# Patient Record
Sex: FEMALE | Race: WHITE | NOT HISPANIC OR LATINO | Employment: OTHER | URBAN - NONMETROPOLITAN AREA
[De-identification: names, ages, dates, MRNs, and addresses within clinical notes are randomized per-mention and may not be internally consistent; named-entity substitution may affect disease eponyms.]

---

## 2019-06-27 ENCOUNTER — HOSPITAL ENCOUNTER (EMERGENCY)
Facility: HOSPITAL | Age: 39
Discharge: HOME | End: 2019-06-27
Attending: EMERGENCY MEDICINE | Admitting: EMERGENCY MEDICINE

## 2019-06-27 VITALS
TEMPERATURE: 98.6 F | OXYGEN SATURATION: 99 % | HEART RATE: 114 BPM | DIASTOLIC BLOOD PRESSURE: 87 MMHG | WEIGHT: 125 LBS | BODY MASS INDEX: 20.83 KG/M2 | SYSTOLIC BLOOD PRESSURE: 126 MMHG | RESPIRATION RATE: 18 BRPM | HEIGHT: 65 IN

## 2019-06-27 DIAGNOSIS — F11.90 METHADONE USE: ICD-10-CM

## 2019-06-27 DIAGNOSIS — T40.1X1A ACCIDENTAL OVERDOSE OF HEROIN, INITIAL ENCOUNTER (HCC): Primary | ICD-10-CM

## 2019-06-27 DIAGNOSIS — F11.10 HEROIN ABUSE (HCC): ICD-10-CM

## 2019-06-27 PROCEDURE — 99283 EMERGENCY DEPT VISIT LOW MDM: CPT

## 2019-06-27 PROCEDURE — 25010000002 PROMETHAZINE PER 50 MG: Performed by: PHYSICIAN ASSISTANT

## 2019-06-27 PROCEDURE — 96372 THER/PROPH/DIAG INJ SC/IM: CPT

## 2019-06-27 RX ORDER — PROMETHAZINE HYDROCHLORIDE 25 MG/ML
12.5 INJECTION, SOLUTION INTRAMUSCULAR; INTRAVENOUS ONCE
Status: COMPLETED | OUTPATIENT
Start: 2019-06-27 | End: 2019-06-27

## 2019-06-27 RX ORDER — METHADONE HYDROCHLORIDE 10 MG/1
125 TABLET ORAL DAILY
COMMUNITY

## 2019-06-27 RX ADMIN — PROMETHAZINE HYDROCHLORIDE 12.5 MG: 25 INJECTION INTRAMUSCULAR; INTRAVENOUS at 18:37

## 2020-09-17 ENCOUNTER — APPOINTMENT (OUTPATIENT)
Dept: CT IMAGING | Facility: HOSPITAL | Age: 40
End: 2020-09-17

## 2020-09-17 ENCOUNTER — HOSPITAL ENCOUNTER (EMERGENCY)
Facility: HOSPITAL | Age: 40
Discharge: HOME | End: 2020-09-18
Attending: STUDENT IN AN ORGANIZED HEALTH CARE EDUCATION/TRAINING PROGRAM | Admitting: EMERGENCY MEDICINE

## 2020-09-17 ENCOUNTER — APPOINTMENT (OUTPATIENT)
Dept: GENERAL RADIOLOGY | Facility: HOSPITAL | Age: 40
End: 2020-09-17

## 2020-09-17 DIAGNOSIS — Z87.898 HISTORY OF SEIZURE: ICD-10-CM

## 2020-09-17 DIAGNOSIS — R41.0 DELIRIUM: Primary | ICD-10-CM

## 2020-09-17 DIAGNOSIS — F19.11 HISTORY OF DRUG ABUSE (HCC): ICD-10-CM

## 2020-09-17 LAB
ALBUMIN SERPL-MCNC: 4.3 G/DL (ref 3.5–5.2)
ALBUMIN/GLOB SERPL: 1.7 G/DL
ALP SERPL-CCNC: 48 U/L (ref 39–117)
ALT SERPL W P-5'-P-CCNC: 15 U/L (ref 1–33)
AMPHET+METHAMPHET UR QL: NEGATIVE
AMPHETAMINES UR QL: NEGATIVE
ANION GAP SERPL CALCULATED.3IONS-SCNC: 21.2 MMOL/L (ref 5–15)
APAP SERPL-MCNC: <5 MCG/ML (ref 10–30)
AST SERPL-CCNC: 20 U/L (ref 1–32)
B-HCG UR QL: NEGATIVE
BACTERIA UR QL AUTO: ABNORMAL /HPF
BARBITURATES UR QL SCN: NEGATIVE
BASOPHILS # BLD AUTO: 0.08 10*3/MM3 (ref 0–0.2)
BASOPHILS NFR BLD AUTO: 0.8 % (ref 0–1.5)
BENZODIAZ UR QL SCN: POSITIVE
BILIRUB SERPL-MCNC: 0.2 MG/DL (ref 0–1.2)
BILIRUB UR QL STRIP: NEGATIVE
BUN SERPL-MCNC: 13 MG/DL (ref 6–20)
BUN/CREAT SERPL: 9.4 (ref 7–25)
BUPRENORPHINE SERPL-MCNC: POSITIVE NG/ML
CALCIUM SPEC-SCNC: 8.9 MG/DL (ref 8.6–10.5)
CANNABINOIDS SERPL QL: NEGATIVE
CHLORIDE SERPL-SCNC: 102 MMOL/L (ref 98–107)
CK SERPL-CCNC: 212 U/L (ref 20–180)
CLARITY UR: CLEAR
CO2 SERPL-SCNC: 16.8 MMOL/L (ref 22–29)
COCAINE UR QL: NEGATIVE
COLOR UR: YELLOW
CREAT SERPL-MCNC: 1.38 MG/DL (ref 0.57–1)
D-LACTATE SERPL-SCNC: 9.6 MMOL/L (ref 0.5–2)
DEPRECATED RDW RBC AUTO: 43.2 FL (ref 37–54)
EOSINOPHIL # BLD AUTO: 0.26 10*3/MM3 (ref 0–0.4)
EOSINOPHIL NFR BLD AUTO: 2.6 % (ref 0.3–6.2)
ERYTHROCYTE [DISTWIDTH] IN BLOOD BY AUTOMATED COUNT: 13.2 % (ref 12.3–15.4)
ETHANOL BLD-MCNC: <10 MG/DL (ref 0–10)
ETHANOL UR QL: <0.01 %
GFR SERPL CREATININE-BSD FRML MDRD: 42 ML/MIN/1.73
GLOBULIN UR ELPH-MCNC: 2.6 GM/DL
GLUCOSE SERPL-MCNC: 161 MG/DL (ref 65–99)
GLUCOSE UR STRIP-MCNC: NEGATIVE MG/DL
HCT VFR BLD AUTO: 36.5 % (ref 34–46.6)
HGB BLD-MCNC: 12.7 G/DL (ref 12–15.9)
HGB UR QL STRIP.AUTO: NEGATIVE
HYALINE CASTS UR QL AUTO: ABNORMAL /LPF
IMM GRANULOCYTES # BLD AUTO: 0.17 10*3/MM3 (ref 0–0.05)
IMM GRANULOCYTES NFR BLD AUTO: 1.7 % (ref 0–0.5)
KETONES UR QL STRIP: ABNORMAL
LACTATE HOLD SPECIMEN: NORMAL
LEUKOCYTE ESTERASE UR QL STRIP.AUTO: NEGATIVE
LYMPHOCYTES # BLD AUTO: 2.42 10*3/MM3 (ref 0.7–3.1)
LYMPHOCYTES NFR BLD AUTO: 24.6 % (ref 19.6–45.3)
MAGNESIUM SERPL-MCNC: 2.2 MG/DL (ref 1.6–2.6)
MCH RBC QN AUTO: 31.6 PG (ref 26.6–33)
MCHC RBC AUTO-ENTMCNC: 34.8 G/DL (ref 31.5–35.7)
MCV RBC AUTO: 90.8 FL (ref 79–97)
METHADONE UR QL SCN: POSITIVE
MONOCYTES # BLD AUTO: 0.63 10*3/MM3 (ref 0.1–0.9)
MONOCYTES NFR BLD AUTO: 6.4 % (ref 5–12)
MUCOUS THREADS URNS QL MICRO: ABNORMAL /HPF
NEUTROPHILS NFR BLD AUTO: 6.29 10*3/MM3 (ref 1.7–7)
NEUTROPHILS NFR BLD AUTO: 63.9 % (ref 42.7–76)
NITRITE UR QL STRIP: NEGATIVE
NRBC BLD AUTO-RTO: 0 /100 WBC (ref 0–0.2)
OPIATES UR QL: NEGATIVE
OXYCODONE UR QL SCN: NEGATIVE
PCP UR QL SCN: NEGATIVE
PH UR STRIP.AUTO: <=5 [PH] (ref 5–8)
PLATELET # BLD AUTO: 409 10*3/MM3 (ref 140–450)
PMV BLD AUTO: 8.7 FL (ref 6–12)
POTASSIUM SERPL-SCNC: 4.4 MMOL/L (ref 3.5–5.2)
PROPOXYPH UR QL: NEGATIVE
PROT SERPL-MCNC: 6.9 G/DL (ref 6–8.5)
PROT UR QL STRIP: ABNORMAL
RBC # BLD AUTO: 4.02 10*6/MM3 (ref 3.77–5.28)
RBC # UR: ABNORMAL /HPF
REF LAB TEST METHOD: ABNORMAL
SALICYLATES SERPL-MCNC: 0.7 MG/DL
SARS-COV-2 RNA PNL SPEC NAA+PROBE: NOT DETECTED
SODIUM SERPL-SCNC: 140 MMOL/L (ref 136–145)
SP GR UR STRIP: 1.02 (ref 1–1.03)
SQUAMOUS #/AREA URNS HPF: ABNORMAL /HPF
TRICYCLICS UR QL SCN: POSITIVE
UROBILINOGEN UR QL STRIP: ABNORMAL
WBC # BLD AUTO: 9.85 10*3/MM3 (ref 3.4–10.8)
WBC UR QL AUTO: ABNORMAL /HPF

## 2020-09-17 PROCEDURE — 71045 X-RAY EXAM CHEST 1 VIEW: CPT

## 2020-09-17 PROCEDURE — 80307 DRUG TEST PRSMV CHEM ANLYZR: CPT | Performed by: STUDENT IN AN ORGANIZED HEALTH CARE EDUCATION/TRAINING PROGRAM

## 2020-09-17 PROCEDURE — 25010000002 VANCOMYCIN 5 G RECONSTITUTED SOLUTION 5,000 MG VIAL: Performed by: EMERGENCY MEDICINE

## 2020-09-17 PROCEDURE — 87205 SMEAR GRAM STAIN: CPT | Performed by: EMERGENCY MEDICINE

## 2020-09-17 PROCEDURE — 96365 THER/PROPH/DIAG IV INF INIT: CPT

## 2020-09-17 PROCEDURE — 83605 ASSAY OF LACTIC ACID: CPT | Performed by: STUDENT IN AN ORGANIZED HEALTH CARE EDUCATION/TRAINING PROGRAM

## 2020-09-17 PROCEDURE — 80053 COMPREHEN METABOLIC PANEL: CPT | Performed by: STUDENT IN AN ORGANIZED HEALTH CARE EDUCATION/TRAINING PROGRAM

## 2020-09-17 PROCEDURE — 99285 EMERGENCY DEPT VISIT HI MDM: CPT

## 2020-09-17 PROCEDURE — 74177 CT ABD & PELVIS W/CONTRAST: CPT

## 2020-09-17 PROCEDURE — 82550 ASSAY OF CK (CPK): CPT | Performed by: STUDENT IN AN ORGANIZED HEALTH CARE EDUCATION/TRAINING PROGRAM

## 2020-09-17 PROCEDURE — 87070 CULTURE OTHR SPECIMN AEROBIC: CPT | Performed by: EMERGENCY MEDICINE

## 2020-09-17 PROCEDURE — 71275 CT ANGIOGRAPHY CHEST: CPT

## 2020-09-17 PROCEDURE — 82945 GLUCOSE OTHER FLUID: CPT | Performed by: EMERGENCY MEDICINE

## 2020-09-17 PROCEDURE — 96366 THER/PROPH/DIAG IV INF ADDON: CPT

## 2020-09-17 PROCEDURE — 81025 URINE PREGNANCY TEST: CPT | Performed by: STUDENT IN AN ORGANIZED HEALTH CARE EDUCATION/TRAINING PROGRAM

## 2020-09-17 PROCEDURE — 89050 BODY FLUID CELL COUNT: CPT | Performed by: EMERGENCY MEDICINE

## 2020-09-17 PROCEDURE — 25010000002 IOPAMIDOL 61 % SOLUTION: Performed by: EMERGENCY MEDICINE

## 2020-09-17 PROCEDURE — 96375 TX/PRO/DX INJ NEW DRUG ADDON: CPT

## 2020-09-17 PROCEDURE — 96361 HYDRATE IV INFUSION ADD-ON: CPT

## 2020-09-17 PROCEDURE — 70450 CT HEAD/BRAIN W/O DYE: CPT

## 2020-09-17 PROCEDURE — 81001 URINALYSIS AUTO W/SCOPE: CPT | Performed by: STUDENT IN AN ORGANIZED HEALTH CARE EDUCATION/TRAINING PROGRAM

## 2020-09-17 PROCEDURE — 87040 BLOOD CULTURE FOR BACTERIA: CPT | Performed by: EMERGENCY MEDICINE

## 2020-09-17 PROCEDURE — 87635 SARS-COV-2 COVID-19 AMP PRB: CPT | Performed by: EMERGENCY MEDICINE

## 2020-09-17 PROCEDURE — 83735 ASSAY OF MAGNESIUM: CPT | Performed by: STUDENT IN AN ORGANIZED HEALTH CARE EDUCATION/TRAINING PROGRAM

## 2020-09-17 PROCEDURE — 84157 ASSAY OF PROTEIN OTHER: CPT | Performed by: EMERGENCY MEDICINE

## 2020-09-17 PROCEDURE — 25010000003 LIDOCAINE 1 % SOLUTION

## 2020-09-17 PROCEDURE — 96372 THER/PROPH/DIAG INJ SC/IM: CPT

## 2020-09-17 PROCEDURE — 87015 SPECIMEN INFECT AGNT CONCNTJ: CPT | Performed by: EMERGENCY MEDICINE

## 2020-09-17 PROCEDURE — 25010000002 MIDAZOLAM PER 1MG: Performed by: STUDENT IN AN ORGANIZED HEALTH CARE EDUCATION/TRAINING PROGRAM

## 2020-09-17 PROCEDURE — 25010000002 ZIPRASIDONE MESYLATE PER 10 MG: Performed by: STUDENT IN AN ORGANIZED HEALTH CARE EDUCATION/TRAINING PROGRAM

## 2020-09-17 PROCEDURE — 85025 COMPLETE CBC W/AUTO DIFF WBC: CPT | Performed by: STUDENT IN AN ORGANIZED HEALTH CARE EDUCATION/TRAINING PROGRAM

## 2020-09-17 RX ORDER — ZIPRASIDONE MESYLATE 20 MG/ML
20 INJECTION, POWDER, LYOPHILIZED, FOR SOLUTION INTRAMUSCULAR ONCE
Status: COMPLETED | OUTPATIENT
Start: 2020-09-17 | End: 2020-09-17

## 2020-09-17 RX ORDER — MIDAZOLAM HYDROCHLORIDE 2 MG/2ML
5 INJECTION, SOLUTION INTRAMUSCULAR; INTRAVENOUS ONCE
Status: COMPLETED | OUTPATIENT
Start: 2020-09-17 | End: 2020-09-17

## 2020-09-17 RX ORDER — CEFTRIAXONE 2 G/50ML
2 INJECTION, SOLUTION INTRAVENOUS ONCE
Status: COMPLETED | OUTPATIENT
Start: 2020-09-17 | End: 2020-09-18

## 2020-09-17 RX ADMIN — SODIUM CHLORIDE 2000 ML: 9 INJECTION, SOLUTION INTRAVENOUS at 19:30

## 2020-09-17 RX ADMIN — ZIPRASIDONE MESYLATE 20 MG: 20 INJECTION, POWDER, LYOPHILIZED, FOR SOLUTION INTRAMUSCULAR at 18:20

## 2020-09-17 RX ADMIN — SODIUM CHLORIDE 1000 ML: 9 INJECTION, SOLUTION INTRAVENOUS at 21:15

## 2020-09-17 RX ADMIN — SODIUM CHLORIDE 1000 ML: 9 INJECTION, SOLUTION INTRAVENOUS at 22:07

## 2020-09-17 RX ADMIN — IOPAMIDOL 100 ML: 612 INJECTION, SOLUTION INTRAVENOUS at 20:41

## 2020-09-17 RX ADMIN — VANCOMYCIN HYDROCHLORIDE 1500 MG: 500 INJECTION, POWDER, LYOPHILIZED, FOR SOLUTION INTRAVENOUS at 21:15

## 2020-09-17 RX ADMIN — MIDAZOLAM HYDROCHLORIDE 5 MG: 1 INJECTION, SOLUTION INTRAMUSCULAR; INTRAVENOUS at 18:51

## 2020-09-17 NOTE — ED PROVIDER NOTES
Subjective   40-year-old female that presents via EMS for agitation and altered mental status.  Patient's family called EMS and the police with concern for their daughter having a seizure.  EMS got there and stated she was in the floor flailing pulling cords out of the wall and biting.  Patient has a history of drug abuse.  She just got out of rehab recently and is currently enrolled in a methadone clinic.  Parents state that she did go to the clinic today to get her dose she goes daily.  EMS gave the patient 3 mg of Ativan intramuscular in route without calming her down.  No history aside from what is given through EMS and the  is available.          Review of Systems   Unable to perform ROS: Mental status change       Past Medical History:   Diagnosis Date   • Anxiety    • Depression    • Disease of thyroid gland    • Substance abuse (CMS/HCC)        No Known Allergies    Past Surgical History:   Procedure Laterality Date   • DILATATION AND CURETTAGE         History reviewed. No pertinent family history.    Social History     Socioeconomic History   • Marital status:      Spouse name: Not on file   • Number of children: Not on file   • Years of education: Not on file   • Highest education level: Not on file   Tobacco Use   • Smoking status: Current Every Day Smoker     Packs/day: 1.00     Types: Cigarettes   Substance and Sexual Activity   • Alcohol use: No     Frequency: Never   • Drug use: Yes     Types: IV     Comment: STATES SHE TOOK HER METHADONE AND SOME BENZOS 9/17/20           Objective   Physical Exam  Vitals signs and nursing note reviewed.     GEN: Patient is actively flailing limbs and being restrained by 4 security guards to nurses and 1 .  She has her arms and handcuffs are currently are behind her back.    Head: Normocephalic, atraumatic  Eyes: Pupils appear small  ENT: Oral mucosa is dry, there is dried blood in the patient's mouth and it looks like she bit her  tongue chest: Nontender to palpation  Cardiovascular: Tachycardic in the 160s   lungs: Respirations in the 40s, clear to auscultation bilaterally  Abdomen: Soft, nontender, nondistended, no peritoneal signs  Extremities: Track marks on upper extremities, abrasions to lower extremities, thorough examination is difficult due to the patient's current agitated state.  She is currently actively kicking and trying to pull her hands out of the handcuffs  Neuro: Patient moves all 4 extremities.  She is definitely not alert or oriented at this time.  She is moaning and wailing.  Definitely appears to be under the influence of some substance  Psych: Unable to assess.    Lumbar Puncture    Date/Time: 9/17/2020 11:36 PM  Performed by: Mik Bennett DO  Authorized by: Mik Bennett DO     Consent:     Consent obtained:  Verbal    Consent given by:  Patient    Risks discussed:  Bleeding, infection, pain, repeat procedure, nerve damage and headache  Pre-procedure details:     Procedure purpose:  Diagnostic    Preparation: Patient was prepped and draped in usual sterile fashion    Anesthesia (see MAR for exact dosages):     Anesthesia method:  Local infiltration    Local anesthetic:  Lidocaine 1% w/o epi  Procedure details:     Lumbar space:  L3-L4 interspace    Patient position:  L lateral decubitus    Needle gauge:  22    Needle length (in):  2.5    Ultrasound guidance: yes      Number of attempts:  2    Fluid appearance:  Clear    Tubes of fluid:  4  Post-procedure:     Puncture site:  Adhesive bandage applied and direct pressure applied    Patient tolerance of procedure:  Tolerated well, no immediate complications               ED Course  ED Course as of Sep 18 0339   Thu Sep 17, 2020   1956 WBC: 9.85 [PF]   1956 Hemoglobin: 12.7 [PF]   1956 Hematocrit: 36.5 [PF]   1956 Platelets: 409 [PF]   1956 Benzodiazepine Screen, Urine(!): Positive [PF]   1956 Tricyclic Antidepressants Screen(!): Positive [PF]   1956 Methadone Screen  , Urine(!): Positive [PF]   1956 Buprenorphine, Screen, Urine(!): Positive [PF]   1956 HCG, Urine QL: Negative [PF]   2003 Ethanol: <10 [PF]   2003 HCG, Urine QL: Negative [PF]   2003 Nitrite, UA: Negative [PF]   2003 Urobilinogen, UA: 0.2 E.U./dL [PF]   2003 Protein, UA(!): 30 mg/dL (1+) [PF]   2003 Blood, UA: Negative [PF]   2003 Bilirubin, UA: Negative [PF]   2003 Ketones, UA(!): Trace [PF]   2003 Glucose: Negative [PF]   2003 Specific Gravity, UA: 1.020 [PF]   2003 Appearance, UA: Clear [PF]   2003 pH, UA: <=5.0 [PF]   2003 Color, UA: Yellow [PF]   2014 Magnesium: 2.2 [PF]   2014 Salicylate: 0.7 [PF]   2014 Ethanol: <10 [PF]   2014 Creatine Kinase(!): 212 [PF]   2014 Acetaminophen(!): <5.0 [PF]   2033 Chest x-ray interpreted by me shows no evidence of any cardiomegaly, effusion, infiltrate, or bony abnormality.    [PF]   2033 Lactate(!!): 9.6 [PF]   2034 RBC, UA: None Seen [PF]   2034 WBC, UA(!): 0-2 [PF]   2034 Bacteria, UA(!): Trace [PF]   2034 Squamous Epithelial Cells, UA: 0-2 [PF]   2034 Hyaline Casts, UA: 31-50 [PF]   2034 Mucus, UA: Trace [PF]   2034 Glucose: Negative [PF]   2034 Ketones, UA(!): Trace [PF]   2034 Bilirubin, UA: Negative [PF]   2034 Blood, UA: Negative [PF]   2034 Protein, UA(!): 30 mg/dL (1+) [PF]   2034 Leukocytes, UA: Negative [PF]   2034 Nitrite, UA: Negative [PF]   Fri Sep 18, 2020   0020 COVID19: Not Detected [PF]   0036 Lactate: 0.8 [PF]   0114 WBC, CSF: 0 [PF]   0114 RBC, CSF(!): 1 [PF]   0114 Color, CSF: Colorless [PF]   0114 Appearance, CSF: Clear [PF]   0131 Protein, Total (CSF): 22.0 [PF]   0131 Glucose, CSF(!): 72 [PF]      ED Course User Index  [PF] Mik Bennett, DO                                           MDM  Number of Diagnoses or Management Options  Diagnosis management comments: 45 minutes critical care time was spent by the attending physician excluding separately billable procedures      Spent the initial 45 minutes of the patient's time in the emergency department  trying to calm her down.  Patient definitely is in a state of excited delirium.  She does not speak and will answer questions but does moan and well.  She will have periods of agitation where she is fighting her restraints with all her might.  We did give her 20 mg of Geodon intramuscularly and then when she continue to fight through this after 40 minutes I ordered an additional 5 mg of intramuscular Versed.  At this time we have not establish an IV out of concerns for safety for the patient and staff.  Police and security are still present.       Amount and/or Complexity of Data Reviewed  Clinical lab tests: reviewed  Tests in the radiology section of CPT®: reviewed  Decide to obtain previous medical records or to obtain history from someone other than the patient: yes  Obtain history from someone other than the patient: yes  Review and summarize past medical records: yes  Discuss the patient with other providers: yes  Independent visualization of images, tracings, or specimens: yes    Risk of Complications, Morbidity, and/or Mortality  Presenting problems: high  Diagnostic procedures: high  Management options: high  General comments: 60 minutes critical care times, exclusive of procedures, due to interpretation of test results.  Formulation of care plan.  Frequent reassessments.     Critical Care  Total time providing critical care: 30-74 minutes    Patient Progress  Patient progress: improved    03:39 EDT  I received care from Dr. Jauregui.  Patient much calmer, more awake and alert.  No further psychomotor agitation.  Patient had lactic acidosis and fever on arrival and initiated septic work-up.  Performed a lumbar puncture to rule out meningitis.  Patient received aggressive IV hydration, broad-spectrum antibiotics of Rocephin and vancomycin once CT head chest abdomen pelvis scans were reviewed and negative for acute pathology.  Repeat lactic acid is pending.  Disposition pending lumbar puncture.    0056  Lactic  acid is cleared on repeat, patient heart rate is normal in the 90s.  Patient is afebrile.    0146  Patient further examined ENT to clarify patient oral bleeding on arrival. Has evidence of bite zaid to tongue, on left. Patient reports seizure hx. On keppra, needs refill. Will dose her evening dose here. Refill keppra. Discharged home in stable condition with outpatient followup recommendations. Return precautions discussed. Advised will contact if abnormal Blood cultures.   Final diagnoses:   Delirium   History of drug abuse (CMS/[facility])   History of seizure            Mik Bennett, DO  09/18/20 0339

## 2020-09-17 NOTE — ED NOTES
AT BEDSIDE WITH PT WHO IS BROUGHT IN BY [facility] WITH C/O AMS BY FAMILY. PT WAS COMBATIVE AND DISORIENTED WITH EMS. [employer] ACCOMPANIED. PT IS DIAPHORETIC ON ARRIVAL. PT IS SCREAMING, PT IS HANDCUFFED AT BILATERAL UE. PT HAS BLOOD IN HER MOUTH; UNABLE TO ASSESS WHETHER IT IS HER TONGUE OR GUMS.      Yaz Mccain, RN  09/17/20 0977

## 2020-09-18 ENCOUNTER — OFFICE VISIT (OUTPATIENT)
Dept: INTERNAL MEDICINE | Facility: CLINIC | Age: 40
End: 2020-09-18

## 2020-09-18 VITALS
DIASTOLIC BLOOD PRESSURE: 56 MMHG | TEMPERATURE: 98.9 F | RESPIRATION RATE: 17 BRPM | WEIGHT: 155 LBS | HEART RATE: 97 BPM | BODY MASS INDEX: 25.83 KG/M2 | SYSTOLIC BLOOD PRESSURE: 96 MMHG | HEIGHT: 65 IN | OXYGEN SATURATION: 98 %

## 2020-09-18 VITALS
TEMPERATURE: 97.4 F | OXYGEN SATURATION: 97 % | HEIGHT: 65 IN | WEIGHT: 145 LBS | BODY MASS INDEX: 24.16 KG/M2 | SYSTOLIC BLOOD PRESSURE: 98 MMHG | HEART RATE: 86 BPM | RESPIRATION RATE: 20 BRPM | DIASTOLIC BLOOD PRESSURE: 62 MMHG

## 2020-09-18 DIAGNOSIS — F41.9 ANXIETY AND DEPRESSION: ICD-10-CM

## 2020-09-18 DIAGNOSIS — F51.01 PRIMARY INSOMNIA: ICD-10-CM

## 2020-09-18 DIAGNOSIS — F19.10 POLYSUBSTANCE ABUSE (HCC): Primary | ICD-10-CM

## 2020-09-18 DIAGNOSIS — F51.5 NIGHTMARES: ICD-10-CM

## 2020-09-18 DIAGNOSIS — G40.909 SEIZURE DISORDER (HCC): ICD-10-CM

## 2020-09-18 DIAGNOSIS — F33.1 MODERATE EPISODE OF RECURRENT MAJOR DEPRESSIVE DISORDER (HCC): ICD-10-CM

## 2020-09-18 DIAGNOSIS — F32.A ANXIETY AND DEPRESSION: ICD-10-CM

## 2020-09-18 LAB
APPEARANCE CSF: CLEAR
COLOR CSF: COLORLESS
D-LACTATE SERPL-SCNC: 0.8 MMOL/L (ref 0.5–2)
GLUCOSE CSF-MCNC: 72 MG/DL (ref 40–70)
HOLD SPECIMEN: NORMAL
PROT CSF-MCNC: 22 MG/DL (ref 15–45)
RBC # CSF MANUAL: 1 /MM3 (ref 0–0)
SPECIMEN VOL CSF: 1.5 ML
TUBE # CSF: 4
WBC # CSF MANUAL: 0 /MM3 (ref 0–5)

## 2020-09-18 PROCEDURE — 96367 TX/PROPH/DG ADDL SEQ IV INF: CPT

## 2020-09-18 PROCEDURE — 99204 OFFICE O/P NEW MOD 45 MIN: CPT | Performed by: INTERNAL MEDICINE

## 2020-09-18 PROCEDURE — 25010000002 CEFTRIAXONE SODIUM-DEXTROSE 2-2.22 GM-%(50ML) RECONSTITUTED SOLUTION: Performed by: EMERGENCY MEDICINE

## 2020-09-18 RX ORDER — BUPROPION HYDROCHLORIDE 300 MG/1
450 TABLET ORAL DAILY
COMMUNITY
End: 2020-10-20 | Stop reason: SDUPTHER

## 2020-09-18 RX ORDER — BUSPIRONE HYDROCHLORIDE 10 MG/1
20 TABLET ORAL 3 TIMES DAILY
Qty: 180 TABLET | Refills: 2 | Status: SHIPPED | OUTPATIENT
Start: 2020-09-18 | End: 2020-12-15 | Stop reason: SDUPTHER

## 2020-09-18 RX ORDER — PRAZOSIN HYDROCHLORIDE 2 MG/1
2 CAPSULE ORAL NIGHTLY
Qty: 90 CAPSULE | Refills: 2 | Status: SHIPPED | OUTPATIENT
Start: 2020-09-18 | End: 2020-10-20 | Stop reason: SDUPTHER

## 2020-09-18 RX ORDER — QUETIAPINE FUMARATE 100 MG/1
100 TABLET, FILM COATED ORAL NIGHTLY
Qty: 30 TABLET | Refills: 2 | Status: SHIPPED | OUTPATIENT
Start: 2020-09-18 | End: 2020-12-15 | Stop reason: SDUPTHER

## 2020-09-18 RX ORDER — DULOXETIN HYDROCHLORIDE 60 MG/1
60 CAPSULE, DELAYED RELEASE ORAL
COMMUNITY
End: 2020-09-18

## 2020-09-18 RX ORDER — LEVETIRACETAM 500 MG/1
500 TABLET ORAL ONCE
Status: COMPLETED | OUTPATIENT
Start: 2020-09-18 | End: 2020-09-18

## 2020-09-18 RX ORDER — DULOXETIN HYDROCHLORIDE 60 MG/1
60 CAPSULE, DELAYED RELEASE ORAL DAILY
Qty: 30 CAPSULE | Refills: 2 | Status: SHIPPED | OUTPATIENT
Start: 2020-09-18 | End: 2020-12-15 | Stop reason: SDUPTHER

## 2020-09-18 RX ORDER — CITALOPRAM 40 MG/1
60 TABLET ORAL
COMMUNITY
End: 2020-10-20

## 2020-09-18 RX ORDER — DOXEPIN HYDROCHLORIDE 25 MG/1
25 CAPSULE ORAL NIGHTLY
Qty: 30 CAPSULE | Refills: 2 | Status: SHIPPED | OUTPATIENT
Start: 2020-09-18 | End: 2020-12-15 | Stop reason: SDUPTHER

## 2020-09-18 RX ORDER — LEVETIRACETAM 500 MG/1
500 TABLET ORAL 2 TIMES DAILY
Qty: 60 TABLET | Refills: 0 | Status: SHIPPED | OUTPATIENT
Start: 2020-09-18 | End: 2020-10-20 | Stop reason: SDUPTHER

## 2020-09-18 RX ORDER — QUETIAPINE FUMARATE 100 MG/1
250 TABLET, FILM COATED ORAL NIGHTLY
COMMUNITY
End: 2020-09-18 | Stop reason: SDUPTHER

## 2020-09-18 RX ADMIN — LEVETIRACETAM 500 MG: 500 TABLET ORAL at 02:07

## 2020-09-18 RX ADMIN — CEFTRIAXONE 2 G: 2 INJECTION, SOLUTION INTRAVENOUS at 00:01

## 2020-09-18 NOTE — ED NOTES
Assisted Dr. Bennett @ bedside with LP. Pt tolerated well.      Anette Moore, TRINA  09/17/20 7502

## 2020-09-18 NOTE — PROGRESS NOTES
Chief Complaint   Patient presents with   • [facility]     ER last night-Recently out of long term treatment[facility]. Plans for future care, discuss meds.       Subjective     History of Present Illness   Audrey Alonso is a 40 y.o. female presenting to establish care.  Patient shares that over the last 8 months, she has been residing in a rehab center for her polysubstance abuse.  Patient has a history of alcoholism, tobacco abuse, and drug use.  As a result, her parents obtain custody of her 8-year-old daughter and she shares that she had good progress at her addiction center.  After leaving the addiction center, she has noticed increased stress.  Her parents were open enough to bring her back home to live with her daughter.  She has noticed increased stress from caring from her daughter as well as managing at home.  Many of her medications were also discontinued upon discharge from the rehab facility and she feels as though being off of these multiple medications has resulted in withdrawal.    Last night, patient was admitted to the emergency room for a seizure episode.  Numerous tests were completed and LP tests are also pending.  She is exhausted today but is feeling okay.  She does wish to restart her psychiatric medications.    The following portions of the patient's history were reviewed and updated as appropriate: allergies, current medications, past family history, past medical history, past social history, past surgical history and problem list.    Review of Systems   Constitutional: Negative for chills, fatigue and fever.   HENT: Negative for congestion, ear pain, rhinorrhea, sinus pressure and sore throat.    Eyes: Negative for visual disturbance.   Respiratory: Negative for cough, chest tightness, shortness of breath and wheezing.    Cardiovascular: Negative for chest pain, palpitations and leg swelling.   Gastrointestinal: Negative for abdominal pain, blood in stool, constipation,  diarrhea, nausea and vomiting.   Endocrine: Negative for polydipsia and polyuria.   Genitourinary: Negative for dysuria and hematuria.   Musculoskeletal: Negative for arthralgias and back pain.   Skin: Negative for rash.   Neurological: Negative for dizziness, light-headedness, numbness and headaches.   Psychiatric/Behavioral: Negative for dysphoric mood and sleep disturbance. The patient is not nervous/anxious.        No Known Allergies    Past Medical History:   Diagnosis Date   • Anxiety    • Depression    • Disease of thyroid gland    • GERD (gastroesophageal reflux disease)    • Headache    • Hypertension    • Seizures (CMS/HCC)    • Substance abuse (CMS/HCC)        Social History     Socioeconomic History   • Marital status:      Spouse name: Not on file   • Number of children: Not on file   • Years of education: Not on file   • Highest education level: Not on file   Tobacco Use   • Smoking status: Current Every Day Smoker     Packs/day: 0.25     Types: Cigarettes   • Smokeless tobacco: Never Used   • Tobacco comment: vapes also   Substance and Sexual Activity   • Alcohol use: No     Frequency: Never     Comment: stopped 2008   • Drug use: Yes     Types: IV     Comment: STATES SHE TOOK HER METHADONE AND SOME BENZOS 9/17/20        Past Surgical History:   Procedure Laterality Date   • DILATATION AND CURETTAGE     • INCISION AND DRAINAGE ABSCESS  2019    arm       Family History   Problem Relation Age of Onset   • Arthritis Mother    • Cancer Mother         breast   • Thyroid disease Mother    • Diabetes Father    • Hypertension Father    • Mental illness Brother          Current Outpatient Medications:   •  buPROPion XL (WELLBUTRIN XL) 300 MG 24 hr tablet, Take 450 mg by mouth Daily., Disp: , Rfl:   •  citalopram (CeleXA) 40 MG tablet, Take 60 mg by mouth., Disp: , Rfl:   •  levETIRAcetam (KEPPRA) 500 MG tablet, Take 1 tablet by mouth 2 (Two) Times a Day., Disp: 60 tablet, Rfl: 0  •  methadone  "(DOLOPHINE) 10 MG tablet, Take 60 mg by mouth Daily,, Disp: , Rfl:   •  QUEtiapine (SEROquel) 100 MG tablet, Take 1 tablet by mouth Every Night., Disp: 30 tablet, Rfl: 2  •  busPIRone (BUSPAR) 10 MG tablet, Take 2 tablets by mouth 3 (Three) Times a Day., Disp: 180 tablet, Rfl: 2  •  doxepin (SINEquan) 25 MG capsule, Take 1 capsule by mouth Every Night., Disp: 30 capsule, Rfl: 2  •  DULoxetine (Cymbalta) 60 MG capsule, Take 1 capsule by mouth Daily., Disp: 30 capsule, Rfl: 2  •  prazosin (MINIPRESS) 2 MG capsule, Take 1 capsule by mouth Every Night., Disp: 90 capsule, Rfl: 2  No current facility-administered medications for this visit.     Objective   BP 96/56   Pulse 97   Temp 98.9 °F (37.2 °C)   Resp 17   Ht 165.1 cm (65\")   Wt 70.3 kg (155 lb)   LMP 08/27/2020 (Approximate)   SpO2 98%   BMI 25.79 kg/m²     Physical Exam  Vitals signs and nursing note reviewed.   Constitutional:       Appearance: She is well-developed.   HENT:      Head: Normocephalic and atraumatic.      Mouth/Throat:      Pharynx: No oropharyngeal exudate.   Eyes:      General: No scleral icterus.     Conjunctiva/sclera: Conjunctivae normal.      Pupils: Pupils are equal, round, and reactive to light.   Neck:      Musculoskeletal: Normal range of motion and neck supple.      Thyroid: No thyromegaly.   Cardiovascular:      Rate and Rhythm: Normal rate and regular rhythm.      Heart sounds: Normal heart sounds. No murmur. No friction rub. No gallop.    Pulmonary:      Effort: Pulmonary effort is normal. No respiratory distress.      Breath sounds: Normal breath sounds. No wheezing.   Abdominal:      General: Bowel sounds are normal. There is no distension.      Palpations: Abdomen is soft.      Tenderness: There is no abdominal tenderness.   Musculoskeletal: Normal range of motion.   Lymphadenopathy:      Cervical: No cervical adenopathy.   Skin:     General: Skin is warm and dry.      Findings: No rash.   Neurological:      Mental Status: " She is alert and oriented to person, place, and time.   Psychiatric:         Behavior: Behavior normal.         Assessment/Plan   Audrey was seen today for [facility].    Diagnoses and all orders for this visit:    Polysubstance abuse (CMS/[facility])  -     Ambulatory Referral to Psychiatry    Anxiety and depression  -     QUEtiapine (SEROquel) 100 MG tablet; Take 1 tablet by mouth Every Night.  -     DULoxetine (Cymbalta) 60 MG capsule; Take 1 capsule by mouth Daily.  -     busPIRone (BUSPAR) 10 MG tablet; Take 2 tablets by mouth 3 (Three) Times a Day.    Seizure disorder (CMS/[facility])  -     Ambulatory Referral to Psychiatry    Primary insomnia  -     doxepin (SINEquan) 25 MG capsule; Take 1 capsule by mouth Every Night.  -     Ambulatory Referral to Psychiatry    Moderate episode of recurrent major depressive disorder (CMS/[facility])  -     QUEtiapine (SEROquel) 100 MG tablet; Take 1 tablet by mouth Every Night.  -     DULoxetine (Cymbalta) 60 MG capsule; Take 1 capsule by mouth Daily.  -     Ambulatory Referral to Psychiatry    Nightmares  -     prazosin (MINIPRESS) 2 MG capsule; Take 1 capsule by mouth Every Night.  -     Ambulatory Referral to Psychiatry          Discussion Summary:  Patient is a 40 y.o. female presenting to [facility].  Patient has a significant history of polysubstance abuse, anxiety and depression, and seizure disorder which appears to be related to her history of substance abuse.  She has reasonable control of her substance abuse as she is now following with a methadone clinic.  Suboxone has recently been discontinued.  She does need to establish with psychiatry as she is on a large number of psychiatric medications.  Encouraged her to consider weaning off of some of these medications when possible.  Given the complexity of her medications, psychiatry can further work with the patient to adjust medications.    ER visit labs and notes were reviewed.  Mild CHARITY with essentially normal CSF studies so  far are noted.  Labs will need to be followed up at her annual visit in about 1 month.        Follow up:  No follow-ups on file.

## 2020-09-18 NOTE — DISCHARGE INSTRUCTIONS
We will contact you if your blood cultures come back abnormal.  They may take up to 48 to 72 hours for results to return.  Take your medications only as prescribed.  Return if worsening symptoms.  Do not operate any heavy machinery for 24 hours due to the medications you received in ER.

## 2020-09-21 LAB
BACTERIA SPEC AEROBE CULT: NORMAL
GRAM STN SPEC: NORMAL
GRAM STN SPEC: NORMAL

## 2020-09-22 LAB
BACTERIA SPEC AEROBE CULT: NORMAL
BACTERIA SPEC AEROBE CULT: NORMAL

## 2020-10-20 ENCOUNTER — OFFICE VISIT (OUTPATIENT)
Dept: INTERNAL MEDICINE | Facility: CLINIC | Age: 40
End: 2020-10-20

## 2020-10-20 ENCOUNTER — OFFICE VISIT (OUTPATIENT)
Dept: BEHAVIORAL HEALTH | Facility: CLINIC | Age: 40
End: 2020-10-20

## 2020-10-20 VITALS
WEIGHT: 154 LBS | BODY MASS INDEX: 25.66 KG/M2 | SYSTOLIC BLOOD PRESSURE: 121 MMHG | OXYGEN SATURATION: 99 % | HEART RATE: 99 BPM | RESPIRATION RATE: 16 BRPM | DIASTOLIC BLOOD PRESSURE: 62 MMHG | TEMPERATURE: 98.2 F | HEIGHT: 65 IN

## 2020-10-20 VITALS
BODY MASS INDEX: 25.66 KG/M2 | DIASTOLIC BLOOD PRESSURE: 82 MMHG | TEMPERATURE: 97.9 F | HEIGHT: 65 IN | OXYGEN SATURATION: 98 % | SYSTOLIC BLOOD PRESSURE: 130 MMHG | HEART RATE: 117 BPM | WEIGHT: 154 LBS

## 2020-10-20 DIAGNOSIS — F41.9 ANXIETY AND DEPRESSION: ICD-10-CM

## 2020-10-20 DIAGNOSIS — R56.9 SEIZURE (HCC): ICD-10-CM

## 2020-10-20 DIAGNOSIS — F51.5 NIGHTMARES: ICD-10-CM

## 2020-10-20 DIAGNOSIS — F33.1 MAJOR DEPRESSIVE DISORDER, RECURRENT EPISODE, MODERATE (HCC): ICD-10-CM

## 2020-10-20 DIAGNOSIS — Z00.00 ENCOUNTER FOR PREVENTIVE HEALTH EXAMINATION: Primary | ICD-10-CM

## 2020-10-20 DIAGNOSIS — Z11.59 ENCOUNTER FOR HEPATITIS C SCREENING TEST FOR LOW RISK PATIENT: ICD-10-CM

## 2020-10-20 DIAGNOSIS — F32.A ANXIETY AND DEPRESSION: ICD-10-CM

## 2020-10-20 DIAGNOSIS — G56.92 NEUROPATHY OF LEFT UPPER EXTREMITY: ICD-10-CM

## 2020-10-20 DIAGNOSIS — Z23 NEED FOR TDAP VACCINATION: ICD-10-CM

## 2020-10-20 DIAGNOSIS — F19.10 POLYSUBSTANCE ABUSE (HCC): ICD-10-CM

## 2020-10-20 DIAGNOSIS — R56.9 SEIZURES (HCC): ICD-10-CM

## 2020-10-20 DIAGNOSIS — F41.1 GENERALIZED ANXIETY DISORDER: Primary | ICD-10-CM

## 2020-10-20 PROCEDURE — 90792 PSYCH DIAG EVAL W/MED SRVCS: CPT | Performed by: NURSE PRACTITIONER

## 2020-10-20 PROCEDURE — 99396 PREV VISIT EST AGE 40-64: CPT | Performed by: INTERNAL MEDICINE

## 2020-10-20 RX ORDER — PRAZOSIN HYDROCHLORIDE 2 MG/1
CAPSULE ORAL
Qty: 90 CAPSULE | Refills: 2 | Status: SHIPPED | OUTPATIENT
Start: 2020-10-20 | End: 2020-11-17

## 2020-10-20 RX ORDER — BUPROPION HYDROCHLORIDE 300 MG/1
300 TABLET ORAL DAILY
Qty: 30 TABLET | Refills: 3 | Status: SHIPPED | OUTPATIENT
Start: 2020-10-20 | End: 2020-12-15 | Stop reason: SDUPTHER

## 2020-10-20 RX ORDER — TETANUS TOXOID, REDUCED DIPHTHERIA TOXOID AND ACELLULAR PERTUSSIS VACCINE, ADSORBED 5; 2.5; 8; 8; 2.5 [IU]/.5ML; [IU]/.5ML; UG/.5ML; UG/.5ML; UG/.5ML
0.5 SUSPENSION INTRAMUSCULAR ONCE
Qty: 0.5 ML | Refills: 0 | Status: SHIPPED | OUTPATIENT
Start: 2020-10-20 | End: 2020-10-20

## 2020-10-20 RX ORDER — LEVETIRACETAM 500 MG/1
500 TABLET ORAL 2 TIMES DAILY
Qty: 60 TABLET | Refills: 0 | Status: SHIPPED | OUTPATIENT
Start: 2020-10-20 | End: 2020-11-16

## 2020-10-20 NOTE — PROGRESS NOTES
Patient Name: Audrey Alonso  MRN: [id number]   :  1980     Referring Physician: David Fleming DO    Chief Complaint:     ICD-10-CM ICD-9-CM   1. Generalized anxiety disorder  F41.1 300.02   2. Nightmares  F51.5 307.47   3. Major depressive disorder, recurrent episode, moderate (CMS/HCC)  F33.1 296.32   4. Seizures (CMS/Former[facility])  R56.9 780.39       HPI:   Audrey Alonso is a 40 y.o. female who is here today for initial evaluation of Anxiety , Depression and Insomnia .  Patient states she recently got out of long-term substance abuse rehab.  States she was there for 9 months.  States when she left her mood was pretty stable.  Did note that they saw a mental health prescriber via telehealth once a week.  Did note that if her mood was not doing well they would add the medication but would not take away an old medication that was not working.  Patient states right now she is feeling an increase in anxiety and depression with depression being slightly worse.  Patient states friends have noticed and mentioned this.    Past Medical History:   Past Medical History:   Diagnosis Date   • Anxiety    • Depression    • Disease of thyroid gland    • GERD (gastroesophageal reflux disease)    • Headache    • Hypertension    • Seizures (CMS/HCC)    • Substance abuse (CMS/HCC)        Past Surgical History:   Past Surgical History:   Procedure Laterality Date   • DILATATION AND CURETTAGE     • INCISION AND DRAINAGE ABSCESS  2019    arm       Social History:   Social History     Socioeconomic History   • Marital status:      Spouse name: Not on file   • Number of children: Not on file   • Years of education: Not on file   • Highest education level: Not on file   Tobacco Use   • Smoking status: Current Every Day Smoker     Packs/day: 0.25     Types: Cigarettes   • Smokeless tobacco: Never Used   • Tobacco comment: vapes also   Substance and Sexual Activity   • Alcohol use: No     Frequency: Never     Comment: stopped  2008   • Drug use: Yes     Types: IV     Comment: STATES SHE TOOK HER METHADONE AND SOME BENZOS 9/17/20       Family History:  Family History   Problem Relation Age of Onset   • Arthritis Mother    • Cancer Mother         breast   • Thyroid disease Mother    • Diabetes Father    • Hypertension Father    • Mental illness Brother        Allergy:  No Known Allergies    Current Medications:   Current Outpatient Medications   Medication Sig Dispense Refill   • busPIRone (BUSPAR) 10 MG tablet Take 2 tablets by mouth 3 (Three) Times a Day. 180 tablet 2   • doxepin (SINEquan) 25 MG capsule Take 1 capsule by mouth Every Night. 30 capsule 2   • DULoxetine (Cymbalta) 60 MG capsule Take 1 capsule by mouth Daily. 30 capsule 2   • levETIRAcetam (KEPPRA) 500 MG tablet Take 1 tablet by mouth 2 (Two) Times a Day. 60 tablet 0   • methadone (DOLOPHINE) 10 MG tablet Take 60 mg by mouth Daily,     • prazosin (MINIPRESS) 2 MG capsule Take three po q hs 90 capsule 2   • QUEtiapine (SEROquel) 100 MG tablet Take 1 tablet by mouth Every Night. 30 tablet 2   • buPROPion XL (WELLBUTRIN XL) 300 MG 24 hr tablet Take 1 tablet by mouth Daily. 30 tablet 3     No current facility-administered medications for this visit.        Lab Results:   Admission on 09/17/2020, Discharged on 09/18/2020   Component Date Value Ref Range Status   • Glucose 09/17/2020 161* 65 - 99 mg/dL Final   • BUN 09/17/2020 13  6 - 20 mg/dL Final   • Creatinine 09/17/2020 1.38* 0.57 - 1.00 mg/dL Final   • Sodium 09/17/2020 140  136 - 145 mmol/L Final   • Potassium 09/17/2020 4.4  3.5 - 5.2 mmol/L Final   • Chloride 09/17/2020 102  98 - 107 mmol/L Final   • CO2 09/17/2020 16.8* 22.0 - 29.0 mmol/L Final   • Calcium 09/17/2020 8.9  8.6 - 10.5 mg/dL Final   • Total Protein 09/17/2020 6.9  6.0 - 8.5 g/dL Final   • Albumin 09/17/2020 4.30  3.50 - 5.20 g/dL Final   • ALT (SGPT) 09/17/2020 15  1 - 33 U/L Final   • AST (SGOT) 09/17/2020 20  1 - 32 U/L Final   • Alkaline Phosphatase  09/17/2020 48  39 - 117 U/L Final   • Total Bilirubin 09/17/2020 0.2  0.0 - 1.2 mg/dL Final   • eGFR Non African Amer 09/17/2020 42* >60 mL/min/1.73 Final   • Globulin 09/17/2020 2.6  gm/dL Final   • A/G Ratio 09/17/2020 1.7  g/dL Final   • BUN/Creatinine Ratio 09/17/2020 9.4  7.0 - 25.0 Final   • Anion Gap 09/17/2020 21.2* 5.0 - 15.0 mmol/L Final   • HCG, Urine QL 09/17/2020 Negative  Negative Final   • Color, UA 09/17/2020 Yellow  Yellow, Straw Final   • Appearance, UA 09/17/2020 Clear  Clear Final   • pH, UA 09/17/2020 <=5.0  5.0 - 8.0 Final   • Specific Gravity, UA 09/17/2020 1.020  1.005 - 1.030 Final   • Glucose, UA 09/17/2020 Negative  Negative Final   • Ketones, UA 09/17/2020 Trace* Negative Final   • Bilirubin, UA 09/17/2020 Negative  Negative Final   • Blood, UA 09/17/2020 Negative  Negative Final   • Protein, UA 09/17/2020 30 mg/dL (1+)* Negative Final   • Leuk Esterase, UA 09/17/2020 Negative  Negative Final   • Nitrite, UA 09/17/2020 Negative  Negative Final   • Urobilinogen, UA 09/17/2020 0.2 E.U./dL  0.2 - 1.0 E.U./dL Final   • Lactate 09/17/2020 9.6* 0.5 - 2.0 mmol/L Final   • Ethanol 09/17/2020 <10  0 - 10 mg/dL Final   • Ethanol % 09/17/2020 <0.010  % Final   • THC, Screen, Urine 09/17/2020 Negative  Negative Final   • Phencyclidine (PCP), Urine 09/17/2020 Negative  Negative Final   • Cocaine Screen, Urine 09/17/2020 Negative  Negative Final   • Methamphetamine, Ur 09/17/2020 Negative  Negative Final   • Opiate Screen 09/17/2020 Negative  Negative Final   • Amphetamine Screen, Urine 09/17/2020 Negative  Negative Final   • Benzodiazepine Screen, Urine 09/17/2020 Positive* Negative Final   • Tricyclic Antidepressants Screen 09/17/2020 Positive* Negative Final   • Methadone Screen, Urine 09/17/2020 Positive* Negative Final   • Barbiturates Screen, Urine 09/17/2020 Negative  Negative Final   • Oxycodone Screen, Urine 09/17/2020 Negative  Negative Final   • Propoxyphene Screen 09/17/2020 Negative   Negative Final   • Buprenorphine, Screen, Urine 09/17/2020 Positive* Negative Final   • Acetaminophen 09/17/2020 <5.0* 10.0 - 30.0 mcg/mL Final   • Salicylate 09/17/2020 0.7  <=30.0 mg/dL Final   • Magnesium 09/17/2020 2.2  1.6 - 2.6 mg/dL Final   • WBC 09/17/2020 9.85  3.40 - 10.80 10*3/mm3 Final   • RBC 09/17/2020 4.02  3.77 - 5.28 10*6/mm3 Final   • Hemoglobin 09/17/2020 12.7  12.0 - 15.9 g/dL Final   • Hematocrit 09/17/2020 36.5  34.0 - 46.6 % Final   • MCV 09/17/2020 90.8  79.0 - 97.0 fL Final   • MCH 09/17/2020 31.6  26.6 - 33.0 pg Final   • MCHC 09/17/2020 34.8  31.5 - 35.7 g/dL Final   • RDW 09/17/2020 13.2  12.3 - 15.4 % Final   • RDW-SD 09/17/2020 43.2  37.0 - 54.0 fl Final   • MPV 09/17/2020 8.7  6.0 - 12.0 fL Final   • Platelets 09/17/2020 409  140 - 450 10*3/mm3 Final   • Neutrophil % 09/17/2020 63.9  42.7 - 76.0 % Final   • Lymphocyte % 09/17/2020 24.6  19.6 - 45.3 % Final   • Monocyte % 09/17/2020 6.4  5.0 - 12.0 % Final   • Eosinophil % 09/17/2020 2.6  0.3 - 6.2 % Final   • Basophil % 09/17/2020 0.8  0.0 - 1.5 % Final   • Immature Grans % 09/17/2020 1.7* 0.0 - 0.5 % Final   • Neutrophils, Absolute 09/17/2020 6.29  1.70 - 7.00 10*3/mm3 Final   • Lymphocytes, Absolute 09/17/2020 2.42  0.70 - 3.10 10*3/mm3 Final   • Monocytes, Absolute 09/17/2020 0.63  0.10 - 0.90 10*3/mm3 Final   • Eosinophils, Absolute 09/17/2020 0.26  0.00 - 0.40 10*3/mm3 Final   • Basophils, Absolute 09/17/2020 0.08  0.00 - 0.20 10*3/mm3 Final   • Immature Grans, Absolute 09/17/2020 0.17* 0.00 - 0.05 10*3/mm3 Final   • nRBC 09/17/2020 0.0  0.0 - 0.2 /100 WBC Final   • Creatine Kinase 09/17/2020 212* 20 - 180 U/L Final   • RBC, UA 09/17/2020 None Seen  None Seen /HPF Final   • WBC, UA 09/17/2020 0-2* None Seen /HPF Final   • Bacteria, UA 09/17/2020 Trace* None Seen /HPF Final   • Squamous Epithelial Cells, UA 09/17/2020 0-2  None Seen, 0-2 /HPF Final   • Hyaline Casts, UA 09/17/2020 31-50  None Seen /LPF Final   • Mucus, UA  09/17/2020 Trace  None Seen, Trace /HPF Final   • Methodology 09/17/2020 Manual Light Microscopy   Final   • Hold Tube 09/17/2020 Hold for add-ons.   Final    Auto resulted.   • Blood Culture 09/17/2020 No growth at 5 days   Final   • Blood Culture 09/17/2020 No growth at 5 days   Final   • COVID19 09/17/2020 Not Detected  Not Detected - Ref. Range Final   • Protein, Total (CSF) 09/17/2020 22.0  15.0 - 45.0 mg/dL Final   • Glucose, CSF 09/17/2020 72* 40 - 70 mg/dL Final   • CSF Culture 09/17/2020 No growth at 3 days   Final   • Gram Stain 09/17/2020 No WBCs seen   Final   • Gram Stain 09/17/2020 No organisms seen   Final   • Extra Tube 09/17/2020 hold   Final   • WBC, CSF 09/17/2020 0  0 - 5 /mm3 Final   • RBC, CSF 09/17/2020 1* 0 - 0 /mm3 Final   • Color, CSF 09/17/2020 Colorless  Colorless Final   • Appearance, CSF 09/17/2020 Clear  Clear Final   • Volume, CSF 09/17/2020 1.5  mL Final   • Tube Number, CSF 09/17/2020 4   Final   • Lactate 09/17/2020 0.8  0.5 - 2.0 mmol/L Final       Review of Symptoms:   Review of Systems   Constitutional: Negative for activity change, appetite change, fatigue, unexpected weight gain and unexpected weight loss.   Respiratory: Negative for shortness of breath and wheezing.    Gastrointestinal: Negative for constipation, diarrhea, nausea and vomiting.   Musculoskeletal: Negative for gait problem.   Skin: Negative for dry skin and rash.   Neurological: Negative for dizziness, speech difficulty, weakness, light-headedness, headache, memory problem and confusion.   Psychiatric/Behavioral: Positive for dysphoric mood, sleep disturbance, depressed mood and stress. Negative for agitation, behavioral problems, decreased concentration, hallucinations, self-injury, suicidal ideas and negative for hyperactivity. The patient is nervous/anxious.        Physical Exam:   Physical Exam  Vitals signs and nursing note reviewed.   Constitutional:       General: She is not in acute distress.      "Appearance: She is well-developed. She is not diaphoretic.   HENT:      Head: Normocephalic and atraumatic.   Eyes:      Conjunctiva/sclera: Conjunctivae normal.   Neck:      Musculoskeletal: Full passive range of motion without pain and normal range of motion.   Cardiovascular:      Rate and Rhythm: Normal rate.   Pulmonary:      Effort: Pulmonary effort is normal. No respiratory distress.   Musculoskeletal: Normal range of motion.   Skin:     General: Skin is warm and dry.   Neurological:      Mental Status: She is alert and oriented to person, place, and time.   Psychiatric:         Mood and Affect: Mood is anxious and depressed. Affect is not labile, blunt, angry or inappropriate.         Speech: Speech is not rapid and pressured or tangential.         Behavior: Behavior normal. Behavior is not agitated, slowed, aggressive, withdrawn, hyperactive or combative. Behavior is cooperative.         Thought Content: Thought content normal. Thought content is not paranoid or delusional. Thought content does not include homicidal or suicidal ideation. Thought content does not include homicidal or suicidal plan.         Judgment: Judgment normal.       Blood pressure 130/82, pulse 117, temperature 97.9 °F (36.6 °C), height 165.1 cm (65\"), weight 69.9 kg (154 lb), SpO2 98 %.  Body mass index is 25.63 kg/m².     Mental Status Exam:   Appearance: appropriate  Hygiene:   good  Cooperation:  Cooperative  Eye Contact:  Good  Psychomotor Behavior:  Appropriate  Mood:anxious and depressed  Affect:  Appropriate  Hopelessness: Denies  Speech:  Normal  Thought Process:  Goal directed  Thought Content:  Normal  Suicidal:  None  Homicidal:  None  Hallucinations:  None  Delusion:  None  Memory:  Intact  Orientation:  Person, Place, Time and Situation  Reliability:  good  Insight:  Good  Judgement:  Good  Impulse Control:  Good  Physical/Medical Issues:  No     PHQ-9 Depression Screening  Little interest or pleasure in doing things? 2 "   Feeling down, depressed, or hopeless? 3   Trouble falling or staying asleep, or sleeping too much? 1   Feeling tired or having little energy? 1   Poor appetite or overeating? 3   Feeling bad about yourself - or that you are a failure or have let yourself or your family down? 3   Trouble concentrating on things, such as reading the newspaper or watching television? 3   Moving or speaking so slowly that other people could have noticed? Or the opposite - being so fidgety or restless that you have been moving around a lot more than usual? 3   Thoughts that you would be better off dead, or of hurting yourself in some way? 0   PHQ-9 Total Score 19   If you checked off any problems, how difficult have these problems made it for you to do your work, take care of things at home, or get along with other people?        Assessment/Plan:   Diagnoses and all orders for this visit:    1. Generalized anxiety disorder (Primary)  -     buPROPion XL (WELLBUTRIN XL) 300 MG 24 hr tablet; Take 1 tablet by mouth Daily.  Dispense: 30 tablet; Refill: 3    2. Nightmares  -     prazosin (MINIPRESS) 2 MG capsule; Take three po q hs  Dispense: 90 capsule; Refill: 2    3. Major depressive disorder, recurrent episode, moderate (CMS/HCC)    4. Seizures (CMS/HCC)  -     levETIRAcetam (KEPPRA) 500 MG tablet; Take 1 tablet by mouth 2 (Two) Times a Day.  Dispense: 60 tablet; Refill: 0    Patient states she was taking 6 mg of prazosin for nightmares.  We will increase this back to original dose.  Will add Wellbutrin  mg to the Cymbalta keep Celexa discontinued for now.  Did refill Keppra as she was out for seizures however primary care will continue this prescription in the future.    A psychological evaluation was conducted in order to assess past and current level of functioning. Areas assessed included, but were not limited to: perception of social support, perception of ability to face and deal with challenges in life (positive  functioning), anxiety symptoms, depressive symptoms, perspective on beliefs/belief system, coping skills for stress, intelligence level,  Therapeutic rapport was established. Interventions conducted today were geared towards incorporating medication management along with support for continued therapy. Education was also provided as to the med management with this provider and what to expect in subsequent sessions.    We discussed risks, benefits,goals and side effects of the above medication and the patient was agreeable with the plan.Patient was educated on the importance of compliance with treatment and follow-up appointments. Patient is aware to contact the [facility] with any worsening of symptoms. To call for questions or concerns and return early if necessary. Patent is agreeable to go to the Emergency Department or call 911 should they begin SI/HI.     Treatment Plan:   Discussed risks, benefits, and alternatives of medication. Encouraged healthy habits (eating, exercise and sleep). Call if any questions or problems arise. Medication reconciled. Controlled substance monitoring report reviewed. Provided psychoeducation.. Discussed coping strategies and current stressors. Set appropriate boundaries and limits for patient's well-being. Use distraction techniques to improve symptoms. Access support networks.      Return in about 4 weeks (around 11/17/2020) for Follow Up 15 min.    Silvina Taylor, STEVEN

## 2020-10-20 NOTE — PROGRESS NOTES
Chief Complaint   Patient presents with   • Annual Exam     physical       Subjective     History of Present Illness   Audrey Alonso is a 40 y.o. female presenting for annual physical.  Preventive health maintenance was reviewed and discussed today. Vaccines were updated.     Currently smoking especially as she ran out of wellbutrin.  Currently at 1/2 ppd.     Has been tolerating her keppra over the last month.  Has had 2 episodes of near seizures where she noticed change in smell and a feeling of lighting through her body. Had appointment with Neurology but this was missed after she was admitted for drug rehab.  She also continues to have lack of feeling to the left hand 3rd to 5th digits.  Has nearly burned her self without realizing the pain.      The following portions of the patient's history were reviewed and updated as appropriate: allergies, current medications, past family history, past medical history, past social history, past surgical history and problem list.    Review of Systems   Constitutional: Negative for chills, fatigue and fever.   HENT: Negative for congestion, ear pain, rhinorrhea, sinus pressure and sore throat.    Eyes: Negative for visual disturbance.   Respiratory: Negative for cough, chest tightness, shortness of breath and wheezing.    Cardiovascular: Negative for chest pain, palpitations and leg swelling.   Gastrointestinal: Negative for abdominal pain, blood in stool, constipation, diarrhea, nausea and vomiting.   Endocrine: Negative for polydipsia and polyuria.   Genitourinary: Negative for dysuria and hematuria.   Musculoskeletal: Negative for arthralgias and back pain.   Skin: Negative for rash.   Neurological: Negative for dizziness, light-headedness, numbness and headaches.   Psychiatric/Behavioral: Negative for dysphoric mood and sleep disturbance. The patient is not nervous/anxious.        No Known Allergies    Past Medical History:   Diagnosis Date   • Anxiety    • Depression     • Disease of thyroid gland    • GERD (gastroesophageal reflux disease)    • Headache    • Hypertension    • Seizures (CMS/HCC)    • Substance abuse (CMS/HCC)        Social History     Socioeconomic History   • Marital status:      Spouse name: Not on file   • Number of children: Not on file   • Years of education: Not on file   • Highest education level: Not on file   Tobacco Use   • Smoking status: Current Every Day Smoker     Packs/day: 0.25     Types: Cigarettes   • Smokeless tobacco: Never Used   • Tobacco comment: vapes also   Substance and Sexual Activity   • Alcohol use: No     Frequency: Never     Comment: stopped 2008   • Drug use: Yes     Types: IV     Comment: STATES SHE TOOK HER METHADONE AND SOME BENZOS 9/17/20        Past Surgical History:   Procedure Laterality Date   • DILATATION AND CURETTAGE     • INCISION AND DRAINAGE ABSCESS  2019    arm       Family History   Problem Relation Age of Onset   • Arthritis Mother    • Cancer Mother         breast   • Thyroid disease Mother    • Diabetes Father    • Hypertension Father    • Mental illness Brother          Current Outpatient Medications:   •  buPROPion XL (WELLBUTRIN XL) 300 MG 24 hr tablet, Take 1 tablet by mouth Daily., Disp: 30 tablet, Rfl: 3  •  busPIRone (BUSPAR) 10 MG tablet, Take 2 tablets by mouth 3 (Three) Times a Day., Disp: 180 tablet, Rfl: 2  •  doxepin (SINEquan) 25 MG capsule, Take 1 capsule by mouth Every Night., Disp: 30 capsule, Rfl: 2  •  DULoxetine (Cymbalta) 60 MG capsule, Take 1 capsule by mouth Daily., Disp: 30 capsule, Rfl: 2  •  levETIRAcetam (KEPPRA) 500 MG tablet, Take 1 tablet by mouth 2 (Two) Times a Day., Disp: 60 tablet, Rfl: 0  •  methadone (DOLOPHINE) 10 MG tablet, Take 60 mg by mouth Daily,, Disp: , Rfl:   •  prazosin (MINIPRESS) 2 MG capsule, Take three po q hs, Disp: 90 capsule, Rfl: 2  •  QUEtiapine (SEROquel) 100 MG tablet, Take 1 tablet by mouth Every Night., Disp: 30 tablet, Rfl: 2    Objective   BP  "121/62   Pulse 99   Temp 98.2 °F (36.8 °C)   Resp 16   Ht 165.1 cm (65\")   Wt 69.9 kg (154 lb)   SpO2 99%   BMI 25.63 kg/m²     Physical Exam  Vitals signs and nursing note reviewed.   Constitutional:       General: She is not in acute distress.     Appearance: She is well-developed.   HENT:      Head: Normocephalic and atraumatic.      Right Ear: External ear normal.      Left Ear: External ear normal.      Nose: Nose normal.      Mouth/Throat:      Pharynx: No oropharyngeal exudate.   Eyes:      General: No scleral icterus.     Conjunctiva/sclera: Conjunctivae normal.      Pupils: Pupils are equal, round, and reactive to light.   Neck:      Musculoskeletal: Normal range of motion and neck supple.      Thyroid: No thyromegaly.      Vascular: No JVD.   Cardiovascular:      Rate and Rhythm: Normal rate and regular rhythm.      Heart sounds: Normal heart sounds.   Pulmonary:      Effort: Pulmonary effort is normal. No respiratory distress.      Breath sounds: Normal breath sounds. No stridor. No wheezing.   Abdominal:      General: Bowel sounds are normal. There is no distension.      Palpations: Abdomen is soft. There is no mass.      Tenderness: There is no abdominal tenderness. There is no guarding.   Genitourinary:     Comments: Deferred  Musculoskeletal: Normal range of motion.         General: No tenderness.   Lymphadenopathy:      Cervical: No cervical adenopathy.   Skin:     General: Skin is warm and dry.   Neurological:      Mental Status: She is alert and oriented to person, place, and time.      Cranial Nerves: No cranial nerve deficit.   Psychiatric:         Behavior: Behavior normal.         Thought Content: Thought content normal.         Judgment: Judgment normal.         Assessment/Plan   Diagnoses and all orders for this visit:    1. Encounter for preventive health examination (Primary)  -     CBC & Differential  -     Comprehensive Metabolic Panel  -     Lipid Panel  -     Ambulatory Referral " to Gynecology    2. Neuropathy of left upper extremity  -     Ambulatory Referral to Neurology    3. Seizure (CMS/HCC)  -     CBC & Differential  -     Comprehensive Metabolic Panel  -     TSH  -     Levetiracetam Level (Keppra)  -     Ambulatory Referral to Neurology    4. Need for Tdap vaccination  -     Tdap (Boostrix) 5-2.5-18.5 LF-MCG/0.5 injection; Inject 0.5 mL into the appropriate muscle as directed by prescriber 1 (One) Time for 1 dose.  Dispense: 0.5 mL; Refill: 0    5. Encounter for hepatitis C screening test for low risk patient  -     CBC & Differential  -     Comprehensive Metabolic Panel  -     Hepatitis Panel, Acute    6. Anxiety and depression    7. Polysubstance abuse (CMS/HCC)  -     CBC & Differential  -     Comprehensive Metabolic Panel  -     Lipid Panel  -     Hepatitis Panel, Acute  -     TSH          Discussion Summary:  Patient is a 40 y.o. female presenting for annual physical    1. Preventive Health Maintenance  - Baseline labs are up-to-date or ordered per above.  - Vaccines reviewed and updated  - Preventive health measures were discussed including: healthy diet with increase in fruits and vegetables, regular exercise at least 3 times a week, safe sex practices, avoidance of drugs, tobacco, and alcohol, and regular seatbelt use.    2. RUE neuropathy and chronic headaches  - concerning for neurologic conditions, neuro referral placed.     3. Seizure Disorder  - stable on current medications, needs long term management with neurology.     4.  Anxiety and depression/history of substance abuse  -Patient has established with psychiatry today.  Medications are being adjusted.      Follow up:  Return in about 3 months (around 1/20/2021) for Next scheduled follow up.     Patient Instructions:  Patient instructions were provided.

## 2020-11-15 DIAGNOSIS — R56.9 SEIZURES (HCC): ICD-10-CM

## 2020-11-16 RX ORDER — LEVETIRACETAM 500 MG/1
TABLET ORAL
Qty: 60 TABLET | Refills: 0 | Status: SHIPPED | OUTPATIENT
Start: 2020-11-16 | End: 2020-11-17 | Stop reason: SDUPTHER

## 2020-11-17 ENCOUNTER — OFFICE VISIT (OUTPATIENT)
Dept: BEHAVIORAL HEALTH | Facility: CLINIC | Age: 40
End: 2020-11-17

## 2020-11-17 VITALS
DIASTOLIC BLOOD PRESSURE: 64 MMHG | HEIGHT: 64 IN | OXYGEN SATURATION: 98 % | HEART RATE: 90 BPM | BODY MASS INDEX: 27.11 KG/M2 | TEMPERATURE: 98.4 F | SYSTOLIC BLOOD PRESSURE: 116 MMHG | WEIGHT: 158.8 LBS

## 2020-11-17 DIAGNOSIS — F51.5 NIGHTMARES: ICD-10-CM

## 2020-11-17 DIAGNOSIS — R56.9 SEIZURES (HCC): ICD-10-CM

## 2020-11-17 DIAGNOSIS — F33.1 MAJOR DEPRESSIVE DISORDER, RECURRENT EPISODE, MODERATE (HCC): Primary | ICD-10-CM

## 2020-11-17 DIAGNOSIS — F41.1 GENERALIZED ANXIETY DISORDER: ICD-10-CM

## 2020-11-17 PROCEDURE — 99213 OFFICE O/P EST LOW 20 MIN: CPT | Performed by: NURSE PRACTITIONER

## 2020-11-17 RX ORDER — LEVETIRACETAM 500 MG/1
500 TABLET ORAL 2 TIMES DAILY
Qty: 60 TABLET | Refills: 6 | Status: SHIPPED | OUTPATIENT
Start: 2020-11-17 | End: 2021-06-01 | Stop reason: SDUPTHER

## 2020-11-17 RX ORDER — PRAZOSIN HYDROCHLORIDE 5 MG/1
CAPSULE ORAL
Qty: 60 CAPSULE | Refills: 3 | Status: SHIPPED | OUTPATIENT
Start: 2020-11-17 | End: 2021-01-19 | Stop reason: SDUPTHER

## 2020-11-17 RX ORDER — DULOXETIN HYDROCHLORIDE 30 MG/1
30 CAPSULE, DELAYED RELEASE ORAL DAILY
Qty: 30 CAPSULE | Refills: 2 | Status: SHIPPED | OUTPATIENT
Start: 2020-11-17 | End: 2021-01-19 | Stop reason: SDUPTHER

## 2020-11-17 NOTE — PROGRESS NOTES
Patient Name: Audrey Alonso  MRN: [id number]   :  1980     Chief Complaint:      ICD-10-CM ICD-9-CM   1. Major depressive disorder, recurrent episode, moderate (CMS/[facility])  F33.1 296.32   2. Generalized anxiety disorder  F41.1 300.02   3. Nightmares  F51.5 307.47   4. Seizures (CMS/[facility])  R56.9 780.39       History of Present Illness: Audrey Alonso is a 40 y.o. female is here today for medication management follow up.  Patient states overall depression is slightly better.  States she still has really bad days but in general is improved.  States anxiety seems to be about the same and affects everyday life.  Still having night terrors 2-3 times a week.    The following portions of the patient's history were reviewed and updated as appropriate: allergies, current medications, past family history, past medical history, past social history, past surgical history and problem list.    Review of Systems;;  Review of Systems   Constitutional: Negative for activity change, appetite change, fatigue, unexpected weight gain and unexpected weight loss.   Respiratory: Negative for shortness of breath and wheezing.    Gastrointestinal: Negative for constipation, diarrhea, nausea and vomiting.   Musculoskeletal: Negative for gait problem.   Skin: Negative for dry skin and rash.   Neurological: Negative for dizziness, speech difficulty, weakness, light-headedness, headache, memory problem and confusion.   Psychiatric/Behavioral: Positive for sleep disturbance, depressed mood and stress. Negative for agitation, behavioral problems, decreased concentration, dysphoric mood, hallucinations, self-injury, suicidal ideas and negative for hyperactivity. The patient is nervous/anxious.        Physical Exam;;  Physical Exam  Vitals signs and nursing note reviewed.   Constitutional:       General: She is not in acute distress.     Appearance: She is well-developed. She is not diaphoretic.   HENT:      Head: Normocephalic and  "atraumatic.   Eyes:      Conjunctiva/sclera: Conjunctivae normal.   Neck:      Musculoskeletal: Full passive range of motion without pain and normal range of motion.   Cardiovascular:      Rate and Rhythm: Normal rate.   Pulmonary:      Effort: Pulmonary effort is normal. No respiratory distress.   Musculoskeletal: Normal range of motion.   Skin:     General: Skin is warm and dry.   Neurological:      Mental Status: She is alert and oriented to person, place, and time.   Psychiatric:         Mood and Affect: Mood is anxious and depressed. Affect is not labile, blunt, angry or inappropriate.         Speech: Speech is not rapid and pressured or tangential.         Behavior: Behavior normal. Behavior is not agitated, slowed, aggressive, withdrawn, hyperactive or combative. Behavior is cooperative.         Thought Content: Thought content normal. Thought content is not paranoid or delusional. Thought content does not include homicidal or suicidal ideation. Thought content does not include homicidal or suicidal plan.         Judgment: Judgment normal.       Blood pressure 116/64, pulse 90, temperature 98.4 °F (36.9 °C), height 162.6 cm (64\"), weight 72 kg (158 lb 12.8 oz), SpO2 98 %.  Body mass index is 27.26 kg/m².    Current Medications;;    Current Outpatient Medications:   •  buPROPion XL (WELLBUTRIN XL) 300 MG 24 hr tablet, Take 1 tablet by mouth Daily., Disp: 30 tablet, Rfl: 3  •  busPIRone (BUSPAR) 10 MG tablet, Take 2 tablets by mouth 3 (Three) Times a Day., Disp: 180 tablet, Rfl: 2  •  doxepin (SINEquan) 25 MG capsule, Take 1 capsule by mouth Every Night., Disp: 30 capsule, Rfl: 2  •  DULoxetine (Cymbalta) 60 MG capsule, Take 1 capsule by mouth Daily., Disp: 30 capsule, Rfl: 2  •  levETIRAcetam (KEPPRA) 500 MG tablet, Take 1 tablet by mouth 2 (Two) Times a Day., Disp: 60 tablet, Rfl: 6  •  methadone (DOLOPHINE) 10 MG tablet, Take 60 mg by mouth Daily,, Disp: , Rfl:   •  QUEtiapine (SEROquel) 100 MG tablet, Take " 1 tablet by mouth Every Night., Disp: 30 tablet, Rfl: 2  •  DULoxetine (Cymbalta) 30 MG capsule, Take 1 capsule by mouth Daily. With 60mg., Disp: 30 capsule, Rfl: 2  •  prazosin (MINIPRESS) 5 MG capsule, Two po q hs, Disp: 60 capsule, Rfl: 3    Lab Results:   Admission on 09/17/2020, Discharged on 09/18/2020   Component Date Value Ref Range Status   • Glucose 09/17/2020 161* 65 - 99 mg/dL Final   • BUN 09/17/2020 13  6 - 20 mg/dL Final   • Creatinine 09/17/2020 1.38* 0.57 - 1.00 mg/dL Final   • Sodium 09/17/2020 140  136 - 145 mmol/L Final   • Potassium 09/17/2020 4.4  3.5 - 5.2 mmol/L Final   • Chloride 09/17/2020 102  98 - 107 mmol/L Final   • CO2 09/17/2020 16.8* 22.0 - 29.0 mmol/L Final   • Calcium 09/17/2020 8.9  8.6 - 10.5 mg/dL Final   • Total Protein 09/17/2020 6.9  6.0 - 8.5 g/dL Final   • Albumin 09/17/2020 4.30  3.50 - 5.20 g/dL Final   • ALT (SGPT) 09/17/2020 15  1 - 33 U/L Final   • AST (SGOT) 09/17/2020 20  1 - 32 U/L Final   • Alkaline Phosphatase 09/17/2020 48  39 - 117 U/L Final   • Total Bilirubin 09/17/2020 0.2  0.0 - 1.2 mg/dL Final   • eGFR Non African Amer 09/17/2020 42* >60 mL/min/1.73 Final   • Globulin 09/17/2020 2.6  gm/dL Final   • A/G Ratio 09/17/2020 1.7  g/dL Final   • BUN/Creatinine Ratio 09/17/2020 9.4  7.0 - 25.0 Final   • Anion Gap 09/17/2020 21.2* 5.0 - 15.0 mmol/L Final   • HCG, Urine QL 09/17/2020 Negative  Negative Final   • Color, UA 09/17/2020 Yellow  Yellow, Straw Final   • Appearance, UA 09/17/2020 Clear  Clear Final   • pH, UA 09/17/2020 <=5.0  5.0 - 8.0 Final   • Specific Gravity, UA 09/17/2020 1.020  1.005 - 1.030 Final   • Glucose, UA 09/17/2020 Negative  Negative Final   • Ketones, UA 09/17/2020 Trace* Negative Final   • Bilirubin, UA 09/17/2020 Negative  Negative Final   • Blood, UA 09/17/2020 Negative  Negative Final   • Protein, UA 09/17/2020 30 mg/dL (1+)* Negative Final   • Leuk Esterase, UA 09/17/2020 Negative  Negative Final   • Nitrite, UA 09/17/2020 Negative   Negative Final   • Urobilinogen, UA 09/17/2020 0.2 E.U./dL  0.2 - 1.0 E.U./dL Final   • Lactate 09/17/2020 9.6* 0.5 - 2.0 mmol/L Final   • Ethanol 09/17/2020 <10  0 - 10 mg/dL Final   • Ethanol % 09/17/2020 <0.010  % Final   • THC, Screen, Urine 09/17/2020 Negative  Negative Final   • Phencyclidine (PCP), Urine 09/17/2020 Negative  Negative Final   • Cocaine Screen, Urine 09/17/2020 Negative  Negative Final   • Methamphetamine, Ur 09/17/2020 Negative  Negative Final   • Opiate Screen 09/17/2020 Negative  Negative Final   • Amphetamine Screen, Urine 09/17/2020 Negative  Negative Final   • Benzodiazepine Screen, Urine 09/17/2020 Positive* Negative Final   • Tricyclic Antidepressants Screen 09/17/2020 Positive* Negative Final   • Methadone Screen, Urine 09/17/2020 Positive* Negative Final   • Barbiturates Screen, Urine 09/17/2020 Negative  Negative Final   • Oxycodone Screen, Urine 09/17/2020 Negative  Negative Final   • Propoxyphene Screen 09/17/2020 Negative  Negative Final   • Buprenorphine, Screen, Urine 09/17/2020 Positive* Negative Final   • Acetaminophen 09/17/2020 <5.0* 10.0 - 30.0 mcg/mL Final   • Salicylate 09/17/2020 0.7  <=30.0 mg/dL Final   • Magnesium 09/17/2020 2.2  1.6 - 2.6 mg/dL Final   • WBC 09/17/2020 9.85  3.40 - 10.80 10*3/mm3 Final   • RBC 09/17/2020 4.02  3.77 - 5.28 10*6/mm3 Final   • Hemoglobin 09/17/2020 12.7  12.0 - 15.9 g/dL Final   • Hematocrit 09/17/2020 36.5  34.0 - 46.6 % Final   • MCV 09/17/2020 90.8  79.0 - 97.0 fL Final   • MCH 09/17/2020 31.6  26.6 - 33.0 pg Final   • MCHC 09/17/2020 34.8  31.5 - 35.7 g/dL Final   • RDW 09/17/2020 13.2  12.3 - 15.4 % Final   • RDW-SD 09/17/2020 43.2  37.0 - 54.0 fl Final   • MPV 09/17/2020 8.7  6.0 - 12.0 fL Final   • Platelets 09/17/2020 409  140 - 450 10*3/mm3 Final   • Neutrophil % 09/17/2020 63.9  42.7 - 76.0 % Final   • Lymphocyte % 09/17/2020 24.6  19.6 - 45.3 % Final   • Monocyte % 09/17/2020 6.4  5.0 - 12.0 % Final   • Eosinophil %  09/17/2020 2.6  0.3 - 6.2 % Final   • Basophil % 09/17/2020 0.8  0.0 - 1.5 % Final   • Immature Grans % 09/17/2020 1.7* 0.0 - 0.5 % Final   • Neutrophils, Absolute 09/17/2020 6.29  1.70 - 7.00 10*3/mm3 Final   • Lymphocytes, Absolute 09/17/2020 2.42  0.70 - 3.10 10*3/mm3 Final   • Monocytes, Absolute 09/17/2020 0.63  0.10 - 0.90 10*3/mm3 Final   • Eosinophils, Absolute 09/17/2020 0.26  0.00 - 0.40 10*3/mm3 Final   • Basophils, Absolute 09/17/2020 0.08  0.00 - 0.20 10*3/mm3 Final   • Immature Grans, Absolute 09/17/2020 0.17* 0.00 - 0.05 10*3/mm3 Final   • nRBC 09/17/2020 0.0  0.0 - 0.2 /100 WBC Final   • Creatine Kinase 09/17/2020 212* 20 - 180 U/L Final   • RBC, UA 09/17/2020 None Seen  None Seen /HPF Final   • WBC, UA 09/17/2020 0-2* None Seen /HPF Final   • Bacteria, UA 09/17/2020 Trace* None Seen /HPF Final   • Squamous Epithelial Cells, UA 09/17/2020 0-2  None Seen, 0-2 /HPF Final   • Hyaline Casts, UA 09/17/2020 31-50  None Seen /LPF Final   • Mucus, UA 09/17/2020 Trace  None Seen, Trace /HPF Final   • Methodology 09/17/2020 Manual Light Microscopy   Final   • Hold Tube 09/17/2020 Hold for add-ons.   Final    Auto resulted.   • Blood Culture 09/17/2020 No growth at 5 days   Final   • Blood Culture 09/17/2020 No growth at 5 days   Final   • COVID19 09/17/2020 Not Detected  Not Detected - Ref. Range Final   • Protein, Total (CSF) 09/17/2020 22.0  15.0 - 45.0 mg/dL Final   • Glucose, CSF 09/17/2020 72* 40 - 70 mg/dL Final   • CSF Culture 09/17/2020 No growth at 3 days   Final   • Gram Stain 09/17/2020 No WBCs seen   Final   • Gram Stain 09/17/2020 No organisms seen   Final   • Extra Tube 09/17/2020 hold   Final   • WBC, CSF 09/17/2020 0  0 - 5 /mm3 Final   • RBC, CSF 09/17/2020 1* 0 - 0 /mm3 Final   • Color, CSF 09/17/2020 Colorless  Colorless Final   • Appearance, CSF 09/17/2020 Clear  Clear Final   • Volume, CSF 09/17/2020 1.5  mL Final   • Tube Number, CSF 09/17/2020 4   Final   • Lactate 09/17/2020 0.8  0.5 -  2.0 mmol/L Final       Mental Status Exam:   Hygiene:   good  Cooperation:  Cooperative  Eye Contact:  Good  Psychomotor Behavior:  Appropriate  Mood:anxious and depressed  Affect:  Appropriate  Hopelessness: Denies  Speech:  Normal  Thought Process:  Goal directed  Thought Content:  Normal  Suicidal:  None  Homicidal:  None  Hallucinations:  None  Delusion:  None  Memory:  Intact  Orientation:  Person, Place, Time and Situation  Reliability:  good  Insight:  Good  Judgement:  Good  Impulse Control:  Good  Physical/Medical Issues:  No     PHQ-9 Depression Screening  Little interest or pleasure in doing things? 1   Feeling down, depressed, or hopeless? 1   Trouble falling or staying asleep, or sleeping too much? 2   Feeling tired or having little energy? 2   Poor appetite or overeating? 2   Feeling bad about yourself - or that you are a failure or have let yourself or your family down? 3   Trouble concentrating on things, such as reading the newspaper or watching television? 2   Moving or speaking so slowly that other people could have noticed? Or the opposite - being so fidgety or restless that you have been moving around a lot more than usual? 0   Thoughts that you would be better off dead, or of hurting yourself in some way? 0   PHQ-9 Total Score 13   If you checked off any problems, how difficult have these problems made it for you to do your work, take care of things at home, or get along with other people?          Assessment/Plan:  Diagnoses and all orders for this visit:    1. Major depressive disorder, recurrent episode, moderate (CMS/HCC) (Primary)  -     DULoxetine (Cymbalta) 30 MG capsule; Take 1 capsule by mouth Daily. With 60mg.  Dispense: 30 capsule; Refill: 2    2. Generalized anxiety disorder  -     DULoxetine (Cymbalta) 30 MG capsule; Take 1 capsule by mouth Daily. With 60mg.  Dispense: 30 capsule; Refill: 2    3. Nightmares  -     prazosin (MINIPRESS) 5 MG capsule; Two po q hs  Dispense: 60  capsule; Refill: 3    4. Seizures (CMS/HCC)  -     levETIRAcetam (KEPPRA) 500 MG tablet; Take 1 tablet by mouth 2 (Two) Times a Day.  Dispense: 60 tablet; Refill: 6      Increase Cymbalta to 60 mg and 30 mg p.o. daily.  Increase prazosin to 5 mg capsules 2 p.o. nightly.    A psychological evaluation was conducted in order to assess past and current level of functioning. Areas assessed included, but were not limited to: perception of social support, perception of ability to face and deal with challenges in life (positive functioning), anxiety symptoms, depressive symptoms, perspective on beliefs/belief system, coping skills for stress, intelligence level,  Therapeutic rapport was established. Interventions conducted today were geared towards incorporating medication management along with support for continued therapy. Education was also provided as to the med management with this provider and what to expect in subsequent sessions.    We discussed risks, benefits,goals and side effects of the above medication and the patient was agreeable with the plan.Patient was educated on the importance of compliance with treatment and follow-up appointments. Patient is aware to contact the [facility] with any worsening of symptoms. To call for questions or concerns and return early if necessary. Patent is agreeable to go to the Emergency Department or call 911 should they begin SI/HI.     Treatment Plan:   Discussed risks, benefits, and alternatives of medication. Encouraged healthy habits (eating, exercise and sleep). Call if any questions or problems arise. Medication reconciled. Controlled substance monitoring report reviewed. Provided psychoeducation.. Discussed coping strategies and current stressors. Set appropriate boundaries and limits for patient's well-being. Use distraction techniques to improve symptoms. Access support networks.      Return in about 4 weeks (around 12/15/2020) for Follow Up 15 min.    Silvina QUESADA  Brandon, APRN

## 2020-12-09 ENCOUNTER — PROCEDURE VISIT (OUTPATIENT)
Dept: OBSTETRICS AND GYNECOLOGY | Facility: CLINIC | Age: 40
End: 2020-12-09

## 2020-12-09 VITALS
WEIGHT: 160.6 LBS | DIASTOLIC BLOOD PRESSURE: 62 MMHG | BODY MASS INDEX: 26.76 KG/M2 | SYSTOLIC BLOOD PRESSURE: 118 MMHG | HEIGHT: 65 IN

## 2020-12-09 DIAGNOSIS — Z01.419 ENCOUNTER FOR GYNECOLOGICAL EXAMINATION WITHOUT ABNORMAL FINDING: Primary | ICD-10-CM

## 2020-12-09 DIAGNOSIS — Z12.4 SCREENING FOR CERVICAL CANCER: ICD-10-CM

## 2020-12-09 DIAGNOSIS — Z12.31 ENCOUNTER FOR SCREENING MAMMOGRAM FOR MALIGNANT NEOPLASM OF BREAST: ICD-10-CM

## 2020-12-09 DIAGNOSIS — Z91.89 AT HIGH RISK FOR BREAST CANCER: ICD-10-CM

## 2020-12-09 PROCEDURE — 99396 PREV VISIT EST AGE 40-64: CPT | Performed by: PHYSICIAN ASSISTANT

## 2020-12-09 NOTE — PROGRESS NOTES
Subjective   Chief Complaint   Patient presents with   • Gynecologic Exam     Last pap done in , MMG is due, No complaints       Audrey Alonso is a 40 y.o. year old new patient  presenting to be seen for her annual gynecological exam.   The patient has no complaints.  Her last Pap smear was in  and she had a mammogram in her 20s due to significant family history of breast cancer.  Patient's mother has had breast cancer as well as several maternal aunts and cousins.  Patient expresses interest in getting the breast cancer genetic testing done today and she would like to get a mammogram set up.  She is  and not currently sexually active.  She has regular menstrual cycles every 30 days with a 4-day moderate flow.  ..Patient's last menstrual period was 2020 (exact date).      Past Medical History:   Diagnosis Date   • Anxiety    • Asthma    • Depression    • Disease of thyroid gland    • GERD (gastroesophageal reflux disease)    • Headache    • Hypertension    • Seizures (CMS/HCC)    • Substance abuse (CMS/HCC)    • Trauma         Current Outpatient Medications:   •  buPROPion XL (WELLBUTRIN XL) 300 MG 24 hr tablet, Take 1 tablet by mouth Daily., Disp: 30 tablet, Rfl: 3  •  busPIRone (BUSPAR) 10 MG tablet, Take 2 tablets by mouth 3 (Three) Times a Day., Disp: 180 tablet, Rfl: 2  •  doxepin (SINEquan) 25 MG capsule, Take 1 capsule by mouth Every Night., Disp: 30 capsule, Rfl: 2  •  DULoxetine (Cymbalta) 60 MG capsule, Take 1 capsule by mouth Daily., Disp: 30 capsule, Rfl: 2  •  levETIRAcetam (KEPPRA) 500 MG tablet, Take 1 tablet by mouth 2 (Two) Times a Day., Disp: 60 tablet, Rfl: 6  •  methadone (DOLOPHINE) 10 MG tablet, Take 60 mg by mouth Daily,, Disp: , Rfl:   •  prazosin (MINIPRESS) 5 MG capsule, Two po q hs, Disp: 60 capsule, Rfl: 3  •  QUEtiapine (SEROquel) 100 MG tablet, Take 1 tablet by mouth Every Night., Disp: 30 tablet, Rfl: 2  •  DULoxetine (Cymbalta) 30 MG capsule, Take 1  "capsule by mouth Daily. With 60mg., Disp: 30 capsule, Rfl: 2   No Known Allergies   Past Surgical History:   Procedure Laterality Date   • DILATATION AND CURETTAGE     • INCISION AND DRAINAGE ABSCESS  2019    arm      Social History     Socioeconomic History   • Marital status:      Spouse name: Not on file   • Number of children: Not on file   • Years of education: Not on file   • Highest education level: Not on file   Social Needs   • Financial resource strain: Not hard at all   • Food insecurity     Worry: Never true     Inability: Never true   • Transportation needs     Medical: No     Non-medical: No   Tobacco Use   • Smoking status: Current Every Day Smoker     Packs/day: 0.25     Types: Cigarettes   • Smokeless tobacco: Never Used   • Tobacco comment: vapes also   Substance and Sexual Activity   • Alcohol use: No     Frequency: Never     Comment: stopped 2008   • Drug use: Yes     Types: IV     Comment: STATES SHE TOOK HER METHADONE AND SOME BENZOS 9/17/20   • Sexual activity: Not Currently     Partners: Male     Birth control/protection: Abstinence   Lifestyle   • Physical activity     Days per week: 0 days     Minutes per session: 0 min   • Stress: Rather much      Family History   Problem Relation Age of Onset   • Arthritis Mother    • Cancer Mother         breast   • Thyroid disease Mother    • Breast cancer Mother    • Diabetes Father    • Hypertension Father    • Mental illness Brother    • Breast cancer Maternal Aunt        Review of Systems   Gastrointestinal: Negative.    Genitourinary: Negative for dysuria, menstrual problem, pelvic pain and vaginal discharge.   Musculoskeletal: Positive for arthralgias.   Psychiatric/Behavioral: Positive for dysphoric mood and sleep disturbance.   All other systems reviewed and are negative.          Objective   /62   Ht 165.1 cm (65\")   Wt 72.8 kg (160 lb 9.6 oz)   LMP 11/18/2020 (Exact Date)   Breastfeeding No   BMI 26.73 kg/m²     Physical " Exam  Constitutional:       Appearance: Normal appearance. She is well-developed, well-groomed and normal weight.   Eyes:      General: Lids are normal.      Extraocular Movements: Extraocular movements intact.      Conjunctiva/sclera: Conjunctivae normal.   Chest:      Breasts: Breasts are symmetrical.         Right: No inverted nipple, mass, nipple discharge, skin change or tenderness.         Left: No inverted nipple, mass, nipple discharge, skin change or tenderness.   Abdominal:      General: There is no distension.      Palpations: Abdomen is soft.      Tenderness: There is no abdominal tenderness. There is no guarding.   Genitourinary:     Labia:         Right: No rash, tenderness or lesion.         Left: No rash, tenderness or lesion.       Urethra: No prolapse, urethral pain, urethral swelling or urethral lesion.      Vagina: No vaginal discharge, erythema, tenderness, bleeding or lesions.      Cervix: No cervical motion tenderness, discharge, friability, lesion, erythema or eversion.      Uterus: Not enlarged and not tender.       Adnexa:         Right: No mass or tenderness.          Left: No mass or tenderness.        Comments: Pap done  Musculoskeletal: Normal range of motion.   Skin:     General: Skin is warm and dry.      Findings: No lesion or rash.   Neurological:      General: No focal deficit present.      Mental Status: She is alert and oriented to person, place, and time.   Psychiatric:         Attention and Perception: Attention normal.         Mood and Affect: Mood normal.         Speech: Speech normal.         Behavior: Behavior is cooperative.         Thought Content: Thought content normal.              Assessment and Plan  Diagnoses and all orders for this visit:    1. Encounter for gynecological examination without abnormal finding (Primary)    2. Screening for cervical cancer  -     Liquid-based Pap Smear, Screening; Future    3. At high risk for breast cancer  -     BRCAssure  Comprehensive Test    4. Encounter for screening mammogram for malignant neoplasm of breast  -     Mammo Screening Digital Tomosynthesis Bilateral With CAD; Future      Patient Instructions   Encourage self breast exam monthly  Annual screening mammograms  BRCAssure testing today  Possible yearly breast MRI pending BRCAssure results               This note was electronically signed.    Catalina Emerson PA-C   December 9, 2020

## 2020-12-09 NOTE — PATIENT INSTRUCTIONS
Encourage self breast exam monthly  Annual screening mammograms  BRCAssure testing today  Possible yearly breast MRI pending BRCAssure results

## 2020-12-15 ENCOUNTER — OFFICE VISIT (OUTPATIENT)
Dept: BEHAVIORAL HEALTH | Facility: CLINIC | Age: 40
End: 2020-12-15

## 2020-12-15 VITALS
DIASTOLIC BLOOD PRESSURE: 70 MMHG | OXYGEN SATURATION: 97 % | HEIGHT: 64 IN | WEIGHT: 161.8 LBS | HEART RATE: 114 BPM | BODY MASS INDEX: 27.62 KG/M2 | SYSTOLIC BLOOD PRESSURE: 118 MMHG | TEMPERATURE: 97.9 F

## 2020-12-15 DIAGNOSIS — F32.A ANXIETY AND DEPRESSION: ICD-10-CM

## 2020-12-15 DIAGNOSIS — F41.1 GENERALIZED ANXIETY DISORDER: ICD-10-CM

## 2020-12-15 DIAGNOSIS — F51.5 NIGHTMARES: ICD-10-CM

## 2020-12-15 DIAGNOSIS — F33.1 MODERATE EPISODE OF RECURRENT MAJOR DEPRESSIVE DISORDER (HCC): ICD-10-CM

## 2020-12-15 DIAGNOSIS — F41.9 ANXIETY AND DEPRESSION: ICD-10-CM

## 2020-12-15 DIAGNOSIS — F33.1 MAJOR DEPRESSIVE DISORDER, RECURRENT EPISODE, MODERATE (HCC): Primary | ICD-10-CM

## 2020-12-15 DIAGNOSIS — F51.01 PRIMARY INSOMNIA: ICD-10-CM

## 2020-12-15 PROCEDURE — 99213 OFFICE O/P EST LOW 20 MIN: CPT | Performed by: NURSE PRACTITIONER

## 2020-12-15 RX ORDER — DULOXETIN HYDROCHLORIDE 60 MG/1
60 CAPSULE, DELAYED RELEASE ORAL DAILY
Qty: 30 CAPSULE | Refills: 2 | Status: SHIPPED | OUTPATIENT
Start: 2020-12-15 | End: 2021-01-19 | Stop reason: SDUPTHER

## 2020-12-15 RX ORDER — BUSPIRONE HYDROCHLORIDE 10 MG/1
20 TABLET ORAL 3 TIMES DAILY
Qty: 180 TABLET | Refills: 2 | Status: SHIPPED | OUTPATIENT
Start: 2020-12-15 | End: 2021-01-19 | Stop reason: SDUPTHER

## 2020-12-15 RX ORDER — DOXEPIN HYDROCHLORIDE 25 MG/1
25 CAPSULE ORAL NIGHTLY
Qty: 30 CAPSULE | Refills: 2 | Status: SHIPPED | OUTPATIENT
Start: 2020-12-15 | End: 2021-01-19

## 2020-12-15 RX ORDER — BUPROPION HYDROCHLORIDE 300 MG/1
300 TABLET ORAL DAILY
Qty: 30 TABLET | Refills: 3 | Status: SHIPPED | OUTPATIENT
Start: 2020-12-15 | End: 2021-01-19 | Stop reason: SDUPTHER

## 2020-12-15 RX ORDER — BUPROPION HYDROCHLORIDE 150 MG/1
150 TABLET ORAL EVERY MORNING
Qty: 30 TABLET | Refills: 3 | Status: SHIPPED | OUTPATIENT
Start: 2020-12-15 | End: 2021-01-19 | Stop reason: SDUPTHER

## 2020-12-15 RX ORDER — QUETIAPINE FUMARATE 100 MG/1
100 TABLET, FILM COATED ORAL NIGHTLY
Qty: 30 TABLET | Refills: 2 | Status: SHIPPED | OUTPATIENT
Start: 2020-12-15 | End: 2021-01-19

## 2020-12-15 NOTE — PROGRESS NOTES
Patient Name: Aurdey Alonso  MRN: [id number]   :  1980     Chief Complaint:      ICD-10-CM ICD-9-CM   1. Major depressive disorder, recurrent episode, moderate (CMS/HCC)  F33.1 296.32   2. Generalized anxiety disorder  F41.1 300.02   3. Nightmares  F51.5 307.47   4. Anxiety and depression  F41.9 300.00    F32.9 311   5. Primary insomnia  F51.01 307.42   6. Moderate episode of recurrent major depressive disorder (CMS/HCC)  F33.1 296.32       History of Present Illness: Audrey Alonso is a 40 y.o. female is here today for medication management follow up.  Patient states the nightmares have improved.  Patient states she feels anxiety medication is doing what it is able to do.  Patient does states she has a little more depression.  States in rehab she was on a higher dose of Wellbutrin and seems to do better.    The following portions of the patient's history were reviewed and updated as appropriate: allergies, current medications, past family history, past medical history, past social history, past surgical history and problem list.    Review of Systems;;  Review of Systems   Constitutional: Negative for activity change, appetite change, fatigue, unexpected weight gain and unexpected weight loss.   Respiratory: Negative for shortness of breath and wheezing.    Gastrointestinal: Negative for constipation, diarrhea, nausea and vomiting.   Musculoskeletal: Negative for gait problem.   Skin: Negative for dry skin and rash.   Neurological: Negative for dizziness, speech difficulty, weakness, light-headedness, headache, memory problem and confusion.   Psychiatric/Behavioral: Positive for depressed mood and stress. Negative for agitation, behavioral problems, decreased concentration, dysphoric mood, hallucinations, self-injury, sleep disturbance, suicidal ideas and negative for hyperactivity. The patient is not nervous/anxious.        Physical Exam;;  Physical Exam  Vitals signs and nursing note reviewed.  "  Constitutional:       General: She is not in acute distress.     Appearance: She is well-developed. She is not diaphoretic.   HENT:      Head: Normocephalic and atraumatic.   Eyes:      Conjunctiva/sclera: Conjunctivae normal.   Neck:      Musculoskeletal: Full passive range of motion without pain and normal range of motion.   Cardiovascular:      Rate and Rhythm: Normal rate.   Pulmonary:      Effort: Pulmonary effort is normal. No respiratory distress.   Musculoskeletal: Normal range of motion.   Skin:     General: Skin is warm and dry.   Neurological:      Mental Status: She is alert and oriented to person, place, and time.   Psychiatric:         Mood and Affect: Mood is depressed. Mood is not anxious. Affect is not labile, blunt, angry or inappropriate.         Speech: Speech is not rapid and pressured or tangential.         Behavior: Behavior normal. Behavior is not agitated, slowed, aggressive, withdrawn, hyperactive or combative. Behavior is cooperative.         Thought Content: Thought content normal. Thought content is not paranoid or delusional. Thought content does not include homicidal or suicidal ideation. Thought content does not include homicidal or suicidal plan.         Judgment: Judgment normal.       Blood pressure 118/70, pulse 114, temperature 97.9 °F (36.6 °C), height 162.6 cm (64\"), weight 73.4 kg (161 lb 12.8 oz), last menstrual period 11/18/2020, SpO2 97 %, not currently breastfeeding.  Body mass index is 27.77 kg/m².    Current Medications;;    Current Outpatient Medications:   •  buPROPion XL (WELLBUTRIN XL) 300 MG 24 hr tablet, Take 1 tablet by mouth Daily., Disp: 30 tablet, Rfl: 3  •  busPIRone (BUSPAR) 10 MG tablet, Take 2 tablets by mouth 3 (Three) Times a Day., Disp: 180 tablet, Rfl: 2  •  doxepin (SINEquan) 25 MG capsule, Take 1 capsule by mouth Every Night., Disp: 30 capsule, Rfl: 2  •  DULoxetine (Cymbalta) 30 MG capsule, Take 1 capsule by mouth Daily. With 60mg., Disp: 30 " capsule, Rfl: 2  •  DULoxetine (Cymbalta) 60 MG capsule, Take 1 capsule by mouth Daily., Disp: 30 capsule, Rfl: 2  •  levETIRAcetam (KEPPRA) 500 MG tablet, Take 1 tablet by mouth 2 (Two) Times a Day., Disp: 60 tablet, Rfl: 6  •  methadone (DOLOPHINE) 10 MG tablet, Take 60 mg by mouth Daily,, Disp: , Rfl:   •  prazosin (MINIPRESS) 5 MG capsule, Two po q hs, Disp: 60 capsule, Rfl: 3  •  QUEtiapine (SEROquel) 100 MG tablet, Take 1 tablet by mouth Every Night., Disp: 30 tablet, Rfl: 2  •  buPROPion XL (Wellbutrin XL) 150 MG 24 hr tablet, Take 1 tablet by mouth Every Morning. With 300mg., Disp: 30 tablet, Rfl: 3    Lab Results:   Admission on 09/17/2020, Discharged on 09/18/2020   Component Date Value Ref Range Status   • Glucose 09/17/2020 161* 65 - 99 mg/dL Final   • BUN 09/17/2020 13  6 - 20 mg/dL Final   • Creatinine 09/17/2020 1.38* 0.57 - 1.00 mg/dL Final   • Sodium 09/17/2020 140  136 - 145 mmol/L Final   • Potassium 09/17/2020 4.4  3.5 - 5.2 mmol/L Final   • Chloride 09/17/2020 102  98 - 107 mmol/L Final   • CO2 09/17/2020 16.8* 22.0 - 29.0 mmol/L Final   • Calcium 09/17/2020 8.9  8.6 - 10.5 mg/dL Final   • Total Protein 09/17/2020 6.9  6.0 - 8.5 g/dL Final   • Albumin 09/17/2020 4.30  3.50 - 5.20 g/dL Final   • ALT (SGPT) 09/17/2020 15  1 - 33 U/L Final   • AST (SGOT) 09/17/2020 20  1 - 32 U/L Final   • Alkaline Phosphatase 09/17/2020 48  39 - 117 U/L Final   • Total Bilirubin 09/17/2020 0.2  0.0 - 1.2 mg/dL Final   • eGFR Non African Amer 09/17/2020 42* >60 mL/min/1.73 Final   • Globulin 09/17/2020 2.6  gm/dL Final   • A/G Ratio 09/17/2020 1.7  g/dL Final   • BUN/Creatinine Ratio 09/17/2020 9.4  7.0 - 25.0 Final   • Anion Gap 09/17/2020 21.2* 5.0 - 15.0 mmol/L Final   • HCG, Urine QL 09/17/2020 Negative  Negative Final   • Color, UA 09/17/2020 Yellow  Yellow, Straw Final   • Appearance, UA 09/17/2020 Clear  Clear Final   • pH, UA 09/17/2020 <=5.0  5.0 - 8.0 Final   • Specific Gravity, UA 09/17/2020 1.020  1.005  - 1.030 Final   • Glucose, UA 09/17/2020 Negative  Negative Final   • Ketones, UA 09/17/2020 Trace* Negative Final   • Bilirubin, UA 09/17/2020 Negative  Negative Final   • Blood, UA 09/17/2020 Negative  Negative Final   • Protein, UA 09/17/2020 30 mg/dL (1+)* Negative Final   • Leuk Esterase, UA 09/17/2020 Negative  Negative Final   • Nitrite, UA 09/17/2020 Negative  Negative Final   • Urobilinogen, UA 09/17/2020 0.2 E.U./dL  0.2 - 1.0 E.U./dL Final   • Lactate 09/17/2020 9.6* 0.5 - 2.0 mmol/L Final   • Ethanol 09/17/2020 <10  0 - 10 mg/dL Final   • Ethanol % 09/17/2020 <0.010  % Final   • THC, Screen, Urine 09/17/2020 Negative  Negative Final   • Phencyclidine (PCP), Urine 09/17/2020 Negative  Negative Final   • Cocaine Screen, Urine 09/17/2020 Negative  Negative Final   • Methamphetamine, Ur 09/17/2020 Negative  Negative Final   • Opiate Screen 09/17/2020 Negative  Negative Final   • Amphetamine Screen, Urine 09/17/2020 Negative  Negative Final   • Benzodiazepine Screen, Urine 09/17/2020 Positive* Negative Final   • Tricyclic Antidepressants Screen 09/17/2020 Positive* Negative Final   • Methadone Screen, Urine 09/17/2020 Positive* Negative Final   • Barbiturates Screen, Urine 09/17/2020 Negative  Negative Final   • Oxycodone Screen, Urine 09/17/2020 Negative  Negative Final   • Propoxyphene Screen 09/17/2020 Negative  Negative Final   • Buprenorphine, Screen, Urine 09/17/2020 Positive* Negative Final   • Acetaminophen 09/17/2020 <5.0* 10.0 - 30.0 mcg/mL Final   • Salicylate 09/17/2020 0.7  <=30.0 mg/dL Final   • Magnesium 09/17/2020 2.2  1.6 - 2.6 mg/dL Final   • WBC 09/17/2020 9.85  3.40 - 10.80 10*3/mm3 Final   • RBC 09/17/2020 4.02  3.77 - 5.28 10*6/mm3 Final   • Hemoglobin 09/17/2020 12.7  12.0 - 15.9 g/dL Final   • Hematocrit 09/17/2020 36.5  34.0 - 46.6 % Final   • MCV 09/17/2020 90.8  79.0 - 97.0 fL Final   • MCH 09/17/2020 31.6  26.6 - 33.0 pg Final   • MCHC 09/17/2020 34.8  31.5 - 35.7 g/dL Final   •  RDW 09/17/2020 13.2  12.3 - 15.4 % Final   • RDW-SD 09/17/2020 43.2  37.0 - 54.0 fl Final   • MPV 09/17/2020 8.7  6.0 - 12.0 fL Final   • Platelets 09/17/2020 409  140 - 450 10*3/mm3 Final   • Neutrophil % 09/17/2020 63.9  42.7 - 76.0 % Final   • Lymphocyte % 09/17/2020 24.6  19.6 - 45.3 % Final   • Monocyte % 09/17/2020 6.4  5.0 - 12.0 % Final   • Eosinophil % 09/17/2020 2.6  0.3 - 6.2 % Final   • Basophil % 09/17/2020 0.8  0.0 - 1.5 % Final   • Immature Grans % 09/17/2020 1.7* 0.0 - 0.5 % Final   • Neutrophils, Absolute 09/17/2020 6.29  1.70 - 7.00 10*3/mm3 Final   • Lymphocytes, Absolute 09/17/2020 2.42  0.70 - 3.10 10*3/mm3 Final   • Monocytes, Absolute 09/17/2020 0.63  0.10 - 0.90 10*3/mm3 Final   • Eosinophils, Absolute 09/17/2020 0.26  0.00 - 0.40 10*3/mm3 Final   • Basophils, Absolute 09/17/2020 0.08  0.00 - 0.20 10*3/mm3 Final   • Immature Grans, Absolute 09/17/2020 0.17* 0.00 - 0.05 10*3/mm3 Final   • nRBC 09/17/2020 0.0  0.0 - 0.2 /100 WBC Final   • Creatine Kinase 09/17/2020 212* 20 - 180 U/L Final   • RBC, UA 09/17/2020 None Seen  None Seen /HPF Final   • WBC, UA 09/17/2020 0-2* None Seen /HPF Final   • Bacteria, UA 09/17/2020 Trace* None Seen /HPF Final   • Squamous Epithelial Cells, UA 09/17/2020 0-2  None Seen, 0-2 /HPF Final   • Hyaline Casts, UA 09/17/2020 31-50  None Seen /LPF Final   • Mucus, UA 09/17/2020 Trace  None Seen, Trace /HPF Final   • Methodology 09/17/2020 Manual Light Microscopy   Final   • Hold Tube 09/17/2020 Hold for add-ons.   Final    Auto resulted.   • Blood Culture 09/17/2020 No growth at 5 days   Final   • Blood Culture 09/17/2020 No growth at 5 days   Final   • COVID19 09/17/2020 Not Detected  Not Detected - Ref. Range Final   • Protein, Total (CSF) 09/17/2020 22.0  15.0 - 45.0 mg/dL Final   • Glucose, CSF 09/17/2020 72* 40 - 70 mg/dL Final   • CSF Culture 09/17/2020 No growth at 3 days   Final   • Gram Stain 09/17/2020 No WBCs seen   Final   • Gram Stain 09/17/2020 No  organisms seen   Final   • Extra Tube 09/17/2020 hold   Final   • WBC, CSF 09/17/2020 0  0 - 5 /mm3 Final   • RBC, CSF 09/17/2020 1* 0 - 0 /mm3 Final   • Color, CSF 09/17/2020 Colorless  Colorless Final   • Appearance, CSF 09/17/2020 Clear  Clear Final   • Volume, CSF 09/17/2020 1.5  mL Final   • Tube Number, CSF 09/17/2020 4   Final   • Lactate 09/17/2020 0.8  0.5 - 2.0 mmol/L Final       Mental Status Exam:   Hygiene:   good  Cooperation:  Cooperative  Eye Contact:  Good  Psychomotor Behavior:  Appropriate  Mood:depressed  Affect:  Appropriate  Hopelessness: Denies  Speech:  Normal  Thought Process:  Goal directed  Thought Content:  Normal  Suicidal:  None  Homicidal:  None  Hallucinations:  None  Delusion:  None  Memory:  Intact  Orientation:  Person, Place, Time and Situation  Reliability:  good  Insight:  Good  Judgement:  Good  Impulse Control:  Good  Physical/Medical Issues:  No     PHQ-9 Depression Screening  Little interest or pleasure in doing things? 1   Feeling down, depressed, or hopeless? 2   Trouble falling or staying asleep, or sleeping too much? 2   Feeling tired or having little energy? 1   Poor appetite or overeating? 2   Feeling bad about yourself - or that you are a failure or have let yourself or your family down? 2   Trouble concentrating on things, such as reading the newspaper or watching television? 3   Moving or speaking so slowly that other people could have noticed? Or the opposite - being so fidgety or restless that you have been moving around a lot more than usual? 3   Thoughts that you would be better off dead, or of hurting yourself in some way? 0   PHQ-9 Total Score 16   If you checked off any problems, how difficult have these problems made it for you to do your work, take care of things at home, or get along with other people?          Assessment/Plan:  Diagnoses and all orders for this visit:    1. Major depressive disorder, recurrent episode, moderate (CMS/HCC) (Primary)    2.  Generalized anxiety disorder  -     buPROPion XL (WELLBUTRIN XL) 300 MG 24 hr tablet; Take 1 tablet by mouth Daily.  Dispense: 30 tablet; Refill: 3  -     buPROPion XL (Wellbutrin XL) 150 MG 24 hr tablet; Take 1 tablet by mouth Every Morning. With 300mg.  Dispense: 30 tablet; Refill: 3    3. Nightmares    4. Anxiety and depression  -     busPIRone (BUSPAR) 10 MG tablet; Take 2 tablets by mouth 3 (Three) Times a Day.  Dispense: 180 tablet; Refill: 2  -     DULoxetine (Cymbalta) 60 MG capsule; Take 1 capsule by mouth Daily.  Dispense: 30 capsule; Refill: 2  -     QUEtiapine (SEROquel) 100 MG tablet; Take 1 tablet by mouth Every Night.  Dispense: 30 tablet; Refill: 2    5. Primary insomnia  -     doxepin (SINEquan) 25 MG capsule; Take 1 capsule by mouth Every Night.  Dispense: 30 capsule; Refill: 2    6. Moderate episode of recurrent major depressive disorder (CMS/HCC)  -     DULoxetine (Cymbalta) 60 MG capsule; Take 1 capsule by mouth Daily.  Dispense: 30 capsule; Refill: 2  -     QUEtiapine (SEROquel) 100 MG tablet; Take 1 tablet by mouth Every Night.  Dispense: 30 tablet; Refill: 2      Increase Wellbutrin XL to 300 mg and a 150 mg p.o. daily.  Continue other medications.    A psychological evaluation was conducted in order to assess past and current level of functioning. Areas assessed included, but were not limited to: perception of social support, perception of ability to face and deal with challenges in life (positive functioning), anxiety symptoms, depressive symptoms, perspective on beliefs/belief system, coping skills for stress, intelligence level,  Therapeutic rapport was established. Interventions conducted today were geared towards incorporating medication management along with support for continued therapy. Education was also provided as to the med management with this provider and what to expect in subsequent sessions.    We discussed risks, benefits,goals and side effects of the above medication and  the patient was agreeable with the plan.Patient was educated on the importance of compliance with treatment and follow-up appointments. Patient is aware to contact the [facility] with any worsening of symptoms. To call for questions or concerns and return early if necessary. Patent is agreeable to go to the Emergency Department or call 911 should they begin SI/HI.     Treatment Plan:   Discussed risks, benefits, and alternatives of medication. Encouraged healthy habits (eating, exercise and sleep). Call if any questions or problems arise. Medication reconciled. Controlled substance monitoring report reviewed. Provided psychoeducation.. Discussed coping strategies and current stressors. Set appropriate boundaries and limits for patient's well-being. Use distraction techniques to improve symptoms. Access support networks.      Return in about 4 weeks (around 1/12/2021) for Follow Up 15 min.    Silvina Taylor, APRN

## 2020-12-16 DIAGNOSIS — Z12.4 SCREENING FOR CERVICAL CANCER: ICD-10-CM

## 2020-12-19 LAB
BRCA1+BRCA2 MUT ANL BLD/T: NORMAL
CITATION REF LAB TEST: NORMAL
CLINICAL INFO: NORMAL
IMP & REVIEW OF LAB RESULTS: NORMAL
LAB DIRECTOR NAME PROVIDER: NORMAL
PREAUTHORIZATION: NORMAL
REASON FOR REFERRAL (NARRATIVE): NORMAL
RECOMMENDATION PATIENT DOC-IMP: NORMAL
REF LAB TEST METHOD: NORMAL
REPORT: NORMAL
SPECIMEN SOURCE: NORMAL

## 2021-01-13 ENCOUNTER — OFFICE VISIT (OUTPATIENT)
Dept: NEUROLOGY | Facility: CLINIC | Age: 41
End: 2021-01-13

## 2021-01-13 VITALS
HEIGHT: 64 IN | DIASTOLIC BLOOD PRESSURE: 80 MMHG | RESPIRATION RATE: 16 BRPM | WEIGHT: 168 LBS | SYSTOLIC BLOOD PRESSURE: 138 MMHG | OXYGEN SATURATION: 96 % | BODY MASS INDEX: 28.68 KG/M2 | HEART RATE: 126 BPM | TEMPERATURE: 97.5 F

## 2021-01-13 DIAGNOSIS — G40.909 SEIZURE DISORDER (HCC): Primary | ICD-10-CM

## 2021-01-13 PROCEDURE — 99204 OFFICE O/P NEW MOD 45 MIN: CPT | Performed by: PSYCHIATRY & NEUROLOGY
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
## 2021-01-19 ENCOUNTER — OFFICE VISIT (OUTPATIENT)
Dept: BEHAVIORAL HEALTH | Facility: CLINIC | Age: 41
End: 2021-01-19

## 2021-01-19 VITALS
WEIGHT: 169 LBS | BODY MASS INDEX: 28.85 KG/M2 | DIASTOLIC BLOOD PRESSURE: 70 MMHG | HEART RATE: 123 BPM | SYSTOLIC BLOOD PRESSURE: 110 MMHG | HEIGHT: 64 IN | TEMPERATURE: 97.5 F | OXYGEN SATURATION: 98 %

## 2021-01-19 DIAGNOSIS — F51.01 PRIMARY INSOMNIA: ICD-10-CM

## 2021-01-19 DIAGNOSIS — F33.1 MODERATE EPISODE OF RECURRENT MAJOR DEPRESSIVE DISORDER (HCC): ICD-10-CM

## 2021-01-19 DIAGNOSIS — F51.5 NIGHTMARES: ICD-10-CM

## 2021-01-19 DIAGNOSIS — F41.1 GENERALIZED ANXIETY DISORDER: ICD-10-CM

## 2021-01-19 DIAGNOSIS — F33.1 MAJOR DEPRESSIVE DISORDER, RECURRENT EPISODE, MODERATE (HCC): Primary | ICD-10-CM

## 2021-01-19 DIAGNOSIS — F32.A ANXIETY AND DEPRESSION: ICD-10-CM

## 2021-01-19 DIAGNOSIS — F41.9 ANXIETY AND DEPRESSION: ICD-10-CM

## 2021-01-19 PROCEDURE — 99213 OFFICE O/P EST LOW 20 MIN: CPT | Performed by: NURSE PRACTITIONER

## 2021-01-19 RX ORDER — DULOXETIN HYDROCHLORIDE 60 MG/1
60 CAPSULE, DELAYED RELEASE ORAL DAILY
Qty: 30 CAPSULE | Refills: 2 | Status: SHIPPED | OUTPATIENT
Start: 2021-01-19 | End: 2021-02-23 | Stop reason: SDUPTHER

## 2021-01-19 RX ORDER — BUPROPION HYDROCHLORIDE 300 MG/1
300 TABLET ORAL DAILY
Qty: 30 TABLET | Refills: 3 | Status: SHIPPED | OUTPATIENT
Start: 2021-01-19 | End: 2021-04-27 | Stop reason: SDUPTHER

## 2021-01-19 RX ORDER — PRAZOSIN HYDROCHLORIDE 5 MG/1
CAPSULE ORAL
Qty: 60 CAPSULE | Refills: 3 | Status: SHIPPED | OUTPATIENT
Start: 2021-01-19 | End: 2021-04-27 | Stop reason: SDUPTHER

## 2021-01-19 RX ORDER — BUSPIRONE HYDROCHLORIDE 10 MG/1
20 TABLET ORAL 3 TIMES DAILY
Qty: 180 TABLET | Refills: 2 | Status: SHIPPED | OUTPATIENT
Start: 2021-01-19 | End: 2021-04-27 | Stop reason: SDUPTHER

## 2021-01-19 RX ORDER — BUPROPION HYDROCHLORIDE 150 MG/1
150 TABLET ORAL EVERY MORNING
Qty: 30 TABLET | Refills: 3 | Status: SHIPPED | OUTPATIENT
Start: 2021-01-19 | End: 2021-06-01 | Stop reason: SDUPTHER

## 2021-01-19 RX ORDER — DOXEPIN HYDROCHLORIDE 50 MG/1
50 CAPSULE ORAL NIGHTLY
Qty: 30 CAPSULE | Refills: 3 | Status: SHIPPED | OUTPATIENT
Start: 2021-01-19 | End: 2021-03-23 | Stop reason: SDUPTHER

## 2021-01-19 RX ORDER — DULOXETIN HYDROCHLORIDE 30 MG/1
30 CAPSULE, DELAYED RELEASE ORAL DAILY
Qty: 30 CAPSULE | Refills: 2 | Status: SHIPPED | OUTPATIENT
Start: 2021-01-19 | End: 2021-02-23

## 2021-01-19 NOTE — PROGRESS NOTES
Patient Name: Audrey Alonso  MRN: [id number]   :  1980     Chief Complaint:      ICD-10-CM ICD-9-CM   1. Major depressive disorder, recurrent episode, moderate (CMS/HCC)  F33.1 296.32   2. Generalized anxiety disorder  F41.1 300.02   3. Nightmares  F51.5 307.47   4. Primary insomnia  F51.01 307.42   5. Anxiety and depression  F41.9 300.00    F32.9 311   6. Moderate episode of recurrent major depressive disorder (CMS/HCC)  F33.1 296.32       History of Present Illness: Audrey Alonso is a 40 y.o. female is here today for medication management follow up.  Patient states she feels about the same.  Having issues with pharmacy getting her both Wellbutrin's and both Cymbalta's.  States she is still having difficulty sleeping.  Is noticing weight gain with Seroquel.  Still waking up feeling extremely tired in the morning.    The following portions of the patient's history were reviewed and updated as appropriate: allergies, current medications, past family history, past medical history, past social history, past surgical history and problem list.    Review of Systems;;  Review of Systems   Constitutional: Negative for activity change, appetite change, fatigue, unexpected weight gain and unexpected weight loss.   Respiratory: Negative for shortness of breath and wheezing.    Gastrointestinal: Negative for constipation, diarrhea, nausea and vomiting.   Musculoskeletal: Negative for gait problem.   Skin: Negative for dry skin and rash.   Neurological: Negative for dizziness, speech difficulty, weakness, light-headedness, headache, memory problem and confusion.   Psychiatric/Behavioral: Positive for sleep disturbance, depressed mood and stress. Negative for agitation, behavioral problems, decreased concentration, dysphoric mood, hallucinations, self-injury, suicidal ideas and negative for hyperactivity. The patient is nervous/anxious.        Physical Exam;;  Physical Exam  Vitals signs and nursing note reviewed.  "  Constitutional:       General: She is not in acute distress.     Appearance: She is well-developed. She is not diaphoretic.   HENT:      Head: Normocephalic and atraumatic.   Eyes:      Conjunctiva/sclera: Conjunctivae normal.   Neck:      Musculoskeletal: Full passive range of motion without pain and normal range of motion.   Cardiovascular:      Rate and Rhythm: Normal rate.   Pulmonary:      Effort: Pulmonary effort is normal. No respiratory distress.   Musculoskeletal: Normal range of motion.   Skin:     General: Skin is warm and dry.   Neurological:      Mental Status: She is alert and oriented to person, place, and time.   Psychiatric:         Mood and Affect: Mood is anxious and depressed. Affect is not labile, blunt, angry or inappropriate.         Speech: Speech is not rapid and pressured or tangential.         Behavior: Behavior normal. Behavior is not agitated, slowed, aggressive, withdrawn, hyperactive or combative. Behavior is cooperative.         Thought Content: Thought content normal. Thought content is not paranoid or delusional. Thought content does not include homicidal or suicidal ideation. Thought content does not include homicidal or suicidal plan.         Judgment: Judgment normal.       Blood pressure 110/70, pulse (!) 123, temperature 97.5 °F (36.4 °C), temperature source Infrared, height 162.6 cm (64.02\"), weight 76.7 kg (169 lb), SpO2 98 %, not currently breastfeeding.  Body mass index is 28.99 kg/m².    Current Medications;;    Current Outpatient Medications:   •  buPROPion XL (Wellbutrin XL) 150 MG 24 hr tablet, Take 1 tablet by mouth Every Morning. With 300mg., Disp: 30 tablet, Rfl: 3  •  buPROPion XL (WELLBUTRIN XL) 300 MG 24 hr tablet, Take 1 tablet by mouth Daily., Disp: 30 tablet, Rfl: 3  •  busPIRone (BUSPAR) 10 MG tablet, Take 2 tablets by mouth 3 (Three) Times a Day., Disp: 180 tablet, Rfl: 2  •  DULoxetine (Cymbalta) 30 MG capsule, Take 1 capsule by mouth Daily. With 60mg., " Disp: 30 capsule, Rfl: 2  •  DULoxetine (Cymbalta) 60 MG capsule, Take 1 capsule by mouth Daily., Disp: 30 capsule, Rfl: 2  •  levETIRAcetam (KEPPRA) 500 MG tablet, Take 1 tablet by mouth 2 (Two) Times a Day., Disp: 60 tablet, Rfl: 6  •  methadone (DOLOPHINE) 10 MG tablet, Take 60 mg by mouth Daily,, Disp: , Rfl:   •  prazosin (MINIPRESS) 5 MG capsule, Two po q hs, Disp: 60 capsule, Rfl: 3  •  doxepin (SINEquan) 50 MG capsule, Take 1 capsule by mouth Every Night., Disp: 30 capsule, Rfl: 3    Lab Results:   Procedure visit on 12/09/2020   Component Date Value Ref Range Status   • Specimen Type: 12/09/2020 Comment   Final    Whole Blood   • Preauthorization 12/09/2020 Comment   Final    Prior authorization review complete   • Clinical Indication 12/09/2020 Comment   Final    Comment: Personal and/or family history of Hereditary Breast and  Ovarian Cancer (HBOC)     • Results: 12/09/2020 Comment   Final    Comment: NEGATIVE FOR PATHOGENIC VARIANTS  No clinically significant variants or variants of uncertain  significance were identified.  GENE   VARIANT  BRCA1  NEGATIVE No pathogenic variants were identified.  BRCA2  NEGATIVE No pathogenic variants were identified.     • Interpretation 12/09/2020 Comment   Final    No pathogenic variants were identified.   • Additional Clinic Info 12/09/2020 Comment   Final    Comment: NCCN Guidelines  When BRCA1 and BRCA2 results are negative, additional  testing may be helpful for some patients with breast,  ovarian, and pancreatic cancer. Guidelines from the  National Comprehensive Cancer Network(R) (NCCN(R))  recommend considering germline genetic testing for  high-penetrance breast and/or ovarian cancer genes  (including, but not limited to, BRCA1/2, CDH1, PALB2, PTEN,  and TP53) in patients with any of the criteria in the table  below. To discuss comprehensive genetic testing for breast,  ovarian, and pancreatic cancer genes, an [facility] Genetic Coordinator is  available at [phone].  Breast cancer diagnosed    Ovarian cancer at any age  <= age 45  Breast cancer diagnosed    Breast cancer diagnosed at  age 46-50 with >= 1        any age, with >= 1 close  close relative with        relative with breast cancer  breast, ovarian,           <= age 50, or ovarian,  pancreatic, or high        pancreatic, or metastatic  grade prostate cancer      prostat                           e cancer  Male breast cancer at      Pancreatic cancer at any age  any age  Triple negative breast     Metastatic prostate cancer at  cancer diagnosed <= 60     any age  Breast, ovarian, or        A first or second degree  pancreatic cancer at any   relative meeting any of the  age; and Ashkenazi         criteria in this table  Buddhist ancestry     • RECOMMENDATIONS 12/09/2020 Comment   Final    Comment: Based on information provided, this patient meets National  Comprehensive Cancer Network(R) (NCCN(R)) clinical  guidelines that recommend considering testing for  additional genes. See Additional Clinical Information for  more details. [facility]' Genetic Coordinators are  available at [phone] to discuss additional genetic  testing if desired.  Genetic counseling is recommended to discuss the potential  clinical and/or reproductive implications of these results,  as well as recommendations for testing family members. To  access [facility] Genetic Counselors please visit  [email or web address] or call [phone] ([phone]).     • Methodology and Limitations 12/09/2020 Comment   Final    Comment: Next-generation sequencing: Genomic regions of interest are  selected using a custom capture reagent for target  enrichment and sequenced via the [employer](R) next  generation sequencing platform. Regions of interest include  all exons and intron/exon junctions (+/-20 nucleotides) of  the BRCA1 (NM_007294.3) and BRCA2 (NM_000059.3) genes.  Sequencing reads  are aligned with the human genome  reference GRCh37/hg19 build. Minimum mean coverage is 40X.  Any segment failing minimum read depth coverage is rescued  by bi-directional [place] sequencing to complete sequence  analysis. Variants, including SNVs and CNVs, are identified  using a custom bioinformatics pipeline.  Reported variants: Pathogenic and likely pathogenic  variants and variants of uncertain significance (VUS) are  reported. Non-deletion variants are specified using the  numbering and nomenclature recommended by the Human Genome  Variation Society (HGVS, http://www.hgvs.org/). Benign  variants are not reported. Variant classif                           ication and  confirmation are consistent with ACMG standards and  guidelines ([place], PMID:65079207; [place], PMID:37353075).  Detailed variant classification information is available  upon request. A variant of uncertain significance (VUS)  should not be used in clinical decision making; a VUS is  classified based on inadequate or conflicting evidence  regarding its pathogenicity or clinical relevance.  Limitations: Technologies used do not detect germline  mosaicism and do not rule out the presence of large  chromosomal aberrations, including rearrangements, gene  fusions, or variants in regions or genes not included in  this test, or possible inter/ intragenic interactions  between variants. Variant classification and/or  interpretation may change over time if more information  becomes available. False positive or false negative results  may occur for reasons that include: genetic variants,  pseudogene interference, technical handling, blood  transfusions, bone marrow transplantation                           , mislabeling of  samples, or erroneous representation of family  relationships. For heterozygous variants in the same gene  the assay cannot determine whether they are on the same or  different chromosome; to determine phase and clinical  significance,  rarely, parental testing may be required.  Exact breakpoints of exon-level deletions/duplications are  not determined. The presence of an inherited cancer  syndrome due to a different genetic cause cannot be ruled  out. Any interpretation should be clinically correlated  with information about the patients presentation and  relevant family history.  This test was developed and its performance characteristics  determined by [employer]. It has not been cleared or approved  by the Food and Drug Administration.     • References 12/09/2020 Comment   Final    Comment: 1. NCCN Genetic/Familial High Risk Assessment: Breast,  Ovarian, and Pancreatic. Version 1.2020.  2. olga Payne al. BRCA1- and BRCA2-Associated  Hereditary Breast and Ovarian Cancer. [place], updated  2016. PMID: 86101425.     • RELEASED BY 12/09/2020 Comment   Final    Comment: Aniket Mckeon MS, [facility], under the direction of Eddie Mayfield,  PhD, [facility]     • IMAGE 12/09/2020 .   Final       Mental Status Exam:   Hygiene:   good  Cooperation:  Cooperative  Eye Contact:  Good  Psychomotor Behavior:  Appropriate  Mood:anxious  Affect:  Appropriate  Hopelessness: Denies  Speech:  Normal  Thought Process:  Goal directed  Thought Content:  Normal  Suicidal:  None  Homicidal:  None  Hallucinations:  None  Delusion:  None  Memory:  Intact  Orientation:  Person, Place, Time and Situation  Reliability:  good  Insight:  Good  Judgement:  Good  Impulse Control:  Good  Physical/Medical Issues:  No     PHQ-9 Depression Screening  Little interest or pleasure in doing things? 1   Feeling down, depressed, or hopeless? 2   Trouble falling or staying asleep, or sleeping too much? 3   Feeling tired or having little energy? 1   Poor appetite or overeating? 1   Feeling bad about yourself - or that you are a failure or have let yourself or your family down? 2   Trouble concentrating on things, such as reading the newspaper or watching television? 2   Moving or speaking so slowly  that other people could have noticed? Or the opposite - being so fidgety or restless that you have been moving around a lot more than usual? 2   Thoughts that you would be better off dead, or of hurting yourself in some way? 0   PHQ-9 Total Score 14   If you checked off any problems, how difficult have these problems made it for you to do your work, take care of things at home, or get along with other people? Somewhat difficult        Assessment/Plan:  Diagnoses and all orders for this visit:    1. Major depressive disorder, recurrent episode, moderate (CMS/HCC) (Primary)  -     DULoxetine (Cymbalta) 30 MG capsule; Take 1 capsule by mouth Daily. With 60mg.  Dispense: 30 capsule; Refill: 2    2. Generalized anxiety disorder  -     buPROPion XL (Wellbutrin XL) 150 MG 24 hr tablet; Take 1 tablet by mouth Every Morning. With 300mg.  Dispense: 30 tablet; Refill: 3  -     buPROPion XL (WELLBUTRIN XL) 300 MG 24 hr tablet; Take 1 tablet by mouth Daily.  Dispense: 30 tablet; Refill: 3  -     DULoxetine (Cymbalta) 30 MG capsule; Take 1 capsule by mouth Daily. With 60mg.  Dispense: 30 capsule; Refill: 2    3. Nightmares  -     prazosin (MINIPRESS) 5 MG capsule; Two po q hs  Dispense: 60 capsule; Refill: 3    4. Primary insomnia  -     doxepin (SINEquan) 50 MG capsule; Take 1 capsule by mouth Every Night.  Dispense: 30 capsule; Refill: 3    5. Anxiety and depression  -     busPIRone (BUSPAR) 10 MG tablet; Take 2 tablets by mouth 3 (Three) Times a Day.  Dispense: 180 tablet; Refill: 2  -     DULoxetine (Cymbalta) 60 MG capsule; Take 1 capsule by mouth Daily.  Dispense: 30 capsule; Refill: 2    6. Moderate episode of recurrent major depressive disorder (CMS/HCC)  -     DULoxetine (Cymbalta) 60 MG capsule; Take 1 capsule by mouth Daily.  Dispense: 30 capsule; Refill: 2      Discontinue Seroquel due to weight gain.  Increase doxepin to 50 mg p.o. nightly.    A psychological evaluation was conducted in order to assess past and  current level of functioning. Areas assessed included, but were not limited to: perception of social support, perception of ability to face and deal with challenges in life (positive functioning), anxiety symptoms, depressive symptoms, perspective on beliefs/belief system, coping skills for stress, intelligence level,  Therapeutic rapport was established. Interventions conducted today were geared towards incorporating medication management along with support for continued therapy. Education was also provided as to the med management with this provider and what to expect in subsequent sessions.    We discussed risks, benefits,goals and side effects of the above medication and the patient was agreeable with the plan.Patient was educated on the importance of compliance with treatment and follow-up appointments. Patient is aware to contact the [facility] with any worsening of symptoms. To call for questions or concerns and return early if necessary. Patent is agreeable to go to the Emergency Department or call 911 should they begin SI/HI.     Treatment Plan:   Discussed risks, benefits, and alternatives of medication. Encouraged healthy habits (eating, exercise and sleep). Call if any questions or problems arise. Medication reconciled. Controlled substance monitoring report reviewed. Provided psychoeducation.. Discussed coping strategies and current stressors. Set appropriate boundaries and limits for patient's well-being. Use distraction techniques to improve symptoms. Access support networks.      Return in about 4 weeks (around 2/16/2021) for Follow Up 15 min.    STEVEN Young

## 2021-01-22 ENCOUNTER — PRIOR AUTHORIZATION (OUTPATIENT)
Dept: INTERNAL MEDICINE | Facility: CLINIC | Age: 41
End: 2021-01-22

## 2021-01-22 NOTE — TELEPHONE ENCOUNTER
Received fax from insurance and PA was APPROVED; attempted to contact patient and voicemail has not been set up

## 2021-02-17 ENCOUNTER — APPOINTMENT (OUTPATIENT)
Dept: NEUROLOGY | Facility: HOSPITAL | Age: 41
End: 2021-02-17

## 2021-02-17 ENCOUNTER — APPOINTMENT (OUTPATIENT)
Dept: MRI IMAGING | Facility: HOSPITAL | Age: 41
End: 2021-02-17

## 2021-02-23 ENCOUNTER — OFFICE VISIT (OUTPATIENT)
Dept: BEHAVIORAL HEALTH | Facility: CLINIC | Age: 41
End: 2021-02-23

## 2021-02-23 VITALS
WEIGHT: 166.8 LBS | HEIGHT: 64 IN | SYSTOLIC BLOOD PRESSURE: 124 MMHG | BODY MASS INDEX: 28.48 KG/M2 | TEMPERATURE: 97 F | OXYGEN SATURATION: 95 % | HEART RATE: 107 BPM | DIASTOLIC BLOOD PRESSURE: 72 MMHG

## 2021-02-23 DIAGNOSIS — F41.1 GENERALIZED ANXIETY DISORDER: ICD-10-CM

## 2021-02-23 DIAGNOSIS — F33.1 MODERATE EPISODE OF RECURRENT MAJOR DEPRESSIVE DISORDER (HCC): ICD-10-CM

## 2021-02-23 DIAGNOSIS — F33.1 MAJOR DEPRESSIVE DISORDER, RECURRENT EPISODE, MODERATE (HCC): Primary | ICD-10-CM

## 2021-02-23 DIAGNOSIS — F32.A ANXIETY AND DEPRESSION: ICD-10-CM

## 2021-02-23 DIAGNOSIS — F51.01 PRIMARY INSOMNIA: ICD-10-CM

## 2021-02-23 DIAGNOSIS — F51.5 NIGHTMARES: ICD-10-CM

## 2021-02-23 DIAGNOSIS — F41.9 ANXIETY AND DEPRESSION: ICD-10-CM

## 2021-02-23 PROCEDURE — 99214 OFFICE O/P EST MOD 30 MIN: CPT | Performed by: NURSE PRACTITIONER

## 2021-02-23 RX ORDER — DULOXETIN HYDROCHLORIDE 60 MG/1
CAPSULE, DELAYED RELEASE ORAL
Qty: 60 CAPSULE | Refills: 2 | Status: SHIPPED | OUTPATIENT
Start: 2021-02-23 | End: 2021-04-27 | Stop reason: SDUPTHER

## 2021-02-23 RX ORDER — QUETIAPINE FUMARATE 50 MG/1
50 TABLET, FILM COATED ORAL NIGHTLY
Qty: 30 TABLET | Refills: 3 | Status: SHIPPED | OUTPATIENT
Start: 2021-02-23 | End: 2021-04-22

## 2021-02-23 NOTE — PROGRESS NOTES
Patient Name: Audrey Alonso  MRN: [id number]   :  1980     Chief Complaint:      ICD-10-CM ICD-9-CM   1. Major depressive disorder, recurrent episode, moderate (CMS/HCC)  F33.1 296.32   2. Generalized anxiety disorder  F41.1 300.02   3. Nightmares  F51.5 307.47   4. Primary insomnia  F51.01 307.42   5. Anxiety and depression  F41.9 300.00    F32.9 311   6. Moderate episode of recurrent major depressive disorder (CMS/HCC)  F33.1 296.32       History of Present Illness: Audrey Alonso is a 41 y.o. female is here today for medication management follow up.  Patient states she has very low energy.  Patient states she wakes up feeling tired.  Patient states this is a struggle with her daughter as her daughter wants to spend time with her and notices when she is not able to do so.  Patient states some nights she is able to sleep for about 3 hours and be up for an hour and sleep 3 more hours.  Patient states this is a good night sleep.  Patient struggles with going to the pharmacy so often as she has no transportation.  Patient states she feels her parents think she is just trying to get out of doing activities however patient states this is not accurate.    The following portions of the patient's history were reviewed and updated as appropriate: allergies, current medications, past family history, past medical history, past social history, past surgical history and problem list.    Review of Systems;;  Review of Systems   Constitutional: Negative for activity change, appetite change, fatigue, unexpected weight gain and unexpected weight loss.   Respiratory: Negative for shortness of breath and wheezing.    Gastrointestinal: Negative for constipation, diarrhea, nausea and vomiting.   Musculoskeletal: Negative for gait problem.   Skin: Negative for dry skin and rash.   Neurological: Negative for dizziness, speech difficulty, weakness, light-headedness, headache, memory problem and confusion.  "  Psychiatric/Behavioral: Positive for dysphoric mood, sleep disturbance and stress. Negative for agitation, behavioral problems, decreased concentration, hallucinations, self-injury, suicidal ideas, negative for hyperactivity and depressed mood. The patient is not nervous/anxious.        Physical Exam;;  Physical Exam  Vitals signs and nursing note reviewed.   Constitutional:       General: She is not in acute distress.     Appearance: She is well-developed. She is not diaphoretic.   HENT:      Head: Normocephalic and atraumatic.   Eyes:      Conjunctiva/sclera: Conjunctivae normal.   Neck:      Musculoskeletal: Full passive range of motion without pain and normal range of motion.   Cardiovascular:      Rate and Rhythm: Normal rate.   Pulmonary:      Effort: Pulmonary effort is normal. No respiratory distress.   Musculoskeletal: Normal range of motion.   Skin:     General: Skin is warm and dry.   Neurological:      Mental Status: She is alert and oriented to person, place, and time.   Psychiatric:         Mood and Affect: Mood is depressed. Mood is not anxious. Affect is not labile, blunt, angry or inappropriate.         Speech: Speech is not rapid and pressured or tangential.         Behavior: Behavior normal. Behavior is not agitated, slowed, aggressive, withdrawn, hyperactive or combative. Behavior is cooperative.         Thought Content: Thought content normal. Thought content is not paranoid or delusional. Thought content does not include homicidal or suicidal ideation. Thought content does not include homicidal or suicidal plan.         Judgment: Judgment normal.       Blood pressure 124/72, pulse 107, temperature 97 °F (36.1 °C), height 162.6 cm (64\"), weight 75.7 kg (166 lb 12.8 oz), SpO2 95 %, not currently breastfeeding.  Body mass index is 28.63 kg/m².    Current Medications;;    Current Outpatient Medications:   •  buPROPion XL (Wellbutrin XL) 150 MG 24 hr tablet, Take 1 tablet by mouth Every Morning. " With 300mg., Disp: 30 tablet, Rfl: 3  •  buPROPion XL (WELLBUTRIN XL) 300 MG 24 hr tablet, Take 1 tablet by mouth Daily., Disp: 30 tablet, Rfl: 3  •  busPIRone (BUSPAR) 10 MG tablet, Take 2 tablets by mouth 3 (Three) Times a Day., Disp: 180 tablet, Rfl: 2  •  doxepin (SINEquan) 50 MG capsule, Take 1 capsule by mouth Every Night., Disp: 30 capsule, Rfl: 3  •  DULoxetine (Cymbalta) 60 MG capsule, Two po q am, Disp: 60 capsule, Rfl: 2  •  levETIRAcetam (KEPPRA) 500 MG tablet, Take 1 tablet by mouth 2 (Two) Times a Day., Disp: 60 tablet, Rfl: 6  •  methadone (DOLOPHINE) 10 MG tablet, Take 60 mg by mouth Daily,, Disp: , Rfl:   •  prazosin (MINIPRESS) 5 MG capsule, Two po q hs, Disp: 60 capsule, Rfl: 3  •  QUEtiapine (SEROquel) 50 MG tablet, Take 1 tablet by mouth Every Night., Disp: 30 tablet, Rfl: 3    Lab Results:   Procedure visit on 12/09/2020   Component Date Value Ref Range Status   • Specimen Type: 12/09/2020 Comment   Final    Whole Blood   • Preauthorization 12/09/2020 Comment   Final    Prior authorization review complete   • Clinical Indication 12/09/2020 Comment   Final    Comment: Personal and/or family history of Hereditary Breast and  Ovarian Cancer (HBOC)     • Results: 12/09/2020 Comment   Final    Comment: NEGATIVE FOR PATHOGENIC VARIANTS  No clinically significant variants or variants of uncertain  significance were identified.  GENE   VARIANT  BRCA1  NEGATIVE No pathogenic variants were identified.  BRCA2  NEGATIVE No pathogenic variants were identified.     • Interpretation 12/09/2020 Comment   Final    No pathogenic variants were identified.   • Additional Clinic Info 12/09/2020 Comment   Final    Comment: NCCN Guidelines  When BRCA1 and BRCA2 results are negative, additional  testing may be helpful for some patients with breast,  ovarian, and pancreatic cancer. Guidelines from the  National Comprehensive Cancer Network(R) (NCCN(R))  recommend considering germline genetic testing  for  high-penetrance breast and/or ovarian cancer genes  (including, but not limited to, BRCA1/2, CDH1, PALB2, PTEN,  and TP53) in patients with any of the criteria in the table  below. To discuss comprehensive genetic testing for breast,  ovarian, and pancreatic cancer genes, an [facility] Genetic Coordinator is available at [phone].  Breast cancer diagnosed    Ovarian cancer at any age  <= age 45  Breast cancer diagnosed    Breast cancer diagnosed at  age 46-50 with >= 1        any age, with >= 1 close  close relative with        relative with breast cancer  breast, ovarian,           <= age 50, or ovarian,  pancreatic, or high        pancreatic, or metastatic  grade prostate cancer      prostat                           e cancer  Male breast cancer at      Pancreatic cancer at any age  any age  Triple negative breast     Metastatic prostate cancer at  cancer diagnosed <= 60     any age  Breast, ovarian, or        A first or second degree  pancreatic cancer at any   relative meeting any of the  age; and Ashkenazi         criteria in this table  Nondenominational ancestry     • RECOMMENDATIONS 12/09/2020 Comment   Final    Comment: Based on information provided, this patient meets National  Comprehensive Cancer Network(R) (NCCN(R)) clinical  guidelines that recommend considering testing for  additional genes. See Additional Clinical Information for  more details. [facility] Coordinators are  available at [phone] to discuss additional genetic  testing if desired.  Genetic counseling is recommended to discuss the potential  clinical and/or reproductive implications of these results,  as well as recommendations for testing family members. To  access [facility] Counselors please visit  [email or web address] or call [phone] ([phone]).     • Methodology and Limitations 12/09/2020 Comment   Final    Comment: Next-generation sequencing:  Genomic regions of interest are  selected using a custom capture reagent for target  enrichment and sequenced via the [employer] next  generation sequencing platform. Regions of interest include  all exons and intron/exon junctions (+/-20 nucleotides) of  the BRCA1 (NM_007294.3) and BRCA2 (NM_000059.3) genes.  Sequencing reads are aligned with the human genome  reference GRCh37/hg19 build. Minimum mean coverage is 40X.  Any segment failing minimum read depth coverage is rescued  by bi-directional [place] sequencing to complete sequence  analysis. Variants, including SNVs and CNVs, are identified  using a custom bioinformatics pipeline.  Reported variants: Pathogenic and likely pathogenic  variants and variants of uncertain significance (VUS) are  reported. Non-deletion variants are specified using the  numbering and nomenclature recommended by the Human Genome  Variation Society (HGVS, http://www.hgvs.org/). Benign  variants are not reported. Variant classif                           ication and  confirmation are consistent with ACMG standards and  guidelines ([place], PMID:82946205; [place], PMID:49248983).  Detailed variant classification information is available  upon request. A variant of uncertain significance (VUS)  should not be used in clinical decision making; a VUS is  classified based on inadequate or conflicting evidence  regarding its pathogenicity or clinical relevance.  Limitations: Technologies used do not detect germline  mosaicism and do not rule out the presence of large  chromosomal aberrations, including rearrangements, gene  fusions, or variants in regions or genes not included in  this test, or possible inter/ intragenic interactions  between variants. Variant classification and/or  interpretation may change over time if more information  becomes available. False positive or false negative results  may occur for reasons that include: genetic variants,  pseudogene interference, technical  handling, blood  transfusions, bone marrow transplantation                           , mislabeling of  samples, or erroneous representation of family  relationships. For heterozygous variants in the same gene  the assay cannot determine whether they are on the same or  different chromosome; to determine phase and clinical  significance, rarely, parental testing may be required.  Exact breakpoints of exon-level deletions/duplications are  not determined. The presence of an inherited cancer  syndrome due to a different genetic cause cannot be ruled  out. Any interpretation should be clinically correlated  with information about the patients presentation and  relevant family history.  This test was developed and its performance characteristics  determined by [employer]. It has not been cleared or approved  by the Food and Drug Administration.     • References 12/09/2020 Comment   Final    Comment: 1. NCCN Genetic/Familial High Risk Assessment: Breast,  Ovarian, and Pancreatic. Version 1.2020.  2. olga Payne al. BRCA1- and BRCA2-Associated  Hereditary Breast and Ovarian Cancer. [place], updated  2016. PMID: 71016547.     • RELEASED BY 12/09/2020 Comment   Final    Comment: Aniket Mckeon MS, [facility] [place], under the direction of Eddie Mayfield,  PhD, [facility]     • IMAGE 12/09/2020 .   Final       Mental Status Exam:   Hygiene:   good  Cooperation:  Cooperative  Eye Contact:  Good  Psychomotor Behavior:  Appropriate  Mood:dysthymic  Affect:  Appropriate  Hopelessness: Denies  Speech:  Normal  Thought Process:  Goal directed  Thought Content:  Normal  Suicidal:  None  Homicidal:  None  Hallucinations:  None  Delusion:  None  Memory:  Intact  Orientation:  Person, Place, Time and Situation  Reliability:  good  Insight:  Good  Judgement:  Good  Impulse Control:  Good  Physical/Medical Issues:  No     PHQ-9 Depression Screening  Little interest or pleasure in doing things? 3   Feeling down, depressed, or hopeless? 2   Trouble falling or  staying asleep, or sleeping too much? 3   Feeling tired or having little energy? 3   Poor appetite or overeating? 3   Feeling bad about yourself - or that you are a failure or have let yourself or your family down? 3   Trouble concentrating on things, such as reading the newspaper or watching television? 3   Moving or speaking so slowly that other people could have noticed? Or the opposite - being so fidgety or restless that you have been moving around a lot more than usual? 1   Thoughts that you would be better off dead, or of hurting yourself in some way? 0   PHQ-9 Total Score 21   If you checked off any problems, how difficult have these problems made it for you to do your work, take care of things at home, or get along with other people?          Assessment/Plan:  Diagnoses and all orders for this visit:    1. Major depressive disorder, recurrent episode, moderate (CMS/HCC) (Primary)    2. Generalized anxiety disorder    3. Nightmares    4. Primary insomnia  -     QUEtiapine (SEROquel) 50 MG tablet; Take 1 tablet by mouth Every Night.  Dispense: 30 tablet; Refill: 3    5. Anxiety and depression  -     DULoxetine (Cymbalta) 60 MG capsule; Two po q am  Dispense: 60 capsule; Refill: 2    6. Moderate episode of recurrent major depressive disorder (CMS/HCC)  -     DULoxetine (Cymbalta) 60 MG capsule; Two po q am  Dispense: 60 capsule; Refill: 2      Patient requests to restart Seroquel for sleep at a lower dose.  Increase Cymbalta to 60 mg take 2 daily.    A psychological evaluation was conducted in order to assess past and current level of functioning. Areas assessed included, but were not limited to: perception of social support, perception of ability to face and deal with challenges in life (positive functioning), anxiety symptoms, depressive symptoms, perspective on beliefs/belief system, coping skills for stress, intelligence level,  Therapeutic rapport was established. Interventions conducted today were geared  towards incorporating medication management along with support for continued therapy. Education was also provided as to the med management with this provider and what to expect in subsequent sessions.    We discussed risks, benefits,goals and side effects of the above medication and the patient was agreeable with the plan.Patient was educated on the importance of compliance with treatment and follow-up appointments. Patient is aware to contact the [facility] with any worsening of symptoms. To call for questions or concerns and return early if necessary. Patent is agreeable to go to the Emergency Department or call 911 should they begin SI/HI.     Treatment Plan:   Discussed risks, benefits, and alternatives of medication. Encouraged healthy habits (eating, exercise and sleep). Call if any questions or problems arise. Medication reconciled. Controlled substance monitoring report reviewed. Provided psychoeducation.. Discussed coping strategies and current stressors. Set appropriate boundaries and limits for patient's well-being. Use distraction techniques to improve symptoms. Access support networks.      Return in about 4 weeks (around 3/23/2021) for Follow Up 15 min.    STEVEN Young

## 2021-03-19 ENCOUNTER — TELEPHONE (OUTPATIENT)
Dept: INTERNAL MEDICINE | Facility: CLINIC | Age: 41
End: 2021-03-19

## 2021-03-19 NOTE — TELEPHONE ENCOUNTER
Pt called in and stated she had placed a request for a refill earlier this week, I saw the note from Alea, but I cannot release that info. I tried to have several other MA's see if they could, but no one could see Silvina's notes to release. Advised pt that she would receive a call back

## 2021-03-22 NOTE — TELEPHONE ENCOUNTER
Sent encounter to [facility]. Will call patient and document in refill encounter when [facility] sends msg back.

## 2021-03-23 ENCOUNTER — OFFICE VISIT (OUTPATIENT)
Dept: BEHAVIORAL HEALTH | Facility: CLINIC | Age: 41
End: 2021-03-23

## 2021-03-23 VITALS
DIASTOLIC BLOOD PRESSURE: 68 MMHG | WEIGHT: 168.6 LBS | HEIGHT: 64 IN | SYSTOLIC BLOOD PRESSURE: 110 MMHG | TEMPERATURE: 98.1 F | BODY MASS INDEX: 28.79 KG/M2

## 2021-03-23 DIAGNOSIS — F51.01 PRIMARY INSOMNIA: ICD-10-CM

## 2021-03-23 DIAGNOSIS — F51.5 NIGHTMARES: ICD-10-CM

## 2021-03-23 DIAGNOSIS — F41.1 GENERALIZED ANXIETY DISORDER: ICD-10-CM

## 2021-03-23 DIAGNOSIS — F33.1 MAJOR DEPRESSIVE DISORDER, RECURRENT EPISODE, MODERATE (HCC): Primary | ICD-10-CM

## 2021-03-23 PROCEDURE — 99213 OFFICE O/P EST LOW 20 MIN: CPT | Performed by: NURSE PRACTITIONER

## 2021-03-23 RX ORDER — DOXEPIN HYDROCHLORIDE 50 MG/1
50 CAPSULE ORAL NIGHTLY
Qty: 30 CAPSULE | Refills: 3 | Status: SHIPPED | OUTPATIENT
Start: 2021-03-23 | End: 2021-04-27

## 2021-03-23 RX ORDER — GABAPENTIN 400 MG/1
400 CAPSULE ORAL 3 TIMES DAILY
Qty: 90 CAPSULE | Refills: 1 | Status: SHIPPED | OUTPATIENT
Start: 2021-03-23 | End: 2021-04-27 | Stop reason: SDUPTHER

## 2021-03-23 NOTE — PROGRESS NOTES
Patient Name: Audrey Alonso  MRN: [id number]   :  1980     Chief Complaint:      ICD-10-CM ICD-9-CM   1. Major depressive disorder, recurrent episode, moderate (CMS/HCC)  F33.1 296.32   2. Generalized anxiety disorder  F41.1 300.02   3. Primary insomnia  F51.01 307.42   4. Nightmares  F51.5 307.47       History of Present Illness: Audrey Alonso is a 41 y.o. female is here today for medication management follow up.  Patient states she thinks the Wellbutrin helps with her mood however it increases her anxiety.  Patient states when she was on gabapentin before it helped her mood and gave her energy and she was able to sleep better.    The following portions of the patient's history were reviewed and updated as appropriate: allergies, current medications, past family history, past medical history, past social history, past surgical history and problem list.    Review of Systems;;  Review of Systems   Constitutional: Negative for activity change, appetite change, fatigue, unexpected weight gain and unexpected weight loss.   Respiratory: Negative for shortness of breath and wheezing.    Gastrointestinal: Negative for constipation, diarrhea, nausea and vomiting.   Musculoskeletal: Negative for gait problem.   Skin: Negative for dry skin and rash.   Neurological: Negative for dizziness, speech difficulty, weakness, light-headedness, headache, memory problem and confusion.   Psychiatric/Behavioral: Positive for sleep disturbance, positive for hyperactivity, depressed mood and stress. Negative for agitation, behavioral problems, decreased concentration, dysphoric mood, hallucinations, self-injury and suicidal ideas. The patient is nervous/anxious.        Physical Exam;;  Physical Exam  Vitals and nursing note reviewed.   Constitutional:       General: She is not in acute distress.     Appearance: She is well-developed. She is not diaphoretic.   HENT:      Head: Normocephalic and atraumatic.   Eyes:       "Conjunctiva/sclera: Conjunctivae normal.   Cardiovascular:      Rate and Rhythm: Normal rate.   Pulmonary:      Effort: Pulmonary effort is normal. No respiratory distress.   Musculoskeletal:         General: Normal range of motion.      Cervical back: Full passive range of motion without pain and normal range of motion.   Skin:     General: Skin is warm and dry.   Neurological:      Mental Status: She is alert and oriented to person, place, and time.   Psychiatric:         Mood and Affect: Mood is anxious and depressed. Affect is not labile, blunt, angry or inappropriate.         Speech: Speech is not rapid and pressured or tangential.         Behavior: Behavior is hyperactive. Behavior is not agitated, slowed, aggressive, withdrawn or combative. Behavior is cooperative.         Thought Content: Thought content normal. Thought content is not paranoid or delusional. Thought content does not include homicidal or suicidal ideation. Thought content does not include homicidal or suicidal plan.         Judgment: Judgment normal.       Blood pressure 110/68, temperature 98.1 °F (36.7 °C), height 162.6 cm (64\"), weight 76.5 kg (168 lb 9.6 oz), not currently breastfeeding.  Body mass index is 28.94 kg/m².    Current Medications;;    Current Outpatient Medications:   •  buPROPion XL (Wellbutrin XL) 150 MG 24 hr tablet, Take 1 tablet by mouth Every Morning. With 300mg., Disp: 30 tablet, Rfl: 3  •  buPROPion XL (WELLBUTRIN XL) 300 MG 24 hr tablet, Take 1 tablet by mouth Daily., Disp: 30 tablet, Rfl: 3  •  busPIRone (BUSPAR) 10 MG tablet, Take 2 tablets by mouth 3 (Three) Times a Day., Disp: 180 tablet, Rfl: 2  •  doxepin (SINEquan) 50 MG capsule, Take 1 capsule by mouth Every Night., Disp: 30 capsule, Rfl: 3  •  DULoxetine (Cymbalta) 60 MG capsule, Two po q am, Disp: 60 capsule, Rfl: 2  •  levETIRAcetam (KEPPRA) 500 MG tablet, Take 1 tablet by mouth 2 (Two) Times a Day., Disp: 60 tablet, Rfl: 6  •  methadone (DOLOPHINE) 10 MG " tablet, Take 60 mg by mouth Daily,, Disp: , Rfl:   •  prazosin (MINIPRESS) 5 MG capsule, Two po q hs, Disp: 60 capsule, Rfl: 3  •  QUEtiapine (SEROquel) 50 MG tablet, Take 1 tablet by mouth Every Night., Disp: 30 tablet, Rfl: 3  •  gabapentin (NEURONTIN) 400 MG capsule, Take 1 capsule by mouth 3 (Three) Times a Day., Disp: 90 capsule, Rfl: 1    Lab Results:   No visits with results within 3 Month(s) from this visit.   Latest known visit with results is:   Procedure visit on 12/09/2020   Component Date Value Ref Range Status   • Specimen Type: 12/09/2020 Comment   Final    Whole Blood   • Preauthorization 12/09/2020 Comment   Final    Prior authorization review complete   • Clinical Indication 12/09/2020 Comment   Final    Comment: Personal and/or family history of Hereditary Breast and  Ovarian Cancer (HBOC)     • Results: 12/09/2020 Comment   Final    Comment: NEGATIVE FOR PATHOGENIC VARIANTS  No clinically significant variants or variants of uncertain  significance were identified.  GENE   VARIANT  BRCA1  NEGATIVE No pathogenic variants were identified.  BRCA2  NEGATIVE No pathogenic variants were identified.     • Interpretation 12/09/2020 Comment   Final    No pathogenic variants were identified.   • Additional Clinic Info 12/09/2020 Comment   Final    Comment: NCCN Guidelines  When BRCA1 and BRCA2 results are negative, additional  testing may be helpful for some patients with breast,  ovarian, and pancreatic cancer. Guidelines from the  National Comprehensive Cancer Network(R) (NCCN(R))  recommend considering germline genetic testing for  high-penetrance breast and/or ovarian cancer genes  (including, but not limited to, BRCA1/2, CDH1, PALB2, PTEN,  and TP53) in patients with any of the criteria in the table  below. To discuss comprehensive genetic testing for breast,  ovarian, and pancreatic cancer genes, an [facility] Genetic Coordinator is available at [phone].  Breast cancer diagnosed     Ovarian cancer at any age  <= age 45  Breast cancer diagnosed    Breast cancer diagnosed at  age 46-50 with >= 1        any age, with >= 1 close  close relative with        relative with breast cancer  breast, ovarian,           <= age 50, or ovarian,  pancreatic, or high        pancreatic, or metastatic  grade prostate cancer      prostat                           e cancer  Male breast cancer at      Pancreatic cancer at any age  any age  Triple negative breast     Metastatic prostate cancer at  cancer diagnosed <= 60     any age  Breast, ovarian, or        A first or second degree  pancreatic cancer at any   relative meeting any of the  age; and Ashkenazi         criteria in this table  Caodaism ancestry     • RECOMMENDATIONS 12/09/2020 Comment   Final    Comment: Based on information provided, this patient meets National  Comprehensive Cancer Network(R) (NCCN(R)) clinical  guidelines that recommend considering testing for  additional genes. See Additional Clinical Information for  more details. [facility]' Genetic Coordinators are  available at [phone] to discuss additional genetic  testing if desired.  Genetic counseling is recommended to discuss the potential  clinical and/or reproductive implications of these results,  as well as recommendations for testing family members. To  access [facility] Genetic Counselors please visit  [email or web address] or call [phone] ([phone]).     • Methodology and Limitations 12/09/2020 Comment   Final    Comment: Next-generation sequencing: Genomic regions of interest are  selected using a custom capture reagent for target  enrichment and sequenced via the [employer](R) next  generation sequencing platform. Regions of interest include  all exons and intron/exon junctions (+/-20 nucleotides) of  the BRCA1 (NM_007294.3) and BRCA2 (NM_000059.3) genes.  Sequencing reads are aligned with the human genome  reference  GRCh37/hg19 build. Minimum mean coverage is 40X.  Any segment failing minimum read depth coverage is rescued  by bi-directional Edison sequencing to complete sequence  analysis. Variants, including SNVs and CNVs, are identified  using a custom bioinformatics pipeline.  Reported variants: Pathogenic and likely pathogenic  variants and variants of uncertain significance (VUS) are  reported. Non-deletion variants are specified using the  numbering and nomenclature recommended by the Human Genome  Variation Society (HGVS, http://www.hgvs.org/). Benign  variants are not reported. Variant classif                           ication and  confirmation are consistent with ACMG standards and  guidelines ([place], PMID:55973906; Jer, PMID:30322636).  Detailed variant classification information is available  upon request. A variant of uncertain significance (VUS)  should not be used in clinical decision making; a VUS is  classified based on inadequate or conflicting evidence  regarding its pathogenicity or clinical relevance.  Limitations: Technologies used do not detect germline  mosaicism and do not rule out the presence of large  chromosomal aberrations, including rearrangements, gene  fusions, or variants in regions or genes not included in  this test, or possible inter/ intragenic interactions  between variants. Variant classification and/or  interpretation may change over time if more information  becomes available. False positive or false negative results  may occur for reasons that include: genetic variants,  pseudogene interference, technical handling, blood  transfusions, bone marrow transplantation                           , mislabeling of  samples, or erroneous representation of family  relationships. For heterozygous variants in the same gene  the assay cannot determine whether they are on the same or  different chromosome; to determine phase and clinical  significance, rarely, parental testing may be  required.  Exact breakpoints of exon-level deletions/duplications are  not determined. The presence of an inherited cancer  syndrome due to a different genetic cause cannot be ruled  out. Any interpretation should be clinically correlated  with information about the patients presentation and  relevant family history.  This test was developed and its performance characteristics  determined by [employer]. It has not been cleared or approved  by the Food and Drug Administration.     • References 12/09/2020 Comment   Final    Comment: 1. NCCN Genetic/Familial High Risk Assessment: Breast,  Ovarian, and Pancreatic. Version 1.2020.  2. olga Payne al. BRCA1- and BRCA2-Associated  Hereditary Breast and Ovarian Cancer. Osvaldo, updated  2016. PMID: [id number].     • RELEASED BY 12/09/2020 Comment   Final    Comment: Aniket Mckeon MS, [facility] [place] under the direction of Eddie Mayfield,  PhD, [facility]     • IMAGE 12/09/2020 .   Final       Mental Status Exam:   Hygiene:   good  Cooperation:  Cooperative  Eye Contact:  Good  Psychomotor Behavior:  Appropriate  Mood:anxious and depressed  Affect:  Appropriate  Hopelessness: Denies  Speech:  Normal  Thought Process:  Goal directed  Thought Content:  Normal  Suicidal:  None  Homicidal:  None  Hallucinations:  None  Delusion:  None  Memory:  Intact  Orientation:  Person, Place, Time and Situation  Reliability:  good  Insight:  Good  Judgement:  Good  Impulse Control:  Good  Physical/Medical Issues:  No     PHQ-9 Depression Screening  Little interest or pleasure in doing things? 2   Feeling down, depressed, or hopeless? 2   Trouble falling or staying asleep, or sleeping too much? 1   Feeling tired or having little energy? 2   Poor appetite or overeating? 3   Feeling bad about yourself - or that you are a failure or have let yourself or your family down? 3   Trouble concentrating on things, such as reading the newspaper or watching television? 2   Moving or speaking so slowly that other people  could have noticed? Or the opposite - being so fidgety or restless that you have been moving around a lot more than usual? 3   Thoughts that you would be better off dead, or of hurting yourself in some way? 1   PHQ-9 Total Score 19   If you checked off any problems, how difficult have these problems made it for you to do your work, take care of things at home, or get along with other people?          Assessment/Plan:  Diagnoses and all orders for this visit:    1. Major depressive disorder, recurrent episode, moderate (CMS/HCC) (Primary)    2. Generalized anxiety disorder  -     gabapentin (NEURONTIN) 400 MG capsule; Take 1 capsule by mouth 3 (Three) Times a Day.  Dispense: 90 capsule; Refill: 1  -     Compliance Drug Analysis, Ur - Urine, Clean Catch; Future    3. Primary insomnia  -     doxepin (SINEquan) 50 MG capsule; Take 1 capsule by mouth Every Night.  Dispense: 30 capsule; Refill: 3    4. Nightmares      Add gabapentin 400 mg 3 times a day.  We will consider increasing to patient's previous dose next month.  Will consider decreasing some of the other medications.    A psychological evaluation was conducted in order to assess past and current level of functioning. Areas assessed included, but were not limited to: perception of social support, perception of ability to face and deal with challenges in life (positive functioning), anxiety symptoms, depressive symptoms, perspective on beliefs/belief system, coping skills for stress, intelligence level,  Therapeutic rapport was established. Interventions conducted today were geared towards incorporating medication management along with support for continued therapy. Education was also provided as to the med management with this provider and what to expect in subsequent sessions.    We discussed risks, benefits,goals and side effects of the above medication and the patient was agreeable with the plan.Patient was educated on the importance of compliance with  treatment and follow-up appointments. Patient is aware to contact the [facility] with any worsening of symptoms. To call for questions or concerns and return early if necessary. Patent is agreeable to go to the Emergency Department or call 911 should they begin SI/HI.     Treatment Plan:   Discussed risks, benefits, and alternatives of medication. Encouraged healthy habits (eating, exercise and sleep). Call if any questions or problems arise. Medication reconciled. Controlled substance monitoring report reviewed. Provided psychoeducation.. Discussed coping strategies and current stressors. Set appropriate boundaries and limits for patient's well-being. Use distraction techniques to improve symptoms. Access support networks.      Return in about 4 weeks (around 4/20/2021) for Follow Up 15 min.    Silvina Taylor, APRN

## 2021-03-31 LAB — REQUEST PROBLEM: NORMAL

## 2021-04-20 ENCOUNTER — TELEPHONE (OUTPATIENT)
Dept: INTERNAL MEDICINE | Facility: CLINIC | Age: 41
End: 2021-04-20

## 2021-04-20 NOTE — TELEPHONE ENCOUNTER
Christine with [facility] left  on referral line to verify medications for patient. Call her at [phone] ask for bethanie.

## 2021-04-22 DIAGNOSIS — F51.01 PRIMARY INSOMNIA: ICD-10-CM

## 2021-04-22 RX ORDER — QUETIAPINE FUMARATE 50 MG/1
TABLET, FILM COATED ORAL
Qty: 90 TABLET | Refills: 2 | Status: SHIPPED | OUTPATIENT
Start: 2021-04-22 | End: 2021-07-08 | Stop reason: SDUPTHER

## 2021-04-27 ENCOUNTER — OFFICE VISIT (OUTPATIENT)
Dept: BEHAVIORAL HEALTH | Facility: CLINIC | Age: 41
End: 2021-04-27

## 2021-04-27 VITALS
HEIGHT: 64 IN | WEIGHT: 158.2 LBS | DIASTOLIC BLOOD PRESSURE: 72 MMHG | BODY MASS INDEX: 27.01 KG/M2 | SYSTOLIC BLOOD PRESSURE: 116 MMHG

## 2021-04-27 DIAGNOSIS — F51.01 PRIMARY INSOMNIA: ICD-10-CM

## 2021-04-27 DIAGNOSIS — F33.1 MAJOR DEPRESSIVE DISORDER, RECURRENT EPISODE, MODERATE (HCC): Primary | ICD-10-CM

## 2021-04-27 DIAGNOSIS — F41.1 GENERALIZED ANXIETY DISORDER: ICD-10-CM

## 2021-04-27 DIAGNOSIS — F32.A ANXIETY AND DEPRESSION: ICD-10-CM

## 2021-04-27 DIAGNOSIS — F41.9 ANXIETY AND DEPRESSION: ICD-10-CM

## 2021-04-27 DIAGNOSIS — F33.1 MODERATE EPISODE OF RECURRENT MAJOR DEPRESSIVE DISORDER (HCC): ICD-10-CM

## 2021-04-27 DIAGNOSIS — F51.5 NIGHTMARES: ICD-10-CM

## 2021-04-27 PROCEDURE — 99213 OFFICE O/P EST LOW 20 MIN: CPT | Performed by: NURSE PRACTITIONER

## 2021-04-27 RX ORDER — PRAZOSIN HYDROCHLORIDE 5 MG/1
CAPSULE ORAL
Qty: 60 CAPSULE | Refills: 3 | Status: SHIPPED | OUTPATIENT
Start: 2021-04-27 | End: 2021-07-08 | Stop reason: SDUPTHER

## 2021-04-27 RX ORDER — GABAPENTIN 400 MG/1
400 CAPSULE ORAL 3 TIMES DAILY
Qty: 90 CAPSULE | Refills: 1 | Status: SHIPPED | OUTPATIENT
Start: 2021-04-27 | End: 2021-06-01

## 2021-04-27 RX ORDER — BUSPIRONE HYDROCHLORIDE 10 MG/1
20 TABLET ORAL 3 TIMES DAILY
Qty: 180 TABLET | Refills: 2 | Status: SHIPPED | OUTPATIENT
Start: 2021-04-27 | End: 2021-07-08 | Stop reason: SDUPTHER

## 2021-04-27 RX ORDER — DULOXETIN HYDROCHLORIDE 60 MG/1
CAPSULE, DELAYED RELEASE ORAL
Qty: 60 CAPSULE | Refills: 2 | Status: SHIPPED | OUTPATIENT
Start: 2021-04-27 | End: 2021-06-01 | Stop reason: SDUPTHER

## 2021-04-27 RX ORDER — BUPROPION HYDROCHLORIDE 300 MG/1
300 TABLET ORAL DAILY
Qty: 30 TABLET | Refills: 3 | Status: SHIPPED | OUTPATIENT
Start: 2021-04-27 | End: 2021-07-08 | Stop reason: SDUPTHER

## 2021-04-27 RX ORDER — DOXEPIN HYDROCHLORIDE 50 MG/1
50 CAPSULE ORAL NIGHTLY
Qty: 30 CAPSULE | Refills: 3 | Status: SHIPPED | OUTPATIENT
Start: 2021-04-27 | End: 2021-07-08 | Stop reason: SDUPTHER

## 2021-04-27 NOTE — PROGRESS NOTES
Patient Name: Audrey Alonso  MRN: [id number]   :  1980     Chief Complaint:      ICD-10-CM ICD-9-CM   1. Major depressive disorder, recurrent episode, moderate (CMS/HCC)  F33.1 296.32   2. Generalized anxiety disorder  F41.1 300.02   3. Primary insomnia  F51.01 307.42   4. Nightmares  F51.5 307.47   5. Anxiety and depression  F41.9 300.00    F32.9 311   6. Moderate episode of recurrent major depressive disorder (CMS/HCC)  F33.1 296.32       History of Present Illness: Audrey Alonso is a 41 y.o. female is here today for medication management follow up.  States anxiety could be better however is happy with the improvement.  Having difficulty getting doxepin paid for by insurance.  States she has been working and eating better this past month and has lost 10 pounds.    The following portions of the patient's history were reviewed and updated as appropriate: allergies, current medications, past family history, past medical history, past social history, past surgical history and problem list.    Review of Systems;;  Review of Systems   Constitutional: Negative for activity change, appetite change, fatigue, unexpected weight gain and unexpected weight loss.   Respiratory: Negative for shortness of breath and wheezing.    Gastrointestinal: Negative for constipation, diarrhea, nausea and vomiting.   Musculoskeletal: Negative for gait problem.   Skin: Negative for dry skin and rash.   Neurological: Negative for dizziness, speech difficulty, weakness, light-headedness, headache, memory problem and confusion.   Psychiatric/Behavioral: Positive for sleep disturbance. Negative for agitation, behavioral problems, decreased concentration, dysphoric mood, hallucinations, self-injury, suicidal ideas, negative for hyperactivity, depressed mood and stress. The patient is nervous/anxious.        Physical Exam;;  Physical Exam  Vitals and nursing note reviewed.   Constitutional:       General: She is not in acute distress.    "  Appearance: She is well-developed. She is not diaphoretic.   HENT:      Head: Normocephalic and atraumatic.   Eyes:      Conjunctiva/sclera: Conjunctivae normal.   Cardiovascular:      Rate and Rhythm: Normal rate.   Pulmonary:      Effort: Pulmonary effort is normal. No respiratory distress.   Musculoskeletal:         General: Normal range of motion.      Cervical back: Full passive range of motion without pain and normal range of motion.   Skin:     General: Skin is warm and dry.   Neurological:      Mental Status: She is alert and oriented to person, place, and time.   Psychiatric:         Mood and Affect: Mood is anxious. Mood is not depressed. Affect is not labile, blunt, angry or inappropriate.         Speech: Speech is not rapid and pressured or tangential.         Behavior: Behavior normal. Behavior is not agitated, slowed, aggressive, withdrawn, hyperactive or combative. Behavior is cooperative.         Thought Content: Thought content normal. Thought content is not paranoid or delusional. Thought content does not include homicidal or suicidal ideation. Thought content does not include homicidal or suicidal plan.         Judgment: Judgment normal.       Blood pressure 116/72, height 162.6 cm (64\"), weight 71.8 kg (158 lb 3.2 oz), not currently breastfeeding.  Body mass index is 27.15 kg/m².    Current Medications;;    Current Outpatient Medications:   •  buPROPion XL (Wellbutrin XL) 150 MG 24 hr tablet, Take 1 tablet by mouth Every Morning. With 300mg., Disp: 30 tablet, Rfl: 3  •  buPROPion XL (WELLBUTRIN XL) 300 MG 24 hr tablet, Take 1 tablet by mouth Daily., Disp: 30 tablet, Rfl: 3  •  busPIRone (BUSPAR) 10 MG tablet, Take 2 tablets by mouth 3 (Three) Times a Day., Disp: 180 tablet, Rfl: 2  •  DULoxetine (Cymbalta) 60 MG capsule, Two po q am, Disp: 60 capsule, Rfl: 2  •  gabapentin (NEURONTIN) 400 MG capsule, Take 1 capsule by mouth 3 (Three) Times a Day., Disp: 90 capsule, Rfl: 1  •  levETIRAcetam " (KEPPRA) 500 MG tablet, Take 1 tablet by mouth 2 (Two) Times a Day., Disp: 60 tablet, Rfl: 6  •  methadone (DOLOPHINE) 10 MG tablet, Take 60 mg by mouth Daily,, Disp: , Rfl:   •  prazosin (MINIPRESS) 5 MG capsule, Two po q hs, Disp: 60 capsule, Rfl: 3  •  QUEtiapine (SEROquel) 50 MG tablet, TAKE ONE TABLET BY MOUTH ONCE NIGHTLY, Disp: 90 tablet, Rfl: 2  •  doxepin (SINEquan) 50 MG capsule, Take 1 capsule by mouth Every Night., Disp: 30 capsule, Rfl: 3    Lab Results:   Orders Only on 03/23/2021   Component Date Value Ref Range Status   • Request Problem 03/23/2021 CANCELED   Final-Edited    Comment: Quantity was not sufficient for analysis.        TEST:  [id number]  Compliance Drug Analysis, Ur    Result canceled by the ancillary.         Mental Status Exam:   Hygiene:   good  Cooperation:  Cooperative  Eye Contact:  Good  Psychomotor Behavior:  Restless  Mood:anxious  Affect:  Appropriate  Hopelessness: Denies  Speech:  Rapid  Thought Process:  Goal directed  Thought Content:  Normal  Suicidal:  None  Homicidal:  None  Hallucinations:  None  Delusion:  None  Memory:  Intact  Orientation:  Person, Place, Time and Situation  Reliability:  good  Insight:  Good  Judgement:  Good  Impulse Control:  Good  Physical/Medical Issues:  No     PHQ-9 Depression Screening  Little interest or pleasure in doing things? 1   Feeling down, depressed, or hopeless? 1   Trouble falling or staying asleep, or sleeping too much? 3   Feeling tired or having little energy? 2   Poor appetite or overeating? 3   Feeling bad about yourself - or that you are a failure or have let yourself or your family down? 3   Trouble concentrating on things, such as reading the newspaper or watching television? 3   Moving or speaking so slowly that other people could have noticed? Or the opposite - being so fidgety or restless that you have been moving around a lot more than usual? 2   Thoughts that you would be better off dead, or of hurting yourself in some  way? 0   PHQ-9 Total Score 18   If you checked off any problems, how difficult have these problems made it for you to do your work, take care of things at home, or get along with other people?          Assessment/Plan:  Diagnoses and all orders for this visit:    1. Major depressive disorder, recurrent episode, moderate (CMS/HCC) (Primary)    2. Generalized anxiety disorder  -     gabapentin (NEURONTIN) 400 MG capsule; Take 1 capsule by mouth 3 (Three) Times a Day.  Dispense: 90 capsule; Refill: 1  -     buPROPion XL (WELLBUTRIN XL) 300 MG 24 hr tablet; Take 1 tablet by mouth Daily.  Dispense: 30 tablet; Refill: 3    3. Primary insomnia  -     doxepin (SINEquan) 50 MG capsule; Take 1 capsule by mouth Every Night.  Dispense: 30 capsule; Refill: 3    4. Nightmares  -     prazosin (MINIPRESS) 5 MG capsule; Two po q hs  Dispense: 60 capsule; Refill: 3    5. Anxiety and depression  -     DULoxetine (Cymbalta) 60 MG capsule; Two po q am  Dispense: 60 capsule; Refill: 2  -     busPIRone (BUSPAR) 10 MG tablet; Take 2 tablets by mouth 3 (Three) Times a Day.  Dispense: 180 tablet; Refill: 2    6. Moderate episode of recurrent major depressive disorder (CMS/HCC)  -     DULoxetine (Cymbalta) 60 MG capsule; Two po q am  Dispense: 60 capsule; Refill: 2      Continue medication as ordered.    A psychological evaluation was conducted in order to assess past and current level of functioning. Areas assessed included, but were not limited to: perception of social support, perception of ability to face and deal with challenges in life (positive functioning), anxiety symptoms, depressive symptoms, perspective on beliefs/belief system, coping skills for stress, intelligence level,  Therapeutic rapport was established. Interventions conducted today were geared towards incorporating medication management along with support for continued therapy. Education was also provided as to the med management with this provider and what to expect in  subsequent sessions.    We discussed risks, benefits,goals and side effects of the above medication and the patient was agreeable with the plan.Patient was educated on the importance of compliance with treatment and follow-up appointments. Patient is aware to contact the [facility] with any worsening of symptoms. To call for questions or concerns and return early if necessary. Patent is agreeable to go to the Emergency Department or call 911 should they begin SI/HI.     Treatment Plan:   Discussed risks, benefits, and alternatives of medication. Encouraged healthy habits (eating, exercise and sleep). Call if any questions or problems arise. Medication reconciled. Controlled substance monitoring report reviewed. Provided psychoeducation.. Discussed coping strategies and current stressors. Set appropriate boundaries and limits for patient's well-being. Use distraction techniques to improve symptoms. Access support networks.      Return in about 4 weeks (around 5/25/2021) for Follow Up 15 min.    Silvina Taylor, APRN

## 2021-06-01 ENCOUNTER — OFFICE VISIT (OUTPATIENT)
Dept: BEHAVIORAL HEALTH | Facility: CLINIC | Age: 41
End: 2021-06-01

## 2021-06-01 VITALS
HEIGHT: 64 IN | WEIGHT: 153.8 LBS | BODY MASS INDEX: 26.26 KG/M2 | DIASTOLIC BLOOD PRESSURE: 72 MMHG | SYSTOLIC BLOOD PRESSURE: 122 MMHG

## 2021-06-01 DIAGNOSIS — F51.5 NIGHTMARES: ICD-10-CM

## 2021-06-01 DIAGNOSIS — F51.01 PRIMARY INSOMNIA: ICD-10-CM

## 2021-06-01 DIAGNOSIS — F33.1 MODERATE EPISODE OF RECURRENT MAJOR DEPRESSIVE DISORDER (HCC): ICD-10-CM

## 2021-06-01 DIAGNOSIS — F32.A ANXIETY AND DEPRESSION: ICD-10-CM

## 2021-06-01 DIAGNOSIS — F33.1 MAJOR DEPRESSIVE DISORDER, RECURRENT EPISODE, MODERATE (HCC): Primary | ICD-10-CM

## 2021-06-01 DIAGNOSIS — R56.9 SEIZURES (HCC): ICD-10-CM

## 2021-06-01 DIAGNOSIS — F41.1 GENERALIZED ANXIETY DISORDER: ICD-10-CM

## 2021-06-01 DIAGNOSIS — F41.9 ANXIETY AND DEPRESSION: ICD-10-CM

## 2021-06-01 PROCEDURE — 99213 OFFICE O/P EST LOW 20 MIN: CPT | Performed by: NURSE PRACTITIONER

## 2021-06-01 RX ORDER — LEVETIRACETAM 500 MG/1
500 TABLET ORAL 2 TIMES DAILY
Qty: 60 TABLET | Refills: 6 | Status: SHIPPED | OUTPATIENT
Start: 2021-06-01 | End: 2021-07-08 | Stop reason: SDUPTHER

## 2021-06-01 RX ORDER — DULOXETIN HYDROCHLORIDE 60 MG/1
CAPSULE, DELAYED RELEASE ORAL
Qty: 60 CAPSULE | Refills: 2 | Status: SHIPPED | OUTPATIENT
Start: 2021-06-01 | End: 2021-07-08 | Stop reason: SDUPTHER

## 2021-06-01 RX ORDER — BUPROPION HYDROCHLORIDE 150 MG/1
150 TABLET ORAL EVERY MORNING
Qty: 30 TABLET | Refills: 3 | Status: SHIPPED | OUTPATIENT
Start: 2021-06-01 | End: 2021-07-08 | Stop reason: SDUPTHER

## 2021-06-01 RX ORDER — GABAPENTIN 600 MG/1
600 TABLET ORAL 3 TIMES DAILY
Qty: 90 TABLET | Refills: 2 | Status: SHIPPED | OUTPATIENT
Start: 2021-06-01 | End: 2021-07-08 | Stop reason: SDUPTHER

## 2021-06-01 NOTE — PROGRESS NOTES
Patient Name: Audrey Alonso  MRN: [id number]   :  1980     Chief Complaint:      ICD-10-CM ICD-9-CM   1. Major depressive disorder, recurrent episode, moderate (CMS/[facility])  F33.1 296.32   2. Generalized anxiety disorder  F41.1 300.02   3. Primary insomnia  F51.01 307.42   4. Nightmares  F51.5 307.47   5. Anxiety and depression  F41.9 300.00    F32.9 311   6. Moderate episode of recurrent major depressive disorder (CMS/[facility])  F33.1 296.32   7. Seizures (CMS/[facility])  R56.9 780.39       History of Present Illness: Audrey Alonso is a 41 y.o. female is here today for medication management follow up.  Patient states anxiety has improved some with the gabapentin.  Does feel she still struggles with anxiety and thinks there is room for improvement.  Patient states sleep is hit or miss.  Some nights she gets good sleep and other nights she has insomnia.    The following portions of the patient's history were reviewed and updated as appropriate: allergies, current medications, past family history, past medical history, past social history, past surgical history and problem list.    Review of Systems;;  Review of Systems   Constitutional: Negative for activity change, appetite change, fatigue, unexpected weight gain and unexpected weight loss.   Respiratory: Negative for shortness of breath and wheezing.    Gastrointestinal: Negative for constipation, diarrhea, nausea and vomiting.   Musculoskeletal: Negative for gait problem.   Skin: Negative for dry skin and rash.   Neurological: Negative for dizziness, speech difficulty, weakness, light-headedness, headache, memory problem and confusion.   Psychiatric/Behavioral: Positive for sleep disturbance and stress. Negative for agitation, behavioral problems, decreased concentration, dysphoric mood, hallucinations, self-injury, suicidal ideas, negative for hyperactivity and depressed mood. The patient is nervous/anxious.        Physical Exam;;  Physical Exam  Vitals and nursing  "note reviewed.   Constitutional:       General: She is not in acute distress.     Appearance: She is well-developed. She is not diaphoretic.   HENT:      Head: Normocephalic and atraumatic.   Eyes:      Conjunctiva/sclera: Conjunctivae normal.   Cardiovascular:      Rate and Rhythm: Normal rate.   Pulmonary:      Effort: Pulmonary effort is normal. No respiratory distress.   Musculoskeletal:         General: Normal range of motion.      Cervical back: Full passive range of motion without pain and normal range of motion.   Skin:     General: Skin is warm and dry.   Neurological:      Mental Status: She is alert and oriented to person, place, and time.   Psychiatric:         Mood and Affect: Mood is anxious. Mood is not depressed. Affect is not labile, blunt, angry or inappropriate.         Speech: Speech is not rapid and pressured or tangential.         Behavior: Behavior normal. Behavior is not agitated, slowed, aggressive, withdrawn, hyperactive or combative. Behavior is cooperative.         Thought Content: Thought content normal. Thought content is not paranoid or delusional. Thought content does not include homicidal or suicidal ideation. Thought content does not include homicidal or suicidal plan.         Judgment: Judgment normal.       Blood pressure 122/72, height 162.6 cm (64\"), weight 69.8 kg (153 lb 12.8 oz), not currently breastfeeding.  Body mass index is 26.4 kg/m².    Current Medications;;    Current Outpatient Medications:   •  buPROPion XL (Wellbutrin XL) 150 MG 24 hr tablet, Take 1 tablet by mouth Every Morning. With 300mg., Disp: 30 tablet, Rfl: 3  •  buPROPion XL (WELLBUTRIN XL) 300 MG 24 hr tablet, Take 1 tablet by mouth Daily., Disp: 30 tablet, Rfl: 3  •  busPIRone (BUSPAR) 10 MG tablet, Take 2 tablets by mouth 3 (Three) Times a Day., Disp: 180 tablet, Rfl: 2  •  doxepin (SINEquan) 50 MG capsule, Take 1 capsule by mouth Every Night., Disp: 30 capsule, Rfl: 3  •  DULoxetine (Cymbalta) 60 MG " capsule, Two po q am, Disp: 60 capsule, Rfl: 2  •  levETIRAcetam (KEPPRA) 500 MG tablet, Take 1 tablet by mouth 2 (Two) Times a Day., Disp: 60 tablet, Rfl: 6  •  methadone (DOLOPHINE) 10 MG tablet, Take 60 mg by mouth Daily,, Disp: , Rfl:   •  prazosin (MINIPRESS) 5 MG capsule, Two po q hs, Disp: 60 capsule, Rfl: 3  •  QUEtiapine (SEROquel) 50 MG tablet, TAKE ONE TABLET BY MOUTH ONCE NIGHTLY, Disp: 90 tablet, Rfl: 2  •  gabapentin (Neurontin) 600 MG tablet, Take 1 tablet by mouth 3 (Three) Times a Day., Disp: 90 tablet, Rfl: 2    Lab Results:   Orders Only on 03/23/2021   Component Date Value Ref Range Status   • Request Problem 03/23/2021 CANCELED   Final-Edited    Comment: Quantity was not sufficient for analysis.        TEST:  [id number]  Compliance Drug Analysis, Ur    Result canceled by the ancillary.         Mental Status Exam:   Hygiene:   good  Cooperation:  Cooperative  Eye Contact:  Good  Psychomotor Behavior:  Appropriate  Mood:anxious  Affect:  Appropriate  Hopelessness: Denies  Speech:  Normal  Thought Process:  Goal directed  Thought Content:  Normal  Suicidal:  None  Homicidal:  None  Hallucinations:  None  Delusion:  None  Memory:  Intact  Orientation:  Person, Place, Time and Situation  Reliability:  good  Insight:  Good  Judgement:  Good  Impulse Control:  Good  Physical/Medical Issues:  No     PHQ-9 Depression Screening  Little interest or pleasure in doing things? 0   Feeling down, depressed, or hopeless? 1   Trouble falling or staying asleep, or sleeping too much? 1   Feeling tired or having little energy? 1   Poor appetite or overeating? 3   Feeling bad about yourself - or that you are a failure or have let yourself or your family down? 2   Trouble concentrating on things, such as reading the newspaper or watching television? 3   Moving or speaking so slowly that other people could have noticed? Or the opposite - being so fidgety or restless that you have been moving around a lot more than usual? 1    Thoughts that you would be better off dead, or of hurting yourself in some way? 0   PHQ-9 Total Score 12   If you checked off any problems, how difficult have these problems made it for you to do your work, take care of things at home, or get along with other people?          Assessment/Plan:  Diagnoses and all orders for this visit:    1. Major depressive disorder, recurrent episode, moderate (CMS/HCC) (Primary)    2. Generalized anxiety disorder  -     gabapentin (Neurontin) 600 MG tablet; Take 1 tablet by mouth 3 (Three) Times a Day.  Dispense: 90 tablet; Refill: 2  -     buPROPion XL (Wellbutrin XL) 150 MG 24 hr tablet; Take 1 tablet by mouth Every Morning. With 300mg.  Dispense: 30 tablet; Refill: 3    3. Primary insomnia    4. Nightmares    5. Anxiety and depression  -     DULoxetine (Cymbalta) 60 MG capsule; Two po q am  Dispense: 60 capsule; Refill: 2    6. Moderate episode of recurrent major depressive disorder (CMS/HCC)  -     DULoxetine (Cymbalta) 60 MG capsule; Two po q am  Dispense: 60 capsule; Refill: 2    7. Seizures (CMS/HCC)  -     levETIRAcetam (KEPPRA) 500 MG tablet; Take 1 tablet by mouth 2 (Two) Times a Day.  Dispense: 60 tablet; Refill: 6      Increase gabapentin to 600 mg 3 times a day.    A psychological evaluation was conducted in order to assess past and current level of functioning. Areas assessed included, but were not limited to: perception of social support, perception of ability to face and deal with challenges in life (positive functioning), anxiety symptoms, depressive symptoms, perspective on beliefs/belief system, coping skills for stress, intelligence level,  Therapeutic rapport was established. Interventions conducted today were geared towards incorporating medication management along with support for continued therapy. Education was also provided as to the med management with this provider and what to expect in subsequent sessions.    We discussed risks, benefits,goals and  side effects of the above medication and the patient was agreeable with the plan.Patient was educated on the importance of compliance with treatment and follow-up appointments. Patient is aware to contact the [facility] with any worsening of symptoms. To call for questions or concerns and return early if necessary. Patent is agreeable to go to the Emergency Department or call 911 should they begin SI/HI.     Treatment Plan:   Discussed risks, benefits, and alternatives of medication. Encouraged healthy habits (eating, exercise and sleep). Call if any questions or problems arise. Medication reconciled. Controlled substance monitoring report reviewed. Provided psychoeducation.. Discussed coping strategies and current stressors. Set appropriate boundaries and limits for patient's well-being. Use distraction techniques to improve symptoms. Access support networks.      Return in about 4 weeks (around 6/29/2021) for Follow Up 15 min.    Silvina Taylor, STEVEN

## 2021-07-08 ENCOUNTER — OFFICE VISIT (OUTPATIENT)
Dept: BEHAVIORAL HEALTH | Facility: CLINIC | Age: 41
End: 2021-07-08

## 2021-07-08 VITALS
BODY MASS INDEX: 27.18 KG/M2 | HEIGHT: 64 IN | SYSTOLIC BLOOD PRESSURE: 110 MMHG | WEIGHT: 159.2 LBS | DIASTOLIC BLOOD PRESSURE: 62 MMHG

## 2021-07-08 DIAGNOSIS — F41.9 ANXIETY AND DEPRESSION: ICD-10-CM

## 2021-07-08 DIAGNOSIS — F51.5 NIGHTMARES: ICD-10-CM

## 2021-07-08 DIAGNOSIS — F33.1 MAJOR DEPRESSIVE DISORDER, RECURRENT EPISODE, MODERATE (HCC): Primary | ICD-10-CM

## 2021-07-08 DIAGNOSIS — F41.1 GENERALIZED ANXIETY DISORDER: ICD-10-CM

## 2021-07-08 DIAGNOSIS — F51.01 PRIMARY INSOMNIA: ICD-10-CM

## 2021-07-08 DIAGNOSIS — F32.A ANXIETY AND DEPRESSION: ICD-10-CM

## 2021-07-08 DIAGNOSIS — F33.1 MODERATE EPISODE OF RECURRENT MAJOR DEPRESSIVE DISORDER (HCC): ICD-10-CM

## 2021-07-08 DIAGNOSIS — R56.9 SEIZURES (HCC): ICD-10-CM

## 2021-07-08 PROCEDURE — 99213 OFFICE O/P EST LOW 20 MIN: CPT | Performed by: NURSE PRACTITIONER

## 2021-07-08 RX ORDER — BUSPIRONE HYDROCHLORIDE 10 MG/1
20 TABLET ORAL 3 TIMES DAILY
Qty: 180 TABLET | Refills: 2 | Status: SHIPPED | OUTPATIENT
Start: 2021-07-08 | End: 2021-08-10 | Stop reason: SDUPTHER

## 2021-07-08 RX ORDER — PRAZOSIN HYDROCHLORIDE 5 MG/1
CAPSULE ORAL
Qty: 60 CAPSULE | Refills: 3 | Status: SHIPPED | OUTPATIENT
Start: 2021-07-08 | End: 2021-08-10 | Stop reason: SDUPTHER

## 2021-07-08 RX ORDER — QUETIAPINE FUMARATE 50 MG/1
50 TABLET, FILM COATED ORAL NIGHTLY
Qty: 90 TABLET | Refills: 2 | Status: SHIPPED | OUTPATIENT
Start: 2021-07-08 | End: 2021-10-28

## 2021-07-08 RX ORDER — BUPROPION HYDROCHLORIDE 300 MG/1
300 TABLET ORAL DAILY
Qty: 30 TABLET | Refills: 3 | Status: SHIPPED | OUTPATIENT
Start: 2021-07-08 | End: 2021-08-10 | Stop reason: SDUPTHER

## 2021-07-08 RX ORDER — DOXEPIN HYDROCHLORIDE 50 MG/1
50 CAPSULE ORAL NIGHTLY
Qty: 30 CAPSULE | Refills: 3 | Status: SHIPPED | OUTPATIENT
Start: 2021-07-08 | End: 2021-10-28 | Stop reason: SDUPTHER

## 2021-07-08 RX ORDER — BUPROPION HYDROCHLORIDE 150 MG/1
150 TABLET ORAL EVERY MORNING
Qty: 30 TABLET | Refills: 3 | Status: SHIPPED | OUTPATIENT
Start: 2021-07-08 | End: 2021-08-10 | Stop reason: SDUPTHER

## 2021-07-08 RX ORDER — DULOXETIN HYDROCHLORIDE 60 MG/1
CAPSULE, DELAYED RELEASE ORAL
Qty: 60 CAPSULE | Refills: 2 | Status: SHIPPED | OUTPATIENT
Start: 2021-07-08 | End: 2021-08-10 | Stop reason: SDUPTHER

## 2021-07-08 RX ORDER — GABAPENTIN 600 MG/1
600 TABLET ORAL 3 TIMES DAILY
Qty: 90 TABLET | Refills: 2 | Status: SHIPPED | OUTPATIENT
Start: 2021-07-08 | End: 2021-08-10

## 2021-07-08 RX ORDER — LEVETIRACETAM 500 MG/1
500 TABLET ORAL 2 TIMES DAILY
Qty: 60 TABLET | Refills: 6 | Status: SHIPPED | OUTPATIENT
Start: 2021-07-08 | End: 2021-08-10

## 2021-07-08 NOTE — PROGRESS NOTES
Patient Name: Audrey Alonso  MRN: [id number]   :  1980     Chief Complaint:      ICD-10-CM ICD-9-CM   1. Major depressive disorder, recurrent episode, moderate (CMS/[facility])  F33.1 296.32   2. Generalized anxiety disorder  F41.1 300.02   3. Primary insomnia  F51.01 307.42   4. Nightmares  F51.5 307.47   5. Anxiety and depression  F41.9 300.00    F32.9 311   6. Moderate episode of recurrent major depressive disorder (CMS/[facility])  F33.1 296.32   7. Seizures (CMS/[facility])  R56.9 780.39       History of Present Illness: Audrey Alonso is a 41 y.o. female is here today for medication management follow up.  Patient states mood and anxiety are stable.  Patient states she falls asleep without difficulty however has difficulty staying asleep.  Does still have nightmares however they are better.  Does not feel the need for change in medication at this time.  No other questions or concerns.    The following portions of the patient's history were reviewed and updated as appropriate: allergies, current medications, past family history, past medical history, past social history, past surgical history and problem list.    Review of Systems;;  Review of Systems   Constitutional: Negative for activity change, appetite change, fatigue, unexpected weight gain and unexpected weight loss.   Respiratory: Negative for shortness of breath and wheezing.    Gastrointestinal: Negative for constipation, diarrhea, nausea and vomiting.   Musculoskeletal: Negative for gait problem.   Skin: Negative for dry skin and rash.   Neurological: Negative for dizziness, speech difficulty, weakness, light-headedness, headache, memory problem and confusion.   Psychiatric/Behavioral: Negative for agitation, behavioral problems, decreased concentration, dysphoric mood, hallucinations, self-injury, sleep disturbance, suicidal ideas, negative for hyperactivity, depressed mood and stress. The patient is not nervous/anxious.        Physical Exam;;  Physical  "Exam  Vitals and nursing note reviewed.   Constitutional:       General: She is not in acute distress.     Appearance: She is well-developed. She is not diaphoretic.   HENT:      Head: Normocephalic and atraumatic.   Eyes:      Conjunctiva/sclera: Conjunctivae normal.   Cardiovascular:      Rate and Rhythm: Normal rate.   Pulmonary:      Effort: Pulmonary effort is normal. No respiratory distress.   Musculoskeletal:         General: Normal range of motion.      Cervical back: Full passive range of motion without pain and normal range of motion.   Skin:     General: Skin is warm and dry.   Neurological:      Mental Status: She is alert and oriented to person, place, and time.   Psychiatric:         Mood and Affect: Mood is not anxious or depressed. Affect is not labile, blunt, angry or inappropriate.         Speech: Speech is not rapid and pressured or tangential.         Behavior: Behavior normal. Behavior is not agitated, slowed, aggressive, withdrawn, hyperactive or combative. Behavior is cooperative.         Thought Content: Thought content normal. Thought content is not paranoid or delusional. Thought content does not include homicidal or suicidal ideation. Thought content does not include homicidal or suicidal plan.         Judgment: Judgment normal.       Blood pressure 110/62, height 162.6 cm (64\"), weight 72.2 kg (159 lb 3.2 oz), not currently breastfeeding.  Body mass index is 27.33 kg/m².    Current Medications;;    Current Outpatient Medications:   •  buPROPion XL (Wellbutrin XL) 150 MG 24 hr tablet, Take 1 tablet by mouth Every Morning. With 300mg., Disp: 30 tablet, Rfl: 3  •  buPROPion XL (WELLBUTRIN XL) 300 MG 24 hr tablet, Take 1 tablet by mouth Daily., Disp: 30 tablet, Rfl: 3  •  busPIRone (BUSPAR) 10 MG tablet, Take 2 tablets by mouth 3 (Three) Times a Day., Disp: 180 tablet, Rfl: 2  •  doxepin (SINEquan) 50 MG capsule, Take 1 capsule by mouth Every Night., Disp: 30 capsule, Rfl: 3  •  DULoxetine " (Cymbalta) 60 MG capsule, Two po q am, Disp: 60 capsule, Rfl: 2  •  gabapentin (Neurontin) 600 MG tablet, Take 1 tablet by mouth 3 (Three) Times a Day., Disp: 90 tablet, Rfl: 2  •  levETIRAcetam (KEPPRA) 500 MG tablet, Take 1 tablet by mouth 2 (Two) Times a Day., Disp: 60 tablet, Rfl: 6  •  methadone (DOLOPHINE) 10 MG tablet, Take 60 mg by mouth Daily,, Disp: , Rfl:   •  prazosin (MINIPRESS) 5 MG capsule, Two po q hs, Disp: 60 capsule, Rfl: 3  •  QUEtiapine (SEROquel) 50 MG tablet, Take 1 tablet by mouth Every Night., Disp: 90 tablet, Rfl: 2    Lab Results:   No visits with results within 3 Month(s) from this visit.   Latest known visit with results is:   Orders Only on 03/23/2021   Component Date Value Ref Range Status   • Request Problem 03/23/2021 CANCELED   Final-Edited    Comment: Quantity was not sufficient for analysis.        TEST:  [id number]  Compliance Drug Analysis, Ur    Result canceled by the ancillary.         Mental Status Exam:   Hygiene:   good  Cooperation:  Cooperative  Eye Contact:  Good  Psychomotor Behavior:  Appropriate  Mood:anxious and depressed  Affect:  Appropriate  Hopelessness: Denies  Speech:  Normal  Thought Process:  Goal directed  Thought Content:  Normal  Suicidal:  None  Homicidal:  None  Hallucinations:  None  Delusion:  None  Memory:  Intact  Orientation:  Person, Place, Time and Situation  Reliability:  good  Insight:  Good  Judgement:  Good  Impulse Control:  Good  Physical/Medical Issues:  No     PHQ-9 Depression Screening  Little interest or pleasure in doing things? 1   Feeling down, depressed, or hopeless? 1   Trouble falling or staying asleep, or sleeping too much? 3   Feeling tired or having little energy? 0   Poor appetite or overeating? 0   Feeling bad about yourself - or that you are a failure or have let yourself or your family down? 1   Trouble concentrating on things, such as reading the newspaper or watching television? 1   Moving or speaking so slowly that other  people could have noticed? Or the opposite - being so fidgety or restless that you have been moving around a lot more than usual? 0   Thoughts that you would be better off dead, or of hurting yourself in some way? 0   PHQ-9 Total Score 7   If you checked off any problems, how difficult have these problems made it for you to do your work, take care of things at home, or get along with other people? Very difficult        Assessment/Plan:  Diagnoses and all orders for this visit:    1. Major depressive disorder, recurrent episode, moderate (CMS/HCC) (Primary)    2. Generalized anxiety disorder  -     buPROPion XL (Wellbutrin XL) 150 MG 24 hr tablet; Take 1 tablet by mouth Every Morning. With 300mg.  Dispense: 30 tablet; Refill: 3  -     buPROPion XL (WELLBUTRIN XL) 300 MG 24 hr tablet; Take 1 tablet by mouth Daily.  Dispense: 30 tablet; Refill: 3  -     gabapentin (Neurontin) 600 MG tablet; Take 1 tablet by mouth 3 (Three) Times a Day.  Dispense: 90 tablet; Refill: 2    3. Primary insomnia  -     QUEtiapine (SEROquel) 50 MG tablet; Take 1 tablet by mouth Every Night.  Dispense: 90 tablet; Refill: 2  -     doxepin (SINEquan) 50 MG capsule; Take 1 capsule by mouth Every Night.  Dispense: 30 capsule; Refill: 3    4. Nightmares  -     prazosin (MINIPRESS) 5 MG capsule; Two po q hs  Dispense: 60 capsule; Refill: 3    5. Anxiety and depression  -     DULoxetine (Cymbalta) 60 MG capsule; Two po q am  Dispense: 60 capsule; Refill: 2  -     busPIRone (BUSPAR) 10 MG tablet; Take 2 tablets by mouth 3 (Three) Times a Day.  Dispense: 180 tablet; Refill: 2    6. Moderate episode of recurrent major depressive disorder (CMS/HCC)  -     DULoxetine (Cymbalta) 60 MG capsule; Two po q am  Dispense: 60 capsule; Refill: 2    7. Seizures (CMS/HCC)  -     levETIRAcetam (KEPPRA) 500 MG tablet; Take 1 tablet by mouth 2 (Two) Times a Day.  Dispense: 60 tablet; Refill: 6          A psychological evaluation was conducted in order to assess past  and current level of functioning. Areas assessed included, but were not limited to: perception of social support, perception of ability to face and deal with challenges in life (positive functioning), anxiety symptoms, depressive symptoms, perspective on beliefs/belief system, coping skills for stress, intelligence level,  Therapeutic rapport was established. Interventions conducted today were geared towards incorporating medication management along with support for continued therapy. Education was also provided as to the med management with this provider and what to expect in subsequent sessions.    We discussed risks, benefits,goals and side effects of the above medication and the patient was agreeable with the plan.Patient was educated on the importance of compliance with treatment and follow-up appointments. Patient is aware to contact the [facility] with any worsening of symptoms. To call for questions or concerns and return early if necessary. Patent is agreeable to go to the Emergency Department or call 911 should they begin SI/HI.     Treatment Plan:   Discussed risks, benefits, and alternatives of medication. Encouraged healthy habits (eating, exercise and sleep). Call if any questions or problems arise. Medication reconciled. Controlled substance monitoring report reviewed. Provided psychoeducation.. Discussed coping strategies and current stressors. Set appropriate boundaries and limits for patient's well-being. Use distraction techniques to improve symptoms. Access support networks.      Return in about 4 weeks (around 8/5/2021) for Follow Up 15 min.    STEVEN Young

## 2021-08-10 ENCOUNTER — OFFICE VISIT (OUTPATIENT)
Dept: BEHAVIORAL HEALTH | Facility: CLINIC | Age: 41
End: 2021-08-10

## 2021-08-10 VITALS
WEIGHT: 156.12 LBS | DIASTOLIC BLOOD PRESSURE: 72 MMHG | SYSTOLIC BLOOD PRESSURE: 122 MMHG | HEIGHT: 64 IN | BODY MASS INDEX: 26.65 KG/M2

## 2021-08-10 DIAGNOSIS — F51.01 PRIMARY INSOMNIA: ICD-10-CM

## 2021-08-10 DIAGNOSIS — F33.1 MODERATE EPISODE OF RECURRENT MAJOR DEPRESSIVE DISORDER (HCC): ICD-10-CM

## 2021-08-10 DIAGNOSIS — F32.A ANXIETY AND DEPRESSION: ICD-10-CM

## 2021-08-10 DIAGNOSIS — F41.9 ANXIETY AND DEPRESSION: ICD-10-CM

## 2021-08-10 DIAGNOSIS — F41.1 GENERALIZED ANXIETY DISORDER: ICD-10-CM

## 2021-08-10 DIAGNOSIS — F33.1 MAJOR DEPRESSIVE DISORDER, RECURRENT EPISODE, MODERATE (HCC): Primary | ICD-10-CM

## 2021-08-10 DIAGNOSIS — F51.5 NIGHTMARES: ICD-10-CM

## 2021-08-10 PROCEDURE — 99213 OFFICE O/P EST LOW 20 MIN: CPT | Performed by: NURSE PRACTITIONER

## 2021-08-10 RX ORDER — DULOXETIN HYDROCHLORIDE 60 MG/1
CAPSULE, DELAYED RELEASE ORAL
Qty: 60 CAPSULE | Refills: 2 | Status: SHIPPED | OUTPATIENT
Start: 2021-08-10 | End: 2021-10-28 | Stop reason: SDUPTHER

## 2021-08-10 RX ORDER — BUPROPION HYDROCHLORIDE 300 MG/1
300 TABLET ORAL DAILY
Qty: 30 TABLET | Refills: 3 | Status: SHIPPED | OUTPATIENT
Start: 2021-08-10 | End: 2021-10-28 | Stop reason: SDUPTHER

## 2021-08-10 RX ORDER — BUPROPION HYDROCHLORIDE 150 MG/1
150 TABLET ORAL EVERY MORNING
Qty: 30 TABLET | Refills: 3 | Status: SHIPPED | OUTPATIENT
Start: 2021-08-10 | End: 2021-10-28 | Stop reason: SDUPTHER

## 2021-08-10 RX ORDER — GABAPENTIN 800 MG/1
800 TABLET ORAL 3 TIMES DAILY
Qty: 90 TABLET | Refills: 2 | Status: SHIPPED | OUTPATIENT
Start: 2021-08-10 | End: 2021-10-28 | Stop reason: SDUPTHER

## 2021-08-10 RX ORDER — BUSPIRONE HYDROCHLORIDE 10 MG/1
20 TABLET ORAL 3 TIMES DAILY
Qty: 180 TABLET | Refills: 2 | Status: SHIPPED | OUTPATIENT
Start: 2021-08-10 | End: 2021-10-28

## 2021-08-10 RX ORDER — PRAZOSIN HYDROCHLORIDE 5 MG/1
CAPSULE ORAL
Qty: 60 CAPSULE | Refills: 3 | Status: SHIPPED | OUTPATIENT
Start: 2021-08-10 | End: 2021-10-28 | Stop reason: SDUPTHER

## 2021-08-10 NOTE — PROGRESS NOTES
Patient Name: Audrey Alonso  MRN: [id number]   :  1980     Chief Complaint:      ICD-10-CM ICD-9-CM   1. Major depressive disorder, recurrent episode, moderate (CMS/HCC)  F33.1 296.32   2. Generalized anxiety disorder  F41.1 300.02   3. Primary insomnia  F51.01 307.42   4. Anxiety and depression  F41.9 300.00    F32.9 311   5. Moderate episode of recurrent major depressive disorder (CMS/HCC)  F33.1 296.32   6. Nightmares  F51.5 307.47       History of Present Illness: Audrey Alonso is a 41 y.o. female is here today for medication management follow up.  Pt states when she was gabapentin last time she was on 800mg three times a day and wonders if she can get back to that dose. States sleep is hit or miss. Everything else seems to be somewhat stable.   The following portions of the patient's history were reviewed and updated as appropriate: allergies, current medications, past family history, past medical history, past social history, past surgical history and problem list.    Review of Systems;;  Review of Systems   Constitutional: Negative for activity change, appetite change, fatigue, unexpected weight gain and unexpected weight loss.   Respiratory: Negative for shortness of breath and wheezing.    Gastrointestinal: Negative for constipation, diarrhea, nausea and vomiting.   Musculoskeletal: Negative for gait problem.   Skin: Negative for dry skin and rash.   Neurological: Negative for dizziness, speech difficulty, weakness, light-headedness, headache, memory problem and confusion.   Psychiatric/Behavioral: Positive for sleep disturbance. Negative for agitation, behavioral problems, decreased concentration, dysphoric mood, hallucinations, self-injury, suicidal ideas, negative for hyperactivity, depressed mood and stress. The patient is nervous/anxious.        Physical Exam;;  Physical Exam  Vitals and nursing note reviewed.   Constitutional:       General: She is not in acute distress.     Appearance:  "She is well-developed. She is not diaphoretic.   HENT:      Head: Normocephalic and atraumatic.   Eyes:      Conjunctiva/sclera: Conjunctivae normal.   Cardiovascular:      Rate and Rhythm: Normal rate.   Pulmonary:      Effort: Pulmonary effort is normal. No respiratory distress.   Musculoskeletal:         General: Normal range of motion.      Cervical back: Full passive range of motion without pain and normal range of motion.   Skin:     General: Skin is warm and dry.   Neurological:      Mental Status: She is alert and oriented to person, place, and time.   Psychiatric:         Mood and Affect: Mood is anxious. Mood is not depressed. Affect is not labile, blunt, angry or inappropriate.         Speech: Speech is not rapid and pressured or tangential.         Behavior: Behavior normal. Behavior is not agitated, slowed, aggressive, withdrawn, hyperactive or combative. Behavior is cooperative.         Thought Content: Thought content normal. Thought content is not paranoid or delusional. Thought content does not include homicidal or suicidal ideation. Thought content does not include homicidal or suicidal plan.         Judgment: Judgment normal.       Blood pressure 122/72, height 162.6 cm (64\"), weight 70.8 kg (156 lb 1.9 oz), not currently breastfeeding.  Body mass index is 26.8 kg/m².    Current Medications;;    Current Outpatient Medications:   •  buPROPion XL (Wellbutrin XL) 150 MG 24 hr tablet, Take 1 tablet by mouth Every Morning. With 300mg., Disp: 30 tablet, Rfl: 3  •  buPROPion XL (WELLBUTRIN XL) 300 MG 24 hr tablet, Take 1 tablet by mouth Daily., Disp: 30 tablet, Rfl: 3  •  busPIRone (BUSPAR) 10 MG tablet, Take 2 tablets by mouth 3 (Three) Times a Day., Disp: 180 tablet, Rfl: 2  •  doxepin (SINEquan) 50 MG capsule, Take 1 capsule by mouth Every Night., Disp: 30 capsule, Rfl: 3  •  DULoxetine (Cymbalta) 60 MG capsule, Two po q am, Disp: 60 capsule, Rfl: 2  •  methadone (DOLOPHINE) 10 MG tablet, Take 60 mg " by mouth Daily,, Disp: , Rfl:   •  prazosin (MINIPRESS) 5 MG capsule, Two po q hs, Disp: 60 capsule, Rfl: 3  •  QUEtiapine (SEROquel) 50 MG tablet, Take 1 tablet by mouth Every Night., Disp: 90 tablet, Rfl: 2  •  gabapentin (NEURONTIN) 800 MG tablet, Take 1 tablet by mouth 3 (Three) Times a Day., Disp: 90 tablet, Rfl: 2    Lab Results:   No visits with results within 3 Month(s) from this visit.   Latest known visit with results is:   Orders Only on 03/23/2021   Component Date Value Ref Range Status   • Request Problem 03/23/2021 CANCELED   Final-Edited    Comment: Quantity was not sufficient for analysis.        TEST:  [id number]  [facility] Drug Analysis, Ur    Result canceled by the ancillary.         Mental Status Exam:   Hygiene:   good  Cooperation:  Cooperative  Eye Contact:  Good  Psychomotor Behavior:  Appropriate  Mood:anxious and depressed  Affect:  Appropriate  Hopelessness: Denies  Speech:  Normal  Thought Process:  Goal directed  Thought Content:  Normal  Suicidal:  None  Homicidal:  None  Hallucinations:  None  Delusion:  None  Memory:  Intact  Orientation:  Person, Place, Time and Situation  Reliability:  good  Insight:  Good  Judgement:  Good  Impulse Control:  Good  Physical/Medical Issues:  No     PHQ-9 Depression Screening  Little interest or pleasure in doing things? 1   Feeling down, depressed, or hopeless? 0   Trouble falling or staying asleep, or sleeping too much? 1 (falling & staying asleep)   Feeling tired or having little energy? 1   Poor appetite or overeating? 2 (overeating)   Feeling bad about yourself - or that you are a failure or have let yourself or your family down? 1   Trouble concentrating on things, such as reading the newspaper or watching television? 2   Moving or speaking so slowly that other people could have noticed? Or the opposite - being so fidgety or restless that you have been moving around a lot more than usual? 1   Thoughts that you would be better off dead, or of  hurting yourself in some way? 0   PHQ-9 Total Score 9   If you checked off any problems, how difficult have these problems made it for you to do your work, take care of things at home, or get along with other people? Very difficult        Assessment/Plan:  Diagnoses and all orders for this visit:    1. Major depressive disorder, recurrent episode, moderate (CMS/HCC) (Primary)    2. Generalized anxiety disorder  -     gabapentin (NEURONTIN) 800 MG tablet; Take 1 tablet by mouth 3 (Three) Times a Day.  Dispense: 90 tablet; Refill: 2  -     buPROPion XL (Wellbutrin XL) 150 MG 24 hr tablet; Take 1 tablet by mouth Every Morning. With 300mg.  Dispense: 30 tablet; Refill: 3  -     buPROPion XL (WELLBUTRIN XL) 300 MG 24 hr tablet; Take 1 tablet by mouth Daily.  Dispense: 30 tablet; Refill: 3    3. Primary insomnia    4. Anxiety and depression  -     DULoxetine (Cymbalta) 60 MG capsule; Two po q am  Dispense: 60 capsule; Refill: 2  -     busPIRone (BUSPAR) 10 MG tablet; Take 2 tablets by mouth 3 (Three) Times a Day.  Dispense: 180 tablet; Refill: 2    5. Moderate episode of recurrent major depressive disorder (CMS/HCC)  -     DULoxetine (Cymbalta) 60 MG capsule; Two po q am  Dispense: 60 capsule; Refill: 2    6. Nightmares  -     prazosin (MINIPRESS) 5 MG capsule; Two po q hs  Dispense: 60 capsule; Refill: 3      Will increase gabapentin. Instructed pt that was the max that would be prescribed. Pt verbalized understanding.     A psychological evaluation was conducted in order to assess past and current level of functioning. Areas assessed included, but were not limited to: perception of social support, perception of ability to face and deal with challenges in life (positive functioning), anxiety symptoms, depressive symptoms, perspective on beliefs/belief system, coping skills for stress, intelligence level,  Therapeutic rapport was established. Interventions conducted today were geared towards incorporating medication  management along with support for continued therapy. Education was also provided as to the med management with this provider and what to expect in subsequent sessions.    We discussed risks, benefits,goals and side effects of the above medication and the patient was agreeable with the plan.Patient was educated on the importance of compliance with treatment and follow-up appointments. Patient is aware to contact the [facility] with any worsening of symptoms. To call for questions or concerns and return early if necessary. Patent is agreeable to go to the Emergency Department or call 911 should they begin SI/HI.     Treatment Plan:   Discussed risks, benefits, and alternatives of medication. Encouraged healthy habits (eating, exercise and sleep). Call if any questions or problems arise. Medication reconciled. Controlled substance monitoring report reviewed. Provided psychoeducation.. Discussed coping strategies and current stressors. Set appropriate boundaries and limits for patient's well-being. Use distraction techniques to improve symptoms. Access support networks.      Return in about 4 weeks (around 9/7/2021) for Follow Up 15 min.    Silvina Taylor, STEVEN

## 2021-09-10 ENCOUNTER — OFFICE VISIT (OUTPATIENT)
Dept: INTERNAL MEDICINE | Facility: CLINIC | Age: 41
End: 2021-09-10

## 2021-09-10 VITALS
TEMPERATURE: 96.5 F | BODY MASS INDEX: 26.96 KG/M2 | DIASTOLIC BLOOD PRESSURE: 70 MMHG | WEIGHT: 161.8 LBS | SYSTOLIC BLOOD PRESSURE: 120 MMHG | HEART RATE: 102 BPM | HEIGHT: 65 IN | RESPIRATION RATE: 16 BRPM | OXYGEN SATURATION: 99 %

## 2021-09-10 DIAGNOSIS — Z86.39 HISTORY OF THYROID DISEASE: ICD-10-CM

## 2021-09-10 DIAGNOSIS — R63.1 POLYDIPSIA: ICD-10-CM

## 2021-09-10 DIAGNOSIS — R79.89 ABNORMAL BLOOD CREATININE LEVEL: Primary | ICD-10-CM

## 2021-09-10 DIAGNOSIS — I10 ESSENTIAL HYPERTENSION: ICD-10-CM

## 2021-09-10 PROCEDURE — 99214 OFFICE O/P EST MOD 30 MIN: CPT | Performed by: NURSE PRACTITIONER

## 2021-09-10 NOTE — PROGRESS NOTES
Office Visit      Patient Name: Audrey Alonso  : 1980   MRN: [id number]   Care Team: Patient Care Team:  David Fleming DO as PCP - General (Internal Medicine)    Chief Complaint  Low creatine levels (weekly urine test, sign of kidneys )    Subjective     Subjective      Audrey Alonso presents to [facility] for kidney concerns. Gets weekly urine test done through [facility] for drug testing. They have informed her that her creatinine is low and to follow-up with primary care provider. She does drink water throughout the day in copious amounts, she always feels thirsty.  She has had no recent lab work through our office, last labs did show elevated creatinine with decreased GFR.  She is on several psychiatric medications including Seroquel.  Blood glucose was also elevated on previous labs.    Denies hematuria, dysuria, flank pain, fatigue, dark urine, polyphagia, polyuria, fatigue, myalgia, illicit drug use, marijuana use, and alcohol use.  She does not take any over-the-counter supplements.  She has no history of kidney dysfunction. Does admit to taking ibuprofen every day for leg aches.   No other acute complaints today.     Review of Systems   Constitutional: Negative for appetite change, fatigue and fever.   HENT: Negative for congestion, sore throat and trouble swallowing.    Eyes: Negative for blurred vision and visual disturbance.   Respiratory: Negative for cough, shortness of breath and wheezing.    Cardiovascular: Negative for chest pain, palpitations and leg swelling.   Gastrointestinal: Negative for abdominal pain, blood in stool, constipation, diarrhea and nausea.   Endocrine: Positive for polydipsia. Negative for polyphagia and polyuria.   Genitourinary: Negative for dysuria.   Musculoskeletal: Negative for arthralgias, back pain and myalgias.   Skin: Negative for rash.   Neurological: Negative for dizziness, weakness, light-headedness  "and headache.   Psychiatric/Behavioral: Negative for sleep disturbance and depressed mood. The patient is not nervous/anxious.        Objective     Objective   Vital Signs:   /70   Pulse 102   Temp 96.5 °F (35.8 °C) (Temporal)   Resp 16   Ht 165.1 cm (65\")   Wt 73.4 kg (161 lb 12.8 oz)   SpO2 99%   BMI 26.92 kg/m²     Physical Exam  Vitals and nursing note reviewed.   Constitutional:       General: She is not in acute distress.     Appearance: Normal appearance. She is not toxic-appearing.   Eyes:      Pupils: Pupils are equal, round, and reactive to light.   Neck:      Vascular: No carotid bruit.   Cardiovascular:      Rate and Rhythm: Normal rate and regular rhythm.      Heart sounds: Normal heart sounds. No murmur heard.     Pulmonary:      Effort: Pulmonary effort is normal. No respiratory distress.      Breath sounds: Normal breath sounds. No wheezing.   Abdominal:      General: Bowel sounds are normal. There is no distension.      Palpations: Abdomen is soft.      Tenderness: There is no abdominal tenderness.   Musculoskeletal:      Cervical back: Neck supple. No tenderness.   Skin:     General: Skin is warm and dry.      Findings: No rash.   Neurological:      General: No focal deficit present.      Mental Status: She is alert.   Psychiatric:         Mood and Affect: Mood normal.         Behavior: Behavior normal.       Assessment / Plan      Assessment/Plan   Problem List Items Addressed This Visit     None      Visit Diagnoses     Abnormal blood creatinine level    -  Primary    Relevant Orders    CBC No Differential    Comprehensive metabolic panel    Hemoglobin A1c    TSH Rfx On Abnormal To Free T4    Hepatitis panel, acute    Labs as above today, will escalate work-up and refer as needed.  Advised to avoid ibuprofen, extra strength Tylenol as needed for leg pain.    Polydipsia        Relevant Orders    CBC No Differential    Comprehensive metabolic panel    Hemoglobin A1c    TSH Rfx On " Abnormal To Free T4    Hepatitis panel, acute    Labs as above to rule out underlying cause.    History of thyroid disease        Relevant Orders    CBC No Differential    Comprehensive metabolic panel    Hemoglobin A1c    TSH Rfx On Abnormal To Free T4    Hepatitis panel, acute    Essential hypertension        Relevant Orders    CBC No Differential    Comprehensive metabolic panel    Hemoglobin A1c    TSH Rfx On Abnormal To Free T4    Hepatitis panel, acute    Controlled off of medication.  Recommend DASH diet, stress management, and moderate intensity exercise 4-5 times per week.           Follow Up   Return in about 1 month (around 10/10/2021) for Next scheduled follow up.  Patient was given instructions and counseling regarding her condition or for health maintenance advice. Please see specific information pulled into the AVS if appropriate.     STEVEN Van  [facility] - [place]

## 2021-09-11 LAB
ALBUMIN SERPL-MCNC: 4.2 G/DL (ref 3.5–5.2)
ALBUMIN/GLOB SERPL: 1.8 G/DL
ALP SERPL-CCNC: 69 U/L (ref 39–117)
ALT SERPL-CCNC: 20 U/L (ref 1–33)
AST SERPL-CCNC: 21 U/L (ref 1–32)
BILIRUB SERPL-MCNC: <0.2 MG/DL (ref 0–1.2)
BUN SERPL-MCNC: 12 MG/DL (ref 6–20)
BUN/CREAT SERPL: 14.5 (ref 7–25)
CALCIUM SERPL-MCNC: 9.3 MG/DL (ref 8.6–10.5)
CHLORIDE SERPL-SCNC: 102 MMOL/L (ref 98–107)
CO2 SERPL-SCNC: 26.2 MMOL/L (ref 22–29)
CREAT SERPL-MCNC: 0.83 MG/DL (ref 0.57–1)
ERYTHROCYTE [DISTWIDTH] IN BLOOD BY AUTOMATED COUNT: 13.7 % (ref 12.3–15.4)
GLOBULIN SER CALC-MCNC: 2.4 GM/DL
GLUCOSE SERPL-MCNC: 114 MG/DL (ref 65–99)
HAV IGM SERPL QL IA: NEGATIVE
HBA1C MFR BLD: 5.3 % (ref 4.8–5.6)
HBV CORE IGM SERPL QL IA: NEGATIVE
HBV SURFACE AG SERPL QL IA: NEGATIVE
HCT VFR BLD AUTO: 40.3 % (ref 34–46.6)
HCV AB S/CO SERPL IA: <0.1 S/CO RATIO (ref 0–0.9)
HGB BLD-MCNC: 12.8 G/DL (ref 12–15.9)
MCH RBC QN AUTO: 29.5 PG (ref 26.6–33)
MCHC RBC AUTO-ENTMCNC: 31.8 G/DL (ref 31.5–35.7)
MCV RBC AUTO: 92.9 FL (ref 79–97)
PLATELET # BLD AUTO: 281 10*3/MM3 (ref 140–450)
POTASSIUM SERPL-SCNC: 4.3 MMOL/L (ref 3.5–5.2)
PROT SERPL-MCNC: 6.6 G/DL (ref 6–8.5)
RBC # BLD AUTO: 4.34 10*6/MM3 (ref 3.77–5.28)
SODIUM SERPL-SCNC: 139 MMOL/L (ref 136–145)
TSH SERPL DL<=0.005 MIU/L-ACNC: 3.31 UIU/ML (ref 0.27–4.2)
WBC # BLD AUTO: 4.11 10*3/MM3 (ref 3.4–10.8)

## 2021-09-16 DIAGNOSIS — F41.1 GENERALIZED ANXIETY DISORDER: ICD-10-CM

## 2021-09-16 RX ORDER — BUPROPION HYDROCHLORIDE 300 MG/1
300 TABLET ORAL DAILY
Qty: 30 TABLET | Refills: 3 | Status: CANCELLED | OUTPATIENT
Start: 2021-09-16

## 2021-09-16 NOTE — TELEPHONE ENCOUNTER
Spoke with pharmacy, may be glitch in new phone system, patient does have refills and they are processing this at this time, patient advised.

## 2021-09-16 NOTE — TELEPHONE ENCOUNTER
Incoming Refill Request      Medication requested (name and dose): WELLBUTRIN  MG    Pharmacy where request should be sent: [facility]    Additional details provided by patient: STATES THAT PHARMACY TOLD HER THAT SHE HAD NO MORE REFILLS    Best call back number: [phone]    Does the patient have less than a 3 day supply:  [x] Yes  [] No    Analisa Curtis  09/16/21, 10:21 EDT           0

## 2021-10-01 ENCOUNTER — OFFICE VISIT (OUTPATIENT)
Dept: INTERNAL MEDICINE | Facility: CLINIC | Age: 41
End: 2021-10-01

## 2021-10-01 VITALS
HEIGHT: 65 IN | WEIGHT: 161 LBS | BODY MASS INDEX: 26.82 KG/M2 | OXYGEN SATURATION: 100 % | HEART RATE: 107 BPM | RESPIRATION RATE: 17 BRPM | SYSTOLIC BLOOD PRESSURE: 127 MMHG | DIASTOLIC BLOOD PRESSURE: 87 MMHG | TEMPERATURE: 97.7 F

## 2021-10-01 DIAGNOSIS — M10.072 ACUTE IDIOPATHIC GOUT INVOLVING TOE OF LEFT FOOT: Primary | ICD-10-CM

## 2021-10-01 PROCEDURE — 99214 OFFICE O/P EST MOD 30 MIN: CPT | Performed by: INTERNAL MEDICINE

## 2021-10-01 RX ORDER — INDOMETHACIN 50 MG/1
50 CAPSULE ORAL 3 TIMES DAILY PRN
Qty: 20 CAPSULE | Refills: 0 | Status: SHIPPED | OUTPATIENT
Start: 2021-10-01 | End: 2021-10-28

## 2021-10-01 NOTE — PROGRESS NOTES
Chief Complaint   Patient presents with   • Foot Pain     left sided with swelling with reddness-started Monday night-was really bad yesterday.       Subjective     History of Present Illness   Audrey Alonso is a 41 y.o. female presenting for follow up with concerns of pain and swelling in her left foot at the base of the 1st digit which has been present for 5 days.  Pain began without any injury and gradually developed Monday night.  She feels that the pain is starting to improve as of today but she still has significant swelling and redness.    The following portions of the patient's history were reviewed and updated as appropriate: allergies, current medications, past family history, past medical history, past social history, past surgical history and problem list.    Review of Systems   Constitutional: Negative for chills, fatigue and fever.   HENT: Negative for congestion, ear pain, rhinorrhea, sinus pressure and sore throat.    Eyes: Negative for visual disturbance.   Respiratory: Negative for cough, chest tightness, shortness of breath and wheezing.    Cardiovascular: Negative for chest pain, palpitations and leg swelling.   Gastrointestinal: Negative for abdominal pain, blood in stool, constipation, diarrhea, nausea and vomiting.   Endocrine: Negative for polydipsia and polyuria.   Genitourinary: Negative for dysuria and hematuria.   Musculoskeletal: Positive for arthralgias. Negative for back pain.   Skin: Negative for rash.   Neurological: Negative for dizziness, light-headedness, numbness and headaches.   Psychiatric/Behavioral: Negative for dysphoric mood and sleep disturbance. The patient is not nervous/anxious.        No Known Allergies    Past Medical History:   Diagnosis Date   • Anxiety    • Asthma    • Depression    • Disease of thyroid gland    • GERD (gastroesophageal reflux disease)    • Headache    • Hypertension    • Seizures (HCC)    • Substance abuse (HCC)    • Trauma        Social History      Socioeconomic History   • Marital status:      Spouse name: Not on file   • Number of children: Not on file   • Years of education: Not on file   • Highest education level: Not on file   Tobacco Use   • Smoking status: Current Every Day Smoker     Packs/day: 0.25     Years: 20.00     Pack years: 5.00     Types: Cigarettes   • Smokeless tobacco: Never Used   • Tobacco comment: vapes also   Vaping Use   • Vaping Use: Every day   • Substances: Nicotine, Flavoring   • Devices: Pre-filled or refillable cartridge   Substance and Sexual Activity   • Alcohol use: No     Comment: stopped 2008   • Drug use: Yes     Types: IV     Comment: STATES SHE TOOK HER METHADONE AND SOME BENZOS 9/17/20   • Sexual activity: Not Currently     Partners: Male     Birth control/protection: Abstinence        Past Surgical History:   Procedure Laterality Date   • DILATATION AND CURETTAGE     • INCISION AND DRAINAGE ABSCESS  2019    arm       Family History   Problem Relation Age of Onset   • Arthritis Mother    • Cancer Mother         breast   • Thyroid disease Mother    • Breast cancer Mother    • Diabetes Father    • Hypertension Father    • Mental illness Brother    • Breast cancer Maternal Aunt          Current Outpatient Medications:   •  buPROPion XL (Wellbutrin XL) 150 MG 24 hr tablet, Take 1 tablet by mouth Every Morning. With 300mg., Disp: 30 tablet, Rfl: 3  •  buPROPion XL (WELLBUTRIN XL) 300 MG 24 hr tablet, Take 1 tablet by mouth Daily., Disp: 30 tablet, Rfl: 3  •  busPIRone (BUSPAR) 10 MG tablet, Take 2 tablets by mouth 3 (Three) Times a Day., Disp: 180 tablet, Rfl: 2  •  doxepin (SINEquan) 50 MG capsule, Take 1 capsule by mouth Every Night., Disp: 30 capsule, Rfl: 3  •  DULoxetine (Cymbalta) 60 MG capsule, Two po q am, Disp: 60 capsule, Rfl: 2  •  gabapentin (NEURONTIN) 800 MG tablet, Take 1 tablet by mouth 3 (Three) Times a Day., Disp: 90 tablet, Rfl: 2  •  methadone (DOLOPHINE) 10 MG tablet, Take 60 mg by mouth  "Daily,, Disp: , Rfl:   •  prazosin (MINIPRESS) 5 MG capsule, Two po q hs, Disp: 60 capsule, Rfl: 3  •  QUEtiapine (SEROquel) 50 MG tablet, Take 1 tablet by mouth Every Night., Disp: 90 tablet, Rfl: 2  •  indomethacin (INDOCIN) 50 MG capsule, Take 1 capsule by mouth 3 (Three) Times a Day As Needed for Mild Pain ., Disp: 20 capsule, Rfl: 0    Objective   /87   Pulse 107   Temp 97.7 °F (36.5 °C)   Resp 17   Ht 165.1 cm (65\")   Wt 73 kg (161 lb)   SpO2 100%   BMI 26.79 kg/m²     Physical Exam  Vitals and nursing note reviewed.   Constitutional:       Appearance: Normal appearance. She is well-developed.   HENT:      Head: Normocephalic and atraumatic.   Eyes:      Extraocular Movements: Extraocular movements intact.      Conjunctiva/sclera: Conjunctivae normal.   Pulmonary:      Effort: Pulmonary effort is normal.   Musculoskeletal:      Cervical back: Normal range of motion and neck supple.   Skin:     General: Skin is warm and dry.      Findings: No rash.   Neurological:      General: No focal deficit present.      Mental Status: She is alert and oriented to person, place, and time.   Psychiatric:         Mood and Affect: Mood normal.         Behavior: Behavior normal.         Assessment/Plan   Diagnoses and all orders for this visit:    1. Acute idiopathic gout involving toe of left foot (Primary)  -     indomethacin (INDOCIN) 50 MG capsule; Take 1 capsule by mouth 3 (Three) Times a Day As Needed for Mild Pain .  Dispense: 20 capsule; Refill: 0          Discussion Summary:  Patient is a 41 y.o. female presenting for follow up with a first episode of gout to the left great toe.  As the symptoms are starting to resolve on their own, patient advised to try conservative management with indomethacin.  She was advised to try hot packs rather than cold packs.  Avoiding foods with high uric acid content was also advised.  We can consider checking uric acid levels in the future.        Follow up:  Return if " symptoms worsen or fail to improve.

## 2021-10-01 NOTE — PATIENT INSTRUCTIONS
Gout    Gout is a condition that causes painful swelling of the joints. Gout is a type of inflammation of the joints (arthritis). This condition is caused by having too much uric acid in the body. Uric acid is a chemical that forms when the body breaks down substances called purines. Purines are important for building body proteins.  When the body has too much uric acid, sharp crystals can form and build up inside the joints. This causes pain and swelling. Gout attacks can happen quickly and may be very painful (acute gout). Over time, the attacks can affect more joints and become more frequent (chronic gout). Gout can also cause uric acid to build up under the skin and inside the kidneys.  What are the causes?  This condition is caused by too much uric acid in your blood. This can happen because:  · Your kidneys do not remove enough uric acid from your blood. This is the most common cause.  · Your body makes too much uric acid. This can happen with some cancers and cancer treatments. It can also occur if your body is breaking down too many red blood cells (hemolytic anemia).  · You eat too many foods that are high in purines. These foods include organ meats and some seafood. Alcohol, especially beer, is also high in purines.  A gout attack may be triggered by trauma or stress.  What increases the risk?  You are more likely to develop this condition if you:  · Have a family history of gout.  · Are male and middle-aged.  · Are female and have gone through menopause.  · Are obese.  · Frequently drink alcohol, especially beer.  · Are dehydrated.  · Lose weight too quickly.  · Have an organ transplant.  · Have lead poisoning.  · Take certain medicines, including aspirin, cyclosporine, diuretics, levodopa, and niacin.  · Have kidney disease.  · Have a skin condition called psoriasis.  What are the signs or symptoms?  An attack of acute gout happens quickly. It usually occurs in just one joint. The most common place is  the big toe. Attacks often start at night. Other joints that may be affected include joints of the feet, ankle, knee, fingers, wrist, or elbow. Symptoms of this condition may include:  · Severe pain.  · Warmth.  · Swelling.  · Stiffness.  · Tenderness. The affected joint may be very painful to touch.  · Shiny, red, or purple skin.  · Chills and fever.  Chronic gout may cause symptoms more frequently. More joints may be involved. You may also have white or yellow lumps (tophi) on your hands or feet or in other areas near your joints.  How is this diagnosed?  This condition is diagnosed based on your symptoms, medical history, and physical exam. You may have tests, such as:  · Blood tests to measure uric acid levels.  · Removal of joint fluid with a thin needle (aspiration) to look for uric acid crystals.  · X-rays to look for joint damage.  How is this treated?  Treatment for this condition has two phases: treating an acute attack and preventing future attacks. Acute gout treatment may include medicines to reduce pain and swelling, including:  · NSAIDs.  · Steroids. These are strong anti-inflammatory medicines that can be taken by mouth (orally) or injected into a joint.  · Colchicine. This medicine relieves pain and swelling when it is taken soon after an attack. It can be given by mouth or through an IV.  Preventive treatment may include:  · Daily use of smaller doses of NSAIDs or colchicine.  · Use of a medicine that reduces uric acid levels in your blood.  · Changes to your diet. You may need to see a dietitian about what to eat and drink to prevent gout.  Follow these instructions at home:  During a gout attack    · If directed, put ice on the affected area:  ? Put ice in a plastic bag.  ? Place a towel between your skin and the bag.  ? Leave the ice on for 20 minutes, 2-3 times a day.  · Raise (elevate) the affected joint above the level of your heart as often as possible.  · Rest the joint as much as possible.  If the affected joint is in your leg, you may be given crutches to use.  · Follow instructions from your health care provider about eating or drinking restrictions.    Avoiding future gout attacks  · Follow a low-purine diet as told by your dietitian or health care provider. Avoid foods and drinks that are high in purines, including liver, kidney, anchovies, asparagus, herring, mushrooms, mussels, and beer.  · Maintain a healthy weight or lose weight if you are overweight. If you want to lose weight, talk with your health care provider. It is important that you do not lose weight too quickly.  · Start or maintain an exercise program as told by your health care provider.  Eating and drinking  · Drink enough fluids to keep your urine pale yellow.  · If you drink alcohol:  ? Limit how much you use to:  § 0-1 drink a day for women.  § 0-2 drinks a day for men.  ? Be aware of how much alcohol is in your drink. In the [place] one drink equals one 12 oz bottle of beer (355 mL) one 5 oz glass of wine (148 mL), or one 1½ oz glass of hard liquor (44 mL).  General instructions  · Take over-the-counter and prescription medicines only as told by your health care provider.  · Do not drive or use heavy machinery while taking prescription pain medicine.  · Return to your normal activities as told by your health care provider. Ask your health care provider what activities are safe for you.  · Keep all follow-up visits as told by your health care provider. This is important.  Contact a health care provider if you have:  · Another gout attack.  · Continuing symptoms of a gout attack after 10 days of treatment.  · Side effects from your medicines.  · Chills or a fever.  · Burning pain when you urinate.  · Pain in your lower back or belly.  Get help right away if you:  · Have severe or uncontrolled pain.  · Cannot urinate.  Summary  · Gout is painful swelling of the joints caused by inflammation.  · The most common site of pain is the big  toe, but it can affect other joints in the body.  · Medicines and dietary changes can help to prevent and treat gout attacks.  This information is not intended to replace advice given to you by your health care provider. Make sure you discuss any questions you have with your health care provider.  Document Revised: 07/10/2019 Document Reviewed: 07/10/2019  [email or web address] Patient Education © 2021 [employer]

## 2021-10-07 ENCOUNTER — TELEPHONE (OUTPATIENT)
Dept: INTERNAL MEDICINE | Facility: CLINIC | Age: 41
End: 2021-10-07

## 2021-10-07 DIAGNOSIS — M10.072 ACUTE IDIOPATHIC GOUT INVOLVING TOE OF LEFT FOOT: Primary | ICD-10-CM

## 2021-10-07 RX ORDER — COLCHICINE 0.6 MG/1
0.6 TABLET ORAL 2 TIMES DAILY PRN
Qty: 30 TABLET | Refills: 0 | Status: SHIPPED | OUTPATIENT
Start: 2021-10-07 | End: 2021-10-28

## 2021-10-07 NOTE — TELEPHONE ENCOUNTER
Pt saw Dr Fleming on 10/1 for red swollen foot-he gave indomethacin-pt reports that foot is still swollen and painful.  The meds did nothing for her she states.  Does she needs labs uric acid?  Med change?

## 2021-10-07 NOTE — TELEPHONE ENCOUNTER
Caller: Audrey Alonso    Relationship: Self    Best call back number: [phone]     Additional notes: PATIENT CALLED TO CHECK ON STATUS OF MEDICATION REQUEST OR TO SEE IF SHE WOULD NEED TO COME INTO OFFICE INSTEAD.

## 2021-10-07 NOTE — TELEPHONE ENCOUNTER
Caller: Audrey Alonso    Relationship: Self    Best call back number: [phone]    What medication are you requesting: NEEDS AN ALTERNATIVE MEDICATION FOR   indomethacin (INDOCIN) 50 MG capsule    THIS IS NOT EFFECTIVE    What are your current symptoms: GOUT    How long have you been experiencing symptoms:     Have you had these symptoms before:    [x] Yes  [] No    Have you been treated for these symptoms before:   [] Yes  [] No    If a prescription is needed, what is your preferred pharmacy and phone number:        [facility] [place] [address] [address] [facility] [phone] [facility] [phone] [facility]      Additional notes: HAD APPOINTMENT ON 10/01 WITH PCP. SYMPTOMS ARE NOT ANY BETTER

## 2021-10-08 ENCOUNTER — HOSPITAL ENCOUNTER (OUTPATIENT)
Dept: GENERAL RADIOLOGY | Facility: HOSPITAL | Age: 41
Discharge: HOME | End: 2021-10-08
Admitting: INTERNAL MEDICINE

## 2021-10-08 ENCOUNTER — OFFICE VISIT (OUTPATIENT)
Dept: INTERNAL MEDICINE | Facility: CLINIC | Age: 41
End: 2021-10-08

## 2021-10-08 VITALS
HEIGHT: 64 IN | SYSTOLIC BLOOD PRESSURE: 138 MMHG | HEART RATE: 110 BPM | DIASTOLIC BLOOD PRESSURE: 70 MMHG | TEMPERATURE: 97.7 F | BODY MASS INDEX: 27.83 KG/M2 | OXYGEN SATURATION: 100 % | WEIGHT: 163 LBS

## 2021-10-08 DIAGNOSIS — M79.675 GREAT TOE PAIN, LEFT: Primary | ICD-10-CM

## 2021-10-08 DIAGNOSIS — M10.072 ACUTE IDIOPATHIC GOUT INVOLVING TOE OF LEFT FOOT: ICD-10-CM

## 2021-10-08 LAB
ALBUMIN SERPL-MCNC: 4.5 G/DL (ref 3.5–5.2)
ALBUMIN/GLOB SERPL: 1.6 G/DL
ALP SERPL-CCNC: 80 U/L (ref 39–117)
ALT SERPL-CCNC: 15 U/L (ref 1–33)
AST SERPL-CCNC: 18 U/L (ref 1–32)
BILIRUB SERPL-MCNC: <0.2 MG/DL (ref 0–1.2)
BUN SERPL-MCNC: 16 MG/DL (ref 6–20)
BUN/CREAT SERPL: 15 (ref 7–25)
CALCIUM SERPL-MCNC: 9.2 MG/DL (ref 8.6–10.5)
CHLORIDE SERPL-SCNC: 100 MMOL/L (ref 98–107)
CO2 SERPL-SCNC: 26.9 MMOL/L (ref 22–29)
CREAT SERPL-MCNC: 1.07 MG/DL (ref 0.57–1)
ERYTHROCYTE [DISTWIDTH] IN BLOOD BY AUTOMATED COUNT: 13 % (ref 12.3–15.4)
GLOBULIN SER CALC-MCNC: 2.8 GM/DL
GLUCOSE SERPL-MCNC: 83 MG/DL (ref 65–99)
HCT VFR BLD AUTO: 28.6 % (ref 34–46.6)
HGB BLD-MCNC: 9.4 G/DL (ref 12–15.9)
MCH RBC QN AUTO: 29.7 PG (ref 26.6–33)
MCHC RBC AUTO-ENTMCNC: 32.9 G/DL (ref 31.5–35.7)
MCV RBC AUTO: 90.5 FL (ref 79–97)
PLATELET # BLD AUTO: 120 10*3/MM3 (ref 140–450)
POTASSIUM SERPL-SCNC: 4.5 MMOL/L (ref 3.5–5.2)
PROT SERPL-MCNC: 7.3 G/DL (ref 6–8.5)
RBC # BLD AUTO: 3.16 10*6/MM3 (ref 3.77–5.28)
SODIUM SERPL-SCNC: 136 MMOL/L (ref 136–145)
URATE SERPL-MCNC: 5.2 MG/DL (ref 2.4–5.7)
WBC # BLD AUTO: 3.55 10*3/MM3 (ref 3.4–10.8)

## 2021-10-08 PROCEDURE — 99213 OFFICE O/P EST LOW 20 MIN: CPT | Performed by: INTERNAL MEDICINE

## 2021-10-08 PROCEDURE — 73630 X-RAY EXAM OF FOOT: CPT

## 2021-10-08 RX ORDER — PREDNISONE 20 MG/1
20 TABLET ORAL DAILY
Qty: 3 TABLET | Refills: 0 | Status: SHIPPED | OUTPATIENT
Start: 2021-10-08 | End: 2021-10-28

## 2021-10-08 NOTE — PROGRESS NOTES
"Subjective  Audrey Alonso is a 41 y.o. female    HPI coming in with 2-week history of left-sided foot pain especially around her big toe.  She was evaluated here about a week ago and placed on Indocin for suspected gout.  Symptoms have not improved.  She denies recent trauma or change in activity has not had similar complaints in the past  Denies any redness or warmth over the lesion.  No fever or chills  The following portions of the patient's history were reviewed and updated as appropriate: allergies, current medications, past family history, past medical history, past social history, past surgical history, and problem list.     Review of Systems   Constitutional: Negative.  Negative for activity change, appetite change, fatigue and fever.   HENT: Negative for congestion, ear discharge, ear pain and trouble swallowing.    Eyes: Negative for photophobia and visual disturbance.   Respiratory: Negative for cough and shortness of breath.    Cardiovascular: Negative for chest pain and palpitations.   Gastrointestinal: Negative for abdominal distention, abdominal pain, constipation, diarrhea, nausea and vomiting.   Endocrine: Negative.    Genitourinary: Negative for dysuria, hematuria and urgency.   Musculoskeletal: Positive for arthralgias and gait problem. Negative for back pain, joint swelling and myalgias.   Skin: Negative for color change and rash.   Allergic/Immunologic: Negative.    Neurological: Negative for dizziness, weakness, light-headedness and headaches.   Hematological: Negative for adenopathy. Does not bruise/bleed easily.   Psychiatric/Behavioral: Positive for sleep disturbance. Negative for agitation, confusion and dysphoric mood. The patient is not nervous/anxious.        Visit Vitals  /70   Pulse 110   Temp 97.7 °F (36.5 °C) (Infrared)   Ht 162.6 cm (64\")   Wt 73.9 kg (163 lb)   SpO2 100%   BMI 27.98 kg/m²       Objective  Physical Exam  Constitutional:       General: She is not in acute " distress.     Appearance: She is well-developed.   HENT:      Nose: Nose normal.   Eyes:      General: No scleral icterus.     Conjunctiva/sclera: Conjunctivae normal.   Neck:      Thyroid: No thyromegaly.      Trachea: No tracheal deviation.   Cardiovascular:      Rate and Rhythm: Normal rate and regular rhythm.      Heart sounds: No murmur heard.   No friction rub.   Pulmonary:      Effort: No respiratory distress.      Breath sounds: No wheezing or rales.   Abdominal:      General: There is no distension.      Palpations: Abdomen is soft. There is no mass.      Tenderness: There is no abdominal tenderness. There is no guarding.   Musculoskeletal:         General: Tenderness and deformity present. Normal range of motion.   Lymphadenopathy:      Cervical: No cervical adenopathy.   Skin:     General: Skin is warm and dry.      Findings: No erythema or rash.   Neurological:      Mental Status: She is alert and oriented to person, place, and time.      Cranial Nerves: No cranial nerve deficit.      Coordination: Coordination normal.      Deep Tendon Reflexes: Reflexes are normal and symmetric.   Psychiatric:         Behavior: Behavior normal.         Thought Content: Thought content normal.         Judgment: Judgment normal.         Diagnoses and all orders for this visit:    Great toe pain, left check x-ray.  In the meantime will add on low-dose prednisone discussed use of a flat sandal avoid too much weight on the foot for now.  Ortho referral if needed

## 2021-10-11 DIAGNOSIS — M79.675 GREAT TOE PAIN, LEFT: Primary | ICD-10-CM

## 2021-10-11 DIAGNOSIS — D69.6 THROMBOCYTOPENIA (HCC): ICD-10-CM

## 2021-10-28 ENCOUNTER — OFFICE VISIT (OUTPATIENT)
Dept: BEHAVIORAL HEALTH | Facility: CLINIC | Age: 41
End: 2021-10-28

## 2021-10-28 VITALS — HEIGHT: 64 IN | BODY MASS INDEX: 27.66 KG/M2 | WEIGHT: 162 LBS

## 2021-10-28 DIAGNOSIS — F41.1 GENERALIZED ANXIETY DISORDER: ICD-10-CM

## 2021-10-28 DIAGNOSIS — F51.5 NIGHTMARES: ICD-10-CM

## 2021-10-28 DIAGNOSIS — F41.9 ANXIETY AND DEPRESSION: ICD-10-CM

## 2021-10-28 DIAGNOSIS — F51.01 PRIMARY INSOMNIA: ICD-10-CM

## 2021-10-28 DIAGNOSIS — F32.A ANXIETY AND DEPRESSION: ICD-10-CM

## 2021-10-28 DIAGNOSIS — F33.1 MODERATE EPISODE OF RECURRENT MAJOR DEPRESSIVE DISORDER (HCC): ICD-10-CM

## 2021-10-28 PROCEDURE — 99213 OFFICE O/P EST LOW 20 MIN: CPT | Performed by: NURSE PRACTITIONER

## 2021-10-28 RX ORDER — DOXEPIN HYDROCHLORIDE 50 MG/1
50 CAPSULE ORAL NIGHTLY
Qty: 30 CAPSULE | Refills: 3 | Status: SHIPPED | OUTPATIENT
Start: 2021-10-28 | End: 2022-01-25 | Stop reason: SDUPTHER

## 2021-10-28 RX ORDER — DULOXETIN HYDROCHLORIDE 60 MG/1
CAPSULE, DELAYED RELEASE ORAL
Qty: 60 CAPSULE | Refills: 2 | Status: SHIPPED | OUTPATIENT
Start: 2021-10-28 | End: 2022-01-25 | Stop reason: SDUPTHER

## 2021-10-28 RX ORDER — BUSPIRONE HYDROCHLORIDE 15 MG/1
30 TABLET ORAL 2 TIMES DAILY
Qty: 120 TABLET | Refills: 6 | Status: SHIPPED | OUTPATIENT
Start: 2021-10-28 | End: 2022-01-25 | Stop reason: SDUPTHER

## 2021-10-28 RX ORDER — QUETIAPINE FUMARATE 100 MG/1
100 TABLET, FILM COATED ORAL NIGHTLY
Qty: 30 TABLET | Refills: 6 | Status: SHIPPED | OUTPATIENT
Start: 2021-10-28 | End: 2022-01-25 | Stop reason: SDUPTHER

## 2021-10-28 RX ORDER — BUPROPION HYDROCHLORIDE 300 MG/1
300 TABLET ORAL DAILY
Qty: 30 TABLET | Refills: 3 | Status: SHIPPED | OUTPATIENT
Start: 2021-10-28 | End: 2022-01-25

## 2021-10-28 RX ORDER — GABAPENTIN 800 MG/1
800 TABLET ORAL 3 TIMES DAILY
Qty: 90 TABLET | Refills: 2 | Status: SHIPPED | OUTPATIENT
Start: 2021-10-28 | End: 2021-12-20 | Stop reason: SDUPTHER

## 2021-10-28 RX ORDER — PRAZOSIN HYDROCHLORIDE 5 MG/1
CAPSULE ORAL
Qty: 60 CAPSULE | Refills: 3 | Status: SHIPPED | OUTPATIENT
Start: 2021-10-28 | End: 2022-01-25 | Stop reason: SDUPTHER

## 2021-10-28 RX ORDER — BUPROPION HYDROCHLORIDE 150 MG/1
150 TABLET ORAL EVERY MORNING
Qty: 30 TABLET | Refills: 3 | Status: SHIPPED | OUTPATIENT
Start: 2021-10-28 | End: 2021-12-08 | Stop reason: SDUPTHER

## 2021-10-28 NOTE — PROGRESS NOTES
Patient Name: Audrey Alonso  MRN: [id number]   :  1980     Chief Complaint:      ICD-10-CM ICD-9-CM   1. Anxiety and depression  F41.9 300.00    F32.A 311   2. Moderate episode of recurrent major depressive disorder (HCC)  F33.1 296.32   3. Nightmares  F51.5 307.47   4. Generalized anxiety disorder  F41.1 300.02   5. Primary insomnia  F51.01 307.42       History of Present Illness: Audrey Alonso is a 41 y.o. female is here today for medication management follow up.  Patient states overall medications are working well however she is having some difficulty with sleep and waking up frequently.  Would like to increase the Seroquel back to 100 mg. The following portions of the patient's history were reviewed and updated as appropriate: allergies, current medications, past family history, past medical history, past social history, past surgical history and problem list.    Review of Systems;;  Review of Systems   Constitutional: Negative for activity change, appetite change, fatigue, unexpected weight gain and unexpected weight loss.   Respiratory: Negative for shortness of breath and wheezing.    Gastrointestinal: Negative for constipation, diarrhea, nausea and vomiting.   Musculoskeletal: Negative for gait problem.   Skin: Negative for dry skin and rash.   Neurological: Negative for dizziness, speech difficulty, weakness, light-headedness, headache, memory problem and confusion.   Psychiatric/Behavioral: Positive for sleep disturbance. Negative for agitation, behavioral problems, decreased concentration, dysphoric mood, hallucinations, self-injury, suicidal ideas, negative for hyperactivity, depressed mood and stress. The patient is nervous/anxious.        Physical Exam;;  Physical Exam  Vitals and nursing note reviewed.   Constitutional:       General: She is not in acute distress.     Appearance: She is well-developed. She is not diaphoretic.   HENT:      Head: Normocephalic and atraumatic.   Eyes:       "Conjunctiva/sclera: Conjunctivae normal.   Cardiovascular:      Rate and Rhythm: Normal rate.   Pulmonary:      Effort: Pulmonary effort is normal. No respiratory distress.   Musculoskeletal:         General: Normal range of motion.      Cervical back: Full passive range of motion without pain and normal range of motion.   Skin:     General: Skin is warm and dry.   Neurological:      Mental Status: She is alert and oriented to person, place, and time.   Psychiatric:         Mood and Affect: Mood is anxious. Mood is not depressed. Affect is not labile, blunt, angry or inappropriate.         Speech: Speech is not rapid and pressured or tangential.         Behavior: Behavior normal. Behavior is not agitated, slowed, aggressive, withdrawn, hyperactive or combative. Behavior is cooperative.         Thought Content: Thought content normal. Thought content is not paranoid or delusional. Thought content does not include homicidal or suicidal ideation. Thought content does not include homicidal or suicidal plan.         Judgment: Judgment normal.       Height 162.6 cm (64\"), weight 73.5 kg (162 lb), not currently breastfeeding.  Body mass index is 27.81 kg/m².    Current Medications;;    Current Outpatient Medications:   •  buPROPion XL (Wellbutrin XL) 150 MG 24 hr tablet, Take 1 tablet by mouth Every Morning. With 300mg., Disp: 30 tablet, Rfl: 3  •  buPROPion XL (WELLBUTRIN XL) 300 MG 24 hr tablet, Take 1 tablet by mouth Daily., Disp: 30 tablet, Rfl: 3  •  doxepin (SINEquan) 50 MG capsule, Take 1 capsule by mouth Every Night., Disp: 30 capsule, Rfl: 3  •  DULoxetine (Cymbalta) 60 MG capsule, Two po q am, Disp: 60 capsule, Rfl: 2  •  gabapentin (NEURONTIN) 800 MG tablet, Take 1 tablet by mouth 3 (Three) Times a Day., Disp: 90 tablet, Rfl: 2  •  methadone (DOLOPHINE) 10 MG tablet, Take 60 mg by mouth Daily,, Disp: , Rfl:   •  prazosin (MINIPRESS) 5 MG capsule, Two po q hs, Disp: 60 capsule, Rfl: 3  •  busPIRone (BUSPAR) 15 MG " tablet, Take 2 tablets by mouth 2 (Two) Times a Day., Disp: 120 tablet, Rfl: 6  •  QUEtiapine (SEROquel) 100 MG tablet, Take 1 tablet by mouth Every Night., Disp: 30 tablet, Rfl: 6    Lab Results:   Office Visit on 10/08/2021   Component Date Value Ref Range Status   • WBC 10/08/2021 3.55  3.40 - 10.80 10*3/mm3 Final   • RBC 10/08/2021 3.16* 3.77 - 5.28 10*6/mm3 Final   • Hemoglobin 10/08/2021 9.4* 12.0 - 15.9 g/dL Final   • Hematocrit 10/08/2021 28.6* 34.0 - 46.6 % Final   • MCV 10/08/2021 90.5  79.0 - 97.0 fL Final   • MCH 10/08/2021 29.7  26.6 - 33.0 pg Final   • MCHC 10/08/2021 32.9  31.5 - 35.7 g/dL Final   • RDW 10/08/2021 13.0  12.3 - 15.4 % Final   • Platelets 10/08/2021 120* 140 - 450 10*3/mm3 Final   • Glucose 10/08/2021 83  65 - 99 mg/dL Final   • BUN 10/08/2021 16  6 - 20 mg/dL Final   • Creatinine 10/08/2021 1.07* 0.57 - 1.00 mg/dL Final   • eGFR Non  Am 10/08/2021 57* >60 mL/min/1.73 Final    Comment: GFR Normal >60  Chronic Kidney Disease <60  Kidney Failure <15     • eGFR  Am 10/08/2021 68  >60 mL/min/1.73 Final   • BUN/Creatinine Ratio 10/08/2021 15.0  7.0 - 25.0 Final   • Sodium 10/08/2021 136  136 - 145 mmol/L Final   • Potassium 10/08/2021 4.5  3.5 - 5.2 mmol/L Final    Comment: Slight hemolysis detected by analyzer. Results may be  affected.     • Chloride 10/08/2021 100  98 - 107 mmol/L Final   • Total CO2 10/08/2021 26.9  22.0 - 29.0 mmol/L Final   • Calcium 10/08/2021 9.2  8.6 - 10.5 mg/dL Final   • Total Protein 10/08/2021 7.3  6.0 - 8.5 g/dL Final   • Albumin 10/08/2021 4.50  3.50 - 5.20 g/dL Final   • Globulin 10/08/2021 2.8  gm/dL Final   • A/G Ratio 10/08/2021 1.6  g/dL Final   • Total Bilirubin 10/08/2021 <0.2  0.0 - 1.2 mg/dL Final   • Alkaline Phosphatase 10/08/2021 80  39 - 117 U/L Final   • AST (SGOT) 10/08/2021 18  1 - 32 U/L Final    Comment: Slight hemolysis detected by analyzer. Results may be  affected.     • ALT (SGPT) 10/08/2021 15  1 - 33 U/L Final   • Uric  Acid 10/08/2021 5.2  2.4 - 5.7 mg/dL Final   Office Visit on 09/10/2021   Component Date Value Ref Range Status   • WBC 09/10/2021 4.11  3.40 - 10.80 10*3/mm3 Final   • RBC 09/10/2021 4.34  3.77 - 5.28 10*6/mm3 Final   • Hemoglobin 09/10/2021 12.8  12.0 - 15.9 g/dL Final   • Hematocrit 09/10/2021 40.3  34.0 - 46.6 % Final   • MCV 09/10/2021 92.9  79.0 - 97.0 fL Final   • MCH 09/10/2021 29.5  26.6 - 33.0 pg Final   • MCHC 09/10/2021 31.8  31.5 - 35.7 g/dL Final   • RDW 09/10/2021 13.7  12.3 - 15.4 % Final   • Platelets 09/10/2021 281  140 - 450 10*3/mm3 Final   • Glucose 09/10/2021 114* 65 - 99 mg/dL Final   • BUN 09/10/2021 12  6 - 20 mg/dL Final   • Creatinine 09/10/2021 0.83  0.57 - 1.00 mg/dL Final   • eGFR Non  Am 09/10/2021 76  >60 mL/min/1.73 Final    Comment: GFR Normal >60  Chronic Kidney Disease <60  Kidney Failure <15     • eGFR  Am 09/10/2021 92  >60 mL/min/1.73 Final   • BUN/Creatinine Ratio 09/10/2021 14.5  7.0 - 25.0 Final   • Sodium 09/10/2021 139  136 - 145 mmol/L Final   • Potassium 09/10/2021 4.3  3.5 - 5.2 mmol/L Final   • Chloride 09/10/2021 102  98 - 107 mmol/L Final   • Total CO2 09/10/2021 26.2  22.0 - 29.0 mmol/L Final   • Calcium 09/10/2021 9.3  8.6 - 10.5 mg/dL Final   • Total Protein 09/10/2021 6.6  6.0 - 8.5 g/dL Final   • Albumin 09/10/2021 4.20  3.50 - 5.20 g/dL Final   • Globulin 09/10/2021 2.4  gm/dL Final   • A/G Ratio 09/10/2021 1.8  g/dL Final   • Total Bilirubin 09/10/2021 <0.2  0.0 - 1.2 mg/dL Final   • Alkaline Phosphatase 09/10/2021 69  39 - 117 U/L Final   • AST (SGOT) 09/10/2021 21  1 - 32 U/L Final   • ALT (SGPT) 09/10/2021 20  1 - 33 U/L Final   • Hemoglobin A1C 09/10/2021 5.30  4.80 - 5.60 % Final    Comment: Hemoglobin A1C Ranges:  Increased Risk for Diabetes  5.7% to 6.4%  Diabetes                     >= 6.5%  Diabetic Goal                < 7.0%     • TSH 09/10/2021 3.310  0.270 - 4.200 uIU/mL Final   • Hep A IgM 09/10/2021 Negative  Negative Final   •  Hepatitis B Surface Ag 09/10/2021 Negative  Negative Final   • Hep B Core IgM 09/10/2021 Negative  Negative Final   • Hep C Virus Ab 09/10/2021 <0.1  0.0 - 0.9 s/co ratio Final    Comment:                                   Negative:     < 0.8                               Indeterminate: 0.8 - 0.9                                    Positive:     > 0.9   The CDC recommends that a positive HCV antibody result   be followed up with a HCV Nucleic Acid Amplification   test ([id number]).         Mental Status Exam:   Hygiene:   good  Cooperation:  Cooperative  Eye Contact:  Good  Psychomotor Behavior:  Appropriate  Mood:anxious and depressed  Affect:  Appropriate  Hopelessness: Denies  Speech:  Normal  Thought Process:  Goal directed  Thought Content:  Normal  Suicidal:  None  Homicidal:  None  Hallucinations:  None  Delusion:  None  Memory:  Intact  Orientation:  Person, Place, Time and Situation  Reliability:  good  Insight:  Good  Judgement:  Good  Impulse Control:  Good  Physical/Medical Issues:  No     PHQ-9 Depression Screening  Little interest or pleasure in doing things? 1   Feeling down, depressed, or hopeless? 1   Trouble falling or staying asleep, or sleeping too much? 2 (Falling and staying asleep)   Feeling tired or having little energy? 2   Poor appetite or overeating? 3 (Overeating)   Feeling bad about yourself - or that you are a failure or have let yourself or your family down? 1   Trouble concentrating on things, such as reading the newspaper or watching television? 2   Moving or speaking so slowly that other people could have noticed? Or the opposite - being so fidgety or restless that you have been moving around a lot more than usual? 0   Thoughts that you would be better off dead, or of hurting yourself in some way? 0   PHQ-9 Total Score 12   If you checked off any problems, how difficult have these problems made it for you to do your work, take care of things at home, or get along with other people? Very  difficult        Assessment/Plan:  Diagnoses and all orders for this visit:    1. Anxiety and depression  -     DULoxetine (Cymbalta) 60 MG capsule; Two po q am  Dispense: 60 capsule; Refill: 2    2. Moderate episode of recurrent major depressive disorder (HCC)  -     DULoxetine (Cymbalta) 60 MG capsule; Two po q am  Dispense: 60 capsule; Refill: 2    3. Nightmares  -     prazosin (MINIPRESS) 5 MG capsule; Two po q hs  Dispense: 60 capsule; Refill: 3    4. Generalized anxiety disorder  -     buPROPion XL (Wellbutrin XL) 150 MG 24 hr tablet; Take 1 tablet by mouth Every Morning. With 300mg.  Dispense: 30 tablet; Refill: 3  -     buPROPion XL (WELLBUTRIN XL) 300 MG 24 hr tablet; Take 1 tablet by mouth Daily.  Dispense: 30 tablet; Refill: 3  -     gabapentin (NEURONTIN) 800 MG tablet; Take 1 tablet by mouth 3 (Three) Times a Day.  Dispense: 90 tablet; Refill: 2    5. Primary insomnia  -     doxepin (SINEquan) 50 MG capsule; Take 1 capsule by mouth Every Night.  Dispense: 30 capsule; Refill: 3    Other orders  -     QUEtiapine (SEROquel) 100 MG tablet; Take 1 tablet by mouth Every Night.  Dispense: 30 tablet; Refill: 6  -     busPIRone (BUSPAR) 15 MG tablet; Take 2 tablets by mouth 2 (Two) Times a Day.  Dispense: 120 tablet; Refill: 6      Will increase Seroquel back to 100 mg daily.  Patient states she would also like to change the BuSpar to twice a day instead of 3 times a day she will do 30 mg twice a day.  A psychological evaluation was conducted in order to assess past and current level of functioning. Areas assessed included, but were not limited to: perception of social support, perception of ability to face and deal with challenges in life (positive functioning), anxiety symptoms, depressive symptoms, perspective on beliefs/belief system, coping skills for stress, intelligence level,  Therapeutic rapport was established. Interventions conducted today were geared towards incorporating medication management along  with support for continued therapy. Education was also provided as to the med management with this provider and what to expect in subsequent sessions.    We discussed risks, benefits,goals and side effects of the above medication and the patient was agreeable with the plan.Patient was educated on the importance of compliance with treatment and follow-up appointments. Patient is aware to contact the [facility] with any worsening of symptoms. To call for questions or concerns and return early if necessary. Patent is agreeable to go to the Emergency Department or call 911 should they begin SI/HI.     Treatment Plan:   Discussed risks, benefits, and alternatives of medication. Encouraged healthy habits (eating, exercise and sleep). Call if any questions or problems arise. Medication reconciled. Controlled substance monitoring report reviewed. Provided psychoeducation.. Discussed coping strategies and current stressors. Set appropriate boundaries and limits for patient's well-being. Use distraction techniques to improve symptoms. Access support networks.      Return in about 3 months (around 1/28/2022) for Follow Up 15 min.    Silvina Taylor, APRN

## 2021-11-18 ENCOUNTER — CONSULT (OUTPATIENT)
Dept: ONCOLOGY | Facility: CLINIC | Age: 41
End: 2021-11-18

## 2021-11-18 ENCOUNTER — LAB (OUTPATIENT)
Dept: LAB | Facility: HOSPITAL | Age: 41
End: 2021-11-18

## 2021-11-18 VITALS
RESPIRATION RATE: 16 BRPM | BODY MASS INDEX: 27.99 KG/M2 | DIASTOLIC BLOOD PRESSURE: 100 MMHG | TEMPERATURE: 98.4 F | HEIGHT: 65 IN | OXYGEN SATURATION: 98 % | SYSTOLIC BLOOD PRESSURE: 173 MMHG | HEART RATE: 105 BPM | WEIGHT: 168 LBS

## 2021-11-18 DIAGNOSIS — D69.6 THROMBOCYTOPENIA (HCC): ICD-10-CM

## 2021-11-18 DIAGNOSIS — D69.6 THROMBOCYTOPENIA (HCC): Primary | ICD-10-CM

## 2021-11-18 LAB
ALBUMIN SERPL-MCNC: 4.4 G/DL (ref 3.5–5.2)
ALBUMIN/GLOB SERPL: 1.5 G/DL
ALP SERPL-CCNC: 64 U/L (ref 39–117)
ALT SERPL W P-5'-P-CCNC: 24 U/L (ref 1–33)
ANION GAP SERPL CALCULATED.3IONS-SCNC: 11.4 MMOL/L (ref 5–15)
AST SERPL-CCNC: 21 U/L (ref 1–32)
BASOPHILS # BLD AUTO: 0.05 10*3/MM3 (ref 0–0.2)
BASOPHILS NFR BLD AUTO: 0.8 % (ref 0–1.5)
BILIRUB SERPL-MCNC: <0.2 MG/DL (ref 0–1.2)
BUN SERPL-MCNC: 17 MG/DL (ref 6–20)
BUN/CREAT SERPL: 20.5 (ref 7–25)
CALCIUM SPEC-SCNC: 9.1 MG/DL (ref 8.6–10.5)
CHLORIDE SERPL-SCNC: 103 MMOL/L (ref 98–107)
CO2 SERPL-SCNC: 26.6 MMOL/L (ref 22–29)
CREAT SERPL-MCNC: 0.83 MG/DL (ref 0.57–1)
DEPRECATED RDW RBC AUTO: 45.1 FL (ref 37–54)
EOSINOPHIL # BLD AUTO: 0.09 10*3/MM3 (ref 0–0.4)
EOSINOPHIL NFR BLD AUTO: 1.5 % (ref 0.3–6.2)
ERYTHROCYTE [DISTWIDTH] IN BLOOD BY AUTOMATED COUNT: 13.2 % (ref 12.3–15.4)
FERRITIN SERPL-MCNC: 21.5 NG/ML (ref 13–150)
GFR SERPL CREATININE-BSD FRML MDRD: 76 ML/MIN/1.73
GLOBULIN UR ELPH-MCNC: 3 GM/DL
GLUCOSE SERPL-MCNC: 93 MG/DL (ref 65–99)
HAPTOGLOB SERPL-MCNC: 81 MG/DL (ref 30–200)
HCT VFR BLD AUTO: 38.7 % (ref 34–46.6)
HGB BLD-MCNC: 12.7 G/DL (ref 12–15.9)
IMM GRANULOCYTES # BLD AUTO: 0.05 10*3/MM3 (ref 0–0.05)
IMM GRANULOCYTES NFR BLD AUTO: 0.8 % (ref 0–0.5)
IRON 24H UR-MRATE: 97 MCG/DL (ref 37–145)
IRON SATN MFR SERPL: 24 % (ref 20–50)
LDH SERPL-CCNC: 212 U/L (ref 135–214)
LYMPHOCYTES # BLD AUTO: 1.57 10*3/MM3 (ref 0.7–3.1)
LYMPHOCYTES NFR BLD AUTO: 26.1 % (ref 19.6–45.3)
MCH RBC QN AUTO: 30.1 PG (ref 26.6–33)
MCHC RBC AUTO-ENTMCNC: 32.8 G/DL (ref 31.5–35.7)
MCV RBC AUTO: 91.7 FL (ref 79–97)
MONOCYTES # BLD AUTO: 0.48 10*3/MM3 (ref 0.1–0.9)
MONOCYTES NFR BLD AUTO: 8 % (ref 5–12)
NEUTROPHILS NFR BLD AUTO: 3.77 10*3/MM3 (ref 1.7–7)
NEUTROPHILS NFR BLD AUTO: 62.8 % (ref 42.7–76)
NRBC BLD AUTO-RTO: 0 /100 WBC (ref 0–0.2)
PLATELET # BLD AUTO: 292 10*3/MM3 (ref 140–450)
PMV BLD AUTO: 10.1 FL (ref 6–12)
POTASSIUM SERPL-SCNC: 4.7 MMOL/L (ref 3.5–5.2)
PROT SERPL-MCNC: 7.4 G/DL (ref 6–8.5)
RBC # BLD AUTO: 4.22 10*6/MM3 (ref 3.77–5.28)
SODIUM SERPL-SCNC: 141 MMOL/L (ref 136–145)
TIBC SERPL-MCNC: 396 MCG/DL (ref 298–536)
TRANSFERRIN SERPL-MCNC: 266 MG/DL (ref 200–360)
WBC NRBC COR # BLD: 6.01 10*3/MM3 (ref 3.4–10.8)

## 2021-11-18 PROCEDURE — 83615 LACTATE (LD) (LDH) ENZYME: CPT

## 2021-11-18 PROCEDURE — 82607 VITAMIN B-12: CPT

## 2021-11-18 PROCEDURE — 99204 OFFICE O/P NEW MOD 45 MIN: CPT | Performed by: INTERNAL MEDICINE

## 2021-11-18 PROCEDURE — 80053 COMPREHEN METABOLIC PANEL: CPT

## 2021-11-18 PROCEDURE — 82728 ASSAY OF FERRITIN: CPT

## 2021-11-18 PROCEDURE — 36415 COLL VENOUS BLD VENIPUNCTURE: CPT

## 2021-11-18 PROCEDURE — 85025 COMPLETE CBC W/AUTO DIFF WBC: CPT

## 2021-11-18 PROCEDURE — 82746 ASSAY OF FOLIC ACID SERUM: CPT

## 2021-11-18 PROCEDURE — 83010 ASSAY OF HAPTOGLOBIN QUANT: CPT

## 2021-11-18 PROCEDURE — 83540 ASSAY OF IRON: CPT

## 2021-11-18 PROCEDURE — 84466 ASSAY OF TRANSFERRIN: CPT

## 2021-11-18 RX ORDER — DICLOFENAC SODIUM 75 MG/1
TABLET, DELAYED RELEASE ORAL
COMMUNITY
Start: 2021-11-17 | End: 2022-01-25

## 2021-11-18 NOTE — PROGRESS NOTES
New Patient Office Visit      Date: 2021     Patient Name: Audrey Alonso  MRN: [id number]  : 1980  Referring Physician: Randal Mart    Chief Complaint: Establish care for anemia and thrombocytopenia    History of Present Illness: Audrey Alonso is a pleasant 41 y.o. female past medical history of anxiety who presents today for evaluation of cytopenia. The patient was recently seen by her PCP who checked a CBC in 2021 which was notable for mild thrombocytopenia and anemia.  Platelet count was 120K and hemoglobin is 9.4.  Per chart review her platelet count was within normal limits in 2021.  She denies any new medications during this time.  She denies any easy bleeding or bruising episodes.  Denies any fevers, chills, night sweats, weight loss.  Denies any family history of any leukemia or autoimmune disorders she is compliant with her home medications and otherwise has no major concerns or complaints today    Oncology History:    Oncology/Hematology History    No history exists.       Subjective      Review of Systems:     Constitutional: Negative for fevers, chills, or weight loss  Eyes: Negative for blurred vision or discharge         Ear/Nose/Throat: Negative for difficulty swallowing, sore throat, LAD                                                       Respiratory: Negative for cough, SOA, wheezing                                                                                        Cardiovascular: Negative for chest pain or palpitations                                                                  Gastrointestinal: Negative for nausea, vomiting or diarrhea                                                                     Genitourinary: Negative for dysuria or hematuria                                                                                           Musculoskeletal: Negative for any joint pains or muscle aches                                                                         Neurologic: Negative for any weakness, headaches, dizziness                                                                         Hematologic: Negative for any easy bleeding or bruising                                                                                   Psychiatric: Negative for anxiety or depression                             Past Medical History:   Past Medical History:   Diagnosis Date   • Anxiety    • Asthma    • Depression    • Disease of thyroid gland    • GERD (gastroesophageal reflux disease)    • Headache    • Hypertension    • Seizures (HCC)    • Substance abuse (HCC)    • Trauma        Past Surgical History:   Past Surgical History:   Procedure Laterality Date   • DILATATION AND CURETTAGE     • INCISION AND DRAINAGE ABSCESS  2019    arm       Family History:   Family History   Problem Relation Age of Onset   • Arthritis Mother    • Cancer Mother         breast   • Thyroid disease Mother    • Breast cancer Mother    • Diabetes Father    • Hypertension Father    • Mental illness Brother    • Breast cancer Maternal Aunt        Social History:   Social History     Socioeconomic History   • Marital status:    Tobacco Use   • Smoking status: Current Every Day Smoker     Packs/day: 0.25     Years: 20.00     Pack years: 5.00     Types: Cigarettes   • Smokeless tobacco: Never Used   • Tobacco comment: vapes also   Vaping Use   • Vaping Use: Every day   • Substances: Nicotine, Flavoring   • Devices: Pre-filled or refillable cartridge   Substance and Sexual Activity   • Alcohol use: No     Comment: stopped 2008   • Drug use: Yes     Types: IV     Comment: STATES SHE TOOK HER METHADONE AND SOME BENZOS 9/17/20   • Sexual activity: Not Currently     Partners: Male     Birth control/protection: Abstinence       Medications:     Current Outpatient Medications:   •  buPROPion XL (Wellbutrin XL) 150 MG 24 hr tablet, Take 1 tablet by mouth Every Morning. With 300mg., Disp: 30  "tablet, Rfl: 3  •  buPROPion XL (WELLBUTRIN XL) 300 MG 24 hr tablet, Take 1 tablet by mouth Daily., Disp: 30 tablet, Rfl: 3  •  busPIRone (BUSPAR) 15 MG tablet, Take 2 tablets by mouth 2 (Two) Times a Day., Disp: 120 tablet, Rfl: 6  •  diclofenac (VOLTAREN) 75 MG EC tablet, , Disp: , Rfl:   •  doxepin (SINEquan) 50 MG capsule, Take 1 capsule by mouth Every Night., Disp: 30 capsule, Rfl: 3  •  DULoxetine (Cymbalta) 60 MG capsule, Two po q am, Disp: 60 capsule, Rfl: 2  •  gabapentin (NEURONTIN) 800 MG tablet, Take 1 tablet by mouth 3 (Three) Times a Day., Disp: 90 tablet, Rfl: 2  •  methadone (DOLOPHINE) 10 MG tablet, Take 60 mg by mouth Daily,, Disp: , Rfl:   •  prazosin (MINIPRESS) 5 MG capsule, Two po q hs, Disp: 60 capsule, Rfl: 3  •  QUEtiapine (SEROquel) 100 MG tablet, Take 1 tablet by mouth Every Night., Disp: 30 tablet, Rfl: 6    Allergies:   No Known Allergies    Objective     Physical Exam:  Vital Signs:   Vitals:    11/18/21 1446   BP: 173/100   Pulse: 105   Resp: 16   Temp: 98.4 °F (36.9 °C)   TempSrc: Temporal   SpO2: 98%   Weight: 76.2 kg (168 lb)   Height: 165.1 cm (65\")   PainSc: 0-No pain     Pain Score    11/18/21 1446   PainSc: 0-No pain     ECOG Performance Status: 0 - Asymptomatic    Constitutional: NAD, ECOG 0  Eyes: PERRLA, scleral anicteric  ENT: No LAD, no thyromegaly  Respiratory: CTAB, no wheezing, rales, rhonchi  Cardiovascular: RRR, no murmurs, pulses 2+ bilaterally  Abdomen: soft, NT/ND, no HSM  Musculoskeletal: strength 5/5 bilaterally, no c/c/e  Neurologic: A&O x 3, CN II-XII intact grossly  Psych: mood and affect congruent, no SI or HI    Results Review:   No visits with results within 2 Week(s) from this visit.   Latest known visit with results is:   Office Visit on 10/08/2021   Component Date Value Ref Range Status   • WBC 10/08/2021 3.55  3.40 - 10.80 10*3/mm3 Final   • RBC 10/08/2021 3.16* 3.77 - 5.28 10*6/mm3 Final   • Hemoglobin 10/08/2021 9.4* 12.0 - 15.9 g/dL Final   • " Hematocrit 10/08/2021 28.6* 34.0 - 46.6 % Final   • MCV 10/08/2021 90.5  79.0 - 97.0 fL Final   • MCH 10/08/2021 29.7  26.6 - 33.0 pg Final   • MCHC 10/08/2021 32.9  31.5 - 35.7 g/dL Final   • RDW 10/08/2021 13.0  12.3 - 15.4 % Final   • Platelets 10/08/2021 120* 140 - 450 10*3/mm3 Final   • Glucose 10/08/2021 83  65 - 99 mg/dL Final   • BUN 10/08/2021 16  6 - 20 mg/dL Final   • Creatinine 10/08/2021 1.07* 0.57 - 1.00 mg/dL Final   • eGFR Non  Am 10/08/2021 57* >60 mL/min/1.73 Final    Comment: GFR Normal >60  Chronic Kidney Disease <60  Kidney Failure <15     • eGFR  Am 10/08/2021 68  >60 mL/min/1.73 Final   • BUN/Creatinine Ratio 10/08/2021 15.0  7.0 - 25.0 Final   • Sodium 10/08/2021 136  136 - 145 mmol/L Final   • Potassium 10/08/2021 4.5  3.5 - 5.2 mmol/L Final    Comment: Slight hemolysis detected by analyzer. Results may be  affected.     • Chloride 10/08/2021 100  98 - 107 mmol/L Final   • Total CO2 10/08/2021 26.9  22.0 - 29.0 mmol/L Final   • Calcium 10/08/2021 9.2  8.6 - 10.5 mg/dL Final   • Total Protein 10/08/2021 7.3  6.0 - 8.5 g/dL Final   • Albumin 10/08/2021 4.50  3.50 - 5.20 g/dL Final   • Globulin 10/08/2021 2.8  gm/dL Final   • A/G Ratio 10/08/2021 1.6  g/dL Final   • Total Bilirubin 10/08/2021 <0.2  0.0 - 1.2 mg/dL Final   • Alkaline Phosphatase 10/08/2021 80  39 - 117 U/L Final   • AST (SGOT) 10/08/2021 18  1 - 32 U/L Final    Comment: Slight hemolysis detected by analyzer. Results may be  affected.     • ALT (SGPT) 10/08/2021 15  1 - 33 U/L Final   • Uric Acid 10/08/2021 5.2  2.4 - 5.7 mg/dL Final       No results found.    Assessment / Plan      Assessment/Plan:   1.  Anemia and thrombocytopenia (HCC) (Primary)  -Unclear etiology at this time.  Most recent hemoglobin 9.4 and platelet count 120K in October 2021.  Were previously within normal limits in September 2021  -Patient currently asymptomatic at this time  -We will recheck labs as below to rule out other secondary causes  including vitamin deficiency/iron deficiency/hemolysis  -We will consider bone marrow biopsy based on results  -     CBC & Differential; Future  -     Comprehensive Metabolic Panel; Future  -     Lactate Dehydrogenase; Future  -     Haptoglobin; Future  -     Vitamin B12; Future  -     Folate; Future  -     Iron Profile; Future  -     Ferritin; Future  -     Vitamin B12; Future  -     Peripheral Blood Smear; Future      Follow Up:   Follow-up in 3 weeks or sooner if needed     Ender Hein MD  Hematology and Oncology     Please note that portions of this note may have been completed with a voice recognition program. Efforts were made to edit the dictations, but occasionally words are mistranscribed.

## 2021-11-19 LAB
FOLATE SERPL-MCNC: 14 NG/ML (ref 4.78–24.2)
LAB AP CASE REPORT: NORMAL
LAB AP CLINICAL INFORMATION: NORMAL
PATH REPORT.FINAL DX SPEC: NORMAL
PATH REPORT.GROSS SPEC: NORMAL
PATHOLOGY REVIEW: YES
VIT B12 BLD-MCNC: 546 PG/ML (ref 211–946)

## 2021-12-08 DIAGNOSIS — F41.1 GENERALIZED ANXIETY DISORDER: ICD-10-CM

## 2021-12-08 RX ORDER — BUPROPION HYDROCHLORIDE 150 MG/1
150 TABLET ORAL EVERY MORNING
Qty: 30 TABLET | Refills: 3 | Status: SHIPPED | OUTPATIENT
Start: 2021-12-08 | End: 2022-01-10 | Stop reason: ALTCHOICE

## 2021-12-08 NOTE — TELEPHONE ENCOUNTER
Incoming Refill Request      Medication requested (name and dose): wellbutrin xl 150mg    [facility]    Additional details provided by patient: pharmacy said there are no refills on file    Best call back number: [phone]    Does the patient have less than a 3 day supply:  [x] Yes  [] No    Chrystal Perez  12/08/21, 13:19 EST

## 2021-12-08 NOTE — TELEPHONE ENCOUNTER
Rx Refill Note  Requested Prescriptions     Pending Prescriptions Disp Refills   • buPROPion XL (Wellbutrin XL) 150 MG 24 hr tablet 30 tablet 3     Sig: Take 1 tablet by mouth Every Morning. With 300mg.      Last office visit with prescribing clinician: 10/28/2021      Next office visit with prescribing clinician: 1/25/2022            Frannie Mann MA  12/08/21, 13:23 EST

## 2021-12-09 ENCOUNTER — OFFICE VISIT (OUTPATIENT)
Dept: ONCOLOGY | Facility: CLINIC | Age: 41
End: 2021-12-09

## 2021-12-09 VITALS
DIASTOLIC BLOOD PRESSURE: 82 MMHG | RESPIRATION RATE: 12 BRPM | BODY MASS INDEX: 27.82 KG/M2 | OXYGEN SATURATION: 98 % | TEMPERATURE: 97.5 F | WEIGHT: 167 LBS | HEART RATE: 102 BPM | SYSTOLIC BLOOD PRESSURE: 133 MMHG | HEIGHT: 65 IN

## 2021-12-09 DIAGNOSIS — D69.6 THROMBOCYTOPENIA (HCC): Primary | ICD-10-CM

## 2021-12-09 PROCEDURE — 99214 OFFICE O/P EST MOD 30 MIN: CPT | Performed by: INTERNAL MEDICINE

## 2021-12-09 RX ORDER — TRAMADOL HYDROCHLORIDE 50 MG/1
TABLET ORAL
COMMUNITY
Start: 2021-12-08 | End: 2022-01-25

## 2021-12-09 NOTE — PROGRESS NOTES
Follow Up Office Visit      Date: 2021     Patient Name: Audrey Alonso  MRN: [id number]  : 1980  Referring Physician: Randal Mart     Chief Complaint:  Follow-up for anemia and thrombocytopenia     History of Present Illness: Audrey Alonso is a pleasant 41 y.o. female past medical history of anxiety who presents today for evaluation of cytopenia. The patient was recently seen by her PCP who checked a CBC in 2021 which was notable for mild thrombocytopenia and anemia.  Platelet count was 120K and hemoglobin is 9.4.  Per chart review her platelet count was within normal limits in 2021.  She denies any new medications during this time.  She denies any easy bleeding or bruising episodes.  Denies any fevers, chills, night sweats, weight loss.  Denies any family history of any leukemia or autoimmune disorders she is compliant with her home medications and otherwise has no major concerns or complaints today     Interval History:  Presents clinic for follow-up.  Overall continues to do well.  Continues denying significant fatigue or weakness.  Denies any easy bleeding or bruising episodes      Oncology History:    Oncology/Hematology History    No history exists.       Subjective      Review of Systems:   Constitutional: Negative for fevers, chills, or weight loss  Eyes: Negative for blurred vision or discharge         Ear/Nose/Throat: Negative for difficulty swallowing, sore throat, LAD                                                       Respiratory: Negative for cough, SOA, wheezing                                                                                        Cardiovascular: Negative for chest pain or palpitations                                                                  Gastrointestinal: Negative for nausea, vomiting or diarrhea                                                                     Genitourinary: Negative for dysuria or hematuria                                                                                            Musculoskeletal: Negative for any joint pains or muscle aches                                                                        Neurologic: Negative for any weakness, headaches, dizziness                                                                         Hematologic: Negative for any easy bleeding or bruising                                                                                   Psychiatric: Negative for anxiety or depression                          Past Medical History/Past Surgical History/ Family History/ Social History: Reviewed by me and unchanged from my previous documentation done onNovember 2021.     Medications:     Current Outpatient Medications:   •  buPROPion XL (Wellbutrin XL) 150 MG 24 hr tablet, Take 1 tablet by mouth Every Morning. With 300mg., Disp: 30 tablet, Rfl: 3  •  buPROPion XL (WELLBUTRIN XL) 300 MG 24 hr tablet, Take 1 tablet by mouth Daily., Disp: 30 tablet, Rfl: 3  •  busPIRone (BUSPAR) 15 MG tablet, Take 2 tablets by mouth 2 (Two) Times a Day., Disp: 120 tablet, Rfl: 6  •  diclofenac (VOLTAREN) 75 MG EC tablet, , Disp: , Rfl:   •  doxepin (SINEquan) 50 MG capsule, Take 1 capsule by mouth Every Night., Disp: 30 capsule, Rfl: 3  •  DULoxetine (Cymbalta) 60 MG capsule, Two po q am, Disp: 60 capsule, Rfl: 2  •  gabapentin (NEURONTIN) 800 MG tablet, Take 1 tablet by mouth 3 (Three) Times a Day., Disp: 90 tablet, Rfl: 2  •  methadone (DOLOPHINE) 10 MG tablet, Take 60 mg by mouth Daily,, Disp: , Rfl:   •  prazosin (MINIPRESS) 5 MG capsule, Two po q hs, Disp: 60 capsule, Rfl: 3  •  QUEtiapine (SEROquel) 100 MG tablet, Take 1 tablet by mouth Every Night., Disp: 30 tablet, Rfl: 6  •  traMADol (ULTRAM) 50 MG tablet, , Disp: , Rfl:     Allergies:   No Known Allergies    Objective     Physical Exam:  Vital Signs:   Vitals:    12/09/21 1527   BP: 133/82   Pulse: 102   Resp: 12   Temp: 97.5 °F (36.4 °C)  "  TempSrc: Temporal   SpO2: 98%   Weight: 75.8 kg (167 lb)   Height: 165.1 cm (65\")   PainSc: 0-No pain     Pain Score    12/09/21 1527   PainSc: 0-No pain     ECOG Performance Status: 0 - Asymptomatic    Constitutional: NAD, ECOG 0  Eyes: PERRLA, scleral anicteric  ENT: No LAD, no thyromegaly  Respiratory: CTAB, no wheezing, rales, rhonchi  Cardiovascular: RRR, no murmurs, pulses 2+ bilaterally  Abdomen: soft, NT/ND, no HSM  Musculoskeletal: strength 5/5 bilaterally, no c/c/e  Neurologic: A&O x 3, CN II-XII intact grossly    Results Review:   No visits with results within 2 Week(s) from this visit.   Latest known visit with results is:   Lab on 11/18/2021   Component Date Value Ref Range Status   • Glucose 11/18/2021 93  65 - 99 mg/dL Final   • BUN 11/18/2021 17  6 - 20 mg/dL Final   • Creatinine 11/18/2021 0.83  0.57 - 1.00 mg/dL Final   • Sodium 11/18/2021 141  136 - 145 mmol/L Final   • Potassium 11/18/2021 4.7  3.5 - 5.2 mmol/L Final    Slight hemolysis detected by analyzer. Results may be affected.   • Chloride 11/18/2021 103  98 - 107 mmol/L Final   • CO2 11/18/2021 26.6  22.0 - 29.0 mmol/L Final   • Calcium 11/18/2021 9.1  8.6 - 10.5 mg/dL Final   • Total Protein 11/18/2021 7.4  6.0 - 8.5 g/dL Final   • Albumin 11/18/2021 4.40  3.50 - 5.20 g/dL Final   • ALT (SGPT) 11/18/2021 24  1 - 33 U/L Final   • AST (SGOT) 11/18/2021 21  1 - 32 U/L Final   • Alkaline Phosphatase 11/18/2021 64  39 - 117 U/L Final   • Total Bilirubin 11/18/2021 <0.2  0.0 - 1.2 mg/dL Final   • eGFR Non  Amer 11/18/2021 76  >60 mL/min/1.73 Final   • Globulin 11/18/2021 3.0  gm/dL Final   • A/G Ratio 11/18/2021 1.5  g/dL Final   • BUN/Creatinine Ratio 11/18/2021 20.5  7.0 - 25.0 Final   • Anion Gap 11/18/2021 11.4  5.0 - 15.0 mmol/L Final   • LDH 11/18/2021 212  135 - 214 U/L Final    Specimen hemolyzed.  Results may be affected.   • Haptoglobin 11/18/2021 81  30 - 200 mg/dL Final    Specimen hemolyzed. Results may be affected.   • " Vitamin B-12 11/18/2021 546  211 - 946 pg/mL Final   • Folate 11/18/2021 14.00  4.78 - 24.20 ng/mL Final   • Iron 11/18/2021 97  37 - 145 mcg/dL Final   • Iron Saturation 11/18/2021 24  20 - 50 % Final   • Transferrin 11/18/2021 266  200 - 360 mg/dL Final   • TIBC 11/18/2021 396  298 - 536 mcg/dL Final   • Ferritin 11/18/2021 21.50  13.00 - 150.00 ng/mL Final   • Pathology Review 11/18/2021 Yes   Final   • WBC 11/18/2021 6.01  3.40 - 10.80 10*3/mm3 Final   • RBC 11/18/2021 4.22  3.77 - 5.28 10*6/mm3 Final   • Hemoglobin 11/18/2021 12.7  12.0 - 15.9 g/dL Final   • Hematocrit 11/18/2021 38.7  34.0 - 46.6 % Final   • MCV 11/18/2021 91.7  79.0 - 97.0 fL Final   • MCH 11/18/2021 30.1  26.6 - 33.0 pg Final   • MCHC 11/18/2021 32.8  31.5 - 35.7 g/dL Final   • RDW 11/18/2021 13.2  12.3 - 15.4 % Final   • RDW-SD 11/18/2021 45.1  37.0 - 54.0 fl Final   • MPV 11/18/2021 10.1  6.0 - 12.0 fL Final   • Platelets 11/18/2021 292  140 - 450 10*3/mm3 Final   • Neutrophil % 11/18/2021 62.8  42.7 - 76.0 % Final   • Lymphocyte % 11/18/2021 26.1  19.6 - 45.3 % Final   • Monocyte % 11/18/2021 8.0  5.0 - 12.0 % Final   • Eosinophil % 11/18/2021 1.5  0.3 - 6.2 % Final   • Basophil % 11/18/2021 0.8  0.0 - 1.5 % Final   • Immature Grans % 11/18/2021 0.8* 0.0 - 0.5 % Final   • Neutrophils, Absolute 11/18/2021 3.77  1.70 - 7.00 10*3/mm3 Final   • Lymphocytes, Absolute 11/18/2021 1.57  0.70 - 3.10 10*3/mm3 Final   • Monocytes, Absolute 11/18/2021 0.48  0.10 - 0.90 10*3/mm3 Final   • Eosinophils, Absolute 11/18/2021 0.09  0.00 - 0.40 10*3/mm3 Final   • Basophils, Absolute 11/18/2021 0.05  0.00 - 0.20 10*3/mm3 Final   • Immature Grans, Absolute 11/18/2021 0.05  0.00 - 0.05 10*3/mm3 Final   • nRBC 11/18/2021 0.0  0.0 - 0.2 /100 WBC Final   • Final Diagnosis 11/18/2021    Final                    Value:This result contains rich text formatting which cannot be displayed here.   • Clinical Information 11/18/2021    Final                    Value:This  result contains rich text formatting which cannot be displayed here.   • Gross Description 11/18/2021    Final                    Value:This result contains rich text formatting which cannot be displayed here.   • Case Report 11/18/2021    Final                    Value:Surgical Pathology Report                         Case: [id number]                                  Authorizing Provider:  Ender Hein MD      Collected:           11/18/2021 03:52 PM          Ordering Location:     [facility]    Received:            11/19/2021 10:21 AM                                 OUT[facility] LAB                                                                   Pathologist:           Gonzalez Cisneros MD                                                         Specimen:    Blood, Venous Line, Peripheral blood smear                                                    No results found.    Assessment / Plan      Assessment/Plan:   1.  Anemia and thrombocytopenia (HCC) (Primary)  -Unclear etiology at this time.  Most recent hemoglobin 9.4 and platelet count 120K in October 2021.  Were previously within normal limits in September 2021  -Patient currently asymptomatic at this time  -Repeat CBC in November 2021 showing a normal WBC, hemoglobin, platelet count  -LDH, haptoglobin within normal limits  -Iron studies, vitamin B12, folate within normal limits  -Peripheral smear without evidence of schistocytes or blast  -Unclear etiology for previous anemia and thrombocytopenia noted in October 2021 although this is likely a false positive  -Labs stable at this time and she can continue her home medications  -No indication for bone marrow biopsy or further hematologic work-up.  She can follow-up in clinic as needed         Follow Up:   Follow-up as needed    Ender Hein MD  Hematology and Oncology     Please note that portions of this note may have been completed with a voice recognition program. Efforts were made to edit  the dictations, but occasionally words are mistranscribed.

## 2021-12-20 ENCOUNTER — TELEPHONE (OUTPATIENT)
Dept: FAMILY MEDICINE CLINIC | Facility: CLINIC | Age: 41
End: 2021-12-20

## 2021-12-20 DIAGNOSIS — F41.1 GENERALIZED ANXIETY DISORDER: ICD-10-CM

## 2021-12-20 RX ORDER — GABAPENTIN 800 MG/1
800 TABLET ORAL 3 TIMES DAILY
Qty: 90 TABLET | Refills: 2 | Status: SHIPPED | OUTPATIENT
Start: 2021-12-20 | End: 2022-01-25 | Stop reason: SDUPTHER

## 2021-12-20 NOTE — TELEPHONE ENCOUNTER
Rx Refill Note  Requested Prescriptions     Pending Prescriptions Disp Refills   • gabapentin (NEURONTIN) 800 MG tablet 90 tablet 2     Sig: Take 1 tablet by mouth 3 (Three) Times a Day.      Last office visit with prescribing clinician: 10/28/2021      Next office visit with prescribing clinician: 1/25/2022     Ok to fill?    uds and csa are up to date.    Elva Chino MA  12/20/21, 11:53 EST

## 2021-12-20 NOTE — TELEPHONE ENCOUNTER
PT CALLED BACK SAID [facility] RECEIVED THE REFILL BUT SAID THEY NEEDED A  NOTE SAYING WHY THE MEDICATION WAS BEING FILLED EARLY.

## 2021-12-20 NOTE — TELEPHONE ENCOUNTER
PT CALLED STATING SHE NEEDS A REFILL ON GABAPENTIN CAUSE SHE IS GOING OUT OF TOWN TOMORROW BUT IT LOOKS LIKE IT HAS BEEN DISCONTINUED?

## 2021-12-20 NOTE — TELEPHONE ENCOUNTER
[facility] HAS BEEN NOTIFIED TO DO THE EARLY FILL.    ATTEMPTED TO CONTACT PATIENT. THERE WAS NO ANSWER, LEFT MESSAGE FOR PATIENT TO CONTACT THE OFFICE.

## 2022-01-03 ENCOUNTER — PRIOR AUTHORIZATION (OUTPATIENT)
Dept: BEHAVIORAL HEALTH | Facility: CLINIC | Age: 42
End: 2022-01-03

## 2022-01-10 ENCOUNTER — PRIOR AUTHORIZATION (OUTPATIENT)
Dept: BEHAVIORAL HEALTH | Facility: CLINIC | Age: 42
End: 2022-01-10

## 2022-01-10 RX ORDER — BUPROPION HCL 150 MG
150 TABLET, EXTENDED RELEASE 24 HR ORAL EVERY MORNING
Qty: 30 TABLET | Refills: 2 | Status: SHIPPED | OUTPATIENT
Start: 2022-01-10 | End: 2022-01-25 | Stop reason: SDUPTHER

## 2022-01-25 ENCOUNTER — OFFICE VISIT (OUTPATIENT)
Dept: BEHAVIORAL HEALTH | Facility: CLINIC | Age: 42
End: 2022-01-25

## 2022-01-25 VITALS
HEIGHT: 65 IN | BODY MASS INDEX: 29.99 KG/M2 | DIASTOLIC BLOOD PRESSURE: 70 MMHG | SYSTOLIC BLOOD PRESSURE: 122 MMHG | WEIGHT: 180 LBS

## 2022-01-25 DIAGNOSIS — F41.1 GENERALIZED ANXIETY DISORDER: ICD-10-CM

## 2022-01-25 DIAGNOSIS — F32.A ANXIETY AND DEPRESSION: ICD-10-CM

## 2022-01-25 DIAGNOSIS — F51.5 NIGHTMARES: ICD-10-CM

## 2022-01-25 DIAGNOSIS — F90.2 ATTENTION DEFICIT HYPERACTIVITY DISORDER (ADHD), COMBINED TYPE: ICD-10-CM

## 2022-01-25 DIAGNOSIS — F51.01 PRIMARY INSOMNIA: ICD-10-CM

## 2022-01-25 DIAGNOSIS — F41.9 ANXIETY AND DEPRESSION: ICD-10-CM

## 2022-01-25 DIAGNOSIS — F33.1 MODERATE EPISODE OF RECURRENT MAJOR DEPRESSIVE DISORDER: Primary | ICD-10-CM

## 2022-01-25 PROCEDURE — 99213 OFFICE O/P EST LOW 20 MIN: CPT | Performed by: NURSE PRACTITIONER

## 2022-01-25 RX ORDER — GABAPENTIN 800 MG/1
800 TABLET ORAL 3 TIMES DAILY
Qty: 90 TABLET | Refills: 2 | Status: SHIPPED | OUTPATIENT
Start: 2022-01-25 | End: 2022-04-07 | Stop reason: SDUPTHER

## 2022-01-25 RX ORDER — PRAZOSIN HYDROCHLORIDE 5 MG/1
CAPSULE ORAL
Qty: 60 CAPSULE | Refills: 6 | Status: SHIPPED | OUTPATIENT
Start: 2022-01-25 | End: 2022-09-22 | Stop reason: SDUPTHER

## 2022-01-25 RX ORDER — INDOMETHACIN 25 MG/1
CAPSULE ORAL
COMMUNITY
Start: 2021-12-21 | End: 2022-03-01

## 2022-01-25 RX ORDER — BUPROPION HYDROCHLORIDE 300 MG/1
300 TABLET ORAL DAILY
Qty: 30 TABLET | Refills: 3 | Status: CANCELLED | OUTPATIENT
Start: 2022-01-25

## 2022-01-25 RX ORDER — BUPROPION HCL 300 MG
300 TABLET, EXTENDED RELEASE 24 HR ORAL DAILY
Qty: 30 TABLET | Refills: 6 | Status: SHIPPED | OUTPATIENT
Start: 2022-01-25 | End: 2022-02-21 | Stop reason: CLARIF

## 2022-01-25 RX ORDER — DOXEPIN HYDROCHLORIDE 50 MG/1
50 CAPSULE ORAL NIGHTLY
Qty: 30 CAPSULE | Refills: 6 | Status: SHIPPED | OUTPATIENT
Start: 2022-01-25 | End: 2022-09-22 | Stop reason: SDUPTHER

## 2022-01-25 RX ORDER — DEXTROAMPHETAMINE SACCHARATE, AMPHETAMINE ASPARTATE, DEXTROAMPHETAMINE SULFATE AND AMPHETAMINE SULFATE 5; 5; 5; 5 MG/1; MG/1; MG/1; MG/1
TABLET ORAL
Qty: 60 TABLET | Refills: 0 | Status: SHIPPED | OUTPATIENT
Start: 2022-01-25 | End: 2022-02-22 | Stop reason: SDUPTHER

## 2022-01-25 RX ORDER — BUSPIRONE HYDROCHLORIDE 15 MG/1
30 TABLET ORAL 2 TIMES DAILY
Qty: 120 TABLET | Refills: 6 | Status: SHIPPED | OUTPATIENT
Start: 2022-01-25 | End: 2022-09-22 | Stop reason: SDUPTHER

## 2022-01-25 RX ORDER — GABAPENTIN 800 MG/1
800 TABLET ORAL 3 TIMES DAILY
Qty: 90 TABLET | Refills: 2 | Status: SHIPPED | OUTPATIENT
Start: 2022-01-25 | End: 2022-01-25 | Stop reason: SDUPTHER

## 2022-01-25 RX ORDER — QUETIAPINE FUMARATE 100 MG/1
100 TABLET, FILM COATED ORAL NIGHTLY
Qty: 30 TABLET | Refills: 6 | Status: SHIPPED | OUTPATIENT
Start: 2022-01-25 | End: 2022-09-22 | Stop reason: SDUPTHER

## 2022-01-25 RX ORDER — BUPROPION HCL 150 MG
150 TABLET, EXTENDED RELEASE 24 HR ORAL EVERY MORNING
Qty: 30 TABLET | Refills: 6 | Status: SHIPPED | OUTPATIENT
Start: 2022-01-25 | End: 2022-02-21 | Stop reason: CLARIF

## 2022-01-25 RX ORDER — DEXTROAMPHETAMINE SACCHARATE, AMPHETAMINE ASPARTATE, DEXTROAMPHETAMINE SULFATE AND AMPHETAMINE SULFATE 5; 5; 5; 5 MG/1; MG/1; MG/1; MG/1
TABLET ORAL
Qty: 60 TABLET | Refills: 0 | Status: SHIPPED | OUTPATIENT
Start: 2022-01-25 | End: 2022-01-25 | Stop reason: SDUPTHER

## 2022-01-25 RX ORDER — DULOXETIN HYDROCHLORIDE 60 MG/1
CAPSULE, DELAYED RELEASE ORAL
Qty: 60 CAPSULE | Refills: 6 | Status: SHIPPED | OUTPATIENT
Start: 2022-01-25 | End: 2022-08-22 | Stop reason: SDUPTHER

## 2022-01-25 NOTE — PROGRESS NOTES
Patient Name: Audrey Alonso  MRN: [id number]   :  1980     Chief Complaint:      ICD-10-CM ICD-9-CM   1. Moderate episode of recurrent major depressive disorder (HCC)  F33.1 296.32   2. Nightmares  F51.5 307.47   3. Generalized anxiety disorder  F41.1 300.02   4. Primary insomnia  F51.01 307.42   5. Anxiety and depression  F41.9 300.00    F32.A 311   6. Attention deficit hyperactivity disorder (ADHD), combined type  F90.2 314.01       History of Present Illness: Audrey Alonso is a 42 y.o. female is here today for medication management follow up.  Patient states she was diagnosed with ADHD in high school.  Took Adderall in the past with good benefit.  Patient states she stopped taking it about 2 years ago.  States she still notices the symptoms of feeling overwhelmed.  Moving from one task to another without completing the initial task.  Would like to restart medication.    The following portions of the patient's history were reviewed and updated as appropriate: allergies, current medications, past family history, past medical history, past social history, past surgical history and problem list.    Review of Systems;;  Review of Systems   Constitutional: Negative for activity change, appetite change, fatigue, unexpected weight gain and unexpected weight loss.   Respiratory: Negative for shortness of breath and wheezing.    Gastrointestinal: Negative for constipation, diarrhea, nausea and vomiting.   Musculoskeletal: Negative for gait problem.   Skin: Negative for dry skin and rash.   Neurological: Negative for dizziness, speech difficulty, weakness, light-headedness, headache, memory problem and confusion.   Psychiatric/Behavioral: Positive for decreased concentration and positive for hyperactivity. Negative for agitation, behavioral problems, dysphoric mood, hallucinations, self-injury, sleep disturbance, suicidal ideas, depressed mood and stress. The patient is not nervous/anxious.        Physical  "Exam;;  Physical Exam  Vitals and nursing note reviewed.   Constitutional:       General: She is not in acute distress.     Appearance: She is well-developed. She is not diaphoretic.   HENT:      Head: Normocephalic and atraumatic.   Eyes:      Conjunctiva/sclera: Conjunctivae normal.   Cardiovascular:      Rate and Rhythm: Normal rate.   Pulmonary:      Effort: Pulmonary effort is normal. No respiratory distress.   Musculoskeletal:         General: Normal range of motion.      Cervical back: Full passive range of motion without pain and normal range of motion.   Skin:     General: Skin is warm and dry.   Neurological:      Mental Status: She is alert and oriented to person, place, and time.   Psychiatric:         Mood and Affect: Mood is not anxious or depressed. Affect is not labile, blunt, angry or inappropriate.         Speech: Speech is not rapid and pressured or tangential.         Behavior: Behavior normal. Behavior is not agitated, slowed, aggressive, withdrawn, hyperactive or combative. Behavior is cooperative.         Thought Content: Thought content normal. Thought content is not paranoid or delusional. Thought content does not include homicidal or suicidal ideation. Thought content does not include homicidal or suicidal plan.         Judgment: Judgment normal.       Blood pressure 122/70, height 165.1 cm (65\"), weight 81.6 kg (180 lb), not currently breastfeeding.  Body mass index is 29.95 kg/m².    Current Medications;;    Current Outpatient Medications:   •  busPIRone (BUSPAR) 15 MG tablet, Take 2 tablets by mouth 2 (Two) Times a Day., Disp: 120 tablet, Rfl: 6  •  doxepin (SINEquan) 50 MG capsule, Take 1 capsule by mouth Every Night., Disp: 30 capsule, Rfl: 6  •  DULoxetine (Cymbalta) 60 MG capsule, Two po q am, Disp: 60 capsule, Rfl: 6  •  gabapentin (NEURONTIN) 800 MG tablet, Take 1 tablet by mouth 3 (Three) Times a Day., Disp: 90 tablet, Rfl: 2  •  indomethacin (INDOCIN) 25 MG capsule, , Disp: , " Rfl:   •  methadone (DOLOPHINE) 10 MG tablet, Take 60 mg by mouth Daily,, Disp: , Rfl:   •  prazosin (MINIPRESS) 5 MG capsule, Two po q hs, Disp: 60 capsule, Rfl: 6  •  QUEtiapine (SEROquel) 100 MG tablet, Take 1 tablet by mouth Every Night., Disp: 30 tablet, Rfl: 6  •  Wellbutrin  MG 24 hr tablet, Take 1 tablet by mouth Every Morning. PER INSURANCE MUST BE BRAND NAME, Disp: 30 tablet, Rfl: 6  •  amphetamine-dextroamphetamine (Adderall) 20 MG tablet, Take 20 mg orally every morning and afternoon., Disp: 60 tablet, Rfl: 0  •  Wellbutrin  MG 24 hr tablet, Take 1 tablet by mouth Daily. WITH 150 mg, Disp: 30 tablet, Rfl: 6    Lab Results:   Lab on 11/18/2021   Component Date Value Ref Range Status   • Glucose 11/18/2021 93  65 - 99 mg/dL Final   • BUN 11/18/2021 17  6 - 20 mg/dL Final   • Creatinine 11/18/2021 0.83  0.57 - 1.00 mg/dL Final   • Sodium 11/18/2021 141  136 - 145 mmol/L Final   • Potassium 11/18/2021 4.7  3.5 - 5.2 mmol/L Final    Slight hemolysis detected by analyzer. Results may be affected.   • Chloride 11/18/2021 103  98 - 107 mmol/L Final   • CO2 11/18/2021 26.6  22.0 - 29.0 mmol/L Final   • Calcium 11/18/2021 9.1  8.6 - 10.5 mg/dL Final   • Total Protein 11/18/2021 7.4  6.0 - 8.5 g/dL Final   • Albumin 11/18/2021 4.40  3.50 - 5.20 g/dL Final   • ALT (SGPT) 11/18/2021 24  1 - 33 U/L Final   • AST (SGOT) 11/18/2021 21  1 - 32 U/L Final   • Alkaline Phosphatase 11/18/2021 64  39 - 117 U/L Final   • Total Bilirubin 11/18/2021 <0.2  0.0 - 1.2 mg/dL Final   • eGFR Non  Amer 11/18/2021 76  >60 mL/min/1.73 Final   • Globulin 11/18/2021 3.0  gm/dL Final   • A/G Ratio 11/18/2021 1.5  g/dL Final   • BUN/Creatinine Ratio 11/18/2021 20.5  7.0 - 25.0 Final   • Anion Gap 11/18/2021 11.4  5.0 - 15.0 mmol/L Final   • LDH 11/18/2021 212  135 - 214 U/L Final    Specimen hemolyzed.  Results may be affected.   • Haptoglobin 11/18/2021 81  30 - 200 mg/dL Final    Specimen hemolyzed. Results may be  affected.   • Vitamin B-12 11/18/2021 546  211 - 946 pg/mL Final   • Folate 11/18/2021 14.00  4.78 - 24.20 ng/mL Final   • Iron 11/18/2021 97  37 - 145 mcg/dL Final   • Iron Saturation 11/18/2021 24  20 - 50 % Final   • Transferrin 11/18/2021 266  200 - 360 mg/dL Final   • TIBC 11/18/2021 396  298 - 536 mcg/dL Final   • Ferritin 11/18/2021 21.50  13.00 - 150.00 ng/mL Final   • Pathology Review 11/18/2021 Yes   Final   • WBC 11/18/2021 6.01  3.40 - 10.80 10*3/mm3 Final   • RBC 11/18/2021 4.22  3.77 - 5.28 10*6/mm3 Final   • Hemoglobin 11/18/2021 12.7  12.0 - 15.9 g/dL Final   • Hematocrit 11/18/2021 38.7  34.0 - 46.6 % Final   • MCV 11/18/2021 91.7  79.0 - 97.0 fL Final   • MCH 11/18/2021 30.1  26.6 - 33.0 pg Final   • MCHC 11/18/2021 32.8  31.5 - 35.7 g/dL Final   • RDW 11/18/2021 13.2  12.3 - 15.4 % Final   • RDW-SD 11/18/2021 45.1  37.0 - 54.0 fl Final   • MPV 11/18/2021 10.1  6.0 - 12.0 fL Final   • Platelets 11/18/2021 292  140 - 450 10*3/mm3 Final   • Neutrophil % 11/18/2021 62.8  42.7 - 76.0 % Final   • Lymphocyte % 11/18/2021 26.1  19.6 - 45.3 % Final   • Monocyte % 11/18/2021 8.0  5.0 - 12.0 % Final   • Eosinophil % 11/18/2021 1.5  0.3 - 6.2 % Final   • Basophil % 11/18/2021 0.8  0.0 - 1.5 % Final   • Immature Grans % 11/18/2021 0.8* 0.0 - 0.5 % Final   • Neutrophils, Absolute 11/18/2021 3.77  1.70 - 7.00 10*3/mm3 Final   • Lymphocytes, Absolute 11/18/2021 1.57  0.70 - 3.10 10*3/mm3 Final   • Monocytes, Absolute 11/18/2021 0.48  0.10 - 0.90 10*3/mm3 Final   • Eosinophils, Absolute 11/18/2021 0.09  0.00 - 0.40 10*3/mm3 Final   • Basophils, Absolute 11/18/2021 0.05  0.00 - 0.20 10*3/mm3 Final   • Immature Grans, Absolute 11/18/2021 0.05  0.00 - 0.05 10*3/mm3 Final   • nRBC 11/18/2021 0.0  0.0 - 0.2 /100 WBC Final   • Final Diagnosis 11/18/2021    Final                    Value:This result contains rich text formatting which cannot be displayed here.   • Clinical Information 11/18/2021    Final                     Value:This result contains rich text formatting which cannot be displayed here.   • Gross Description 11/18/2021    Final                    Value:This result contains rich text formatting which cannot be displayed here.   • Case Report 11/18/2021    Final                    Value:Surgical Pathology Report                         Case: [id number]                                  Authorizing Provider:  Ender Hein MD      Collected:           11/18/2021 03:52 PM          Ordering Location:     [facility]    Received:            11/19/2021 10:21 AM                                 OUTPAT LAB                                                                   Pathologist:           Gonzalez Cisneros MD                                                         Specimen:    Blood, Venous Line, Peripheral blood smear                                                    Mental Status Exam:   Hygiene:   good  Cooperation:  Cooperative  Eye Contact:  Good  Psychomotor Behavior:  Restless  Mood:within normal limits  Affect:  Appropriate  Hopelessness: Denies  Speech:  Rapid  Thought Process:  Goal directed  Thought Content:  Normal  Suicidal:  None  Homicidal:  None  Hallucinations:  None  Delusion:  None  Memory:  Intact  Orientation:  Person, Place, Time and Situation  Reliability:  good  Insight:  Good  Judgement:  Good  Impulse Control:  Good  Physical/Medical Issues:  No     PHQ-9 Depression Screening  Little interest or pleasure in doing things? 2   Feeling down, depressed, or hopeless? 2   Trouble falling or staying asleep, or sleeping too much? 2 (Trouble staying asleep)   Feeling tired or having little energy? 1   Poor appetite or overeating? 1 (Overeating)   Feeling bad about yourself - or that you are a failure or have let yourself or your family down? 2   Trouble concentrating on things, such as reading the newspaper or watching television? 3   Moving or speaking so slowly that other people could  have noticed? Or the opposite - being so fidgety or restless that you have been moving around a lot more than usual? 3   Thoughts that you would be better off dead, or of hurting yourself in some way? 0   PHQ-9 Total Score 16   If you checked off any problems, how difficult have these problems made it for you to do your work, take care of things at home, or get along with other people? Very difficult        Assessment/Plan:  Diagnoses and all orders for this visit:    1. Moderate episode of recurrent major depressive disorder (HCC) (Primary)  -     DULoxetine (Cymbalta) 60 MG capsule; Two po q am  Dispense: 60 capsule; Refill: 6  -     Wellbutrin  MG 24 hr tablet; Take 1 tablet by mouth Every Morning. PER INSURANCE MUST BE BRAND NAME  Dispense: 30 tablet; Refill: 6  -     Wellbutrin  MG 24 hr tablet; Take 1 tablet by mouth Daily. WITH 150 mg  Dispense: 30 tablet; Refill: 6    2. Nightmares  -     prazosin (MINIPRESS) 5 MG capsule; Two po q hs  Dispense: 60 capsule; Refill: 6  -     QUEtiapine (SEROquel) 100 MG tablet; Take 1 tablet by mouth Every Night.  Dispense: 30 tablet; Refill: 6    3. Generalized anxiety disorder  -     DULoxetine (Cymbalta) 60 MG capsule; Two po q am  Dispense: 60 capsule; Refill: 6  -     busPIRone (BUSPAR) 15 MG tablet; Take 2 tablets by mouth 2 (Two) Times a Day.  Dispense: 120 tablet; Refill: 6  -     gabapentin (NEURONTIN) 800 MG tablet; Take 1 tablet by mouth 3 (Three) Times a Day.  Dispense: 90 tablet; Refill: 2    4. Primary insomnia  -     QUEtiapine (SEROquel) 100 MG tablet; Take 1 tablet by mouth Every Night.  Dispense: 30 tablet; Refill: 6  -     doxepin (SINEquan) 50 MG capsule; Take 1 capsule by mouth Every Night.  Dispense: 30 capsule; Refill: 6    5. Anxiety and depression    6. Attention deficit hyperactivity disorder (ADHD), combined type  -     amphetamine-dextroamphetamine (Adderall) 20 MG tablet; Take 20 mg orally every morning and afternoon.  Dispense: 60  tablet; Refill: 0      Patient has been diagnosed with ADHD for many years.  Has not been taking medication over the last 2 years.  Would like to restart.  Adderall 20 mg twice a day.    A psychological evaluation was conducted in order to assess past and current level of functioning. Areas assessed included, but were not limited to: perception of social support, perception of ability to face and deal with challenges in life (positive functioning), anxiety symptoms, depressive symptoms, perspective on beliefs/belief system, coping skills for stress, intelligence level,  Therapeutic rapport was established. Interventions conducted today were geared towards incorporating medication management along with support for continued therapy. Education was also provided as to the med management with this provider and what to expect in subsequent sessions.    We discussed risks, benefits,goals and side effects of the above medication and the patient was agreeable with the plan.Patient was educated on the importance of compliance with treatment and follow-up appointments. Patient is aware to contact the [facility] with any worsening of symptoms. To call for questions or concerns and return early if necessary. Patent is agreeable to go to the Emergency Department or call 911 should they begin SI/HI.     Treatment Plan:   Discussed risks, benefits, and alternatives of medication. Encouraged healthy habits (eating, exercise and sleep). Call if any questions or problems arise. Medication reconciled. Controlled substance monitoring report reviewed. Provided psychoeducation.. Discussed coping strategies and current stressors. Set appropriate boundaries and limits for patient's well-being. Use distraction techniques to improve symptoms. Access support networks.      Return in about 4 weeks (around 2/22/2022) for Follow Up 15 min.    STEVEN Young

## 2022-02-17 ENCOUNTER — PRIOR AUTHORIZATION (OUTPATIENT)
Dept: BEHAVIORAL HEALTH | Facility: CLINIC | Age: 42
End: 2022-02-17

## 2022-02-17 DIAGNOSIS — F33.1 MODERATE EPISODE OF RECURRENT MAJOR DEPRESSIVE DISORDER: ICD-10-CM

## 2022-02-21 RX ORDER — BUPROPION HYDROCHLORIDE 300 MG/1
300 TABLET ORAL DAILY
Qty: 30 TABLET | Refills: 6 | Status: SHIPPED | OUTPATIENT
Start: 2022-02-21 | End: 2022-09-22 | Stop reason: SDUPTHER

## 2022-02-22 DIAGNOSIS — F90.2 ATTENTION DEFICIT HYPERACTIVITY DISORDER (ADHD), COMBINED TYPE: ICD-10-CM

## 2022-02-22 RX ORDER — DEXTROAMPHETAMINE SACCHARATE, AMPHETAMINE ASPARTATE, DEXTROAMPHETAMINE SULFATE AND AMPHETAMINE SULFATE 5; 5; 5; 5 MG/1; MG/1; MG/1; MG/1
TABLET ORAL
Qty: 60 TABLET | Refills: 0 | Status: SHIPPED | OUTPATIENT
Start: 2022-02-22 | End: 2022-03-22 | Stop reason: SDUPTHER

## 2022-02-22 NOTE — TELEPHONE ENCOUNTER
Incoming Refill Request      Medication requested (name and dose): ADDERALL 20MG    Pharmacy where request should be sent: [facility]    Additional details provided by patient: N/A    Best call back number: [phone]    Does the patient have less than a 3 day supply:  [] Yes  [x] No    Jeane Guadalupe  02/22/22, 08:27 EST

## 2022-02-22 NOTE — TELEPHONE ENCOUNTER
Rx Refill Note  Requested Prescriptions     Pending Prescriptions Disp Refills   • amphetamine-dextroamphetamine (Adderall) 20 MG tablet 60 tablet 0     Sig: Take 20 mg orally every morning and afternoon.      Last office visit with prescribing clinician: 1/25/2022      Next office visit with prescribing clinician: 3/1/2022            Frannie Mann MA  02/22/22, 10:35 EST

## 2022-03-01 ENCOUNTER — OFFICE VISIT (OUTPATIENT)
Dept: BEHAVIORAL HEALTH | Facility: CLINIC | Age: 42
End: 2022-03-01

## 2022-03-01 VITALS
WEIGHT: 170 LBS | SYSTOLIC BLOOD PRESSURE: 110 MMHG | BODY MASS INDEX: 28.32 KG/M2 | DIASTOLIC BLOOD PRESSURE: 70 MMHG | HEIGHT: 65 IN

## 2022-03-01 DIAGNOSIS — F90.2 ATTENTION DEFICIT HYPERACTIVITY DISORDER (ADHD), COMBINED TYPE: ICD-10-CM

## 2022-03-01 DIAGNOSIS — F41.1 GENERALIZED ANXIETY DISORDER: ICD-10-CM

## 2022-03-01 DIAGNOSIS — F33.1 MODERATE EPISODE OF RECURRENT MAJOR DEPRESSIVE DISORDER: Primary | ICD-10-CM

## 2022-03-01 DIAGNOSIS — F51.01 PRIMARY INSOMNIA: ICD-10-CM

## 2022-03-01 DIAGNOSIS — F51.5 NIGHTMARES: ICD-10-CM

## 2022-03-01 PROCEDURE — 99213 OFFICE O/P EST LOW 20 MIN: CPT | Performed by: NURSE PRACTITIONER

## 2022-03-01 NOTE — PROGRESS NOTES
Patient Name: Audrey Alonso  MRN: [id number]   :  1980     Chief Complaint:      ICD-10-CM ICD-9-CM   1. Moderate episode of recurrent major depressive disorder (HCC)  F33.1 296.32   2. Nightmares  F51.5 307.47   3. Generalized anxiety disorder  F41.1 300.02   4. Primary insomnia  F51.01 307.42   5. Attention deficit hyperactivity disorder (ADHD), combined type  F90.2 314.01       History of Present Illness: Audrey Alonso is a 42 y.o. female is here today for medication management follow up.  Patient states she feels like the Adderall is working.  Feels sleep is about the same.  Still has difficulty staying asleep.  Overall thinks medication is working well.    The following portions of the patient's history were reviewed and updated as appropriate: allergies, current medications, past family history, past medical history, past social history, past surgical history and problem list.    Review of Systems;;  Review of Systems   Constitutional: Negative for activity change, appetite change, fatigue, unexpected weight gain and unexpected weight loss.   Respiratory: Negative for shortness of breath and wheezing.    Gastrointestinal: Negative for constipation, diarrhea, nausea and vomiting.   Musculoskeletal: Negative for gait problem.   Skin: Negative for dry skin and rash.   Neurological: Negative for dizziness, speech difficulty, weakness, light-headedness, headache, memory problem and confusion.   Psychiatric/Behavioral: Negative for agitation, behavioral problems, decreased concentration, dysphoric mood, hallucinations, self-injury, sleep disturbance, suicidal ideas, negative for hyperactivity, depressed mood and stress. The patient is not nervous/anxious.        Physical Exam;;  Physical Exam  Vitals and nursing note reviewed.   Constitutional:       General: She is not in acute distress.     Appearance: She is well-developed. She is not diaphoretic.   HENT:      Head: Normocephalic and atraumatic.  "  Eyes:      Conjunctiva/sclera: Conjunctivae normal.   Cardiovascular:      Rate and Rhythm: Normal rate.   Pulmonary:      Effort: Pulmonary effort is normal. No respiratory distress.   Musculoskeletal:         General: Normal range of motion.      Cervical back: Full passive range of motion without pain and normal range of motion.   Skin:     General: Skin is warm and dry.   Neurological:      Mental Status: She is alert and oriented to person, place, and time.   Psychiatric:         Mood and Affect: Mood is not anxious or depressed. Affect is not labile, blunt, angry or inappropriate.         Speech: Speech is not rapid and pressured or tangential.         Behavior: Behavior normal. Behavior is not agitated, slowed, aggressive, withdrawn, hyperactive or combative. Behavior is cooperative.         Thought Content: Thought content normal. Thought content is not paranoid or delusional. Thought content does not include homicidal or suicidal ideation. Thought content does not include homicidal or suicidal plan.         Judgment: Judgment normal.       Blood pressure 110/70, height 165.1 cm (65\"), weight 77.1 kg (170 lb), not currently breastfeeding.  Body mass index is 28.29 kg/m².    Current Medications;;    Current Outpatient Medications:   •  amphetamine-dextroamphetamine (Adderall) 20 MG tablet, Take 20 mg orally every morning and afternoon., Disp: 60 tablet, Rfl: 0  •  buPROPion XL (Wellbutrin XL) 300 MG 24 hr tablet, Take 1 tablet by mouth Daily. WITH 150 mg, Disp: 30 tablet, Rfl: 6  •  busPIRone (BUSPAR) 15 MG tablet, Take 2 tablets by mouth 2 (Two) Times a Day., Disp: 120 tablet, Rfl: 6  •  doxepin (SINEquan) 50 MG capsule, Take 1 capsule by mouth Every Night., Disp: 30 capsule, Rfl: 6  •  DULoxetine (Cymbalta) 60 MG capsule, Two po q am, Disp: 60 capsule, Rfl: 6  •  gabapentin (NEURONTIN) 800 MG tablet, Take 1 tablet by mouth 3 (Three) Times a Day., Disp: 90 tablet, Rfl: 2  •  methadone (DOLOPHINE) 10 MG " tablet, Take 60 mg by mouth Daily,, Disp: , Rfl:   •  prazosin (MINIPRESS) 5 MG capsule, Two po q hs, Disp: 60 capsule, Rfl: 6  •  QUEtiapine (SEROquel) 100 MG tablet, Take 1 tablet by mouth Every Night., Disp: 30 tablet, Rfl: 6    Lab Results:   No visits with results within 3 Month(s) from this visit.   Latest known visit with results is:   Lab on 11/18/2021   Component Date Value Ref Range Status   • Glucose 11/18/2021 93  65 - 99 mg/dL Final   • BUN 11/18/2021 17  6 - 20 mg/dL Final   • Creatinine 11/18/2021 0.83  0.57 - 1.00 mg/dL Final   • Sodium 11/18/2021 141  136 - 145 mmol/L Final   • Potassium 11/18/2021 4.7  3.5 - 5.2 mmol/L Final    Slight hemolysis detected by analyzer. Results may be affected.   • Chloride 11/18/2021 103  98 - 107 mmol/L Final   • CO2 11/18/2021 26.6  22.0 - 29.0 mmol/L Final   • Calcium 11/18/2021 9.1  8.6 - 10.5 mg/dL Final   • Total Protein 11/18/2021 7.4  6.0 - 8.5 g/dL Final   • Albumin 11/18/2021 4.40  3.50 - 5.20 g/dL Final   • ALT (SGPT) 11/18/2021 24  1 - 33 U/L Final   • AST (SGOT) 11/18/2021 21  1 - 32 U/L Final   • Alkaline Phosphatase 11/18/2021 64  39 - 117 U/L Final   • Total Bilirubin 11/18/2021 <0.2  0.0 - 1.2 mg/dL Final   • eGFR Non  Amer 11/18/2021 76  >60 mL/min/1.73 Final   • Globulin 11/18/2021 3.0  gm/dL Final   • A/G Ratio 11/18/2021 1.5  g/dL Final   • BUN/Creatinine Ratio 11/18/2021 20.5  7.0 - 25.0 Final   • Anion Gap 11/18/2021 11.4  5.0 - 15.0 mmol/L Final   • LDH 11/18/2021 212  135 - 214 U/L Final    Specimen hemolyzed.  Results may be affected.   • Haptoglobin 11/18/2021 81  30 - 200 mg/dL Final    Specimen hemolyzed. Results may be affected.   • Vitamin B-12 11/18/2021 546  211 - 946 pg/mL Final   • Folate 11/18/2021 14.00  4.78 - 24.20 ng/mL Final   • Iron 11/18/2021 97  37 - 145 mcg/dL Final   • Iron Saturation 11/18/2021 24  20 - 50 % Final   • Transferrin 11/18/2021 266  200 - 360 mg/dL Final   • TIBC 11/18/2021 396  298 - 536 mcg/dL Final    • Ferritin 11/18/2021 21.50  13.00 - 150.00 ng/mL Final   • Pathology Review 11/18/2021 Yes   Final   • WBC 11/18/2021 6.01  3.40 - 10.80 10*3/mm3 Final   • RBC 11/18/2021 4.22  3.77 - 5.28 10*6/mm3 Final   • Hemoglobin 11/18/2021 12.7  12.0 - 15.9 g/dL Final   • Hematocrit 11/18/2021 38.7  34.0 - 46.6 % Final   • MCV 11/18/2021 91.7  79.0 - 97.0 fL Final   • MCH 11/18/2021 30.1  26.6 - 33.0 pg Final   • MCHC 11/18/2021 32.8  31.5 - 35.7 g/dL Final   • RDW 11/18/2021 13.2  12.3 - 15.4 % Final   • RDW-SD 11/18/2021 45.1  37.0 - 54.0 fl Final   • MPV 11/18/2021 10.1  6.0 - 12.0 fL Final   • Platelets 11/18/2021 292  140 - 450 10*3/mm3 Final   • Neutrophil % 11/18/2021 62.8  42.7 - 76.0 % Final   • Lymphocyte % 11/18/2021 26.1  19.6 - 45.3 % Final   • Monocyte % 11/18/2021 8.0  5.0 - 12.0 % Final   • Eosinophil % 11/18/2021 1.5  0.3 - 6.2 % Final   • Basophil % 11/18/2021 0.8  0.0 - 1.5 % Final   • Immature Grans % 11/18/2021 0.8* 0.0 - 0.5 % Final   • Neutrophils, Absolute 11/18/2021 3.77  1.70 - 7.00 10*3/mm3 Final   • Lymphocytes, Absolute 11/18/2021 1.57  0.70 - 3.10 10*3/mm3 Final   • Monocytes, Absolute 11/18/2021 0.48  0.10 - 0.90 10*3/mm3 Final   • Eosinophils, Absolute 11/18/2021 0.09  0.00 - 0.40 10*3/mm3 Final   • Basophils, Absolute 11/18/2021 0.05  0.00 - 0.20 10*3/mm3 Final   • Immature Grans, Absolute 11/18/2021 0.05  0.00 - 0.05 10*3/mm3 Final   • nRBC 11/18/2021 0.0  0.0 - 0.2 /100 WBC Final   • Final Diagnosis 11/18/2021    Final                    Value:This result contains rich text formatting which cannot be displayed here.   • Clinical Information 11/18/2021    Final                    Value:This result contains rich text formatting which cannot be displayed here.   • Gross Description 11/18/2021    Final                    Value:This result contains rich text formatting which cannot be displayed here.   • Case Report 11/18/2021    Final                    Value:Surgical Pathology Report                          Case: [id number]                                  Authorizing Provider:  Ender Hein MD      Collected:           11/18/2021 03:52 PM          Ordering Location:     [facility]    Received:            11/19/2021 10:21 AM                                 OUTPAT LAB                                                                   Pathologist:           Gonzalez Cisneros MD                                                         Specimen:    Blood, Venous Line, Peripheral blood smear                                                    Mental Status Exam:   Hygiene:   good  Cooperation:  Cooperative  Eye Contact:  Good  Psychomotor Behavior:  Appropriate  Mood:within normal limits  Affect:  Appropriate  Hopelessness: Denies  Speech:  Normal  Thought Process:  Goal directed  Thought Content:  Normal  Suicidal:  None  Homicidal:  None  Hallucinations:  None  Delusion:  None  Memory:  Intact  Orientation:  Person, Place, Time and Situation  Reliability:  good  Insight:  Good  Judgement:  Good  Impulse Control:  Good  Physical/Medical Issues:  No     PHQ-9 Depression Screening  Little interest or pleasure in doing things? 2   Feeling down, depressed, or hopeless? 2   Trouble falling or staying asleep, or sleeping too much? 3 (Trouble falling and staying asleep)   Feeling tired or having little energy? 2   Poor appetite or overeating? 3 (Overeating)   Feeling bad about yourself - or that you are a failure or have let yourself or your family down? 2   Trouble concentrating on things, such as reading the newspaper or watching television? 3   Moving or speaking so slowly that other people could have noticed? Or the opposite - being so fidgety or restless that you have been moving around a lot more than usual? 1   Thoughts that you would be better off dead, or of hurting yourself in some way? 0   PHQ-9 Total Score 18   If you checked off any problems, how difficult have these problems made it  for you to do your work, take care of things at home, or get along with other people? Very difficult        Assessment/Plan:  Diagnoses and all orders for this visit:    1. Moderate episode of recurrent major depressive disorder (HCC) (Primary)    2. Nightmares    3. Generalized anxiety disorder    4. Primary insomnia    5. Attention deficit hyperactivity disorder (ADHD), combined type      Patient doing fairly well.  Continue medication as ordered.    A psychological evaluation was conducted in order to assess past and current level of functioning. Areas assessed included, but were not limited to: perception of social support, perception of ability to face and deal with challenges in life (positive functioning), anxiety symptoms, depressive symptoms, perspective on beliefs/belief system, coping skills for stress, intelligence level,  Therapeutic rapport was established. Interventions conducted today were geared towards incorporating medication management along with support for continued therapy. Education was also provided as to the med management with this provider and what to expect in subsequent sessions.    We discussed risks, benefits,goals and side effects of the above medication and the patient was agreeable with the plan.Patient was educated on the importance of compliance with treatment and follow-up appointments. Patient is aware to contact the [facility] with any worsening of symptoms. To call for questions or concerns and return early if necessary. Patent is agreeable to go to the Emergency Department or call 911 should they begin SI/HI.     Treatment Plan:   Discussed risks, benefits, and alternatives of medication. Encouraged healthy habits (eating, exercise and sleep). Call if any questions or problems arise. Medication reconciled. Controlled substance monitoring report reviewed. Provided psychoeducation.. Discussed coping strategies and current stressors. Set appropriate boundaries and limits  for patient's well-being. Use distraction techniques to improve symptoms. Access support networks.      Return in about 4 weeks (around 3/29/2022) for Follow Up 15 min.    Silvina Taylor, APRN

## 2022-03-14 ENCOUNTER — PRIOR AUTHORIZATION (OUTPATIENT)
Dept: BEHAVIORAL HEALTH | Facility: CLINIC | Age: 42
End: 2022-03-14

## 2022-03-22 DIAGNOSIS — F90.2 ATTENTION DEFICIT HYPERACTIVITY DISORDER (ADHD), COMBINED TYPE: ICD-10-CM

## 2022-03-22 RX ORDER — DEXTROAMPHETAMINE SACCHARATE, AMPHETAMINE ASPARTATE, DEXTROAMPHETAMINE SULFATE AND AMPHETAMINE SULFATE 5; 5; 5; 5 MG/1; MG/1; MG/1; MG/1
TABLET ORAL
Qty: 60 TABLET | Refills: 0 | Status: SHIPPED | OUTPATIENT
Start: 2022-03-22 | End: 2022-04-07

## 2022-03-22 NOTE — TELEPHONE ENCOUNTER
Incoming Refill Request      Medication requested (name and dose): ADDERALL 20MG    Pharmacy where request should be sent: [facility]    Additional details provided by patient: N/A    Best call back number: [phone]    Does the patient have less than a 3 day supply:  [x] Yes  [] No    Jeane Guadalupe  03/22/22, 08:16 EDT

## 2022-03-22 NOTE — TELEPHONE ENCOUNTER
Rx Refill Note  Requested Prescriptions     Pending Prescriptions Disp Refills   • amphetamine-dextroamphetamine (Adderall) 20 MG tablet 60 tablet 0     Sig: Take 20 mg orally every morning and afternoon.      Last office visit with prescribing clinician: 3/1/2022      Next office visit with prescribing clinician: 4/7/2022            Josselin Snow MA  03/22/22, 09:00 EDT

## 2022-03-24 ENCOUNTER — OFFICE VISIT (OUTPATIENT)
Dept: INTERNAL MEDICINE | Facility: CLINIC | Age: 42
End: 2022-03-24

## 2022-03-24 VITALS
BODY MASS INDEX: 28.56 KG/M2 | WEIGHT: 171.4 LBS | DIASTOLIC BLOOD PRESSURE: 81 MMHG | SYSTOLIC BLOOD PRESSURE: 126 MMHG | OXYGEN SATURATION: 100 % | TEMPERATURE: 97.5 F | HEART RATE: 122 BPM | HEIGHT: 65 IN

## 2022-03-24 DIAGNOSIS — R73.9 HYPERGLYCEMIA: Primary | ICD-10-CM

## 2022-03-24 DIAGNOSIS — F41.9 ANXIETY: ICD-10-CM

## 2022-03-24 PROCEDURE — 99213 OFFICE O/P EST LOW 20 MIN: CPT | Performed by: INTERNAL MEDICINE

## 2022-03-24 RX ORDER — BUSPIRONE HYDROCHLORIDE 10 MG/1
TABLET ORAL
COMMUNITY
Start: 2022-03-10 | End: 2022-03-24 | Stop reason: DRUGHIGH

## 2022-03-24 RX ORDER — DICLOFENAC SODIUM 75 MG/1
75 TABLET, DELAYED RELEASE ORAL 2 TIMES DAILY
COMMUNITY
Start: 2022-03-10 | End: 2023-01-06

## 2022-03-24 NOTE — PROGRESS NOTES
"Subjective  Audrey Alonso is a 42 y.o. female    HPI 42-year-old female with history of anxiety disorder has had a history of opioid addiction currently on a methadone clinic.  She failed one of her urine screens because her urine was too dilute.  Over the last few months she has been allowed to take a week supply of methadone at home however now she will have to resume going daily to the clinic.  She has had a history of weight gain and hyperglycemia.    The following portions of the patient's history were reviewed and updated as appropriate: allergies, current medications, past family history, past medical history, past social history, past surgical history, and problem list.     Review of Systems   Constitutional: Negative.  Negative for activity change, appetite change, fatigue and fever.   HENT: Negative for congestion, ear discharge, ear pain and trouble swallowing.    Eyes: Negative for photophobia and visual disturbance.   Respiratory: Negative for cough and shortness of breath.    Cardiovascular: Negative for chest pain and palpitations.   Gastrointestinal: Negative for abdominal distention, abdominal pain, constipation, diarrhea, nausea and vomiting.   Endocrine: Negative.    Genitourinary: Negative for dysuria, hematuria and urgency.   Musculoskeletal: Negative for arthralgias, back pain, joint swelling and myalgias.   Skin: Negative for color change and rash.   Allergic/Immunologic: Negative.    Neurological: Negative for dizziness, weakness, light-headedness and headaches.   Hematological: Negative for adenopathy. Does not bruise/bleed easily.   Psychiatric/Behavioral: Positive for sleep disturbance. Negative for agitation, confusion and dysphoric mood. The patient is nervous/anxious.        Visit Vitals  /81 (BP Location: Left arm, Patient Position: Sitting, Cuff Size: Adult)   Pulse (!) 122   Temp 97.5 °F (36.4 °C) (Infrared)   Ht 165.1 cm (65\")   Wt 77.7 kg (171 lb 6.4 oz)   SpO2 100%   BMI " 28.52 kg/m²       Objective  Physical Exam  Constitutional:       General: She is not in acute distress.     Appearance: She is well-developed.   HENT:      Nose: Nose normal.   Eyes:      General: No scleral icterus.     Conjunctiva/sclera: Conjunctivae normal.   Neck:      Thyroid: No thyromegaly.      Trachea: No tracheal deviation.   Cardiovascular:      Rate and Rhythm: Normal rate and regular rhythm.      Heart sounds: No murmur heard.    No friction rub.   Pulmonary:      Effort: No respiratory distress.      Breath sounds: No wheezing or rales.   Abdominal:      General: There is no distension.      Palpations: Abdomen is soft. There is no mass.      Tenderness: There is no abdominal tenderness. There is no guarding.   Musculoskeletal:         General: Deformity present. Normal range of motion.   Lymphadenopathy:      Cervical: No cervical adenopathy.   Skin:     General: Skin is warm and dry.      Findings: No erythema or rash.   Neurological:      Mental Status: She is alert and oriented to person, place, and time.      Cranial Nerves: No cranial nerve deficit.      Coordination: Coordination normal.      Deep Tendon Reflexes: Reflexes are normal and symmetric.   Psychiatric:         Behavior: Behavior normal.         Thought Content: Thought content normal.         Judgment: Judgment normal.         Diagnoses and all orders for this visit:    Hyperglycemia suspected as cause for her polyuria and therefore a dilute urine.  Urine creatinine noted to be less than 20 on her last test.  Check A1c discussed dietary restrictions    Other orders  -     Discontinue: busPIRone (BUSPAR) 10 MG tablet  -     diclofenac (VOLTAREN) 75 MG EC tablet

## 2022-03-25 LAB
ALBUMIN SERPL-MCNC: 4.1 G/DL (ref 3.8–4.8)
ALBUMIN/GLOB SERPL: 1.4 {RATIO} (ref 1.2–2.2)
ALP SERPL-CCNC: 71 IU/L (ref 44–121)
ALT SERPL-CCNC: 34 IU/L (ref 0–32)
AST SERPL-CCNC: 19 IU/L (ref 0–40)
BILIRUB SERPL-MCNC: 0.3 MG/DL (ref 0–1.2)
BUN SERPL-MCNC: 21 MG/DL (ref 6–24)
BUN/CREAT SERPL: 19 (ref 9–23)
CALCIUM SERPL-MCNC: 8.9 MG/DL (ref 8.7–10.2)
CHLORIDE SERPL-SCNC: 101 MMOL/L (ref 96–106)
CO2 SERPL-SCNC: 22 MMOL/L (ref 20–29)
CREAT SERPL-MCNC: 1.09 MG/DL (ref 0.57–1)
EGFRCR SERPLBLD CKD-EPI 2021: 65 ML/MIN/1.73
GLOBULIN SER CALC-MCNC: 2.9 G/DL (ref 1.5–4.5)
GLUCOSE SERPL-MCNC: 99 MG/DL (ref 65–99)
HBA1C MFR BLD: 5.4 % (ref 4.8–5.6)
POTASSIUM SERPL-SCNC: 4.3 MMOL/L (ref 3.5–5.2)
PROT SERPL-MCNC: 7 G/DL (ref 6–8.5)
SODIUM SERPL-SCNC: 138 MMOL/L (ref 134–144)

## 2022-04-07 ENCOUNTER — OFFICE VISIT (OUTPATIENT)
Dept: BEHAVIORAL HEALTH | Facility: CLINIC | Age: 42
End: 2022-04-07

## 2022-04-07 VITALS
DIASTOLIC BLOOD PRESSURE: 78 MMHG | WEIGHT: 177 LBS | SYSTOLIC BLOOD PRESSURE: 122 MMHG | BODY MASS INDEX: 29.49 KG/M2 | HEIGHT: 65 IN

## 2022-04-07 DIAGNOSIS — F90.2 ATTENTION DEFICIT HYPERACTIVITY DISORDER (ADHD), COMBINED TYPE: ICD-10-CM

## 2022-04-07 DIAGNOSIS — F41.1 GENERALIZED ANXIETY DISORDER: ICD-10-CM

## 2022-04-07 DIAGNOSIS — F51.01 PRIMARY INSOMNIA: ICD-10-CM

## 2022-04-07 DIAGNOSIS — F51.5 NIGHTMARES: ICD-10-CM

## 2022-04-07 DIAGNOSIS — F33.1 MODERATE EPISODE OF RECURRENT MAJOR DEPRESSIVE DISORDER: Primary | ICD-10-CM

## 2022-04-07 PROCEDURE — 99213 OFFICE O/P EST LOW 20 MIN: CPT | Performed by: NURSE PRACTITIONER

## 2022-04-07 RX ORDER — GABAPENTIN 800 MG/1
800 TABLET ORAL 3 TIMES DAILY
Qty: 90 TABLET | Refills: 2 | Status: SHIPPED | OUTPATIENT
Start: 2022-04-07 | End: 2022-06-14 | Stop reason: SDUPTHER

## 2022-04-07 RX ORDER — DEXTROAMPHETAMINE SACCHARATE, AMPHETAMINE ASPARTATE, DEXTROAMPHETAMINE SULFATE AND AMPHETAMINE SULFATE 7.5; 7.5; 7.5; 7.5 MG/1; MG/1; MG/1; MG/1
TABLET ORAL
Qty: 60 TABLET | Refills: 0 | Status: SHIPPED | OUTPATIENT
Start: 2022-04-07 | End: 2022-05-16 | Stop reason: SDUPTHER

## 2022-04-07 NOTE — PROGRESS NOTES
Patient Name: Audrey Alonso  MRN: [id number]   :  1980     Chief Complaint:      ICD-10-CM ICD-9-CM   1. Moderate episode of recurrent major depressive disorder (HCC)  F33.1 296.32   2. Nightmares  F51.5 307.47   3. Generalized anxiety disorder  F41.1 300.02   4. Primary insomnia  F51.01 307.42   5. Attention deficit hyperactivity disorder (ADHD), combined type  F90.2 314.01       History of Present Illness: Audrey Alonso is a 42 y.o. female is here today for medication management follow up. Pt states focus is not as good as it was in the beginning. Is wondering about an increase.    The following portions of the patient's history were reviewed and updated as appropriate: allergies, current medications, past family history, past medical history, past social history, past surgical history and problem list.    Review of Systems;;  Review of Systems   Constitutional: Negative for activity change, appetite change, fatigue, unexpected weight gain and unexpected weight loss.   Respiratory: Negative for shortness of breath and wheezing.    Gastrointestinal: Negative for constipation, diarrhea, nausea and vomiting.   Musculoskeletal: Negative for gait problem.   Skin: Negative for dry skin and rash.   Neurological: Negative for dizziness, speech difficulty, weakness, light-headedness, headache, memory problem and confusion.   Psychiatric/Behavioral: Negative for agitation, behavioral problems, decreased concentration, dysphoric mood, hallucinations, self-injury, sleep disturbance, suicidal ideas, negative for hyperactivity, depressed mood and stress. The patient is not nervous/anxious.        Physical Exam;;  Physical Exam  Vitals and nursing note reviewed.   Constitutional:       General: She is not in acute distress.     Appearance: She is well-developed. She is not diaphoretic.   HENT:      Head: Normocephalic and atraumatic.   Eyes:      Conjunctiva/sclera: Conjunctivae normal.   Cardiovascular:      Rate  "and Rhythm: Normal rate.   Pulmonary:      Effort: Pulmonary effort is normal. No respiratory distress.   Musculoskeletal:         General: Normal range of motion.      Cervical back: Full passive range of motion without pain and normal range of motion.   Skin:     General: Skin is warm and dry.   Neurological:      Mental Status: She is alert and oriented to person, place, and time.   Psychiatric:         Mood and Affect: Mood is not anxious or depressed. Affect is not labile, blunt, angry or inappropriate.         Speech: Speech is not rapid and pressured or tangential.         Behavior: Behavior normal. Behavior is not agitated, slowed, aggressive, withdrawn, hyperactive or combative. Behavior is cooperative.         Thought Content: Thought content normal. Thought content is not paranoid or delusional. Thought content does not include homicidal or suicidal ideation. Thought content does not include homicidal or suicidal plan.         Judgment: Judgment normal.       Blood pressure 122/78, height 165.1 cm (65\"), weight 80.3 kg (177 lb), not currently breastfeeding.  Body mass index is 29.45 kg/m².    Current Medications;;    Current Outpatient Medications:   •  buPROPion XL (Wellbutrin XL) 300 MG 24 hr tablet, Take 1 tablet by mouth Daily. WITH 150 mg, Disp: 30 tablet, Rfl: 6  •  busPIRone (BUSPAR) 15 MG tablet, Take 2 tablets by mouth 2 (Two) Times a Day., Disp: 120 tablet, Rfl: 6  •  diclofenac (VOLTAREN) 75 MG EC tablet, , Disp: , Rfl:   •  doxepin (SINEquan) 50 MG capsule, Take 1 capsule by mouth Every Night., Disp: 30 capsule, Rfl: 6  •  DULoxetine (Cymbalta) 60 MG capsule, Two po q am, Disp: 60 capsule, Rfl: 6  •  gabapentin (NEURONTIN) 800 MG tablet, Take 1 tablet by mouth 3 (Three) Times a Day., Disp: 90 tablet, Rfl: 2  •  methadone (DOLOPHINE) 10 MG tablet, Take 60 mg by mouth Daily,, Disp: , Rfl:   •  prazosin (MINIPRESS) 5 MG capsule, Two po q hs, Disp: 60 capsule, Rfl: 6  •  QUEtiapine (SEROquel) 100 " MG tablet, Take 1 tablet by mouth Every Night., Disp: 30 tablet, Rfl: 6  •  amphetamine-dextroamphetamine (Adderall) 30 MG tablet, Take 30 mg orally every morning and afternoon, Disp: 60 tablet, Rfl: 0    Lab Results:   Office Visit on 03/24/2022   Component Date Value Ref Range Status   • Hemoglobin A1C 03/24/2022 5.4  4.8 - 5.6 % Final    Comment:          Prediabetes: 5.7 - 6.4           Diabetes: >6.4           Glycemic control for adults with diabetes: <7.0     • Glucose 03/24/2022 99  65 - 99 mg/dL Final   • BUN 03/24/2022 21  6 - 24 mg/dL Final   • Creatinine 03/24/2022 1.09 (A) 0.57 - 1.00 mg/dL Final   • EGFR Result 03/24/2022 65  >59 mL/min/1.73 Final   • BUN/Creatinine Ratio 03/24/2022 19  9 - 23 Final   • Sodium 03/24/2022 138  134 - 144 mmol/L Final   • Potassium 03/24/2022 4.3  3.5 - 5.2 mmol/L Final   • Chloride 03/24/2022 101  96 - 106 mmol/L Final   • Total CO2 03/24/2022 22  20 - 29 mmol/L Final   • Calcium 03/24/2022 8.9  8.7 - 10.2 mg/dL Final   • Total Protein 03/24/2022 7.0  6.0 - 8.5 g/dL Final   • Albumin 03/24/2022 4.1  3.8 - 4.8 g/dL Final   • Globulin 03/24/2022 2.9  1.5 - 4.5 g/dL Final   • A/G Ratio 03/24/2022 1.4  1.2 - 2.2 Final   • Total Bilirubin 03/24/2022 0.3  0.0 - 1.2 mg/dL Final   • Alkaline Phosphatase 03/24/2022 71  44 - 121 IU/L Final   • AST (SGOT) 03/24/2022 19  0 - 40 IU/L Final   • ALT (SGPT) 03/24/2022 34 (A) 0 - 32 IU/L Final       Mental Status Exam:   Hygiene:   good  Cooperation:  Cooperative  Eye Contact:  Good  Psychomotor Behavior:  Appropriate  Mood:within normal limits  Affect:  Appropriate  Hopelessness: Denies  Speech:  Normal  Thought Process:  Goal directed  Thought Content:  Normal  Suicidal:  None  Homicidal:  None  Hallucinations:  None  Delusion:  None  Memory:  Intact  Orientation:  Person, Place, Time and Situation  Reliability:  good  Insight:  Good  Judgement:  Good  Impulse Control:  Good  Physical/Medical Issues:  No     PHQ-9 Depression  Screening  Little interest or pleasure in doing things? 1-->several days   Feeling down, depressed, or hopeless? 2-->more than half the days   Trouble falling or staying asleep, or sleeping too much? 2-->more than half the days (All)   Feeling tired or having little energy? 2-->more than half the days   Poor appetite or overeating? 3-->nearly every day (Overeating)   Feeling bad about yourself - or that you are a failure or have let yourself or your family down? 2-->more than half the days   Trouble concentrating on things, such as reading the newspaper or watching television? 3-->nearly every day   Moving or speaking so slowly that other people could have noticed? Or the opposite - being so fidgety or restless that you have been moving around a lot more than usual? 1-->several days (Fidgety)   Thoughts that you would be better off dead, or of hurting yourself in some way? 0-->not at all   PHQ-9 Total Score 16   If you checked off any problems, how difficult have these problems made it for you to do your work, take care of things at home, or get along with other people? very difficult        Assessment/Plan:  Diagnoses and all orders for this visit:    1. Moderate episode of recurrent major depressive disorder (HCC) (Primary)    2. Nightmares    3. Generalized anxiety disorder  -     gabapentin (NEURONTIN) 800 MG tablet; Take 1 tablet by mouth 3 (Three) Times a Day.  Dispense: 90 tablet; Refill: 2    4. Primary insomnia    5. Attention deficit hyperactivity disorder (ADHD), combined type  -     amphetamine-dextroamphetamine (Adderall) 30 MG tablet; Take 30 mg orally every morning and afternoon  Dispense: 60 tablet; Refill: 0      Increase adderall to 30 mg po bid.    A psychological evaluation was conducted in order to assess past and current level of functioning. Areas assessed included, but were not limited to: perception of social support, perception of ability to face and deal with challenges in life (positive  functioning), anxiety symptoms, depressive symptoms, perspective on beliefs/belief system, coping skills for stress, intelligence level,  Therapeutic rapport was established. Interventions conducted today were geared towards incorporating medication management along with support for continued therapy. Education was also provided as to the med management with this provider and what to expect in subsequent sessions.    We discussed risks, benefits,goals and side effects of the above medication and the patient was agreeable with the plan.Patient was educated on the importance of compliance with treatment and follow-up appointments. Patient is aware to contact the [facility] with any worsening of symptoms. To call for questions or concerns and return early if necessary. Patent is agreeable to go to the Emergency Department or call 911 should they begin SI/HI.     Treatment Plan:   Discussed risks, benefits, and alternatives of medication. Encouraged healthy habits (eating, exercise and sleep). Call if any questions or problems arise. Medication reconciled. Controlled substance monitoring report reviewed. Provided psychoeducation.. Discussed coping strategies and current stressors. Set appropriate boundaries and limits for patient's well-being. Use distraction techniques to improve symptoms. Access support networks.      Return in about 2 months (around 6/7/2022) for Follow Up 30 min.    Silvina Taylor, STEVEN

## 2022-05-16 ENCOUNTER — TELEPHONE (OUTPATIENT)
Dept: INTERNAL MEDICINE | Facility: CLINIC | Age: 42
End: 2022-05-16

## 2022-05-16 DIAGNOSIS — F90.2 ATTENTION DEFICIT HYPERACTIVITY DISORDER (ADHD), COMBINED TYPE: ICD-10-CM

## 2022-05-16 RX ORDER — DEXTROAMPHETAMINE SACCHARATE, AMPHETAMINE ASPARTATE, DEXTROAMPHETAMINE SULFATE AND AMPHETAMINE SULFATE 7.5; 7.5; 7.5; 7.5 MG/1; MG/1; MG/1; MG/1
TABLET ORAL
Qty: 60 TABLET | Refills: 0 | Status: SHIPPED | OUTPATIENT
Start: 2022-05-16 | End: 2022-06-14 | Stop reason: SDUPTHER

## 2022-05-16 NOTE — TELEPHONE ENCOUNTER
Rx Refill Note  Requested Prescriptions     Pending Prescriptions Disp Refills   • amphetamine-dextroamphetamine (Adderall) 30 MG tablet 60 tablet 0     Sig: Take 30 mg orally every morning and afternoon      Last office visit with prescribing clinician: 4/7/2022      Next office visit with prescribing clinician: 06/14/2022            Gabby Rhodes LPN  05/16/22, 15:28 EDT

## 2022-05-16 NOTE — TELEPHONE ENCOUNTER
Incoming Refill Request      Medication requested (name and dose): adderall 30mg    [facility] where [facility]    Additional details provided by patient: n/a    Best call back number: [phone]    Does the patient have less than a 3 day supply:  [x] Yes  [] No    Chrystal Perez  05/16/22, 08:18 EDT

## 2022-06-14 ENCOUNTER — OFFICE VISIT (OUTPATIENT)
Dept: BEHAVIORAL HEALTH | Facility: CLINIC | Age: 42
End: 2022-06-14

## 2022-06-14 VITALS
HEIGHT: 65 IN | SYSTOLIC BLOOD PRESSURE: 124 MMHG | DIASTOLIC BLOOD PRESSURE: 78 MMHG | WEIGHT: 174 LBS | BODY MASS INDEX: 28.99 KG/M2

## 2022-06-14 DIAGNOSIS — F33.1 MAJOR DEPRESSIVE DISORDER, RECURRENT EPISODE, MODERATE: ICD-10-CM

## 2022-06-14 DIAGNOSIS — F90.2 ATTENTION DEFICIT HYPERACTIVITY DISORDER (ADHD), COMBINED TYPE: Primary | ICD-10-CM

## 2022-06-14 DIAGNOSIS — R56.9 SEIZURES: ICD-10-CM

## 2022-06-14 DIAGNOSIS — F41.1 GENERALIZED ANXIETY DISORDER: ICD-10-CM

## 2022-06-14 PROCEDURE — 99214 OFFICE O/P EST MOD 30 MIN: CPT

## 2022-06-14 RX ORDER — AMOXICILLIN 500 MG/1
CAPSULE ORAL
Status: ON HOLD | COMMUNITY
Start: 2022-06-13 | End: 2022-07-13

## 2022-06-14 RX ORDER — IBUPROFEN 800 MG/1
800 TABLET ORAL EVERY 8 HOURS PRN
COMMUNITY
Start: 2022-06-13 | End: 2023-01-06

## 2022-06-14 RX ORDER — DEXTROAMPHETAMINE SACCHARATE, AMPHETAMINE ASPARTATE, DEXTROAMPHETAMINE SULFATE AND AMPHETAMINE SULFATE 7.5; 7.5; 7.5; 7.5 MG/1; MG/1; MG/1; MG/1
TABLET ORAL
Qty: 60 TABLET | Refills: 0 | Status: SHIPPED | OUTPATIENT
Start: 2022-06-14 | End: 2022-07-29 | Stop reason: SDUPTHER

## 2022-06-14 RX ORDER — GABAPENTIN 800 MG/1
800 TABLET ORAL 3 TIMES DAILY
Qty: 90 TABLET | Refills: 2 | Status: SHIPPED | OUTPATIENT
Start: 2022-06-14 | End: 2022-10-24 | Stop reason: SDUPTHER

## 2022-06-14 NOTE — PROGRESS NOTES
Follow Up Office Visit    Patient Name: Audrey Alonso  : 1980   MRN: [id number]   Care Team: Patient Care Team:  David Fleming DO as PCP - General (Internal Medicine)        Chief Complaint:    Chief Complaint   Patient presents with   • ADHD   • Depression   • Med Management       History of Present Illness: Audrey Alonso is a 42 y.o. female who is here today for a medication management follow up. Patient reports that things are going pretty good. She states increasing her Adderall has been a positive change. All other medications are working well with no complaints.     Subjective   Review of Systems:    Review of Systems   All other systems reviewed and are negative.      Current Medications:   Current Outpatient Medications   Medication Sig Dispense Refill   • amoxicillin (AMOXIL) 500 MG capsule      • amphetamine-dextroamphetamine (Adderall) 30 MG tablet Take 30 mg orally every morning and afternoon 60 tablet 0   • buPROPion XL (Wellbutrin XL) 300 MG 24 hr tablet Take 1 tablet by mouth Daily. WITH 150 mg 30 tablet 6   • busPIRone (BUSPAR) 15 MG tablet Take 2 tablets by mouth 2 (Two) Times a Day. 120 tablet 6   • diclofenac (VOLTAREN) 75 MG EC tablet      • doxepin (SINEquan) 50 MG capsule Take 1 capsule by mouth Every Night. 30 capsule 6   • DULoxetine (Cymbalta) 60 MG capsule Two po q am 60 capsule 6   • gabapentin (NEURONTIN) 800 MG tablet Take 1 tablet by mouth 3 (Three) Times a Day. 90 tablet 2   • ibuprofen (ADVIL,MOTRIN) 800 MG tablet      • methadone (DOLOPHINE) 10 MG tablet Take 60 mg by mouth Daily,     • prazosin (MINIPRESS) 5 MG capsule Two po q hs 60 capsule 6   • QUEtiapine (SEROquel) 100 MG tablet Take 1 tablet by mouth Every Night. 30 tablet 6     No current facility-administered medications for this visit.       Mental Status Exam:   Hygiene:   good  Cooperation:  Cooperative  Eye Contact:  Good  Psychomotor Behavior:  Appropriate  Affect:  Appropriate  Mood: normal  Speech:   Normal  Thought Process:  Goal directed and Linear  Thought Content:  Normal  Suicidal:  None  Homicidal:  None  Hallucinations:  None  Delusion:  None  Memory:  Intact  Orientation:  Person, Place, Time and Situation  Reliability:  good  Insight:  Good  Judgement:  Good  Impulse Control:  Good  Physical/Medical Issues:  No        PHQ-2/PHQ-9: Depression Screening  Little Interest or Pleasure in Doing Things: 1-->several days  Feeling Down, Depressed or Hopeless: 2-->more than half the days  PHQ-2 Total Score: 3  Trouble Falling or Staying Asleep, or Sleeping Too Much: 2-->more than half the days  Feeling Tired or Having Little Energy: 1-->several days  Poor Appetite or Overeating: 3-->nearly every day  Feeling Bad about Yourself - or that You are a Failure or Have Let Yourself or Your Family Down: 1-->several days  Trouble Concentrating on Things, Such as Reading the Newspaper or Watching Television: 2-->more than half the days  Moving or Speaking So Slowly that Other People Could Have Noticed? Or the Opposite - Being So Fidgety: 1-->several days  Thoughts that You Would be Better Off Dead or of Hurting Yourself in Some Way: 0-->not at all  PHQ-9: Brief Depression Severity Measure Score: 13  If You Checked Off Any Problems, How Difficult Have These Problems Made It For You to Do Your Work, Take Care of Things at Home, or Get Along with Other People?: somewhat difficult  PHQ-9 Score:   PHQ-9 Total Score: 13    Depression Screening:  Patient screened positive for depression based on a PHQ-9 score of 13 on 6/14/2022. Follow-up recommendations include: Suicide Risk Assessment performed. Medications as ordered      TAMANNA-7  Over the last two weeks, how often have you been bothered by the following problems?  Feeling nervous, anxious or on edge: Nearly every day  Not being able to stop or control worrying: More than half the days  Worrying too much about different things: More than half the days  Trouble Relaxing: Nearly  every day  Being so restless that it is hard to sit still: More than half the days  Becoming easily annoyed or irritable: Several days  Feeling afraid as if something awful might happen: Several days  TAMANNA 7 Total Score: 14  If you checked any problems, how difficult have these problems made it for you to do your work, take care of things at home, or get along with other people: Very difficult        Screening for Adults With ADHD - (1-6)  1. How often do you have trouble wrapping up the final details of a project, once the challenging parts have been done?: Often  2. How often do you have difficulty getting things in order when you have to do a task that requires organization?: Often  3. How often do you have problems remembering appointments or obligations : Sometimes  4. When you have a task that requires a lot of thought, how often do you avoid or delay getting started ?: Very Often  5. How often do you fidget or squirm with your hands or feet when you have to sit down for a long time?: Sometimes  6. How often do you feel overly active and compelled to do things, like you were driven by a motor?: Rarely  7. How often do you make careless mistakes when you have to work on a boring or difficult project?: Sometimes  8. How often do have difficulty keeping your attention when you are doing boring or repetitive work?: Sometimes  9. How often do you have difficulty concentrating on what people say to you, even when they are speaking to you: Sometimes  10.How often do you misplace or have difficulty finding things at home or at work?: Often  11.How often are you distracted by activity or noise around you?: Sometimes  12.How often do you leave your seat in meetings or other situations in which you are expected to remain seated?: Rarely  13.How often do you feel restless or fidgety?: Often  14.How often do you have difficulty unwinding and relaxing when you have time to yourself?: Often  15.How often do you find yourself  "talking too much when you are in social situations?: Rarely  16.When you’re in a conversation, how often do you find yourself finishing the sentences of the people you are talking to, before they can finish them themselves?: Rarely  17.How often do you have difficulty waiting your turn in situations when turn taking is required?: Rarely  18.How often do you interrupt others when they are busy?: Sometimes        Objective   Vital Signs:   /78   Ht 165.1 cm (65\")   Wt 78.9 kg (174 lb)   BMI 28.96 kg/m²       Assessment / Plan    Diagnoses and all orders for this visit:    1. Attention deficit hyperactivity disorder (ADHD), combined type (Primary)  -     Urine Drug Screen - Urine, Clean Catch  -     amphetamine-dextroamphetamine (Adderall) 30 MG tablet; Take 30 mg orally every morning and afternoon  Dispense: 60 tablet; Refill: 0    2. Major depressive disorder, recurrent episode, moderate (HCC)    3. Generalized anxiety disorder  -     gabapentin (NEURONTIN) 800 MG tablet; Take 1 tablet by mouth 3 (Three) Times a Day.  Dispense: 90 tablet; Refill: 2    4. Seizures (HCC)       Continue medications as ordered. Urine drugs screen obtained. New controlled substance use agreement signed.             MEDS ORDERED DURING VISIT:  New Medications Ordered This Visit   Medications   • amphetamine-dextroamphetamine (Adderall) 30 MG tablet     Sig: Take 30 mg orally every morning and afternoon     Dispense:  60 tablet     Refill:  0     Take 30 mg orally every morning and afternoon.   • gabapentin (NEURONTIN) 800 MG tablet     Sig: Take 1 tablet by mouth 3 (Three) Times a Day.     Dispense:  90 tablet     Refill:  2         Follow Up   Return in about 3 months (around 9/14/2022).  Patient was given instructions and counseling regarding her condition or for health maintenance advice. Please see specific information pulled into the AVS if appropriate.     TREATMENT PLAN/GOALS: Continue supportive psychotherapy efforts and " medications as indicated. Treatment and medication options discussed during today's visit. Patient acknowledged and verbally consented to continue with current treatment plan and was educated on the importance of compliance with treatment and follow-up appointments.    MEDICATION ISSUES:  Discussed medication options and treatment plan of prescribed medication as well as the risks, benefits, and side effects including potential falls, possible impaired driving and metabolic adversities among others. Patient is agreeable to call the office with any worsening of symptoms or onset of side effects. Patient is agreeable to call 911 or go to the nearest ER should he/she begin having SI/HI.    STEVEN [facility]  [address] [address] [phone]    June 14, 2022 15:57 EDT

## 2022-06-17 LAB
AMPHETAMINES UR QL SCN: POSITIVE NG/ML
BARBITURATES UR QL SCN: NEGATIVE NG/ML
BENZODIAZ UR QL SCN: NEGATIVE NG/ML
BZE UR QL SCN: NEGATIVE NG/ML
CANNABINOIDS UR QL SCN: NEGATIVE NG/ML
CREAT UR-MCNC: 16.8 MG/DL (ref 20–300)
LABORATORY COMMENT REPORT: ABNORMAL
METHADONE UR QL SCN: POSITIVE NG/ML
OPIATES UR QL SCN: NEGATIVE NG/ML
OXYCODONE+OXYMORPHONE UR QL SCN: NEGATIVE NG/ML
PCP UR QL: NEGATIVE NG/ML
PH UR: 7.7 [PH] (ref 4.5–8.9)
PROPOXYPH UR QL SCN: NEGATIVE NG/ML
SP GR UR: 1

## 2022-07-08 ENCOUNTER — APPOINTMENT (OUTPATIENT)
Dept: GENERAL RADIOLOGY | Facility: HOSPITAL | Age: 42
End: 2022-07-08

## 2022-07-08 ENCOUNTER — HOSPITAL ENCOUNTER (EMERGENCY)
Facility: HOSPITAL | Age: 42
Discharge: HOME | End: 2022-07-09
Attending: EMERGENCY MEDICINE | Admitting: EMERGENCY MEDICINE

## 2022-07-08 DIAGNOSIS — M25.559 HIP PAIN: Primary | ICD-10-CM

## 2022-07-08 LAB
ALBUMIN SERPL-MCNC: 3.2 G/DL (ref 3.5–5.2)
ALBUMIN/GLOB SERPL: 1 G/DL
ALP SERPL-CCNC: 154 U/L (ref 39–117)
ALT SERPL W P-5'-P-CCNC: 24 U/L (ref 1–33)
ANION GAP SERPL CALCULATED.3IONS-SCNC: 12.9 MMOL/L (ref 5–15)
AST SERPL-CCNC: 21 U/L (ref 1–32)
BASOPHILS # BLD AUTO: 0.02 10*3/MM3 (ref 0–0.2)
BASOPHILS NFR BLD AUTO: 0.4 % (ref 0–1.5)
BILIRUB SERPL-MCNC: 0.6 MG/DL (ref 0–1.2)
BUN SERPL-MCNC: 10 MG/DL (ref 6–20)
BUN/CREAT SERPL: 12.2 (ref 7–25)
CALCIUM SPEC-SCNC: 8.3 MG/DL (ref 8.6–10.5)
CHLORIDE SERPL-SCNC: 102 MMOL/L (ref 98–107)
CO2 SERPL-SCNC: 22.1 MMOL/L (ref 22–29)
CREAT SERPL-MCNC: 0.82 MG/DL (ref 0.57–1)
CRP SERPL-MCNC: 19.76 MG/DL (ref 0–0.5)
DEPRECATED RDW RBC AUTO: 46.2 FL (ref 37–54)
EGFRCR SERPLBLD CKD-EPI 2021: 91.7 ML/MIN/1.73
EOSINOPHIL # BLD AUTO: 0.04 10*3/MM3 (ref 0–0.4)
EOSINOPHIL NFR BLD AUTO: 0.8 % (ref 0.3–6.2)
ERYTHROCYTE [DISTWIDTH] IN BLOOD BY AUTOMATED COUNT: 14.1 % (ref 12.3–15.4)
GLOBULIN UR ELPH-MCNC: 3.2 GM/DL
GLUCOSE SERPL-MCNC: 94 MG/DL (ref 65–99)
HCT VFR BLD AUTO: 30.2 % (ref 34–46.6)
HGB BLD-MCNC: 10.3 G/DL (ref 12–15.9)
IMM GRANULOCYTES # BLD AUTO: 0.08 10*3/MM3 (ref 0–0.05)
IMM GRANULOCYTES NFR BLD AUTO: 1.6 % (ref 0–0.5)
LYMPHOCYTES # BLD AUTO: 0.54 10*3/MM3 (ref 0.7–3.1)
LYMPHOCYTES NFR BLD AUTO: 10.8 % (ref 19.6–45.3)
MCH RBC QN AUTO: 30.6 PG (ref 26.6–33)
MCHC RBC AUTO-ENTMCNC: 34.1 G/DL (ref 31.5–35.7)
MCV RBC AUTO: 89.6 FL (ref 79–97)
MONOCYTES # BLD AUTO: 0.11 10*3/MM3 (ref 0.1–0.9)
MONOCYTES NFR BLD AUTO: 2.2 % (ref 5–12)
NEUTROPHILS NFR BLD AUTO: 4.22 10*3/MM3 (ref 1.7–7)
NEUTROPHILS NFR BLD AUTO: 84.2 % (ref 42.7–76)
NRBC BLD AUTO-RTO: 0 /100 WBC (ref 0–0.2)
PLATELET # BLD AUTO: 183 10*3/MM3 (ref 140–450)
PMV BLD AUTO: 9.5 FL (ref 6–12)
POTASSIUM SERPL-SCNC: 4 MMOL/L (ref 3.5–5.2)
PROCALCITONIN SERPL-MCNC: 0.54 NG/ML (ref 0–0.25)
PROT SERPL-MCNC: 6.4 G/DL (ref 6–8.5)
RBC # BLD AUTO: 3.37 10*6/MM3 (ref 3.77–5.28)
SODIUM SERPL-SCNC: 137 MMOL/L (ref 136–145)
WBC NRBC COR # BLD: 5.01 10*3/MM3 (ref 3.4–10.8)

## 2022-07-08 PROCEDURE — 85025 COMPLETE CBC W/AUTO DIFF WBC: CPT | Performed by: EMERGENCY MEDICINE

## 2022-07-08 PROCEDURE — 0202U NFCT DS 22 TRGT SARS-COV-2: CPT | Performed by: EMERGENCY MEDICINE

## 2022-07-08 PROCEDURE — 96374 THER/PROPH/DIAG INJ IV PUSH: CPT

## 2022-07-08 PROCEDURE — 99284 EMERGENCY DEPT VISIT MOD MDM: CPT

## 2022-07-08 PROCEDURE — 25010000002 KETOROLAC TROMETHAMINE PER 15 MG: Performed by: EMERGENCY MEDICINE

## 2022-07-08 PROCEDURE — 96375 TX/PRO/DX INJ NEW DRUG ADDON: CPT

## 2022-07-08 PROCEDURE — 73502 X-RAY EXAM HIP UNI 2-3 VIEWS: CPT

## 2022-07-08 PROCEDURE — 80053 COMPREHEN METABOLIC PANEL: CPT | Performed by: EMERGENCY MEDICINE

## 2022-07-08 PROCEDURE — 86140 C-REACTIVE PROTEIN: CPT | Performed by: EMERGENCY MEDICINE

## 2022-07-08 PROCEDURE — 84145 PROCALCITONIN (PCT): CPT | Performed by: EMERGENCY MEDICINE

## 2022-07-08 PROCEDURE — 25010000002 MORPHINE PER 10 MG: Performed by: EMERGENCY MEDICINE

## 2022-07-08 RX ORDER — MORPHINE SULFATE 4 MG/ML
4 INJECTION, SOLUTION INTRAMUSCULAR; INTRAVENOUS ONCE
Status: COMPLETED | OUTPATIENT
Start: 2022-07-08 | End: 2022-07-08

## 2022-07-08 RX ORDER — SODIUM CHLORIDE 0.9 % (FLUSH) 0.9 %
10 SYRINGE (ML) INJECTION AS NEEDED
Status: DISCONTINUED | OUTPATIENT
Start: 2022-07-08 | End: 2022-07-09 | Stop reason: HOSPADM

## 2022-07-08 RX ORDER — KETOROLAC TROMETHAMINE 30 MG/ML
10 INJECTION, SOLUTION INTRAMUSCULAR; INTRAVENOUS ONCE
Status: COMPLETED | OUTPATIENT
Start: 2022-07-08 | End: 2022-07-08

## 2022-07-08 RX ADMIN — MORPHINE SULFATE 4 MG: 4 INJECTION INTRAVENOUS at 21:55

## 2022-07-08 RX ADMIN — KETOROLAC TROMETHAMINE 10 MG: 30 INJECTION, SOLUTION INTRAMUSCULAR at 21:51

## 2022-07-09 ENCOUNTER — APPOINTMENT (OUTPATIENT)
Dept: GENERAL RADIOLOGY | Facility: HOSPITAL | Age: 42
End: 2022-07-09

## 2022-07-09 ENCOUNTER — APPOINTMENT (OUTPATIENT)
Dept: CT IMAGING | Facility: HOSPITAL | Age: 42
End: 2022-07-09

## 2022-07-09 VITALS
RESPIRATION RATE: 17 BRPM | HEART RATE: 89 BPM | BODY MASS INDEX: 28.32 KG/M2 | DIASTOLIC BLOOD PRESSURE: 89 MMHG | TEMPERATURE: 99.2 F | SYSTOLIC BLOOD PRESSURE: 125 MMHG | HEIGHT: 65 IN | OXYGEN SATURATION: 98 % | WEIGHT: 170 LBS

## 2022-07-09 LAB
AMPHET+METHAMPHET UR QL: NEGATIVE
AMPHETAMINES UR QL: NEGATIVE
B PARAPERT DNA SPEC QL NAA+PROBE: NOT DETECTED
B PERT DNA SPEC QL NAA+PROBE: NOT DETECTED
BACTERIA BLD CULT: ABNORMAL
BACTERIA UR QL AUTO: ABNORMAL /HPF
BARBITURATES UR QL SCN: NEGATIVE
BENZODIAZ UR QL SCN: NEGATIVE
BILIRUB UR QL STRIP: NEGATIVE
BOTTLE TYPE: ABNORMAL
BUPRENORPHINE SERPL-MCNC: NEGATIVE NG/ML
C PNEUM DNA NPH QL NAA+NON-PROBE: NOT DETECTED
CANNABINOIDS SERPL QL: NEGATIVE
CLARITY UR: CLEAR
COCAINE UR QL: NEGATIVE
COLOR UR: YELLOW
D-LACTATE SERPL-SCNC: 1.2 MMOL/L (ref 0.5–2)
FLUAV SUBTYP SPEC NAA+PROBE: NOT DETECTED
FLUBV RNA ISLT QL NAA+PROBE: NOT DETECTED
GLUCOSE UR STRIP-MCNC: NEGATIVE MG/DL
HADV DNA SPEC NAA+PROBE: NOT DETECTED
HCOV 229E RNA SPEC QL NAA+PROBE: NOT DETECTED
HCOV HKU1 RNA SPEC QL NAA+PROBE: NOT DETECTED
HCOV NL63 RNA SPEC QL NAA+PROBE: NOT DETECTED
HCOV OC43 RNA SPEC QL NAA+PROBE: NOT DETECTED
HGB UR QL STRIP.AUTO: ABNORMAL
HMPV RNA NPH QL NAA+NON-PROBE: NOT DETECTED
HPIV1 RNA ISLT QL NAA+PROBE: NOT DETECTED
HPIV2 RNA SPEC QL NAA+PROBE: NOT DETECTED
HPIV3 RNA NPH QL NAA+PROBE: NOT DETECTED
HPIV4 P GENE NPH QL NAA+PROBE: NOT DETECTED
HYALINE CASTS UR QL AUTO: ABNORMAL /LPF
KETONES UR QL STRIP: NEGATIVE
LEUKOCYTE ESTERASE UR QL STRIP.AUTO: NEGATIVE
M PNEUMO IGG SER IA-ACNC: NOT DETECTED
METHADONE UR QL SCN: POSITIVE
NITRITE UR QL STRIP: NEGATIVE
OPIATES UR QL: POSITIVE
OXYCODONE UR QL SCN: NEGATIVE
PCP UR QL SCN: NEGATIVE
PH UR STRIP.AUTO: 7 [PH] (ref 5–8)
PROPOXYPH UR QL: NEGATIVE
PROT UR QL STRIP: NEGATIVE
RBC # UR STRIP: ABNORMAL /HPF
REF LAB TEST METHOD: ABNORMAL
RHINOVIRUS RNA SPEC NAA+PROBE: NOT DETECTED
RSV RNA NPH QL NAA+NON-PROBE: NOT DETECTED
SARS-COV-2 RNA NPH QL NAA+NON-PROBE: NOT DETECTED
SP GR UR STRIP: 1.01 (ref 1–1.03)
SQUAMOUS #/AREA URNS HPF: ABNORMAL /HPF
TRICYCLICS UR QL SCN: POSITIVE
UROBILINOGEN UR QL STRIP: ABNORMAL
WBC # UR STRIP: ABNORMAL /HPF

## 2022-07-09 PROCEDURE — 74176 CT ABD & PELVIS W/O CONTRAST: CPT

## 2022-07-09 PROCEDURE — 71045 X-RAY EXAM CHEST 1 VIEW: CPT

## 2022-07-09 PROCEDURE — 87040 BLOOD CULTURE FOR BACTERIA: CPT | Performed by: EMERGENCY MEDICINE

## 2022-07-09 PROCEDURE — 87186 SC STD MICRODIL/AGAR DIL: CPT | Performed by: EMERGENCY MEDICINE

## 2022-07-09 PROCEDURE — 96376 TX/PRO/DX INJ SAME DRUG ADON: CPT

## 2022-07-09 PROCEDURE — 80306 DRUG TEST PRSMV INSTRMNT: CPT | Performed by: EMERGENCY MEDICINE

## 2022-07-09 PROCEDURE — 25010000002 METHYLPREDNISOLONE PER 125 MG: Performed by: EMERGENCY MEDICINE

## 2022-07-09 PROCEDURE — 87150 DNA/RNA AMPLIFIED PROBE: CPT | Performed by: EMERGENCY MEDICINE

## 2022-07-09 PROCEDURE — 87077 CULTURE AEROBIC IDENTIFY: CPT | Performed by: EMERGENCY MEDICINE

## 2022-07-09 PROCEDURE — 87076 CULTURE ANAEROBE IDENT EACH: CPT | Performed by: EMERGENCY MEDICINE

## 2022-07-09 PROCEDURE — 83605 ASSAY OF LACTIC ACID: CPT | Performed by: EMERGENCY MEDICINE

## 2022-07-09 PROCEDURE — 25010000002 HYDROMORPHONE 1 MG/ML SOLUTION: Performed by: EMERGENCY MEDICINE

## 2022-07-09 PROCEDURE — 96375 TX/PRO/DX INJ NEW DRUG ADDON: CPT

## 2022-07-09 PROCEDURE — 81001 URINALYSIS AUTO W/SCOPE: CPT | Performed by: EMERGENCY MEDICINE

## 2022-07-09 RX ORDER — LIDOCAINE 50 MG/G
1 PATCH TOPICAL EVERY 24 HOURS
Qty: 6 EACH | Refills: 0 | Status: SHIPPED | OUTPATIENT
Start: 2022-07-09 | End: 2023-01-06

## 2022-07-09 RX ORDER — MELOXICAM 7.5 MG/1
7.5 TABLET ORAL DAILY
Qty: 14 TABLET | Refills: 0 | Status: SHIPPED | OUTPATIENT
Start: 2022-07-09 | End: 2023-01-06

## 2022-07-09 RX ORDER — METHYLPREDNISOLONE SODIUM SUCCINATE 125 MG/2ML
125 INJECTION, POWDER, LYOPHILIZED, FOR SOLUTION INTRAMUSCULAR; INTRAVENOUS ONCE
Status: COMPLETED | OUTPATIENT
Start: 2022-07-09 | End: 2022-07-09

## 2022-07-09 RX ORDER — ACETAMINOPHEN 325 MG/1
975 TABLET ORAL ONCE
Status: COMPLETED | OUTPATIENT
Start: 2022-07-09 | End: 2022-07-09

## 2022-07-09 RX ORDER — OXYCODONE HYDROCHLORIDE AND ACETAMINOPHEN 5; 325 MG/1; MG/1
1 TABLET ORAL EVERY 4 HOURS PRN
Qty: 8 TABLET | Refills: 0 | Status: ON HOLD | OUTPATIENT
Start: 2022-07-09 | End: 2022-07-13

## 2022-07-09 RX ADMIN — HYDROMORPHONE HYDROCHLORIDE 1 MG: 1 INJECTION, SOLUTION INTRAMUSCULAR; INTRAVENOUS; SUBCUTANEOUS at 00:25

## 2022-07-09 RX ADMIN — HYDROMORPHONE HYDROCHLORIDE 1 MG: 1 INJECTION, SOLUTION INTRAMUSCULAR; INTRAVENOUS; SUBCUTANEOUS at 05:02

## 2022-07-09 RX ADMIN — SODIUM CHLORIDE 1000 ML: 9 INJECTION, SOLUTION INTRAVENOUS at 00:24

## 2022-07-09 RX ADMIN — ACETAMINOPHEN 975 MG: 325 TABLET ORAL at 01:58

## 2022-07-09 RX ADMIN — METHYLPREDNISOLONE SODIUM SUCCINATE 125 MG: 125 INJECTION, POWDER, FOR SOLUTION INTRAMUSCULAR; INTRAVENOUS at 02:06

## 2022-07-09 NOTE — DISCHARGE INSTRUCTIONS
I want you to have your blood work rechecked in a few days time.  This can be done at your primary care physician's office, however if they are unable to do so please return to the ER for further evaluation.    If you develop a high fever greater than 100.4 please return to the emergency department for repeat evaluation.    I had spoken with our orthopedist and they are willing to see you in their office in the near future, however you can also see your established orthopedist if you so desire.

## 2022-07-09 NOTE — ED PROVIDER NOTES
TRIAGE CHIEF COMPLAINT:     Nursing and triage notes reviewed    Chief Complaint   Patient presents with   • Pain      HPI: Audrey Alonso is a 42 y.o. female who presents to the emergency department complaining of right hip pain.  Patient has a history of chronic right-sided hip discomfort and states she sees an orthopedist who performs an intra joint injection every 3 months.  She states she is neck scheduled approximately a week from now.  Patient states for the past several days has had a worsening pain in her right hip.  She states she can feel bone scraping on bone.  Pain is worse with movement.  She has tried all of her home medications but states it is not helping.  She denies to me having any fevers or chills.  She denies any history of recent trauma.  Denies numbness or tingling of the extremity.    REVIEW OF SYSTEMS: All other systems reviewed and are negative     PAST MEDICAL HISTORY:   Past Medical History:   Diagnosis Date   • Anxiety    • Asthma    • Depression    • Disease of thyroid gland    • GERD (gastroesophageal reflux disease)    • Headache    • Hypertension    • Seizures (HCC)    • Substance abuse (HCC)    • Trauma         FAMILY HISTORY:   Family History   Problem Relation Age of Onset   • Arthritis Mother    • Cancer Mother         breast   • Thyroid disease Mother    • Breast cancer Mother    • Diabetes Father    • Hypertension Father    • Mental illness Brother    • Breast cancer Maternal Aunt         SOCIAL HISTORY:   Social History     Socioeconomic History   • Marital status:    Tobacco Use   • Smoking status: Current Every Day Smoker     Packs/day: 0.25     Years: 20.00     Pack years: 5.00     Types: Cigarettes   • Smokeless tobacco: Never Used   • Tobacco comment: vapes also   Vaping Use   • Vaping Use: Every day   • Substances: Nicotine, Flavoring   • Devices: Pre-filled or refillable cartridge   Substance and Sexual Activity   • Alcohol use: No     Comment: stopped 2008    • Drug use: Yes     Types: IV     Comment: STATES SHE TOOK HER METHADONE AND SOME BENZOS 9/17/20   • Sexual activity: Not Currently     Partners: Male     Birth control/protection: Abstinence        SURGICAL HISTORY:   Past Surgical History:   Procedure Laterality Date   • DILATATION AND CURETTAGE     • INCISION AND DRAINAGE ABSCESS  2019    arm        CURRENT MEDICATIONS:      Medication List      ASK your doctor about these medications    amoxicillin 500 MG capsule  Commonly known as: AMOXIL     amphetamine-dextroamphetamine 30 MG tablet  Commonly known as: Adderall  Take 30 mg orally every morning and afternoon     buPROPion  MG 24 hr tablet  Commonly known as: Wellbutrin XL  Take 1 tablet by mouth Daily. WITH 150 mg     busPIRone 15 MG tablet  Commonly known as: BUSPAR  Take 2 tablets by mouth 2 (Two) Times a Day.     diclofenac 75 MG EC tablet  Commonly known as: VOLTAREN     doxepin 50 MG capsule  Commonly known as: SINEquan  Take 1 capsule by mouth Every Night.     DULoxetine 60 MG capsule  Commonly known as: Cymbalta  Two po q am     gabapentin 800 MG tablet  Commonly known as: NEURONTIN  Take 1 tablet by mouth 3 (Three) Times a Day.     ibuprofen 800 MG tablet  Commonly known as: ADVIL,MOTRIN     methadone 10 MG tablet  Commonly known as: DOLOPHINE     prazosin 5 MG capsule  Commonly known as: MINIPRESS  Two po q hs     QUEtiapine 100 MG tablet  Commonly known as: SEROquel  Take 1 tablet by mouth Every Night.             ALLERGIES: Diphenhydramine hcl     PHYSICAL EXAM:   VITAL SIGNS:   Vitals:    07/08/22 2103   BP: (!) 162/104   Pulse: (!) 141   Resp: 24   Temp: 100 °F (37.8 °C)   SpO2: 93%      CONSTITUTIONAL: Awake, oriented, appears nontoxic but uncomfortable  HENT: Atraumatic, normocephalic, oral mucosa pink and moist, airway patent. Nares patent without drainage. External ears normal.   EYES: Conjunctivae clear   NECK: Trachea midline, nontender, supple   CARDIOVASCULAR: Tachycardic with a  regular rhythm, No murmurs, rubs, gallops   PULMONARY/CHEST: Clear to auscultation, no rhonchi, wheezes, or rales. Symmetrical breath sounds.   ABDOMINAL: Nondistended, soft, nontender - no rebound or guarding.  NEUROLOGIC: Nonfocal, moving all four extremities, no gross sensory or motor deficits.   EXTREMITIES: No clubbing, cyanosis.  There is tenderness of the right hip.  Pain with range of motion of the right hip.  Distal dorsalis pedis and posterior tibial pulses are intact.  No discomfort with palpation of the knee.  SKIN: Warm, Dry, No erythema, No rash     ED COURSE / MEDICAL DECISION MAKING:   Audrey Alonso is a 42 y.o. female who presents to the emergency department for evaluation of right hip discomfort.  Patient appears moderately uncomfortable on arrival in the emergency department.  Patient initially is tachycardic and hypertensive.  Other vital signs are stable.  Exam does reveal tenderness with compression of the right hip as well as pain with range of motion.  The extremity is neurovascularly intact on examination.    X-rays of the right hip per radiology interpretation reveal severe arthritis of the right hip, no other acute abnormalities, there was question of foreign body, however this is patient's a vape pen that she had left in her pocket.    Laboratory testing did reveal a mildly elevated CRP at 19 and procalcitonin at 0.5.  Patient's white blood cell count was within the normal range as was lactic acid.  Urinalysis not indicative of infection.  Respiratory panel is negative.  Chest x-ray per my interpretation does not reveal obvious pneumonia at this time.  Urine drug screen is positive for methadone which patient has been on for multiple years as well as opiates which we had been administered at this facility.    I did obtain a CT scan of the abdomen and pelvis which was also interpreted by the radiologist.  It again reveals severe osteoarthritis of the right hip but no other acute  abnormality.    Patient was given some IV fluids and multiple rounds of pain medication with some improvement in her symptoms.  Her heart rate did improve to the normal range.    I did discuss the case with the orthopedist on-call and he felt that it was less likely that this represented an acute septic arthritis.  He recommended close outpatient follow-up.  We will give patient option of seeing our orthopedist here or the one she is seen in the past.  I will also reach out to her primary care physician to obtain repeat labs to ensure that these are not worsening.    Patient was comfortable with this plan and discharged in good condition with very strict return precautions.    DECISION TO DISCHARGE/ADMIT: see ED care timeline     FINAL IMPRESSION:   1 --right hip pain  2 --   3 --     Electronically signed by: Margo Palmer MD, 7/8/2022 21:27 Margo Erazo MD  07/09/22 0551

## 2022-07-10 ENCOUNTER — TELEPHONE (OUTPATIENT)
Dept: EMERGENCY DEPT | Facility: HOSPITAL | Age: 42
End: 2022-07-10

## 2022-07-11 DIAGNOSIS — M25.551 RIGHT HIP PAIN: Primary | ICD-10-CM

## 2022-07-12 LAB
BACTERIA SPEC AEROBE CULT: ABNORMAL
GRAM STN SPEC: ABNORMAL
GRAM STN SPEC: ABNORMAL
ISOLATED FROM: ABNORMAL

## 2022-07-13 ENCOUNTER — APPOINTMENT (OUTPATIENT)
Dept: CARDIOLOGY | Facility: HOSPITAL | Age: 42
End: 2022-07-13

## 2022-07-13 ENCOUNTER — APPOINTMENT (OUTPATIENT)
Dept: CT IMAGING | Facility: HOSPITAL | Age: 42
End: 2022-07-13

## 2022-07-13 ENCOUNTER — APPOINTMENT (OUTPATIENT)
Dept: ULTRASOUND IMAGING | Facility: HOSPITAL | Age: 42
End: 2022-07-13

## 2022-07-13 ENCOUNTER — HOSPITAL ENCOUNTER (INPATIENT)
Facility: HOSPITAL | Age: 42
LOS: 3 days | Discharge: HOME | End: 2022-07-16
Attending: EMERGENCY MEDICINE | Admitting: INTERNAL MEDICINE

## 2022-07-13 DIAGNOSIS — M25.559 HIP PAIN: ICD-10-CM

## 2022-07-13 DIAGNOSIS — A41.9 SEPSIS, DUE TO UNSPECIFIED ORGANISM, UNSPECIFIED WHETHER ACUTE ORGAN DYSFUNCTION PRESENT: ICD-10-CM

## 2022-07-13 DIAGNOSIS — J18.9 PNEUMONIA OF BOTH LUNGS DUE TO INFECTIOUS ORGANISM, UNSPECIFIED PART OF LUNG: Primary | ICD-10-CM

## 2022-07-13 DIAGNOSIS — Z74.09 IMPAIRED MOBILITY: ICD-10-CM

## 2022-07-13 LAB
ALBUMIN SERPL-MCNC: 3.3 G/DL (ref 3.5–5.2)
ALBUMIN/GLOB SERPL: 0.9 G/DL
ALP SERPL-CCNC: 145 U/L (ref 39–117)
ALT SERPL W P-5'-P-CCNC: 23 U/L (ref 1–33)
AMPHET+METHAMPHET UR QL: POSITIVE
AMPHETAMINES UR QL: NEGATIVE
ANION GAP SERPL CALCULATED.3IONS-SCNC: 12.3 MMOL/L (ref 5–15)
AST SERPL-CCNC: 27 U/L (ref 1–32)
BACTERIA SPEC AEROBE CULT: ABNORMAL
BACTERIA SPEC AEROBE CULT: ABNORMAL
BACTERIA UR QL AUTO: ABNORMAL /HPF
BARBITURATES UR QL SCN: NEGATIVE
BASOPHILS # BLD AUTO: 0.04 10*3/MM3 (ref 0–0.2)
BASOPHILS NFR BLD AUTO: 0.3 % (ref 0–1.5)
BENZODIAZ UR QL SCN: POSITIVE
BILIRUB SERPL-MCNC: 0.3 MG/DL (ref 0–1.2)
BILIRUB UR QL STRIP: NEGATIVE
BUN SERPL-MCNC: 10 MG/DL (ref 6–20)
BUN/CREAT SERPL: 13.2 (ref 7–25)
BUPRENORPHINE SERPL-MCNC: NEGATIVE NG/ML
CALCIUM SPEC-SCNC: 8.6 MG/DL (ref 8.6–10.5)
CANNABINOIDS SERPL QL: NEGATIVE
CHLORIDE SERPL-SCNC: 102 MMOL/L (ref 98–107)
CK MB SERPL-CCNC: <1 NG/ML
CK SERPL-CCNC: 34 U/L (ref 20–180)
CLARITY UR: CLEAR
CO2 SERPL-SCNC: 25.7 MMOL/L (ref 22–29)
COCAINE UR QL: NEGATIVE
COLOR UR: YELLOW
CREAT SERPL-MCNC: 0.76 MG/DL (ref 0.57–1)
CRP SERPL-MCNC: 39.14 MG/DL (ref 0–0.5)
D-LACTATE SERPL-SCNC: 0.6 MMOL/L (ref 0.5–2)
DEPRECATED RDW RBC AUTO: 48.2 FL (ref 37–54)
EGFRCR SERPLBLD CKD-EPI 2021: 100.5 ML/MIN/1.73
EOSINOPHIL # BLD AUTO: 0.18 10*3/MM3 (ref 0–0.4)
EOSINOPHIL NFR BLD AUTO: 1.3 % (ref 0.3–6.2)
ERYTHROCYTE [DISTWIDTH] IN BLOOD BY AUTOMATED COUNT: 14.5 % (ref 12.3–15.4)
ERYTHROCYTE [SEDIMENTATION RATE] IN BLOOD: 79 MM/HR (ref 0–20)
FLUAV RNA RESP QL NAA+PROBE: NOT DETECTED
FLUBV RNA RESP QL NAA+PROBE: NOT DETECTED
GLOBULIN UR ELPH-MCNC: 3.6 GM/DL
GLUCOSE SERPL-MCNC: 92 MG/DL (ref 65–99)
GLUCOSE UR STRIP-MCNC: NEGATIVE MG/DL
GRAM STN SPEC: ABNORMAL
GRAM STN SPEC: ABNORMAL
HCT VFR BLD AUTO: 28.7 % (ref 34–46.6)
HGB BLD-MCNC: 9.6 G/DL (ref 12–15.9)
HGB UR QL STRIP.AUTO: ABNORMAL
HYALINE CASTS UR QL AUTO: ABNORMAL /LPF
IMM GRANULOCYTES # BLD AUTO: 0.3 10*3/MM3 (ref 0–0.05)
IMM GRANULOCYTES NFR BLD AUTO: 2.1 % (ref 0–0.5)
ISOLATED FROM: ABNORMAL
ISOLATED FROM: ABNORMAL
KETONES UR QL STRIP: NEGATIVE
LEUKOCYTE ESTERASE UR QL STRIP.AUTO: NEGATIVE
LYMPHOCYTES # BLD AUTO: 1.37 10*3/MM3 (ref 0.7–3.1)
LYMPHOCYTES NFR BLD AUTO: 9.8 % (ref 19.6–45.3)
MCH RBC QN AUTO: 30.3 PG (ref 26.6–33)
MCHC RBC AUTO-ENTMCNC: 33.4 G/DL (ref 31.5–35.7)
MCV RBC AUTO: 90.5 FL (ref 79–97)
METHADONE UR QL SCN: POSITIVE
MONOCYTES # BLD AUTO: 1.2 10*3/MM3 (ref 0.1–0.9)
MONOCYTES NFR BLD AUTO: 8.6 % (ref 5–12)
NEUTROPHILS NFR BLD AUTO: 10.94 10*3/MM3 (ref 1.7–7)
NEUTROPHILS NFR BLD AUTO: 77.9 % (ref 42.7–76)
NITRITE UR QL STRIP: NEGATIVE
NRBC BLD AUTO-RTO: 0 /100 WBC (ref 0–0.2)
OPIATES UR QL: POSITIVE
OXYCODONE UR QL SCN: NEGATIVE
PCP UR QL SCN: NEGATIVE
PH UR STRIP.AUTO: 6.5 [PH] (ref 5–8)
PLATELET # BLD AUTO: 367 10*3/MM3 (ref 140–450)
PMV BLD AUTO: 9 FL (ref 6–12)
POTASSIUM SERPL-SCNC: 4.4 MMOL/L (ref 3.5–5.2)
PROCALCITONIN SERPL-MCNC: 0.6 NG/ML (ref 0–0.25)
PROPOXYPH UR QL: NEGATIVE
PROT SERPL-MCNC: 6.9 G/DL (ref 6–8.5)
PROT UR QL STRIP: NEGATIVE
RBC # BLD AUTO: 3.17 10*6/MM3 (ref 3.77–5.28)
RBC # UR STRIP: ABNORMAL /HPF
REF LAB TEST METHOD: ABNORMAL
SARS-COV-2 RNA RESP QL NAA+PROBE: NOT DETECTED
SODIUM SERPL-SCNC: 140 MMOL/L (ref 136–145)
SP GR UR STRIP: >1.03 (ref 1–1.03)
SQUAMOUS #/AREA URNS HPF: ABNORMAL /HPF
TRICYCLICS UR QL SCN: POSITIVE
TROPONIN T SERPL-MCNC: <0.01 NG/ML (ref 0–0.03)
UROBILINOGEN UR QL STRIP: ABNORMAL
WBC # UR STRIP: ABNORMAL /HPF
WBC NRBC COR # BLD: 14.03 10*3/MM3 (ref 3.4–10.8)

## 2022-07-13 PROCEDURE — 25010000002 VANCOMYCIN 5 G RECONSTITUTED SOLUTION: Performed by: PHYSICIAN ASSISTANT

## 2022-07-13 PROCEDURE — 87040 BLOOD CULTURE FOR BACTERIA: CPT | Performed by: EMERGENCY MEDICINE

## 2022-07-13 PROCEDURE — 83605 ASSAY OF LACTIC ACID: CPT | Performed by: EMERGENCY MEDICINE

## 2022-07-13 PROCEDURE — 84145 PROCALCITONIN (PCT): CPT | Performed by: EMERGENCY MEDICINE

## 2022-07-13 PROCEDURE — 81001 URINALYSIS AUTO W/SCOPE: CPT | Performed by: EMERGENCY MEDICINE

## 2022-07-13 PROCEDURE — 99222 1ST HOSP IP/OBS MODERATE 55: CPT | Performed by: INTERNAL MEDICINE

## 2022-07-13 PROCEDURE — 84484 ASSAY OF TROPONIN QUANT: CPT | Performed by: EMERGENCY MEDICINE

## 2022-07-13 PROCEDURE — 93306 TTE W/DOPPLER COMPLETE: CPT | Performed by: INTERNAL MEDICINE

## 2022-07-13 PROCEDURE — 82550 ASSAY OF CK (CPK): CPT | Performed by: EMERGENCY MEDICINE

## 2022-07-13 PROCEDURE — 25010000002 IOPAMIDOL 61 % SOLUTION: Performed by: EMERGENCY MEDICINE

## 2022-07-13 PROCEDURE — 25010000002 HYDROMORPHONE 1 MG/ML SOLUTION: Performed by: EMERGENCY MEDICINE

## 2022-07-13 PROCEDURE — 25010000002 KETOROLAC TROMETHAMINE PER 15 MG: Performed by: STUDENT IN AN ORGANIZED HEALTH CARE EDUCATION/TRAINING PROGRAM

## 2022-07-13 PROCEDURE — 25010000002 ONDANSETRON PER 1 MG: Performed by: EMERGENCY MEDICINE

## 2022-07-13 PROCEDURE — 80053 COMPREHEN METABOLIC PANEL: CPT | Performed by: EMERGENCY MEDICINE

## 2022-07-13 PROCEDURE — 25010000002 PIPERACILLIN SOD-TAZOBACTAM PER 1 G: Performed by: PHYSICIAN ASSISTANT

## 2022-07-13 PROCEDURE — 85651 RBC SED RATE NONAUTOMATED: CPT | Performed by: EMERGENCY MEDICINE

## 2022-07-13 PROCEDURE — 86140 C-REACTIVE PROTEIN: CPT | Performed by: EMERGENCY MEDICINE

## 2022-07-13 PROCEDURE — 93306 TTE W/DOPPLER COMPLETE: CPT

## 2022-07-13 PROCEDURE — 25010000002 ENOXAPARIN PER 10 MG: Performed by: INTERNAL MEDICINE

## 2022-07-13 PROCEDURE — 82553 CREATINE MB FRACTION: CPT | Performed by: EMERGENCY MEDICINE

## 2022-07-13 PROCEDURE — 93970 EXTREMITY STUDY: CPT

## 2022-07-13 PROCEDURE — 99284 EMERGENCY DEPT VISIT MOD MDM: CPT

## 2022-07-13 PROCEDURE — 74177 CT ABD & PELVIS W/CONTRAST: CPT

## 2022-07-13 PROCEDURE — 80306 DRUG TEST PRSMV INSTRMNT: CPT | Performed by: PHYSICIAN ASSISTANT

## 2022-07-13 PROCEDURE — 25010000002 CEFTRIAXONE SODIUM-DEXTROSE 1-3.74 GM-%(50ML) RECONSTITUTED SOLUTION: Performed by: INTERNAL MEDICINE

## 2022-07-13 PROCEDURE — 87636 SARSCOV2 & INF A&B AMP PRB: CPT | Performed by: EMERGENCY MEDICINE

## 2022-07-13 PROCEDURE — 85025 COMPLETE CBC W/AUTO DIFF WBC: CPT | Performed by: EMERGENCY MEDICINE

## 2022-07-13 PROCEDURE — 71275 CT ANGIOGRAPHY CHEST: CPT

## 2022-07-13 RX ORDER — ACETAMINOPHEN 650 MG/1
650 SUPPOSITORY RECTAL EVERY 4 HOURS PRN
Status: DISCONTINUED | OUTPATIENT
Start: 2022-07-13 | End: 2022-07-16 | Stop reason: HOSPADM

## 2022-07-13 RX ORDER — ONDANSETRON 2 MG/ML
4 INJECTION INTRAMUSCULAR; INTRAVENOUS ONCE
Status: COMPLETED | OUTPATIENT
Start: 2022-07-13 | End: 2022-07-13

## 2022-07-13 RX ORDER — CHOLECALCIFEROL (VITAMIN D3) 125 MCG
5 CAPSULE ORAL NIGHTLY PRN
Status: DISCONTINUED | OUTPATIENT
Start: 2022-07-13 | End: 2022-07-15

## 2022-07-13 RX ORDER — DOXEPIN HYDROCHLORIDE 25 MG/1
50 CAPSULE ORAL NIGHTLY
Status: DISCONTINUED | OUTPATIENT
Start: 2022-07-13 | End: 2022-07-16 | Stop reason: HOSPADM

## 2022-07-13 RX ORDER — BUSPIRONE HYDROCHLORIDE 10 MG/1
30 TABLET ORAL 2 TIMES DAILY
Status: DISCONTINUED | OUTPATIENT
Start: 2022-07-13 | End: 2022-07-16 | Stop reason: HOSPADM

## 2022-07-13 RX ORDER — AMOXICILLIN 250 MG
2 CAPSULE ORAL 2 TIMES DAILY
Status: DISCONTINUED | OUTPATIENT
Start: 2022-07-13 | End: 2022-07-16 | Stop reason: HOSPADM

## 2022-07-13 RX ORDER — ENOXAPARIN SODIUM 100 MG/ML
40 INJECTION SUBCUTANEOUS DAILY
Status: DISCONTINUED | OUTPATIENT
Start: 2022-07-13 | End: 2022-07-16 | Stop reason: HOSPADM

## 2022-07-13 RX ORDER — GABAPENTIN 300 MG/1
300 CAPSULE ORAL EVERY 8 HOURS SCHEDULED
Status: DISCONTINUED | OUTPATIENT
Start: 2022-07-13 | End: 2022-07-16 | Stop reason: HOSPADM

## 2022-07-13 RX ORDER — KETOROLAC TROMETHAMINE 30 MG/ML
30 INJECTION, SOLUTION INTRAMUSCULAR; INTRAVENOUS EVERY 6 HOURS PRN
Status: DISCONTINUED | OUTPATIENT
Start: 2022-07-13 | End: 2022-07-13

## 2022-07-13 RX ORDER — POLYETHYLENE GLYCOL 3350 17 G/17G
17 POWDER, FOR SOLUTION ORAL DAILY PRN
Status: DISCONTINUED | OUTPATIENT
Start: 2022-07-13 | End: 2022-07-16 | Stop reason: HOSPADM

## 2022-07-13 RX ORDER — KETOROLAC TROMETHAMINE 30 MG/ML
30 INJECTION, SOLUTION INTRAMUSCULAR; INTRAVENOUS EVERY 4 HOURS PRN
Status: COMPLETED | OUTPATIENT
Start: 2022-07-13 | End: 2022-07-15

## 2022-07-13 RX ORDER — ONDANSETRON 2 MG/ML
4 INJECTION INTRAMUSCULAR; INTRAVENOUS EVERY 6 HOURS PRN
Status: DISCONTINUED | OUTPATIENT
Start: 2022-07-13 | End: 2022-07-16 | Stop reason: HOSPADM

## 2022-07-13 RX ORDER — BISACODYL 5 MG/1
5 TABLET, DELAYED RELEASE ORAL DAILY PRN
Status: DISCONTINUED | OUTPATIENT
Start: 2022-07-13 | End: 2022-07-16 | Stop reason: HOSPADM

## 2022-07-13 RX ORDER — BISACODYL 10 MG
10 SUPPOSITORY, RECTAL RECTAL DAILY PRN
Status: DISCONTINUED | OUTPATIENT
Start: 2022-07-13 | End: 2022-07-16 | Stop reason: HOSPADM

## 2022-07-13 RX ORDER — ACETAMINOPHEN 160 MG/5ML
650 SOLUTION ORAL EVERY 4 HOURS PRN
Status: DISCONTINUED | OUTPATIENT
Start: 2022-07-13 | End: 2022-07-16 | Stop reason: HOSPADM

## 2022-07-13 RX ORDER — SODIUM CHLORIDE 0.9 % (FLUSH) 0.9 %
10 SYRINGE (ML) INJECTION EVERY 12 HOURS SCHEDULED
Status: DISCONTINUED | OUTPATIENT
Start: 2022-07-13 | End: 2022-07-16 | Stop reason: HOSPADM

## 2022-07-13 RX ORDER — SODIUM CHLORIDE 0.9 % (FLUSH) 0.9 %
10 SYRINGE (ML) INJECTION AS NEEDED
Status: DISCONTINUED | OUTPATIENT
Start: 2022-07-13 | End: 2022-07-16 | Stop reason: HOSPADM

## 2022-07-13 RX ORDER — METHADONE HYDROCHLORIDE 10 MG/1
60 TABLET ORAL DAILY
Status: DISCONTINUED | OUTPATIENT
Start: 2022-07-14 | End: 2022-07-14

## 2022-07-13 RX ORDER — DULOXETIN HYDROCHLORIDE 30 MG/1
60 CAPSULE, DELAYED RELEASE ORAL DAILY
Status: DISCONTINUED | OUTPATIENT
Start: 2022-07-14 | End: 2022-07-16 | Stop reason: HOSPADM

## 2022-07-13 RX ORDER — CEFTRIAXONE 1 G/50ML
1 INJECTION, SOLUTION INTRAVENOUS EVERY 24 HOURS
Status: DISCONTINUED | OUTPATIENT
Start: 2022-07-13 | End: 2022-07-14

## 2022-07-13 RX ORDER — ACETAMINOPHEN 325 MG/1
650 TABLET ORAL EVERY 4 HOURS PRN
Status: DISCONTINUED | OUTPATIENT
Start: 2022-07-13 | End: 2022-07-16 | Stop reason: HOSPADM

## 2022-07-13 RX ORDER — QUETIAPINE FUMARATE 100 MG/1
100 TABLET, FILM COATED ORAL NIGHTLY
Status: DISCONTINUED | OUTPATIENT
Start: 2022-07-13 | End: 2022-07-16 | Stop reason: HOSPADM

## 2022-07-13 RX ORDER — BUPROPION HYDROCHLORIDE 150 MG/1
300 TABLET ORAL DAILY
Status: DISCONTINUED | OUTPATIENT
Start: 2022-07-14 | End: 2022-07-16 | Stop reason: HOSPADM

## 2022-07-13 RX ADMIN — ACETAMINOPHEN 650 MG: 325 TABLET ORAL at 20:18

## 2022-07-13 RX ADMIN — CEFTRIAXONE 1 G: 1 INJECTION, SOLUTION INTRAVENOUS at 20:58

## 2022-07-13 RX ADMIN — TAZOBACTAM SODIUM AND PIPERACILLIN SODIUM 3.38 G: 375; 3 INJECTION, SOLUTION INTRAVENOUS at 13:13

## 2022-07-13 RX ADMIN — ONDANSETRON 4 MG: 2 INJECTION INTRAMUSCULAR; INTRAVENOUS at 12:09

## 2022-07-13 RX ADMIN — SENNOSIDES AND DOCUSATE SODIUM 2 TABLET: 50; 8.6 TABLET ORAL at 20:18

## 2022-07-13 RX ADMIN — DOXEPIN HYDROCHLORIDE 50 MG: 25 CAPSULE ORAL at 20:17

## 2022-07-13 RX ADMIN — ENOXAPARIN SODIUM 40 MG: 40 INJECTION SUBCUTANEOUS at 20:19

## 2022-07-13 RX ADMIN — QUETIAPINE FUMARATE 100 MG: 100 TABLET ORAL at 20:18

## 2022-07-13 RX ADMIN — HYDROMORPHONE HYDROCHLORIDE 1 MG: 1 INJECTION, SOLUTION INTRAMUSCULAR; INTRAVENOUS; SUBCUTANEOUS at 14:43

## 2022-07-13 RX ADMIN — SODIUM CHLORIDE 1000 ML: 9 INJECTION, SOLUTION INTRAVENOUS at 16:36

## 2022-07-13 RX ADMIN — Medication 10 ML: at 20:20

## 2022-07-13 RX ADMIN — SODIUM CHLORIDE 1000 ML: 9 INJECTION, SOLUTION INTRAVENOUS at 13:13

## 2022-07-13 RX ADMIN — KETOROLAC TROMETHAMINE 30 MG: 30 INJECTION, SOLUTION INTRAMUSCULAR; INTRAVENOUS at 23:24

## 2022-07-13 RX ADMIN — HYDROMORPHONE HYDROCHLORIDE 1 MG: 1 INJECTION, SOLUTION INTRAMUSCULAR; INTRAVENOUS; SUBCUTANEOUS at 12:09

## 2022-07-13 RX ADMIN — GABAPENTIN 300 MG: 300 CAPSULE ORAL at 21:00

## 2022-07-13 RX ADMIN — BUSPIRONE HYDROCHLORIDE 30 MG: 10 TABLET ORAL at 20:18

## 2022-07-13 RX ADMIN — IOPAMIDOL 100 ML: 612 INJECTION, SOLUTION INTRAVENOUS at 13:37

## 2022-07-13 RX ADMIN — Medication: at 14:05

## 2022-07-13 RX ADMIN — VANCOMYCIN HYDROCHLORIDE 1500 MG: 5 INJECTION, POWDER, LYOPHILIZED, FOR SOLUTION INTRAVENOUS at 14:04

## 2022-07-13 NOTE — ED NOTES
Called from CT, Pt's ultrasound-guided IV had infiltrated. Carlos LANG notified.    Gave bedside report to LOWELL Keys. Plan of care discussed. Safety precautions in place. Personal belongings and call light are with in reach. Patient has no additional needs at this time.

## 2022-07-13 NOTE — ED PROVIDER NOTES
Subjective   42-year-old female presents to the emergency department after receiving a phone call from [facility] for abnormal culture results.  The patient was seen in the emergency department several days ago for right hip pain, she had been receiving injections due to chronic hip pain, her last injection was approximately 3 to 4 months ago.  She had extensive work-up performed at that time and there was concern for a septic hip joint, this was ruled out and she was sent home with pain medication.  Her blood cultures grew out Serratia several days later and there is been multiple attempts to try to reach her at home, she states she has had trouble with her phone.  She finally got the message or call her primary care physician who told her to come to the emergency department.  Cultures indicate an abnormal growth of Serratia with pan susceptibility to multiple antibiotics.  She also grew out cornybacterium with indications of susceptibility to vancomycin.  She reports that she has continued right hip pain and has been mostly immobile at home due to the pain.  She now has swelling in both legs.  She does have a significant past medical history of IV drug abuse and is currently on methadone.      History provided by:  Patient   used: No        Review of Systems   Cardiovascular: Positive for leg swelling.   Musculoskeletal:        Right hip pain   All other systems reviewed and are negative.      Past Medical History:   Diagnosis Date   • Anxiety    • Asthma    • Depression    • Disease of thyroid gland    • GERD (gastroesophageal reflux disease)    • Headache    • Hypertension    • Seizures (HCC)    • Substance abuse (HCC)    • Trauma        Allergies   Allergen Reactions   • Diphenhydramine Hcl Other (See Comments)       Past Surgical History:   Procedure Laterality Date   • DILATATION AND CURETTAGE     • INCISION AND DRAINAGE ABSCESS  2019    arm       Family History   Problem Relation  Age of Onset   • Arthritis Mother    • Cancer Mother         breast   • Thyroid disease Mother    • Breast cancer Mother    • Diabetes Father    • Hypertension Father    • Mental illness Brother    • Breast cancer Maternal Aunt        Social History     Socioeconomic History   • Marital status:    Tobacco Use   • Smoking status: Current Every Day Smoker     Packs/day: 0.25     Years: 20.00     Pack years: 5.00     Types: Cigarettes   • Smokeless tobacco: Never Used   • Tobacco comment: vapes also   Vaping Use   • Vaping Use: Every day   • Substances: Nicotine, Flavoring   • Devices: Pre-filled or refillable cartridge   Substance and Sexual Activity   • Alcohol use: No     Comment: stopped 2008   • Drug use: Yes     Types: IV     Comment: STATES SHE TOOK HER METHADONE AND SOME BENZOS 9/17/20   • Sexual activity: Not Currently     Partners: Male     Birth control/protection: Abstinence           Objective   Physical Exam  Vitals and nursing note reviewed.   Constitutional:       Appearance: She is well-developed.   HENT:      Head: Normocephalic and atraumatic.   Cardiovascular:      Rate and Rhythm: Normal rate and regular rhythm.   Pulmonary:      Effort: Pulmonary effort is normal.      Breath sounds: Normal breath sounds.   Abdominal:      Palpations: Abdomen is soft.   Musculoskeletal:      Cervical back: Normal range of motion and neck supple.      Right lower leg: Edema present.      Left lower leg: Edema present.      Comments: Tenderness to palpation right hip, pain with flexion and extension.   Skin:     General: Skin is warm and dry.   Neurological:      Mental Status: She is alert and oriented to person, place, and time.      Deep Tendon Reflexes: Reflexes are normal and symmetric.         Procedures           ED Course  ED Course as of 07/13/22 1614   Wed Jul 13, 2022   1350 EKG interpreted by me reveals sinus tachycardia with a rate of 112 bpm.  There is low QRS voltage in chest leads.  This is  an abnormal appearing EKG. [TB]   1520 Discussed the case with Dr. Roman, he will review and call me back [CS]   1604 Discussed with , patient accepted for admission [CS]      ED Course User Index  [CS] Carlos Patiño Jr., MAMIE  [TB] Margo Palmer MD                                           MDM  Number of Diagnoses or Management Options  Pneumonia of both lungs due to infectious organism, unspecified part of lung: new and requires workup  Sepsis, due to unspecified organism, unspecified whether acute organ dysfunction present (HCC): new and requires workup     Amount and/or Complexity of Data Reviewed  Clinical lab tests: reviewed  Decide to obtain previous medical records or to obtain history from someone other than the patient: yes  Discuss the patient with other providers: yes    Risk of Complications, Morbidity, and/or Mortality  Presenting problems: moderate  Diagnostic procedures: low  Management options: moderate  General comments: 42-year-old female presents with concern for sepsis, from a previous blood culture results she was called back to the emergency department.  She presented at that time and today with increasingly worsening right hip pain over the last several weeks.  The pain is worse with any activity, she also states she has increased edema in both her legs.  She has had body aches and chills at home but denies any fever.  Work-up was performed here including labs, repeat cultures, and CT scans of her chest and abdomen.  She was found to have multifocal pneumonia, started on Vanco and Zosyn and IV fluids per sepsis protocol and admitted to telemetry is remained stable throughout    Patient Progress  Patient progress: stable      Final diagnoses:   Pneumonia of both lungs due to infectious organism, unspecified part of lung   Sepsis, due to unspecified organism, unspecified whether acute organ dysfunction present (HCC)       ED Disposition  ED Disposition     ED  Disposition   Decision to Admit    Condition   --    Comment   Level of Care: Telemetry [5]   Diagnosis: Pneumonia of both lungs due to infectious organism, unspecified part of lung [[id number]]   Admitting Physician: JOSEPH ASHLEY [[id number]]   Attending Physician: JOSEPH ASHLEY [[id number]]   Certification: I Certify That Inpatient Hospital Services Are Medically Necessary For Greater Than 2 Midnights               No follow-up provider specified.       Medication List      No changes were made to your prescriptions during this visit.          Carlos Patiño Jr., PA-C  07/13/22 7335

## 2022-07-13 NOTE — H&P
[facility]   HISTORY AND PHYSICAL      Name:  Audrey Alonso   Age:  42 y.o.  Sex:  female  :  1980  MRN:  [id number]   Visit Number:  [id number]  Admission Date:  2022  Date Of Service:  22  Primary Care Physician:  David Fleming DO    Chief Complaint:     Positive blood cultures    History Of Presenting Illness:      Patient is a 42-year-old female with history significant for anxiety/depression, hypertension, substance abuse who presents to the emergency room at the request of the ER after patient's blood cultures from previous visit returned positive for Serratia and Corynebacterium.  Patient initially reported to the emergency room on 2022 with complaints of progressively worsening chronic right hip pain.  Patient is followed by Dr. Salinas with orthopedics.  She receives intra-articular joint injections with last known injection approximately 4 months ago.   At the time, there was concern for septic joint and so blood cultures were obtained.  However, this was ruled out and patient was discharged home with prescription for Norco and outpatient follow-up.  Patient does admit to bacteremia in the past, but unfortunately cannot recall the name of the bacteria.  She was hospitalized in [place] approximately 3 to 4 years ago after an abscess developed from IV drug use.  Patient does not recall being told that she had any medical problems with her heart.  She states that since that time she has avoided IV injections but does note that she has relapsed recently.  Her drugs of choice include opiates and benzos.  She most recently used fentanyl.  She denies nausea/vomiting, diarrhea, chest pain, cough, shortness of breath, fever/chills.  No known sick contacts.  Upon arrival to the ER, patient was afebrile but tachycardic at 123.  She was otherwise hemodynamically stable and 96% on room air.  Labs are notable for negative troponin, unremarkable chemistry panel, CRP  39, sedimentation rate 79, lactate normal, procalcitonin 0.6.  WBC 14, hemoglobin 9.6, hematocrit 28, platelets 367.  Urinalysis was negative for infection.  COVID-negative.  UDS positive for amphetamines, benzos, methadone, opiates and TCAs.  CT abdomen pelvis showed no acute intra-abdominal process.  CT angiogram of the chest was negative for pulmonary embolism but does show evidence of multifocal pneumonia.  Patient received Dilaudid, vancomycin and Zosyn in the emergency room.  Hospital service contacted for admission.    Review Of Systems:    All systems were reviewed and negative except as mentioned in history of presenting illness, assessment and plan.    Past Medical History: Patient  has a past medical history of Anxiety, Asthma, Depression, Disease of thyroid gland, GERD (gastroesophageal reflux disease), Headache, Hypertension, Seizures (HCC), Substance abuse (HCC), and Trauma.    Past Surgical History: Patient  has a past surgical history that includes Dilation and curettage of uterus and Incision and Drainage Abscess (2019).    Social History: Patient  reports that she has been smoking cigarettes. She has a 5.00 pack-year smoking history. She has never used smokeless tobacco. She reports current drug use. Drug: IV. She reports that she does not drink alcohol.    Family History: Patient's family history includes Arthritis in her mother; Breast cancer in her maternal aunt and mother; Cancer in her mother; Diabetes in her father; Hypertension in her father; Mental illness in her brother; Thyroid disease in her mother.    Allergies:      Diphenhydramine hcl    Home Medications:    Prior to Admission Medications     Prescriptions Last Dose Informant Patient Reported? Taking?    amoxicillin (AMOXIL) 500 MG capsule   Yes No    amphetamine-dextroamphetamine (Adderall) 30 MG tablet   No No    Take 30 mg orally every morning and afternoon    buPROPion XL (Wellbutrin XL) 300 MG 24 hr tablet   No No    Take 1  tablet by mouth Daily. WITH 150 mg    busPIRone (BUSPAR) 15 MG tablet   No No    Take 2 tablets by mouth 2 (Two) Times a Day.    diclofenac (VOLTAREN) 75 MG EC tablet   Yes No    doxepin (SINEquan) 50 MG capsule   No No    Take 1 capsule by mouth Every Night.    DULoxetine (Cymbalta) 60 MG capsule   No No    Two po q am    gabapentin (NEURONTIN) 800 MG tablet   No No    Take 1 tablet by mouth 3 (Three) Times a Day.    ibuprofen (ADVIL,MOTRIN) 800 MG tablet   Yes No    lidocaine (LIDODERM) 5 %   No No    Place 1 patch on the skin as directed by provider Daily. Remove & Discard patch within 12 hours or as directed by MD    meloxicam (MOBIC) 7.5 MG tablet   No No    Take 1 tablet by mouth Daily.    methadone (DOLOPHINE) 10 MG tablet   Yes No    Take 60 mg by mouth Daily,    oxyCODONE-acetaminophen (PERCOCET) 5-325 MG per tablet   No No    Take 1 tablet by mouth Every 4 (Four) Hours As Needed for Severe Pain .    prazosin (MINIPRESS) 5 MG capsule   No No    Two po q hs    QUEtiapine (SEROquel) 100 MG tablet   No No    Take 1 tablet by mouth Every Night.        ED Medications:    Medications   sodium chloride 0.9 % bolus 1,000 mL (1,000 mL Intravenous New Bag 7/13/22 1636)   sodium chloride 0.9 % bolus 1,000 mL (0 mL Intravenous Stopped 7/13/22 1604)   ondansetron (ZOFRAN) injection 4 mg (4 mg Intravenous Given 7/13/22 1209)   HYDROmorphone (DILAUDID) injection 1 mg (1 mg Intravenous Given 7/13/22 1209)   piperacillin-tazobactam (ZOSYN) 3.375 g in iso-osmotic dextrose 50 ml (premix) (0 g Intravenous Stopped 7/13/22 1404)   vancomycin 1500 mg/500 mL 0.9% NS IVPB ([facility]) (0 mg Intravenous Stopped 7/13/22 1636)     And   Pharmacy to dose vancomycin ( Does not apply Given 7/13/22 1405)   iopamidol (ISOVUE-300) 61 % injection 100 mL (100 mL Intravenous Given 7/13/22 1337)   HYDROmorphone (DILAUDID) injection 1 mg (1 mg Intravenous Given 7/13/22 1443)     Vital Signs:  Temp:  [98 °F (36.7 °C)] 98 °F (36.7 °C)  Heart Rate:  [123]  "123  Resp:  [18] 18  BP: (128)/(77) 128/77        07/13/22  0942   Weight: 74.8 kg (165 lb)     Body mass index is 29.23 kg/m².    Physical Exam:     Most recent vital Signs: /77 (BP Location: Left arm, Patient Position: Sitting)   Pulse (!) 123   Temp 98 °F (36.7 °C) (Oral)   Resp 18   Ht 160 cm (63\")   Wt 74.8 kg (165 lb)   LMP 07/01/2022 (Approximate)   SpO2 96%   BMI 29.23 kg/m²     Physical Exam  Constitutional:       General: She is not in acute distress.     Appearance: Normal appearance.   HENT:      Head: Normocephalic and atraumatic.      Right Ear: External ear normal.      Left Ear: External ear normal.      Mouth/Throat:      Mouth: Mucous membranes are moist.      Pharynx: Oropharynx is clear.   Eyes:      Extraocular Movements: Extraocular movements intact.      Conjunctiva/sclera: Conjunctivae normal.      Pupils: Pupils are equal, round, and reactive to light.   Cardiovascular:      Rate and Rhythm: Normal rate and regular rhythm.      Pulses: Normal pulses.      Heart sounds: Normal heart sounds.   Pulmonary:      Effort: Pulmonary effort is normal.      Breath sounds: Normal breath sounds.   Abdominal:      General: There is no distension.      Palpations: Abdomen is soft.      Tenderness: There is no abdominal tenderness.   Musculoskeletal:      Right lower leg: Edema present.      Left lower leg: Edema present.   Skin:     General: Skin is warm and dry.   Neurological:      General: No focal deficit present.      Mental Status: She is alert and oriented to person, place, and time.         Laboratory data:    I have reviewed the labs done in the emergency room.    Results from last 7 days   Lab Units 07/13/22  1202 07/08/22  2151   SODIUM mmol/L 140 137   POTASSIUM mmol/L 4.4 4.0   CHLORIDE mmol/L 102 102   CO2 mmol/L 25.7 22.1   BUN mg/dL 10 10   CREATININE mg/dL 0.76 0.82   CALCIUM mg/dL 8.6 8.3*   BILIRUBIN mg/dL 0.3 0.6   ALK PHOS U/L 145* 154*   ALT (SGPT) U/L 23 24   AST " (SGOT) U/L 27 21   GLUCOSE mg/dL 92 94     Results from last 7 days   Lab Units 07/13/22  1202 07/08/22  2151   WBC 10*3/mm3 14.03* 5.01   HEMOGLOBIN g/dL 9.6* 10.3*   HEMATOCRIT % 28.7* 30.2*   PLATELETS 10*3/mm3 367 183         Results from last 7 days   Lab Units 07/13/22  1202   CK TOTAL U/L 34   TROPONIN T ng/mL <0.010                     Results from last 7 days   Lab Units 07/13/22  1548 07/09/22  0358   COLOR UA  Yellow Yellow   GLUCOSE UA  Negative Negative   KETONES UA  Negative Negative   LEUKOCYTES UA  Negative Negative   PH, URINE  6.5 7.0   BILIRUBIN UA  Negative Negative   UROBILINOGEN UA  0.2 E.U./dL 0.2 E.U./dL   RBC UA /HPF  --  0-2*   WBC UA /HPF  --  None Seen       Pain Management Panel     Pain Management Panel Latest Ref Rng & Units 7/13/2022 7/9/2022    CREATININE UR 20.0 - 300.0 mg/dL - -    AMPHETAMINES SCREEN, URINE Negative Positive(A) Negative    BARBITURATES SCREEN Negative Negative Negative    BENZODIAZEPINE SCREEN, URINE Negative Positive(A) Negative    BUPRENORPHINEUR Negative Negative Negative    COCAINE SCREEN, URINE Negative Negative Negative    METHADONE SCREEN, URINE Negative Positive(A) Positive(A)    METHAMPHETAMINEUR Negative Negative Negative          EKG:      EKG personally reviewed, sinus tachycardia with a rate of 112 bpm.  QTc 402.  No acute ST or T wave changes.    Radiology:    CT Abdomen Pelvis With Contrast    Result Date: 7/13/2022  PROCEDURE: CT ABDOMEN PELVIS W CONTRAST-  HISTORY:  right hip pain c/w sepsis  COMPARISON: 07/09/2022.  TECHNIQUE: Multiple axial CT images were obtained from the lung bases through the pubic symphysis following the administration of Isovue 300 and oral contrast.  FINDINGS:  ABDOMEN: The lung bases are clear. The heart is normal in size. The liver is normal. The gallbladder is unremarkable. The spleen is unremarkable. No adrenal mass is present.  The pancreas is normal. The kidneys are normal. The aorta is normal in caliber. There is no  free fluid or adenopathy. The abdominal portions of the GI tract are unremarkable with no evidence of obstruction.  PELVIS: The appendix is normal.  The urinary bladder is unremarkable. Uterus and ovaries are unremarkable. There is no significant free fluid or adenopathy.      No evidence of acute intra-abdominal process.     This study was performed with techniques to keep radiation doses as low as reasonably achievable (ALARA). Individualized dose reduction techniques using automated exposure control or adjustment of mA and/or kV according to the patient size were employed.      Images were reviewed, interpreted, and dictated by Dr. Alejandro Price MD Transcribed by Dulce Goodwin PA-C.  This report was signed and finalized on 7/13/2022 2:04 PM by Alejandro Price MD.    CT Angiogram Chest Pulmonary Embolism    Result Date: 7/13/2022  PROCEDURE: CT ANGIOGRAM CHEST PULMONARY EMBOLISM-  HISTORY: sepsis  COMPARISON: September 2020.  TECHNIQUE: Multiple axial CT images were obtained from the thoracic inlet through the upper abdomen following the administration of Isovue 300 per the CT PE protocol. Coronal and oblique 3D MIP images were reconstructed from the original axial data set.  FINDINGS: There are no filling defects within the pulmonary arteries to suggest an embolus. The thoracic aorta is normal in caliber with no evidence of dissection. The heart is normal in size. There are no pleural or pericardial effusions. There is no adenopathy. There are a few peripheral groundglass opacities in the upper lobes bilaterally consistent with pneumonia, viral etiology is in the differential diagnosis. There is right lower lobe atelectasis. There is a small hiatal hernia without evidence of reflux. The visualized upper abdomen is unremarkable.      1. Mild multifocal pneumonia. Viral etiology is included in the differential diagnosis. 2. No evidence of pulmonary embolus or dissection.    15.22 mGy.cm   This study was performed  with techniques to keep radiation doses as low as reasonably achievable (ALARA). Individualized dose reduction techniques using automated exposure control or adjustment of mA and/or kV according to the patient size were employed.     Images were reviewed, interpreted, and dictated by Dr. Alejandro Price MD Transcribed by Dulce Goodwin PA-C.  This report was signed and finalized on 7/13/2022 2:04 PM by Alejandro Price MD.    US Venous Doppler Lower Extremity Bilateral (duplex)    Addendum Date: 7/13/2022    PROCEDURE: US VENOUS DOPPLER LOWER EXTREMITY BILATERAL (DUPLEX)-   HISTORY: swelling  RIGHT LOWER EXTREMITY:  FINDINGS: Multiple transverse and longitudinal scans were performed of the femoropopliteal deep venous system, with augmentation and compression maneuvers.  Normal phasic flow was noted in the visualized deep venous system. No intraluminal increased echogenicity is noted to suggest thrombus. There is normal compression and augmentation of the venous structures. No abnormal venous collaterals are seen.  IMPRESSION: No evidence of deep venous thrombosis of the right lower extremity.    HISTORY:  swelling  LEFT LOWER EXTREMITY:  Findings: Multiple transverse and longitudinal scans were performed of the femoropopliteal deep venous system, with augmentation and compression maneuvers.  Normal phasic flow was noted in the visualized deep venous system. No intraluminal increased echogenicity is noted to suggest thrombus. There is normal compression and augmentation of the venous structures. No abnormal venous collaterals are seen.  IMPRESSION: No evidence of deep venous thrombosis of the left lower extremity.  This report was signed and finalized on 7/13/2022 1:56 PM by Alejandro Price MD.    Addendum Date: 7/13/2022    PROCEDURE: US VENOUS DOPPLER LOWER EXTREMITY BILATERAL (DUPLEX)-   HISTORY: swelling  RIGHT LOWER EXTREMITY:  FINDINGS: Multiple transverse and longitudinal scans were performed of the femoropopliteal  deep venous system, with augmentation and compression maneuvers.  Normal phasic flow was noted in the visualized deep venous system. No intraluminal increased echogenicity is noted to suggest thrombus. There is normal compression and augmentation of the venous structures. No abnormal venous collaterals are seen.  IMPRESSION: No evidence of deep venous thrombosis of the right lower extremity.    HISTORY:  swelling  LEFT LOWER EXTREMITY:  Findings: Multiple transverse and longitudinal scans were performed of the femoropopliteal deep venous system, with augmentation and compression maneuvers.  Normal phasic flow was noted in the visualized deep venous system. No intraluminal increased echogenicity is noted to suggest thrombus. There is normal compression and augmentation of the venous structures. No abnormal venous collaterals are seen.  IMPRESSION: No evidence of deep venous thrombosis of the left lower extremity.  This report was signed and finalized on 7/13/2022 1:56 PM by Alejandro Price MD.    Result Date: 7/13/2022  FINAL REPORT TECHNIQUE: Bilateral lower extremity venous duplex was performed with augmentation and compression. CLINICAL HISTORY: swelling FINDINGS: Proper flow is seen throughout the deep venous systems bilaterally.  There is no evidence of deep venous thrombosis.     No evidence of deep venous thrombosis. Authenticated and Electronically Signed by Henrry Pinon MD on 07/13/2022 12:49:38 PM      Assessment:    1. Sepsis secondary to #2-POA  2. Serratia marcescens and Corynebacterium bacteremia-POA  3. Mild multifocal pneumonia-POA  4. Anxiety/depression  5. Hypertension  6. GERD  7. History of IV drug abuse  8. Opiate use disorder on methadone  9. Severe right hip osteoarthritis with history of joint injections    Plan:    Sepsis  Serratia marcescens and Corynebacterium bacteremia  Pneumonia?  - Serratia is a space organism, however upon review of sensitivities it is sensitive to ceftazidime.  As  such, we will choose to treat with Rocephin  -Vancomycin for Corynebacterium (though this could be a contaminant)  -Suspect that source could be related to pneumonia, I do not suspect septic joint as patient does not have pain upon palpation or erythema, however osteomyelitis is on my differential especially given elevated CRP and sed rate, will ask radiology to review CT abdomen and pelvis with special attention to bone  -Repeat blood cultures  -Fortunately, patient is maintaining appropriate saturation on room air.  -We will trend CRP  -Supportive care    History of IV drug use  -UDS positive for amphetamines, benzodiazepine, methadone, opiates and TCAs  -Patient is prescribed medication for all except for benzodiazepines  -Denies history of recent IV injection  -Does admit to relapse with fentanyl  -We will continue with home methadone  -We will attempt pain control with Toradol and Tylenol    Bilateral lower extremity edema  - Lower extremity duplex venous ultrasounds negative for DVT  - Will obtain 2D echocardiogram    Risk Assessment: High  DVT Prophylaxis: Lovenox  Code Status: Full  Diet: Regular    Advance Care Planning   ACP discussion was declined by the patient. Patient does not have an advance directive, declines further assistance.           Ran Gaspar DO  07/13/22  16:54 EDT    Dictated utilizing Dragon dictation.

## 2022-07-14 ENCOUNTER — APPOINTMENT (OUTPATIENT)
Dept: GENERAL RADIOLOGY | Facility: HOSPITAL | Age: 42
End: 2022-07-14

## 2022-07-14 LAB
ANION GAP SERPL CALCULATED.3IONS-SCNC: 10.4 MMOL/L (ref 5–15)
APPEARANCE FLD: ABNORMAL
BASOPHILS # BLD AUTO: 0.03 10*3/MM3 (ref 0–0.2)
BASOPHILS NFR BLD AUTO: 0.2 % (ref 0–1.5)
BH CV ECHO MEAS - AO MAX PG: 11.3 MMHG
BH CV ECHO MEAS - AO MEAN PG: 5 MMHG
BH CV ECHO MEAS - AO ROOT DIAM: 2.8 CM
BH CV ECHO MEAS - AO V2 MAX: 168 CM/SEC
BH CV ECHO MEAS - AO V2 VTI: 25.9 CM
BH CV ECHO MEAS - AVA(I,D): 2.07 CM2
BH CV ECHO MEAS - EDV(CUBED): 114.8 ML
BH CV ECHO MEAS - EDV(MOD-SP2): 59.3 ML
BH CV ECHO MEAS - EDV(MOD-SP4): 66.7 ML
BH CV ECHO MEAS - EF(MOD-BP): 64.3 %
BH CV ECHO MEAS - EF(MOD-SP2): 66.1 %
BH CV ECHO MEAS - EF(MOD-SP4): 64.2 %
BH CV ECHO MEAS - ESV(CUBED): 19.5 ML
BH CV ECHO MEAS - ESV(MOD-SP2): 20.1 ML
BH CV ECHO MEAS - ESV(MOD-SP4): 23.9 ML
BH CV ECHO MEAS - FS: 44.7 %
BH CV ECHO MEAS - IVS/LVPW: 1.23 CM
BH CV ECHO MEAS - IVSD: 0.95 CM
BH CV ECHO MEAS - LA DIMENSION: 3.6 CM
BH CV ECHO MEAS - LAT PEAK E' VEL: 14.4 CM/SEC
BH CV ECHO MEAS - LV MASS(C)D: 142.1 GRAMS
BH CV ECHO MEAS - LV MAX PG: 5.8 MMHG
BH CV ECHO MEAS - LV MEAN PG: 3 MMHG
BH CV ECHO MEAS - LV V1 MAX: 120 CM/SEC
BH CV ECHO MEAS - LV V1 VTI: 20.4 CM
BH CV ECHO MEAS - LVIDD: 4.9 CM
BH CV ECHO MEAS - LVIDS: 2.7 CM
BH CV ECHO MEAS - LVOT AREA: 2.6 CM2
BH CV ECHO MEAS - LVOT DIAM: 1.83 CM
BH CV ECHO MEAS - LVPWD: 0.77 CM
BH CV ECHO MEAS - MED PEAK E' VEL: 4.7 CM/SEC
BH CV ECHO MEAS - MV DEC SLOPE: 1168 CM/SEC2
BH CV ECHO MEAS - MV DEC TIME: 0.09 MSEC
BH CV ECHO MEAS - MV E MAX VEL: 103 CM/SEC
BH CV ECHO MEAS - PA ACC TIME: 0.09 SEC
BH CV ECHO MEAS - PA PR(ACCEL): 37.6 MMHG
BH CV ECHO MEAS - PA V2 MAX: 126 CM/SEC
BH CV ECHO MEAS - RAP SYSTOLE: 8 MMHG
BH CV ECHO MEAS - RV MAX PG: 4.3 MMHG
BH CV ECHO MEAS - RV V1 MAX: 104 CM/SEC
BH CV ECHO MEAS - RV V1 VTI: 21.1 CM
BH CV ECHO MEAS - RVSP: 34.6 MMHG
BH CV ECHO MEAS - SV(LVOT): 53.7 ML
BH CV ECHO MEAS - SV(MOD-SP2): 39.2 ML
BH CV ECHO MEAS - SV(MOD-SP4): 42.8 ML
BH CV ECHO MEAS - TAPSE (>1.6): 2.9 CM
BH CV ECHO MEAS - TR MAX PG: 26.6 MMHG
BH CV ECHO MEAS - TR MAX VEL: 258 CM/SEC
BH CV ECHO MEASUREMENTS AVERAGE E/E' RATIO: 10.79
BH CV XLRA - TDI S': 12.4 CM/SEC
BUN SERPL-MCNC: 9 MG/DL (ref 6–20)
BUN/CREAT SERPL: 14.1 (ref 7–25)
CALCIUM SPEC-SCNC: 8.3 MG/DL (ref 8.6–10.5)
CHLORIDE SERPL-SCNC: 103 MMOL/L (ref 98–107)
CO2 SERPL-SCNC: 23.6 MMOL/L (ref 22–29)
COLOR FLD: ABNORMAL
CREAT SERPL-MCNC: 0.64 MG/DL (ref 0.57–1)
CRP SERPL-MCNC: 34.36 MG/DL (ref 0–0.5)
DEPRECATED RDW RBC AUTO: 46.9 FL (ref 37–54)
EGFRCR SERPLBLD CKD-EPI 2021: 113.3 ML/MIN/1.73
EOSINOPHIL # BLD AUTO: 0.17 10*3/MM3 (ref 0–0.4)
EOSINOPHIL NFR BLD AUTO: 1.1 % (ref 0.3–6.2)
ERYTHROCYTE [DISTWIDTH] IN BLOOD BY AUTOMATED COUNT: 14.4 % (ref 12.3–15.4)
ERYTHROCYTE [SEDIMENTATION RATE] IN BLOOD: 80 MM/HR (ref 0–20)
GLUCOSE SERPL-MCNC: 91 MG/DL (ref 65–99)
HCT VFR BLD AUTO: 27 % (ref 34–46.6)
HGB BLD-MCNC: 9 G/DL (ref 12–15.9)
IMM GRANULOCYTES # BLD AUTO: 0.22 10*3/MM3 (ref 0–0.05)
IMM GRANULOCYTES NFR BLD AUTO: 1.4 % (ref 0–0.5)
LEFT ATRIUM VOLUME INDEX: 21.3 ML/M2
LYMPHOCYTES # BLD AUTO: 1.06 10*3/MM3 (ref 0.7–3.1)
LYMPHOCYTES NFR BLD AUTO: 6.6 % (ref 19.6–45.3)
MAXIMAL PREDICTED HEART RATE: 178 BPM
MCH RBC QN AUTO: 29.8 PG (ref 26.6–33)
MCHC RBC AUTO-ENTMCNC: 33.3 G/DL (ref 31.5–35.7)
MCV RBC AUTO: 89.4 FL (ref 79–97)
MONOCYTES # BLD AUTO: 1.15 10*3/MM3 (ref 0.1–0.9)
MONOCYTES NFR BLD AUTO: 7.2 % (ref 5–12)
NEUTROPHILS NFR BLD AUTO: 13.44 10*3/MM3 (ref 1.7–7)
NEUTROPHILS NFR BLD AUTO: 83.5 % (ref 42.7–76)
NRBC BLD AUTO-RTO: 0 /100 WBC (ref 0–0.2)
PLATELET # BLD AUTO: 376 10*3/MM3 (ref 140–450)
PMV BLD AUTO: 8.8 FL (ref 6–12)
POTASSIUM SERPL-SCNC: 3.9 MMOL/L (ref 3.5–5.2)
RBC # BLD AUTO: 3.02 10*6/MM3 (ref 3.77–5.28)
RBC # FLD AUTO: ABNORMAL 10*3/UL
SODIUM SERPL-SCNC: 137 MMOL/L (ref 136–145)
STRESS TARGET HR: 151 BPM
WBC # FLD AUTO: ABNORMAL 10*3/UL
WBC NRBC COR # BLD: 16.07 10*3/MM3 (ref 3.4–10.8)

## 2022-07-14 PROCEDURE — 0S993ZX DRAINAGE OF RIGHT HIP JOINT, PERCUTANEOUS APPROACH, DIAGNOSTIC: ICD-10-PCS | Performed by: RADIOLOGY

## 2022-07-14 PROCEDURE — 25010000002 HYDROMORPHONE 1 MG/ML SOLUTION: Performed by: INTERNAL MEDICINE

## 2022-07-14 PROCEDURE — 25010000002 KETOROLAC TROMETHAMINE PER 15 MG: Performed by: STUDENT IN AN ORGANIZED HEALTH CARE EDUCATION/TRAINING PROGRAM

## 2022-07-14 PROCEDURE — 77002 NEEDLE LOCALIZATION BY XRAY: CPT

## 2022-07-14 PROCEDURE — 25010000002 ONDANSETRON PER 1 MG: Performed by: INTERNAL MEDICINE

## 2022-07-14 PROCEDURE — 25010000002 ENOXAPARIN PER 10 MG: Performed by: INTERNAL MEDICINE

## 2022-07-14 PROCEDURE — 25010000002 VANCOMYCIN 1 G RECONSTITUTED SOLUTION: Performed by: INTERNAL MEDICINE

## 2022-07-14 PROCEDURE — 89050 BODY FLUID CELL COUNT: CPT | Performed by: INTERNAL MEDICINE

## 2022-07-14 PROCEDURE — 0 LIDOCAINE 1 % SOLUTION: Performed by: INTERNAL MEDICINE

## 2022-07-14 PROCEDURE — 97165 OT EVAL LOW COMPLEX 30 MIN: CPT

## 2022-07-14 PROCEDURE — 85652 RBC SED RATE AUTOMATED: CPT | Performed by: INTERNAL MEDICINE

## 2022-07-14 PROCEDURE — 87205 SMEAR GRAM STAIN: CPT | Performed by: INTERNAL MEDICINE

## 2022-07-14 PROCEDURE — 99232 SBSQ HOSP IP/OBS MODERATE 35: CPT | Performed by: PHYSICIAN ASSISTANT

## 2022-07-14 PROCEDURE — 85025 COMPLETE CBC W/AUTO DIFF WBC: CPT | Performed by: INTERNAL MEDICINE

## 2022-07-14 PROCEDURE — 25010000002 CEFTRIAXONE SODIUM-DEXTROSE 2-2.22 GM-%(50ML) RECONSTITUTED SOLUTION: Performed by: INTERNAL MEDICINE

## 2022-07-14 PROCEDURE — 86140 C-REACTIVE PROTEIN: CPT | Performed by: INTERNAL MEDICINE

## 2022-07-14 PROCEDURE — 87077 CULTURE AEROBIC IDENTIFY: CPT | Performed by: INTERNAL MEDICINE

## 2022-07-14 PROCEDURE — 87070 CULTURE OTHR SPECIMN AEROBIC: CPT | Performed by: INTERNAL MEDICINE

## 2022-07-14 PROCEDURE — 80048 BASIC METABOLIC PNL TOTAL CA: CPT | Performed by: INTERNAL MEDICINE

## 2022-07-14 PROCEDURE — 97161 PT EVAL LOW COMPLEX 20 MIN: CPT

## 2022-07-14 PROCEDURE — 87186 SC STD MICRODIL/AGAR DIL: CPT | Performed by: INTERNAL MEDICINE

## 2022-07-14 PROCEDURE — 25010000002 VANCOMYCIN 5 G RECONSTITUTED SOLUTION: Performed by: INTERNAL MEDICINE

## 2022-07-14 RX ORDER — CEFTRIAXONE 2 G/50ML
2 INJECTION, SOLUTION INTRAVENOUS EVERY 24 HOURS
Status: DISCONTINUED | OUTPATIENT
Start: 2022-07-14 | End: 2022-07-16

## 2022-07-14 RX ORDER — METHADONE HYDROCHLORIDE 10 MG/1
125 TABLET ORAL DAILY
Status: DISCONTINUED | OUTPATIENT
Start: 2022-07-15 | End: 2022-07-16 | Stop reason: HOSPADM

## 2022-07-14 RX ORDER — LIDOCAINE HYDROCHLORIDE 10 MG/ML
10 INJECTION, SOLUTION INFILTRATION; PERINEURAL ONCE
Status: COMPLETED | OUTPATIENT
Start: 2022-07-14 | End: 2022-07-14

## 2022-07-14 RX ADMIN — DOXEPIN HYDROCHLORIDE 50 MG: 25 CAPSULE ORAL at 21:35

## 2022-07-14 RX ADMIN — KETOROLAC TROMETHAMINE 30 MG: 30 INJECTION, SOLUTION INTRAMUSCULAR; INTRAVENOUS at 21:45

## 2022-07-14 RX ADMIN — GABAPENTIN 300 MG: 300 CAPSULE ORAL at 05:18

## 2022-07-14 RX ADMIN — KETOROLAC TROMETHAMINE 30 MG: 30 INJECTION, SOLUTION INTRAMUSCULAR; INTRAVENOUS at 08:25

## 2022-07-14 RX ADMIN — SENNOSIDES AND DOCUSATE SODIUM 2 TABLET: 50; 8.6 TABLET ORAL at 08:25

## 2022-07-14 RX ADMIN — KETOROLAC TROMETHAMINE 30 MG: 30 INJECTION, SOLUTION INTRAMUSCULAR; INTRAVENOUS at 03:31

## 2022-07-14 RX ADMIN — BUSPIRONE HYDROCHLORIDE 30 MG: 10 TABLET ORAL at 08:25

## 2022-07-14 RX ADMIN — Medication 10 ML: at 21:35

## 2022-07-14 RX ADMIN — BUPROPION HYDROCHLORIDE 300 MG: 150 TABLET, FILM COATED, EXTENDED RELEASE ORAL at 08:24

## 2022-07-14 RX ADMIN — ACETAMINOPHEN 650 MG: 650 SOLUTION ORAL at 16:30

## 2022-07-14 RX ADMIN — DULOXETINE 60 MG: 30 CAPSULE, DELAYED RELEASE ORAL at 08:24

## 2022-07-14 RX ADMIN — ENOXAPARIN SODIUM 40 MG: 40 INJECTION SUBCUTANEOUS at 08:25

## 2022-07-14 RX ADMIN — ONDANSETRON 4 MG: 2 INJECTION INTRAMUSCULAR; INTRAVENOUS at 16:30

## 2022-07-14 RX ADMIN — GABAPENTIN 300 MG: 300 CAPSULE ORAL at 13:20

## 2022-07-14 RX ADMIN — METHADONE HYDROCHLORIDE 60 MG: 10 TABLET ORAL at 08:23

## 2022-07-14 RX ADMIN — CEFTRIAXONE 2 G: 2 INJECTION, SOLUTION INTRAVENOUS at 16:58

## 2022-07-14 RX ADMIN — LIDOCAINE HYDROCHLORIDE 10 ML: 10 INJECTION, SOLUTION INFILTRATION; PERINEURAL at 12:57

## 2022-07-14 RX ADMIN — ONDANSETRON 4 MG: 2 INJECTION INTRAMUSCULAR; INTRAVENOUS at 21:35

## 2022-07-14 RX ADMIN — Medication 10 ML: at 08:26

## 2022-07-14 RX ADMIN — QUETIAPINE FUMARATE 100 MG: 100 TABLET ORAL at 21:35

## 2022-07-14 RX ADMIN — GABAPENTIN 300 MG: 300 CAPSULE ORAL at 21:35

## 2022-07-14 RX ADMIN — BUSPIRONE HYDROCHLORIDE 30 MG: 10 TABLET ORAL at 21:35

## 2022-07-14 RX ADMIN — HYDROMORPHONE HYDROCHLORIDE 1 MG: 1 INJECTION, SOLUTION INTRAMUSCULAR; INTRAVENOUS; SUBCUTANEOUS at 11:15

## 2022-07-14 RX ADMIN — VANCOMYCIN HYDROCHLORIDE 1250 MG: 5 INJECTION, POWDER, LYOPHILIZED, FOR SOLUTION INTRAVENOUS at 14:25

## 2022-07-14 RX ADMIN — SODIUM CHLORIDE 1000 MG: 900 INJECTION, SOLUTION INTRAVENOUS at 03:03

## 2022-07-14 RX ADMIN — KETOROLAC TROMETHAMINE 30 MG: 30 INJECTION, SOLUTION INTRAMUSCULAR; INTRAVENOUS at 13:20

## 2022-07-14 NOTE — PLAN OF CARE
Goal Outcome Evaluation:  Plan of Care Reviewed With: patient        Progress: no change  Outcome Evaluation: Pt participated in PT eval this date. Pt did complain of R hip pain, but was able to ambulate 38 ft then 24 ft with Rwx and CGA and Rwx. Pt instruction on step to gait pattern to allow decreased weight shifting on the R LE. Pt is expected to benefit from skilled PTx during this inpatient stay.

## 2022-07-14 NOTE — PLAN OF CARE
Problem: Adult Inpatient Plan of Care  Goal: Plan of Care Review  Outcome: Ongoing, Progressing  Goal: Patient-Specific Goal (Individualized)  Outcome: Ongoing, Progressing  Goal: Absence of Hospital-Acquired Illness or Injury  Outcome: Ongoing, Progressing  Intervention: Identify and Manage Fall Risk  Recent Flowsheet Documentation  Taken 7/14/2022 1600 by Marc Damon RN  Safety Promotion/Fall Prevention: safety round/check completed  Taken 7/14/2022 1400 by Marc Damon RN  Safety Promotion/Fall Prevention: safety round/check completed  Intervention: Prevent Skin Injury  Recent Flowsheet Documentation  Taken 7/14/2022 1600 by Marc Damon RN  Body Position: position changed independently  Taken 7/14/2022 1400 by Marc Damon RN  Body Position: position changed independently  Intervention: Prevent and Manage VTE (Venous Thromboembolism) Risk  Recent Flowsheet Documentation  Taken 7/14/2022 1600 by Marc Damon RN  Activity Management: up in chair  Taken 7/14/2022 1400 by Marc Damon RN  Activity Management: activity adjusted per tolerance  Intervention: Prevent Infection  Recent Flowsheet Documentation  Taken 7/14/2022 1600 by Marc Damon RN  Infection Prevention: environmental surveillance performed  Taken 7/14/2022 1400 by Marc Damon RN  Infection Prevention: environmental surveillance performed  Goal: Optimal Comfort and Wellbeing  Outcome: Ongoing, Progressing  Goal: Readiness for Transition of Care  Outcome: Ongoing, Progressing     Problem: Fall Injury Risk  Goal: Absence of Fall and Fall-Related Injury  Outcome: Ongoing, Progressing  Intervention: Promote Injury-Free Environment  Recent Flowsheet Documentation  Taken 7/14/2022 1600 by Marc Damon RN  Safety Promotion/Fall Prevention: safety round/check completed  Taken 7/14/2022 1400 by Marc Damon RN  Safety Promotion/Fall Prevention: safety round/check completed     Problem: Fluid Imbalance  (Pneumonia)  Goal: Fluid Balance  Outcome: Ongoing, Progressing     Problem: Infection (Pneumonia)  Goal: Resolution of Infection Signs and Symptoms  Outcome: Ongoing, Progressing     Problem: Respiratory Compromise (Pneumonia)  Goal: Effective Oxygenation and Ventilation  Outcome: Ongoing, Progressing  Intervention: Optimize Oxygenation and Ventilation  Recent Flowsheet Documentation  Taken 7/14/2022 1600 by Marc Damon RN  Head of Bed ([facility]) Positioning: HOB at 30 degrees  Taken 7/14/2022 1400 by Marc Damon RN  Head of Bed ([facility]) Positioning: HOB at 30 degrees     Problem: Pain Acute  Goal: Acceptable Pain Control and Functional Ability  Outcome: Ongoing, Progressing     Problem: Skin Injury Risk Increased  Goal: Skin Health and Integrity  Outcome: Ongoing, Progressing  Intervention: Optimize Skin Protection  Recent Flowsheet Documentation  Taken 7/14/2022 1600 by Marc Damon RN  Head of Bed ([facility]) Positioning: HOB at 30 degrees  Taken 7/14/2022 1400 by Marc Damon RN  Head of Bed ([facility]) Positioning: HOB at 30 degrees   Goal Outcome Evaluation:

## 2022-07-14 NOTE — PROGRESS NOTES
[facility]   PROGRESS NOTE    Name:  Audrey Alonso   Age:  42 y.o.  Sex:  female  :  1980  MRN:  [id number]   Visit Number:  [id number]  Admission Date:  2022  Date Of Service:  22  Primary Care Physician:  David Fleming DO     LOS: 1 day :  Patient Care Team:  David Fleming DO as PCP - General (Internal Medicine):    Chief Complaint:    Hip pain    Subjective / Interval History:   Patient seen and evaluated this morning.  She continues with right hip pain, reporting decreased ROM.  Denies chills, afebrile.  No cough, congestion, or shortness of breath.      Hospital Course:  Ms. Alonso is a 42-year of age female with history of anxiety/depression, hypertension, substance abuse.  She presented to the ER at request of the ER after patient had positive blood cultures from previous visit.  Patient had initially presented to the emergency room on 2022 with complaints of progressive chronic right hip pain.  She is followed by Dr. Salinas with orthopedics.  She receives intra articular joint injections with the last known injection approximately 4 months ago.  At that time, there was concern for septic joint and so blood cultures were obtained.  This was ruled out and patient was discharged home with prescription for Norco and outpatient follow-up.  Patient does report history of bacteremia, unfortunately cannot recall the specific organism.  Upon ER evaluation, patient afebril, tachycardic with heart rate 123, otherwise hemodynamically stable.  CRP 39, sed rate 79, lactate normal, procalcitonin 0.6.  Leukocytosis of 14, hemoglobin 9.6, hematocrit 28, platelets 367.  Urinalysis negative for infection.  COVID-negative.  UDS positive for amphetamines, benzos, methadone, opiates, and TCAs.  CT angiogram of the chest negative for pulmonary embolism but does show evidence of multifocal pneumonia.  Additionally CT abdomen pelvis showed no acute intra-abdominal process.   Patient given pain medication, initiated on vancomycin and Zosyn in the emergency room.  Hospitalist service contacted for admission.    Review of Systems:   Review of Systems   Constitutional: Positive for activity change.   Musculoskeletal: Positive for arthralgias (right hip).   Neurological: Positive for weakness (generalized).         Vital Signs:    Temp:  [97.8 °F (36.6 °C)-98.8 °F (37.1 °C)] 98.3 °F (36.8 °C)  Heart Rate:  [106-123] 113  Resp:  [18] 18  BP: (123-151)/() 148/90    Intake and output:    I/O last 3 completed shifts:  In: 2240 [P.O.:690; IV Piggyback:1550]  Out: 1700 [Urine:1700]  No intake/output data recorded.    Physical Examination:  Physical Exam  Vitals and nursing note reviewed.   Constitutional:       Appearance: She is ill-appearing.   HENT:      Head: Normocephalic and atraumatic.      Mouth/Throat:      Mouth: Mucous membranes are moist.      Pharynx: Oropharynx is clear.   Eyes:      Extraocular Movements: Extraocular movements intact.      Pupils: Pupils are equal, round, and reactive to light.   Cardiovascular:      Rate and Rhythm: Regular rhythm. Tachycardia present.      Pulses: Normal pulses.   Pulmonary:      Effort: Pulmonary effort is normal.      Breath sounds: Normal breath sounds.   Abdominal:      General: Bowel sounds are normal. There is no distension.      Palpations: Abdomen is soft.      Tenderness: There is no abdominal tenderness.   Musculoskeletal:         General: Tenderness present.      Cervical back: Neck supple. No rigidity.      Comments: Decreased ROM to the RLE, neurovascularly intact.  No erythema over the right hip, diffusely tender.    Skin:     General: Skin is warm and dry.      Capillary Refill: Capillary refill takes less than 2 seconds.   Neurological:      Mental Status: She is alert and oriented to person, place, and time.   Psychiatric:         Mood and Affect: Mood normal.         Behavior: Behavior normal.           Laboratory  results:  I have reviewed the patient's laboratory results.    Results from last 7 days   Lab Units 07/14/22  0653 07/13/22  1202 07/08/22  2151   SODIUM mmol/L 137 140 137   POTASSIUM mmol/L 3.9 4.4 4.0   CHLORIDE mmol/L 103 102 102   CO2 mmol/L 23.6 25.7 22.1   BUN mg/dL 9 10 10   CREATININE mg/dL 0.64 0.76 0.82   CALCIUM mg/dL 8.3* 8.6 8.3*   BILIRUBIN mg/dL  --  0.3 0.6   ALK PHOS U/L  --  145* 154*   ALT (SGPT) U/L  --  23 24   AST (SGOT) U/L  --  27 21   GLUCOSE mg/dL 91 92 94     Results from last 7 days   Lab Units 07/14/22  0653 07/13/22  1202 07/08/22  2151   WBC 10*3/mm3 16.07* 14.03* 5.01   HEMOGLOBIN g/dL 9.0* 9.6* 10.3*   HEMATOCRIT % 27.0* 28.7* 30.2*   PLATELETS 10*3/mm3 376 367 183         Results from last 7 days   Lab Units 07/13/22  1202   CK TOTAL U/L 34   TROPONIN T ng/mL <0.010     Results from last 7 days   Lab Units 07/09/22  0035 07/09/22  0030   BLOODCX  Serratia marcescens* Serratia marcescens*  Corynebacterium species*       Radiology results:  I have reviewed the patient's radiology reports.  Imaging Results (Last 24 Hours)     Procedure Component Value Units Date/Time    US Venous Doppler Lower Extremity Bilateral (duplex) [[id number]] Collected: 07/13/22 1249     Updated: 07/13/22 1531    Addenda:        PROCEDURE: US VENOUS DOPPLER LOWER EXTREMITY BILATERAL (DUPLEX)-        HISTORY: swelling     RIGHT LOWER EXTREMITY:     FINDINGS: Multiple transverse and longitudinal scans were performed of  the femoropopliteal deep venous system, with augmentation and  compression maneuvers.     Normal phasic flow was noted in the visualized deep venous system. No  intraluminal increased echogenicity is noted to suggest thrombus. There  is normal compression and augmentation of the venous structures. No  abnormal venous collaterals are seen.      IMPRESSION: No evidence of deep venous thrombosis of the right lower  extremity.           HISTORY:  swelling     LEFT LOWER EXTREMITY:     Findings:  Multiple transverse and longitudinal scans were performed of  the femoropopliteal deep venous system, with augmentation and  compression maneuvers.     Normal phasic flow was noted in the visualized deep venous system. No  intraluminal increased echogenicity is noted to suggest thrombus. There  is normal compression and augmentation of the venous structures. No  abnormal venous collaterals are seen.      IMPRESSION: No evidence of deep venous thrombosis of the left lower  extremity.     This report was signed and finalized on 7/13/2022 1:56 PM by Alejandro Price MD.  Signed: 07/13/22 6806 by Alejandro Price MD            PROCEDURE: US VENOUS DOPPLER LOWER EXTREMITY BILATERAL (DUPLEX)-        HISTORY: swelling     RIGHT LOWER EXTREMITY:     FINDINGS: Multiple transverse and longitudinal scans were performed of  the femoropopliteal deep venous system, with augmentation and  compression maneuvers.     Normal phasic flow was noted in the visualized deep venous system. No  intraluminal increased echogenicity is noted to suggest thrombus. There  is normal compression and augmentation of the venous structures. No  abnormal venous collaterals are seen.      IMPRESSION: No evidence of deep venous thrombosis of the right lower  extremity.           HISTORY:  swelling     LEFT LOWER EXTREMITY:     Findings: Multiple transverse and longitudinal scans were performed of  the femoropopliteal deep venous system, with augmentation and  compression maneuvers.     Normal phasic flow was noted in the visualized deep venous system. No  intraluminal increased echogenicity is noted to suggest thrombus. There  is normal compression and augmentation of the venous structures. No  abnormal venous collaterals are seen.      IMPRESSION: No evidence of deep venous thrombosis of the left lower  extremity.     This report was signed and finalized on 7/13/2022 1:56 PM by Alejandro Price MD.  Signed: 07/13/22 8476 by Alejandro Price MD    Narrative:       FINAL REPORT    TECHNIQUE:  Bilateral lower extremity venous duplex was performed with  augmentation and compression.    CLINICAL HISTORY:  swelling    FINDINGS:  Proper flow is seen throughout the deep venous systems  bilaterally.  There is no evidence of deep venous thrombosis.      Impression:      No evidence of deep venous thrombosis.    Authenticated and Electronically Signed by Henrry Pinon MD  on 07/13/2022 12:49:38 PM    CT Angiogram Chest Pulmonary Embolism [[id number]] Collected: 07/13/22 1401     Updated: 07/13/22 1406    Narrative:      PROCEDURE: CT ANGIOGRAM CHEST PULMONARY EMBOLISM-     HISTORY: sepsis     COMPARISON: September 2020.     TECHNIQUE: Multiple axial CT images were obtained from the thoracic  inlet through the upper abdomen following the administration of Isovue  300 per the CT PE protocol. Coronal and oblique 3D MIP images were  reconstructed from the original axial data set.      FINDINGS: There are no filling defects within the pulmonary arteries to  suggest an embolus. The thoracic aorta is normal in caliber with no  evidence of dissection. The heart is normal in size. There are no  pleural or pericardial effusions. There is no adenopathy. There are a  few peripheral groundglass opacities in the upper lobes bilaterally  consistent with pneumonia, viral etiology is in the differential  diagnosis. There is right lower lobe atelectasis. There is a small  hiatal hernia without evidence of reflux. The visualized upper abdomen  is unremarkable.        Impression:      1. Mild multifocal pneumonia. Viral etiology is included in the  differential diagnosis.  2. No evidence of pulmonary embolus or dissection.            15.22 mGy.cm        This study was performed with techniques to keep radiation doses as low  as reasonably achievable (ALARA). Individualized dose reduction  techniques using automated exposure control or adjustment of mA and/or  kV according to the patient size were  employed.               Images were reviewed, interpreted, and dictated by Dr. Alejandro Price MD  Transcribed by Dulce Goodwin PA-C.     This report was signed and finalized on 7/13/2022 2:04 PM by Alejandro Price MD.    CT Abdomen Pelvis With Contrast [[id number]] Collected: 07/13/22 1403     Updated: 07/13/22 1406    Narrative:      PROCEDURE: CT ABDOMEN PELVIS W CONTRAST-     HISTORY:  right hip pain c/w sepsis     COMPARISON: 07/09/2022.     TECHNIQUE: Multiple axial CT images were obtained from the lung bases  through the pubic symphysis following the administration of Isovue 300  and oral contrast.      FINDINGS:      ABDOMEN: The lung bases are clear. The heart is normal in size. The  liver is normal. The gallbladder is unremarkable. The spleen is  unremarkable. No adrenal mass is present.  The pancreas is normal. The  kidneys are normal. The aorta is normal in caliber. There is no free  fluid or adenopathy. The abdominal portions of the GI tract are  unremarkable with no evidence of obstruction.     PELVIS: The appendix is normal.  The urinary bladder is unremarkable.  Uterus and ovaries are unremarkable. There is no significant free fluid  or adenopathy.       Impression:      No evidence of acute intra-abdominal process.              This study was performed with techniques to keep radiation doses as low  as reasonably achievable (ALARA). Individualized dose reduction  techniques using automated exposure control or adjustment of mA and/or  kV according to the patient size were employed.                  Images were reviewed, interpreted, and dictated by Dr. Alejandro Price MD  Transcribed by Dulce Goodwin PA-C.     This report was signed and finalized on 7/13/2022 2:04 PM by Alejandro Price MD.              Medication Review:   I have reviewed the patients active and prn medications.     Assessment:    Pneumonia of both lungs due to infectious organism        Plan:  Sepsis  Serratia marcescens and  Corynebacterium bacteremia  Pneumonia  - Continue Rocephin for Serratia, Vancomycin for Corynebacerium   - CRP and sed rate elevated, BC pending, leukocytosis slightly worse continue to trend  - Discussed with radiologist CT A/P findings; small joint effusion with degenerative changes, recommending joint aspiration   - High suspicion for osteomyelitis; consult orthopedic services, recommendations appreciated  - Patient denies any respiratory symptoms, continue with supportive care    History of IV drug use  - UDS positive for amphetamines, benzodiazepine, methadone, opiates, and TCAs  - Denies history of recent IV injection  - She is prescribed all except for benzodiazepines  - Continue home methadone, dose confirmed by pharmacy  - Will given one time order for Dilaudid today  - Continue Toradol and Tylenol    Bilateral lower extremity edema  - Venous doppler negative for DVT  - 2D echo pending    Risk Assessment: High  DVT Prophylaxis: Lovenox  Code Status: Full  Diet: Regular       Angelika Frank PA-C  07/14/22  09:22 EDT

## 2022-07-14 NOTE — PROGRESS NOTES
"Pharmacy Consult - Vancomycin Dosing    Pharmacy was consulted to dose vancomycin for  Audrey Alonso, a 42 y.o. female  160 cm (63\") 79.8 kg (175 lb 14.8 oz)    Indication: Abnormal blood cultures  Consulting Provider: Chasity  Goal AUC: 400-600 mg/L*hr.    Current Antimicrobial Therapy  Anti-Infectives (From admission, onward)      Ordered     Dose/Rate Route Frequency Start Stop    07/14/22 0745  vancomycin 1250 mg/250 mL 0.9% NS IVPB ([facility])        Ordering Provider: Ran Gaspar DO    1,250 mg Intravenous Every 12 Hours 07/14/22 1400 07/21/22 0159    07/13/22 1837  cefTRIAXone (ROCEPHIN) IVPB 1 g/50ml dextrose (premix)        Ordering Provider: Ran Gaspar DO    1 g  100 mL/hr over 30 Minutes Intravenous Every 24 Hours 07/13/22 2100 07/18/22 2059    07/13/22 1837  Pharmacy to dose vancomycin        Ordering Provider: Ran Gaspar DO     Does not apply Continuous PRN 07/13/22 1837 07/20/22 1836    07/13/22 1116  vancomycin 1500 mg/500 mL 0.9% NS IVPB ([facility])        Ordering Provider: Carlos Patiño Jr., PA-C   \"And\" Linked Group Details    1,500 mg  over 90 Minutes Intravenous Once 07/13/22 1118 07/13/22 1636    07/13/22 1116  Pharmacy to dose vancomycin        Ordering Provider: Carlos Patiño Jr., PA-C   \"And\" Linked Group Details     Does not apply Once 07/13/22 1118 07/13/22 1405    07/13/22 1111  piperacillin-tazobactam (ZOSYN) 3.375 g in iso-osmotic dextrose 50 ml (premix)        Ordering Provider: Carlos Patiño Jr., PA-C    3.375 g  over 30 Minutes Intravenous Once 07/13/22 1113 07/13/22 1404            Labs  Results from last 7 days   Lab Units 07/14/22  0653 07/13/22  1202 07/08/22  2151   WBC 10*3/mm3 16.07* 14.03* 5.01   CREATININE mg/dL 0.64 0.76 0.82      Estimated Creatinine Clearance: 114.6 mL/min (by KARNE-G formula based on SCr of 0.64 mg/dL).  Temp Readings from Last 1 Encounters:   07/14/22 98.8 °F (37.1 °C) (Oral)       Microbiology Culture results  Microbiology Results (last " 10 days)       Procedure Component Value - Date/Time    COVID-19 and FLU A/B PCR - Swab, Nasopharynx [[id number]]  (Normal) Collected: 07/13/22 1443    Lab Status: Final result Specimen: Swab from Nasopharynx Updated: 07/13/22 1510     COVID19 Not Detected     Influenza A PCR Not Detected     Influenza B PCR Not Detected    Narrative:      Fact sheet for providers: https://www.fda.gov/media/778407/download    Fact sheet for patients: https://www.fda.gov/media/208252/download    Test performed by PCR.    Blood Culture - Blood, Arm, Left [[id number]]  (Abnormal) Collected: 07/09/22 0035    Lab Status: Final result Specimen: Blood from Arm, Left Updated: 07/12/22 0612     Blood Culture Serratia marcescens     Isolated from Aerobic and Anaerobic Bottles     Gram Stain Anaerobic Bottle Gram negative bacilli      Aerobic Bottle Gram negative bacilli    Narrative:      Refer to previous blood culture collected on 7/9/2022 0030 for MICs    Blood Culture - Blood, Arm, Right [[id number]]  (Abnormal)  (Susceptibility) Collected: 07/09/22 0030    Lab Status: Edited Result - FINAL Specimen: Blood from Arm, Right Updated: 07/13/22 0652     Blood Culture Serratia marcescens     Isolated from Anaerobic Bottle     Blood Culture Corynebacterium species     Comment: No definitive guidelines. All are susceptible to vancomycin. Resistance to penicillins & cephalosporins does occur.          Isolated from Aerobic Bottle     Gram Stain Anaerobic Bottle Gram negative bacilli      Aerobic Bottle Gram positive bacilli    Narrative:      Corynebacterium probable contaminant requires clinical correlation, susceptibility not performed unless requested by physician.    Blood culture does not meet the specified criteria for PCR identification.  If pregnant, immunocompromised, or clinical concern for meningitis, call lab to run BCID for Listeria monocytogenes.    Susceptibility        Serratia marcescens      OLIVER      Cefepime Susceptible       Ceftazidime Susceptible      Ceftriaxone Susceptible      Gentamicin Susceptible      Levofloxacin Susceptible      Piperacillin + Tazobactam Susceptible      Trimethoprim + Sulfamethoxazole Susceptible                       Susceptibility Comments       Serratia marcescens    Cefazolin sensitivity will not be reported for Enterobacteriaceae in non-urine isolates. If cefazolin is preferred, please call the microbiology lab to request an E-test.  With the exception of urinary-sourced infections, aminoglycosides should not be used as monotherapy.               Blood Culture ID, PCR - Blood, Arm, Right [[id number]]  (Abnormal) Collected: 07/09/22 0030    Lab Status: Final result Specimen: Blood from Arm, Right Updated: 07/09/22 2307     BCID, PCR Serratia marcescens. Identification by BCID2 PCR.     BOTTLE TYPE Anaerobic Bottle    Respiratory Panel PCR w/COVID-19(SARS-CoV-2) [facility] In-House, NP Swab in UTM/VTM, 3-4 HR TAT - Swab, Nasopharynx [[id number]]  (Normal) Collected: 07/08/22 2342    Lab Status: Final result Specimen: Swab from Nasopharynx Updated: 07/09/22 0036     ADENOVIRUS, PCR Not Detected     Coronavirus 229E Not Detected     Coronavirus HKU1 Not Detected     Coronavirus NL63 Not Detected     Coronavirus OC43 Not Detected     COVID19 Not Detected     Human Metapneumovirus Not Detected     Human Rhinovirus/Enterovirus Not Detected     Influenza A PCR Not Detected     Influenza B PCR Not Detected     Parainfluenza Virus 1 Not Detected     Parainfluenza Virus 2 Not Detected     Parainfluenza Virus 3 Not Detected     Parainfluenza Virus 4 Not Detected     RSV, PCR Not Detected     Bordetella pertussis pcr Not Detected     Bordetella parapertussis PCR Not Detected     Chlamydophila pneumoniae PCR Not Detected     Mycoplasma pneumo by PCR Not Detected    Narrative:      In the setting of a positive respiratory panel with a viral infection PLUS a negative procalcitonin without other underlying  concern for bacterial infection, consider observing off antibiotics or discontinuation of antibiotics and continue supportive care. If the respiratory panel is positive for atypical bacterial infection (Bordetella pertussis, Chlamydophila pneumoniae, or Mycoplasma pneumoniae), consider antibiotic de-escalation to target atypical bacterial infection.            [employer]ight[employer] AUC Calculation    Predicted Steady State AUC on Current Dose: 385 mg/L*hr    Predicted Steady State AUC on New Dose: 480 mg/L*hr    Assessment/Plan    Based on renal function and current level, will initiate vancomycin 1250 mg Q12H.  Assess clearance by vancomycin random level on 7/15 @ 0600.  Pharmacy will continue to monitor renal function, cultures and sensitivities, and clinical status to adjust regimen as necessary.      Thank you,  Iesha Hallman, PharmD  07/14/22 07:46 EDT

## 2022-07-14 NOTE — PLAN OF CARE
Goal Outcome Evaluation:           Progress: no change  Outcome Evaluation: Pt complain of right hip pain. Pt medicated per orders. See MAR. Will continue to monitor.

## 2022-07-14 NOTE — THERAPY EVALUATION
Patient Name: Audrey Alonso  : 1980    MRN: [id number]                              Today's Date: 2022       Admit Date: 2022    Visit Dx:     ICD-10-CM ICD-9-CM   1. Pneumonia of both lungs due to infectious organism, unspecified part of lung  J18.9 483.8   2. Sepsis, due to unspecified organism, unspecified whether acute organ dysfunction present ([facility])  A41.9 038.9     995.91     Patient Active Problem List   Diagnosis   • Thrombocytopenia ([facility])   • Pneumonia of both lungs due to infectious organism     Past Medical History:   Diagnosis Date   • Anxiety    • Asthma    • Depression    • Disease of thyroid gland    • GERD (gastroesophageal reflux disease)    • Headache    • Hypertension    • Seizures ([facility])    • Substance abuse ([facility])    • Trauma      Past Surgical History:   Procedure Laterality Date   • DILATATION AND CURETTAGE     • INCISION AND DRAINAGE ABSCESS  2019    arm      General Information     Row Name 22 1311          OT Time and Intention    Document Type evaluation  -     Mode of Treatment occupational therapy  -     Row Name 22 1311          General Information    Patient Profile Reviewed yes  -     Prior Level of Function independent:;ADL's;all household mobility  -     Existing Precautions/Restrictions fall;hip;right  -     Barriers to Rehab medically complex;previous functional deficit  -     Row Name 22 1311          Occupational Profile    Reason for Services/Referral (Occupational Profile) ADL decline  -     Row Name 22 1311          Living Environment    People in Home parent(s);child(orlando), dependent  -     Row Name 22 1311          Home Main Entrance    Number of Stairs, Main Entrance two  -     Stair Railings, Main Entrance none  -     Row Name 22 1311          Stairs Within Home, Primary    Stairs, Within Home, Primary to basement, pt stays on main level  -     Number of Stairs, Within Home, Primary other (see  comments)  full flight  -[facility] Name 07/14/22 1311          Cognition    Orientation Status (Cognition) oriented x 4  -[facility] Name 07/14/22 1311          Safety Issues, Functional Mobility    Safety Issues Affecting Function (Mobility) insight into deficits/self-awareness;positioning of assistive device;safety precaution awareness;safety precautions follow-through/compliance  -     Impairments Affecting Function (Mobility) balance;endurance/activity tolerance;pain;strength;shortness of breath;range of motion (ROM)  -           User Key  (r) = Recorded By, (t) = Taken By, (c) = Cosigned By    Initials Name Provider Type     Zeenat Brock Occupational Therapist                 Mobility/ADL's     Row Name 07/14/22 1313          Bed Mobility    Bed Mobility scooting/bridging  -     Scooting/Bridging Bee (Bed Mobility) contact guard  -     Assistive Device (Bed Mobility) bed rails;draw sheet;head of bed elevated  -AH     Row Name 07/14/22 1313          Transfers    Transfers sit-stand transfer;toilet transfer  -     Sit-Stand Bee (Transfers) contact guard  -     Bee Level (Toilet Transfer) supervision  -     Assistive Device (Toilet Transfer) commode, bedside without drop arms;walker, front-wheeled  -AH     Row Name 07/14/22 1313          Sit-Stand Transfer    Assistive Device (Sit-Stand Transfers) walker, front-wheeled  -AH     Row Name 07/14/22 1313          Toilet Transfer    Type (Toilet Transfer) sit-stand;stand-sit  -AH     Row Name 07/14/22 1313          Functional Mobility    Functional Mobility- Ind. Level contact guard assist;verbal cues required  -     Functional Mobility- Device walker, front-wheeled  [facility]     Functional Mobility-Distance (Feet) 54  -AH     Row Name 07/14/22 1313          Activities of Daily Living    BADL Assessment/Intervention bathing;upper body dressing;lower body dressing;grooming;feeding;toileting  -AH     Row Name 07/14/22 1313           Bathing Assessment/Intervention    [place] Level (Bathing) minimum assist (75% patient effort)  -AH     Row Name 07/14/22 1313          Upper Body Dressing Assessment/Training    [place] Level (Upper Body Dressing) independent  -AH     Row Name 07/14/22 1313          Lower Body Dressing Assessment/Training    [place] Level (Lower Body Dressing) don;socks;minimum assist (75% patient effort)  -AH     Row Name 07/14/22 1313          Grooming Assessment/Training    [place] Level (Grooming) set up  -AH     Row Name 07/14/22 1313          Self-Feeding Assessment/Training    [place] Level (Feeding) independent  -AH     Row Name 07/14/22 1313          Toileting Assessment/Training    [place] Level (Toileting) set up  -     Assistive Devices (Toileting) commode, bedside without drop arms  -           User Key  (r) = Recorded By, (t) = Taken By, (c) = Cosigned By    Initials Name Provider Type    Zeenat Prescott Occupational Therapist               Obj/Interventions     Row Name 07/14/22 1316          Sensory Assessment (Somatosensory)    Sensory Assessment (Somatosensory) sensation intact  -AH     Row Name 07/14/22 1316          Vision Assessment/Intervention    Visual Impairment/Limitations WNL  -AH     Row Name 07/14/22 1316          Range of Motion Comprehensive    General Range of Motion bilateral upper extremity ROM L  -AH     Row Name 07/14/22 1316          Strength Comprehensive (MMT)    Comment, General Manual Muscle Testing (MMT) Assessment [facility]           User Key  (r) = Recorded By, (t) = Taken By, (c) = Cosigned By    Initials Name Provider Type     Zeenat Brock Occupational Therapist               Goals/Plan     Row Name 07/14/22 1329          Bed Mobility Goal 1 (OT)    Activity/Assistive Device (Bed Mobility Goal 1, OT) bed mobility activities, all  -     [place] Level/Cues Needed (Bed Mobility Goal 1, OT) independent  -     Time Frame (Bed Mobility  Goal 1, OT) by discharge  -     Progress/Outcomes (Bed Mobility Goal 1, OT) goal ongoing  -AH     Row Name 07/14/22 1329          Transfer Goal 1 (OT)    Activity/Assistive Device (Transfer Goal 1, OT) sit-to-stand/stand-to-sit  -     [place] Level/Cues Needed (Transfer Goal 1, OT) modified independence  -     Time Frame (Transfer Goal 1, OT) long term goal (LTG);4 days  -     Progress/Outcome (Transfer Goal 1, OT) goal ongoing  -AH     Row Name 07/14/22 1329          Bathing Goal 1 (OT)    Activity/Device (Bathing Goal 1, OT) lower body bathing;long-handled sponge  -     [place] Level/Cues Needed (Bathing Goal 1, OT) modified independence  -     Time Frame (Bathing Goal 1, OT) by discharge  -     Strategies/Barriers (Bathing Goal 1, OT) simulated bathing task  -     Progress/Outcomes (Bathing Goal 1, OT) goal ongoing  -AH     Row Name 07/14/22 1329          Dressing Goal 1 (OT)    Activity/Device (Dressing Goal 1, OT) lower body dressing;reacher;sock-aid  -     [place]/Cues Needed (Dressing Goal 1, OT) set-up required  -     Time Frame (Dressing Goal 1, OT) long term goal (LTG);4 days  -     Progress/Outcome (Dressing Goal 1, OT) goal ongoing  -AH     Row Name 07/14/22 1329          Therapy Assessment/Plan (OT)    Planned Therapy Interventions (OT) activity tolerance training;adaptive equipment training;BADL retraining;patient/caregiver education/training;transfer/mobility retraining  -           User Key  (r) = Recorded By, (t) = Taken By, (c) = Cosigned By    Initials Name Provider Type     Zeenat Brock Occupational Therapist               Clinical Impression     Row Name 07/14/22 1327          Pain Assessment    Pretreatment Pain Rating 10/10  -     Posttreatment Pain Rating 10/10  -     Pain Location - Side/Orientation Right  -     Pain Location lower  -     Pain Location - extremity;hip  -     Pain Intervention(s) Repositioned;Ambulation/increased activity   -[facility] Name 07/14/22 1323          Plan of Care Review    Plan of Care Reviewed With patient  -     Progress no change  -     Outcome Evaluation Pt seen for OT evaluation today.  Pt presents with pain and decreased independence with self care tasks.  Pt able to sit eob with cga, stand with cga and walked out into the hallway, to her bathroom and from bathroom to her chair.  Pt is expected to benefit from skilled OT to improve her strength and independence with ADL tasks.  -[facility] Name 07/14/22 1323          Therapy Assessment/Plan (OT)    Patient/Family Therapy Goal Statement (OT) d/c home  -     Rehab Potential (OT) good, to achieve stated therapy goals  -     Criteria for Skilled Therapeutic Interventions Met (OT) yes;skilled treatment is necessary  -     Therapy Frequency (OT) 3 times/wk  -[facility] Name 07/14/22 1323          Therapy Plan Review/Discharge Plan (OT)    Anticipated Discharge Disposition (OT) home  -[facility] Name 07/14/22 1323          Vital Signs    O2 Delivery Pre Treatment room air  -     O2 Delivery Intra Treatment room air  -     O2 Delivery Post Treatment room air  -[facility] Name 07/14/22 1323          Positioning and Restraints    Pre-Treatment Position in bed  -     Post Treatment Position chair  -     In Chair reclined;call light within reach;encouraged to call for assist  -           User Key  (r) = Recorded By, (t) = Taken By, (c) = Cosigned By    Initials Name Provider Type     Zeenat Brock Occupational Therapist               Outcome Measures     Row Name 07/14/22 3720          How much help from another is currently needed...    Putting on and taking off regular lower body clothing? 3  -AH     Bathing (including washing, rinsing, and drying) 3  -AH     Toileting (which includes using toilet bed pan or urinal) 4  -AH     Putting on and taking off regular upper body clothing 4  -AH     Taking care of personal grooming (such as brushing teeth) 4  -AH      Eating meals 4  -     AM-PAC 6 Clicks Score (OT) 22  -AH     Row Name 07/14/22 1312          How much help from another person do you currently need...    Turning from your back to your side while in flat bed without using bedrails? 4  -MS     Moving from lying on back to sitting on the side of a flat bed without bedrails? 3  -MS     Moving to and from a bed to a chair (including a wheelchair)? 3  -MS     Standing up from a chair using your arms (e.g., wheelchair, bedside chair)? 3  -MS     Climbing 3-5 steps with a railing? 2  -MS     To walk in hospital room? 3  -MS     AM-PAC 6 Clicks Score (PT) 18  -MS     Highest level of mobility 6 --> Walked 10 steps or more  -MS     Row Name 07/14/22 1330 07/14/22 1312       Functional Assessment    Outcome Measure Options AM-PAC 6 Clicks Daily Activity (OT)  - AM-PAC 6 Clicks Basic Mobility (PT)  -MS          User Key  (r) = Recorded By, (t) = Taken By, (c) = Cosigned By    Initials Name Provider Type     Zeenat Brock Occupational Therapist    Nehemiah Murray, PT Physical Therapist                Occupational Therapy Education                 Title: PT OT SLP Therapies (In Progress)     Topic: Occupational Therapy (In Progress)     Point: ADL training (Done)     Description:   Instruct learner(s) on proper safety adaptation and remediation techniques during self care or transfers.   Instruct in proper use of assistive devices.              Learning Progress Summary           Patient Acceptance, E,TB, VU by  at 7/14/2022 1330    Comment: Role of OT/POC                   Point: Home exercise program (Not Started)     Description:   Instruct learner(s) on appropriate technique for monitoring, assisting and/or progressing therapeutic exercises/activities.              Learner Progress:  Not documented in this visit.          Point: Precautions (Not Started)     Description:   Instruct learner(s) on prescribed precautions during self-care and functional  transfers.              Learner Progress:  Not documented in this visit.          Point: Body mechanics (Not Started)     Description:   Instruct learner(s) on proper positioning and spine alignment during self-care, functional mobility activities and/or exercises.              Learner Progress:  Not documented in this visit.                      User Key     Initials Effective Dates Name Provider Type [facility] 06/16/21 -  Zeenat Brock Occupational Therapist OT              OT Recommendation and Plan  Planned Therapy Interventions (OT): activity tolerance training, adaptive equipment training, BADL retraining, patient/caregiver education/training, transfer/mobility retraining  Therapy Frequency (OT): 3 times/wk  Plan of Care Review  Plan of Care Reviewed With: patient  Progress: no change  Outcome Evaluation: Pt seen for OT evaluation today.  Pt presents with pain and decreased independence with self care tasks.  Pt able to sit eob with cga, stand with cga and walked out into the hallway, to her bathroom and from bathroom to her chair.  Pt is expected to benefit from skilled OT to improve her strength and independence with ADL tasks.     Time Calculation:    Time Calculation- OT     Row Name 07/14/22 1331             Time Calculation- OT    OT Start Time 1042  -      OT Received On 07/14/22  -      OT Goal Re-Cert Due Date 07/24/22  -              Untimed Charges    OT Eval/Re-eval Minutes 38  -AH              Total Minutes    Untimed Charges Total Minutes 38  -       Total Minutes 38  -AH            User Key  (r) = Recorded By, (t) = Taken By, (c) = Cosigned By    Initials Name Provider Type     Zeenat Brock Occupational Therapist              Therapy Charges for Today     Code Description Service Date Service Provider Modifiers Qty    [id number]  OT EVAL LOW COMPLEXITY 3 7/14/2022 Zeenat Brock GO 1               Zeenat Brock  7/14/2022

## 2022-07-14 NOTE — CONSULTS
"Adult Nutrition  Assessment/PES    Patient Name:  Audrey Alonso  YOB: 1980  MRN: [id number]  Admit Date:  7/13/2022    Assessment Date:  7/14/2022    Comments:    RD completed MSA with NFPE and pt does not meet criteria for malnutrition at this time.     Recommend:    1. Continue current diet as medically appropriate and tolerated.   2. Continue to encourage PO intake as appropriate. Pt PO intake of 75% x 1 meal.  3. RD ordered Boost Plus QD.  4. Consider MVI with minerals daily.   5. Continue to monitor and replace electrolytes PRN.    RD to follow pt and available PRN.      Reason for Assessment     Row Name 07/14/22 1300          Reason for Assessment    Reason For Assessment nurse/nurse practitioner consult;identified at risk by screening criteria;per organizational policy     Diagnosis psychosocial;substance use/abuse;gastrointestinal disease;cardiac disease;infection/sepsis;other (see comments)  sepsis, depression, substance use, GERD     Identified At Risk by Screening Criteria MST SCORE 2+                  Anthropometrics     Row Name 07/14/22 1302          Anthropometrics    Age for Calculations 42     Height for Calculation 1.6 m (5' 3\")     Weight for Calculation 74.8 kg (165 lb)  est dry bw                Labs/Tests/Procedures/Meds     Row Name 07/14/22 1301          Labs/Procedures/Meds    Lab Results Reviewed reviewed, pertinent     Lab Results Comments high: CRP, procal  low: alb            Medications    Pertinent Medications Reviewed reviewed, pertinent     Pertinent Medications Comments pericolace, NaCl                Physical Findings     Row Name 07/14/22 1302          Physical Findings    Overall Physical Appearance obesity, 2+ edema bilateral ankles and feet                Estimated/Assessed Needs - Anthropometrics     Row Name 07/14/22 1302          Anthropometrics    Age for Calculations 42     Height for Calculation 1.6 m (5' 3\")     Weight for Calculation 74.8 kg (165 lb)  " est dry bw            Estimated/Assessed Needs    Additional Documentation Fluid Requirements (Group);[place]-St. Jeor Equation (Group);Estimated Calorie Needs (Group);KCAL/KG (Group);Protein Requirements (Group)            Estimated Calorie Needs    Estimated Calorie Requirement (kcal/day) 2245  30 kcal/kg     Estimated Calorie Need Method kcal/kg            KCAL/KG    KCAL/KG 30 Kcal/Kg (kcal);25 Kcal/Kg (kcal)     25 Kcal/Kg (kcal) 1871.1     30 Kcal/Kg (kcal) 2245.32            [place]-St. Jeor Equation    RMR ([place]-St. Jeor Equation) 1377.565            Protein Requirements    Weight Used For Protein Calculations 74.8 kg (165 lb)  est dry bw     Est Protein Requirement Amount (gms/kg) 0.8 gm protein  60-75     Estimated Protein Requirements (gms/day) 59.88            Fluid Requirements    Fluid Requirements (mL/day) 2245     Estimated Fluid Requirement Method other (see comments)  1 mL/kcal     RDA Method (mL) 2245                Nutrition Prescription Ordered     Row Name 07/14/22 1303          Nutrition Prescription PO    Current PO Diet Regular                Evaluation of Received Nutrient/Fluid Intake     Row Name 07/14/22 1303          PO Evaluation    Number of Days PO Intake Evaluated Insufficient Data     Number of Meals 1     % PO Intake 75                     Problem/Interventions:   Problem 1     Row Name 07/14/22 1303          Nutrition Diagnoses Problem 1    Problem 1 Increased Nutrient Needs     Macronutrient Kcal;Fluid;Protein     Micronutrient Vitamin;Mineral     Etiology (related to) Medical Diagnosis     Infectious Disease Sepsis     Signs/Symptoms (evidenced by) Biochemical     Labs Reviewed Done     Specific Labs Noted C Reactive Protein                      Intervention Goal     Row Name 07/14/22 1307          Intervention Goal    General Maintain nutrition;Disease management/therapy;Reduce/improve symptoms;Meet nutritional needs for age/condition     PO Establish PO;Meet estimated  needs;Tolerate PO;Increase intake     Weight Maintain weight                Nutrition Intervention     Row Name 07/14/22 1304          Nutrition Intervention    RD/Tech Action Follow Tx progress;Care plan reviewd;Encourage intake;Recommend/ordered     Recommended/Ordered Supplement                Nutrition Prescription     Row Name 07/14/22 1304          Nutrition Prescription PO    PO Prescription Begin/change supplement;Other (comment)  Continue current diet as medically appropriate and tolerated     Begin/Change Diet to Regular     Supplement Boost Plus     Supplement Frequency Daily     Common Modifiers Cardiac     New PO Prescription Ordered? No, recommended            Other Orders    Obtain Weight Daily     Obtain Weight Ordered? No, recommended     Supplement Vitamin mineral supplement     Supplement Ordered? No, recommended     Other Continue to monitor and replace electrolytes PRN                Education/Evaluation     Row Name 07/14/22 1306          Education    Education Will Instruct as appropriate            Monitor/Evaluation    Monitor Per protocol;I&O;PO intake;Supplement intake;Pertinent labs;Weight;Symptoms                 Electronically signed by:  Melissa Ibarra RD  07/14/22 13:05 EDT

## 2022-07-14 NOTE — CASE MANAGEMENT/SOCIAL WORK
Discharge Planning Assessment   [facility]     Patient Name: Audrey Alonso  MRN: [id number]  Today's Date: 2022    Admit Date: 2022     Discharge Needs Assessment     Row Name 22 1448       Living Environment    People in Home parent(s);sibling(s);child(orlando), adult    Name(s) of People in Home lives with mother, father, daughter, and brother    Current Living Arrangements home    Duration at Residence 1+    Primary Care Provided by self    Provides Primary Care For child(orlando)    Quality of Family Relationships helpful    Able to Return to Prior Arrangements yes       Resource/Environmental Concerns    Transportation Concerns none       Transition Planning    Patient/Family Anticipates Transition to home with family    Patient/Family Anticipated Services at Transition none    Transportation Anticipated family or friend will provide       Discharge Needs Assessment    Readmission Within the Last 30 Days other (see comments)  was seen recently seen in ER for hip pain; cultures taken; pt phoned to return for + culture tx    Equipment Currently Used at Home none    Concerns to be Addressed no discharge needs identified;denies needs/concerns at this time    Anticipated Changes Related to Illness none    Equipment Needed After Discharge none               Discharge Plan     Row Name 22 5229       Plan    Plan Pt seen in ER recently for hip pain, cultures drawn. Pt received phone call to come back in for treatment.Pt arived with pnemonia.CM saw pt at bedside. Able to state name, , address, phone, demographics. Pt states she lives at home with her mother, father, brother, and daughter. Her family provides transportation. [facility]. She would like meds to bed. She has no home DME. Denies need or concern at this time.              Continued Care and Services - Admitted Since 2022    Coordination has not been started for this encounter.       Expected Discharge Date and  Time     Expected Discharge Date Expected Discharge Time    Jul 16, 2022          Demographic Summary     Row Name 07/14/22 1443       General Information    Admission Type inpatient    Arrived From emergency department;other (see comments)  pt called to come in; recent cultures +    Expected Length of Stay (LOS) 7/16    Referral Source admission list    Reason for Consult discharge planning    Preferred Language English       Contact Information    Permission Granted to Share Info With family/designee    Contact Information Comments mother hebert jama                Functional Status     Row Name 07/14/22 1446       Functional Status    Usual Activity Tolerance good    Current Activity Tolerance fair       Functional Status, IADL    Medications independent    Meal Preparation independent    Housekeeping independent    Laundry independent    Shopping independent               Psychosocial    No documentation.                Abuse/Neglect    No documentation.                Legal    No documentation.                Substance Abuse    No documentation.                Patient Forms    No documentation.                   Alee Garland RN

## 2022-07-14 NOTE — THERAPY EVALUATION
Patient Name: Audrey Alonso  : 1980    MRN: [id number]                              Today's Date: 2022       Admit Date: 2022    Visit Dx:     ICD-10-CM ICD-9-CM   1. Pneumonia of both lungs due to infectious organism, unspecified part of lung  J18.9 483.8   2. Sepsis, due to unspecified organism, unspecified whether acute organ dysfunction present (HCC)  A41.9 038.9     995.91     Patient Active Problem List   Diagnosis   • Thrombocytopenia (HCC)   • Pneumonia of both lungs due to infectious organism     Past Medical History:   Diagnosis Date   • Anxiety    • Asthma    • Depression    • Disease of thyroid gland    • GERD (gastroesophageal reflux disease)    • Headache    • Hypertension    • Seizures (HCC)    • Substance abuse ([facility])    • Trauma      Past Surgical History:   Procedure Laterality Date   • DILATATION AND CURETTAGE     • INCISION AND DRAINAGE ABSCESS  2019    arm      General Information     Row Name 22 1302          Physical Therapy Time and Intention    Document Type evaluation  -MS     Mode of Treatment physical therapy  -MS     Row Name 22 1302          General Information    Patient Profile Reviewed yes  -MS     Existing Precautions/Restrictions fall;hip;right  -MS     Barriers to Rehab medically complex;previous functional deficit  -MS     Row Name 22 1302          Living Environment    People in Home child([place]), dependent;parent(s)  -MS     Row Name 22 1302          Home Main Entrance    Number of Stairs, Main Entrance two  -MS     Stair Railings, Main Entrance none  -MS     Row Name 22 1302          Stairs Within Home, Primary    Number of Stairs, Within Home, Primary other (see comments)  basement, Pt's room on main level  -MS     Row Name 22 1302          Cognition    Orientation Status (Cognition) oriented x 4  -MS     Row Name 22 1302          Safety Issues, Functional Mobility    Safety Issues Affecting Function (Mobility)  insight into deficits/self-awareness;positioning of assistive device;safety precaution awareness;impulsivity;safety precautions follow-through/compliance  -MS     Impairments Affecting Function (Mobility) balance;endurance/activity tolerance;pain;strength;shortness of breath;range of motion (ROM);sensation/sensory awareness  -MS           User Key  (r) = Recorded By, (t) = Taken By, (c) = Cosigned By    Initials Name Provider Type    MS Nehemiah Morgan, PT Physical Therapist               Mobility     Row Name 07/14/22 1305          Bed Mobility    Bed Mobility scooting/bridging  -MS     Scooting/Bridging [place] (Bed Mobility) contact guard  -MS     Assistive Device (Bed Mobility) bed rails;draw sheet;head of bed elevated  -MS     Row Name 07/14/22 1305          Bed-Chair Transfer    Bed-Chair [place] (Transfers) contact guard  -MS     Assistive Device (Bed-Chair Transfers) walker, front-wheeled  -MS     Row Name 07/14/22 1305          Sit-Stand Transfer    Sit-Stand [place] (Transfers) contact guard  -MS     Assistive Device (Sit-Stand Transfers) walker, front-wheeled  -MS     Row Name 07/14/22 1305          Gait/Stairs (Locomotion)    [place] Level (Gait) contact guard  -MS     Assistive Device (Gait) walker, front-wheeled  -MS     Distance in Feet (Gait) 24, 18  -MS     Deviations/Abnormal Patterns (Gait) samuel decreased;festinating/shuffling;antalgic  -MS     Right Sided Gait Deviations heel strike decreased  -MS           User Key  (r) = Recorded By, (t) = Taken By, (c) = Cosigned By    Initials Name Provider Type    Nehemiah Murray PT Physical Therapist               Obj/Interventions     Row Name 07/14/22 1306          Range of Motion Comprehensive    General Range of Motion lower extremity range of motion deficits identified  -MS     Row Name 07/14/22 1306          Strength Comprehensive (MMT)    General Manual Muscle Testing (MMT) Assessment lower extremity strength deficits  identified  -MS     Comment, General Manual Muscle Testing (MMT) Assessment R LE NT due to hip pain.  -MS     Row Name 07/14/22 1306          Balance    Balance Assessment standing dynamic balance  -MS     Dynamic Standing Balance contact guard  -MS     Row Name 07/14/22 1306          Sensory Assessment (Somatosensory)    Sensory Assessment (Somatosensory) sensation intact  -MS           User Key  (r) = Recorded By, (t) = Taken By, (c) = Cosigned By    Initials Name Provider Type    Nehemiah Murray, PT Physical Therapist               Goals/Plan     Row Name 07/14/22 1311          Bed Mobility Goal 1 (PT)    Activity/Assistive Device (Bed Mobility Goal 1, PT) bed mobility activities, all  -MS     [place] Level/Cues Needed (Bed Mobility Goal 1, PT) modified independence  -MS     Time Frame (Bed Mobility Goal 1, PT) long term goal (LTG);10 days  -MS     [facility] Name 07/14/22 1311          Transfer Goal 1 (PT)    Activity/Assistive Device (Transfer Goal 1, PT) transfers, all;sit-to-stand/stand-to-sit  -MS     [place] Level/Cues Needed (Transfer Goal 1, PT) modified independence  -MS     Time Frame (Transfer Goal 1, PT) long term goal (LTG);10 days  -MS     [facility] Name 07/14/22 1311          Gait Training Goal 1 (PT)    Activity/Assistive Device (Gait Training Goal 1, PT) gait (walking locomotion);assistive device use;diminish gait deviation  -MS     [place] Level (Gait Training Goal 1, PT) standby assist  -MS     Distance (Gait Training Goal 1, PT) 200  -MS     Time Frame (Gait Training Goal 1, PT) long term goal (LTG);10 days  -MS     [facility] Name 07/14/22 1311          Patient Education Goal (PT)    Activity (Patient Education Goal, PT) ther exer x15 reps ea  -MS     [place]/Cues/Accuracy (Memory Goal 2, PT) demonstrates adequately  -MS     Time Frame (Patient Education Goal, PT) long term goal (LTG);10 days  -MS     Row Name 07/14/22 1311          Therapy Assessment/Plan (PT)    Planned Therapy  Interventions (PT) balance training;bed mobility training;gait training;home exercise program;stair training;ROM (range of motion);transfer training;patient/family education;strengthening  -MS           User Key  (r) = Recorded By, (t) = Taken By, (c) = Cosigned By    Initials Name Provider Type    MS Nehemiah Morgan, PT Physical Therapist               Clinical Impression     Row Name 07/14/22 1307          Pain    Pretreatment Pain Rating 10/10  -MS     Posttreatment Pain Rating 10/10  -MS     Pain Location - Side/Orientation Right  -MS     Pain Location - hip  -MS     Pain Intervention(s) Repositioned;Ambulation/increased activity  -MS     Row Name 07/14/22 1307          Plan of Care Review    Plan of Care Reviewed With patient  -MS     Progress no change  -MS     Outcome Evaluation Pt participated in PT eval this date. Pt did complain of R hip pain, but was able to ambulate 38 ft then 24 ft with Rwx and CGA and Rwx. Pt instruction on step to gait pattern to allow decreased weight shifting on the R LE. Pt is expected to benefit from skilled PTx during this inpatient stay.  -MS     Row Name 07/14/22 1301          Therapy Assessment/Plan (PT)    Patient/Family Therapy Goals Statement (PT) to go home  -MS     Rehab Potential (PT) good, to achieve stated therapy goals  -MS     Criteria for Skilled Interventions Met (PT) yes;meets criteria  -MS     Therapy Frequency (PT) 5 times/wk  -MS     Row Name 07/14/22 1307          Vital Signs    O2 Delivery Pre Treatment room air  -MS     O2 Delivery Intra Treatment room air  -MS     O2 Delivery Post Treatment room air  -MS     Pre Patient Position Supine  -MS     Intra Patient Position Standing  -MS     Post Patient Position Sitting  -MS     Row Name 07/14/22 1307          Positioning and Restraints    Pre-Treatment Position in bed  -MS     Post Treatment Position chair  -MS     In Chair call light within reach;encouraged to call for assist;reclined  -MS           User Key   (r) = Recorded By, (t) = Taken By, (c) = Cosigned By    Initials Name Provider Type    MS Nehemiah Morgan PT Physical Therapist               Outcome Measures     Row Name 07/14/22 1312          How much help from another person do you currently need...    Turning from your back to your side while in flat bed without using bedrails? 4  -MS     Moving from lying on back to sitting on the side of a flat bed without bedrails? 3  -MS     Moving to and from a bed to a chair (including a wheelchair)? 3  -MS     Standing up from a chair using your arms (e.g., wheelchair, bedside chair)? 3  -MS     Climbing 3-5 steps with a railing? 2  -MS     To walk in hospital room? 3  -MS     AM-PAC 6 Clicks Score (PT) 18  -MS     Highest level of mobility 6 --> Walked 10 steps or more  -MS     Row Name 07/14/22 1312          Functional Assessment    Outcome Measure Options AM-PAC 6 Clicks Basic Mobility (PT)  -MS           User Key  (r) = Recorded By, (t) = Taken By, (c) = Cosigned By    Initials Name Provider Type    MS Nehemiah Morgan PT Physical Therapist                             Physical Therapy Education                 Title: PT OT SLP Therapies (In Progress)     Topic: Physical Therapy (In Progress)     Point: Mobility training (Done)     Learning Progress Summary           Patient Acceptance, E, VU by MS at 7/14/2022 1312    Comment: importance of mobility                   Point: Home exercise program (Done)     Learning Progress Summary           Patient Acceptance, E, VU by MS at 7/14/2022 1312    Comment: importance of mobility                   Point: Body mechanics (Not Started)     Learner Progress:  Not documented in this visit.          Point: Precautions (Not Started)     Learner Progress:  Not documented in this visit.                      User Key     Initials Effective Dates Name Provider Type Discipline    MS 07/01/22 -  Nehemiah Morgan PT Physical Therapist PT              PT Recommendation and  Plan  Planned Therapy Interventions (PT): balance training, bed mobility training, gait training, home exercise program, stair training, ROM (range of motion), transfer training, patient/family education, strengthening  Plan of Care Reviewed With: patient  Progress: no change  Outcome Evaluation: Pt participated in PT eval this date. Pt did complain of R hip pain, but was able to ambulate 38 ft then 24 ft with Rwx and CGA and Rwx. Pt instruction on step to gait pattern to allow decreased weight shifting on the R LE. Pt is expected to benefit from skilled PTx during this inpatient stay.     Time Calculation:    PT Charges     Row Name 07/14/22 1313             Time Calculation    Start Time 1003  -MS      PT Received On 07/14/22  -MS      PT Goal Re-Cert Due Date 07/24/22  -MS              Untimed Charges    PT Eval/Re-eval Minutes 40  -MS              Total Minutes    Untimed Charges Total Minutes 40  -MS       Total Minutes 40  -MS            User Key  (r) = Recorded By, (t) = Taken By, (c) = Cosigned By    Initials Name Provider Type    MS Nehemiah Morgan PT Physical Therapist              Therapy Charges for Today     Code Description Service Date Service Provider Modifiers Qty    [id number]  PT EVAL LOW COMPLEXITY 3 7/14/2022 Nehemiah Morgan PT GP 1          PT G-Codes  Outcome Measure Options: AM-PAC 6 Clicks Basic Mobility (PT)  AM-PAC 6 Clicks Score (PT): 18    Nehemiah Morgan PT  7/14/2022

## 2022-07-14 NOTE — PLAN OF CARE
Goal Outcome Evaluation:  Plan of Care Reviewed With: patient        Progress: no change  Outcome Evaluation: Pt seen for OT evaluation today.  Pt presents with pain and decreased independence with self care tasks.  Pt able to sit eob with cga, stand with cga and walked out into the hallway, to her bathroom and from bathroom to her chair.  Pt is expected to benefit from skilled OT to improve her strength and independence with ADL tasks.

## 2022-07-14 NOTE — PAYOR COMM NOTE
"TO:[facility]  FROM:NERISSA RAMESH, RN PHONE [phone]  INPT NOTIFICATION AND CLINICALS    Nannette Alonso (42 y.o. Female)             Date of Birth   1980    Social Security Number       Address   [address] [place] [address] [address] [place] [address]    Home Phone   [phone]    MRN   [id number]       Jehovah's witness   [facility]    Marital Status                               Admission Date   22    Admission Type   Emergency    Admitting Provider   Ran Gaspar DO    Attending Provider   Ran Gaspar DO    Department, Room/Bed   [facility] MED SURG  4, 432/       Discharge Date       Discharge Disposition       Discharge Destination                               Attending Provider: Ran Gaspar DO    Allergies: Diphenhydramine Hcl    Isolation: None   Infection: None   Code Status: CPR   Advance Care Planning Activity    Ht: 160 cm (63\")   Wt: 79.8 kg (175 lb 14.8 oz)    Admission Cmt: None   Principal Problem: None                Active Insurance as of 2022     Primary Coverage     Payor Plan Insurance Group Employer/Plan Group    [employer] [place] [employer]      Payor Plan Address Payor Plan Phone Number Payor Plan Fax Number Effective Dates    [address] [address] [phone]  2019 - None Entered    [facility] [id number]       Subscriber Name Subscriber Birth Date Member ID       NANNETTE ALONSO 1980 [id number]                 Emergency Contacts      (Rel.) Home Phone Work Phone Mobile Phone    FRANCESCA ALLEN (Mother) -- -- [phone]               History & Physical      Ran Gaspar DO at 22 [facility]            [facility] HOSPITALIST   HISTORY AND PHYSICAL      Name:  Nannette Alonso   Age:  42 y.o.  Sex:  female  :  1980  MRN:  [id number]   Visit Number:  [id number]  Admission Date:  2022  Date Of Service:  22  Primary Care Physician:  David Fleming DO    Chief Complaint:     Positive " blood cultures    History Of Presenting Illness:      Patient is a 42-year-old female with history significant for anxiety/depression, hypertension, substance abuse who presents to the emergency room at the request of the ER after patient's blood cultures from previous visit returned positive for Serratia and Corynebacterium.  Patient initially reported to the emergency room on 7/8/2022 with complaints of progressively worsening chronic right hip pain.  Patient is followed by Dr. Salinas with orthopedics.  She receives intra-articular joint injections with last known injection approximately 4 months ago.   At the time, there was concern for septic joint and so blood cultures were obtained.  However, this was ruled out and patient was discharged home with prescription for Norco and outpatient follow-up.  Patient does admit to bacteremia in the past, but unfortunately cannot recall the name of the bacteria.  She was hospitalized in [place] approximately 3 to 4 years ago after an abscess developed from IV drug use.  Patient does not recall being told that she had any medical problems with her heart.  She states that since that time she has avoided IV injections but does note that she has relapsed recently.  Her drugs of choice include opiates and benzos.  She most recently used fentanyl.  She denies nausea/vomiting, diarrhea, chest pain, cough, shortness of breath, fever/chills.  No known sick contacts.  Upon arrival to the ER, patient was afebrile but tachycardic at 123.  She was otherwise hemodynamically stable and 96% on room air.  Labs are notable for negative troponin, unremarkable chemistry panel, CRP 39, sedimentation rate 79, lactate normal, procalcitonin 0.6.  WBC 14, hemoglobin 9.6, hematocrit 28, platelets 367.  Urinalysis was negative for infection.  COVID-negative.  UDS positive for amphetamines, benzos, methadone, opiates and TCAs.  CT abdomen pelvis showed no acute intra-abdominal process.  CT angiogram of  the chest was negative for pulmonary embolism but does show evidence of multifocal pneumonia.  Patient received Dilaudid, vancomycin and Zosyn in the emergency room.  Hospital service contacted for admission.    Review Of Systems:    All systems were reviewed and negative except as mentioned in history of presenting illness, assessment and plan.    Past Medical History: Patient  has a past medical history of Anxiety, Asthma, Depression, Disease of thyroid gland, GERD (gastroesophageal reflux disease), Headache, Hypertension, Seizures (HCC), Substance abuse ([facility]), and Trauma.    Past Surgical History: Patient  has a past surgical history that includes Dilation and curettage of uterus and Incision and Drainage Abscess (2019).    Social History: Patient  reports that she has been smoking cigarettes. She has a 5.00 pack-year smoking history. She has never used smokeless tobacco. She reports current drug use. Drug: IV. She reports that she does not drink alcohol.    Family History: Patient's family history includes Arthritis in her mother; Breast cancer in her maternal aunt and mother; Cancer in her mother; Diabetes in her father; Hypertension in her father; Mental illness in her brother; Thyroid disease in her mother.    Allergies:      Diphenhydramine hcl    Home Medications:    Prior to Admission Medications     Prescriptions Last Dose Informant Patient Reported? Taking?    amoxicillin (AMOXIL) 500 MG capsule   Yes No    amphetamine-dextroamphetamine (Adderall) 30 MG tablet   No No    Take 30 mg orally every morning and afternoon    buPROPion XL (Wellbutrin XL) 300 MG 24 hr tablet   No No    Take 1 tablet by mouth Daily. WITH 150 mg    busPIRone (BUSPAR) 15 MG tablet   No No    Take 2 tablets by mouth 2 (Two) Times a Day.    diclofenac (VOLTAREN) 75 MG EC tablet   Yes No    doxepin (SINEquan) 50 MG capsule   No No    Take 1 capsule by mouth Every Night.    DULoxetine (Cymbalta) 60 MG capsule   No No    Two po q am     "gabapentin (NEURONTIN) 800 MG tablet   No No    Take 1 tablet by mouth 3 (Three) Times a Day.    ibuprofen (ADVIL,MOTRIN) 800 MG tablet   Yes No    lidocaine (LIDODERM) 5 %   No No    Place 1 patch on the skin as directed by provider Daily. Remove & Discard patch within 12 hours or as directed by MD    meloxicam (MOBIC) 7.5 MG tablet   No No    Take 1 tablet by mouth Daily.    methadone (DOLOPHINE) 10 MG tablet   Yes No    Take 60 mg by mouth Daily,    oxyCODONE-acetaminophen (PERCOCET) 5-325 MG per tablet   No No    Take 1 tablet by mouth Every 4 (Four) Hours As Needed for Severe Pain .    prazosin (MINIPRESS) 5 MG capsule   No No    Two po q hs    QUEtiapine (SEROquel) 100 MG tablet   No No    Take 1 tablet by mouth Every Night.        ED Medications:    Medications   sodium chloride 0.9 % bolus 1,000 mL (1,000 mL Intravenous New Bag 7/13/22 1636)   sodium chloride 0.9 % bolus 1,000 mL (0 mL Intravenous Stopped 7/13/22 1604)   ondansetron (ZOFRAN) injection 4 mg (4 mg Intravenous Given 7/13/22 1209)   HYDROmorphone (DILAUDID) injection 1 mg (1 mg Intravenous Given 7/13/22 1209)   piperacillin-tazobactam (ZOSYN) 3.375 g in iso-osmotic dextrose 50 ml (premix) (0 g Intravenous Stopped 7/13/22 1404)   vancomycin 1500 mg/500 mL 0.9% NS IVPB ([facility]) (0 mg Intravenous Stopped 7/13/22 1636)     And   Pharmacy to dose vancomycin ( Does not apply Given 7/13/22 1405)   iopamidol (ISOVUE-300) 61 % injection 100 mL (100 mL Intravenous Given 7/13/22 1337)   HYDROmorphone (DILAUDID) injection 1 mg (1 mg Intravenous Given 7/13/22 1443)     Vital Signs:  Temp:  [98 °F (36.7 °C)] 98 °F (36.7 °C)  Heart Rate:  [123] 123  Resp:  [18] 18  BP: (128)/(77) 128/77        07/13/22  0942   Weight: 74.8 kg (165 lb)     Body mass index is 29.23 kg/m².    Physical Exam:     Most recent vital Signs: /77 (BP Location: Left arm, Patient Position: Sitting)   Pulse (!) 123   Temp 98 °F (36.7 °C) (Oral)   Resp 18   Ht 160 cm (63\")   Wt 74.8 " kg (165 lb)   LMP 07/01/2022 (Approximate)   SpO2 96%   BMI 29.23 kg/m²     Physical Exam  Constitutional:       General: She is not in acute distress.     Appearance: Normal appearance.   HENT:      Head: Normocephalic and atraumatic.      Right Ear: External ear normal.      Left Ear: External ear normal.      Mouth/Throat:      Mouth: Mucous membranes are moist.      Pharynx: Oropharynx is clear.   Eyes:      Extraocular Movements: Extraocular movements intact.      Conjunctiva/sclera: Conjunctivae normal.      Pupils: Pupils are equal, round, and reactive to light.   Cardiovascular:      Rate and Rhythm: Normal rate and regular rhythm.      Pulses: Normal pulses.      Heart sounds: Normal heart sounds.   Pulmonary:      Effort: Pulmonary effort is normal.      Breath sounds: Normal breath sounds.   Abdominal:      General: There is no distension.      Palpations: Abdomen is soft.      Tenderness: There is no abdominal tenderness.   Musculoskeletal:      Right lower leg: Edema present.      Left lower leg: Edema present.   Skin:     General: Skin is warm and dry.   Neurological:      General: No focal deficit present.      Mental Status: She is alert and oriented to person, place, and time.         Laboratory data:    I have reviewed the labs done in the emergency room.    Results from last 7 days   Lab Units 07/13/22  1202 07/08/22  2151   SODIUM mmol/L 140 137   POTASSIUM mmol/L 4.4 4.0   CHLORIDE mmol/L 102 102   CO2 mmol/L 25.7 22.1   BUN mg/dL 10 10   CREATININE mg/dL 0.76 0.82   CALCIUM mg/dL 8.6 8.3*   BILIRUBIN mg/dL 0.3 0.6   ALK PHOS U/L 145* 154*   ALT (SGPT) U/L 23 24   AST (SGOT) U/L 27 21   GLUCOSE mg/dL 92 94     Results from last 7 days   Lab Units 07/13/22  1202 07/08/22  2151   WBC 10*3/mm3 14.03* 5.01   HEMOGLOBIN g/dL 9.6* 10.3*   HEMATOCRIT % 28.7* 30.2*   PLATELETS 10*3/mm3 367 183         Results from last 7 days   Lab Units 07/13/22  1202   CK TOTAL U/L 34   TROPONIN T ng/mL <0.010                      Results from last 7 days   Lab Units 07/13/22  1548 07/09/22  0358   COLOR UA  Yellow Yellow   GLUCOSE UA  Negative Negative   KETONES UA  Negative Negative   LEUKOCYTES UA  Negative Negative   PH, URINE  6.5 7.0   BILIRUBIN UA  Negative Negative   UROBILINOGEN UA  0.2 E.U./dL 0.2 E.U./dL   RBC UA /HPF  --  0-2*   WBC UA /HPF  --  None Seen       Pain Management Panel     Pain Management Panel Latest Ref Rng & Units 7/13/2022 7/9/2022    CREATININE UR 20.0 - 300.0 mg/dL - -    AMPHETAMINES SCREEN, URINE Negative Positive(A) Negative    BARBITURATES SCREEN Negative Negative Negative    BENZODIAZEPINE SCREEN, URINE Negative Positive(A) Negative    BUPRENORPHINEUR Negative Negative Negative    COCAINE SCREEN, URINE Negative Negative Negative    METHADONE SCREEN, URINE Negative Positive(A) Positive(A)    METHAMPHETAMINEUR Negative Negative Negative          EKG:      EKG personally reviewed, sinus tachycardia with a rate of 112 bpm.  QTc 402.  No acute ST or T wave changes.    Radiology:    CT Abdomen Pelvis With Contrast    Result Date: 7/13/2022  PROCEDURE: CT ABDOMEN PELVIS W CONTRAST-  HISTORY:  right hip pain c/w sepsis  COMPARISON: 07/09/2022.  TECHNIQUE: Multiple axial CT images were obtained from the lung bases through the pubic symphysis following the administration of Isovue 300 and oral contrast.  FINDINGS:  ABDOMEN: The lung bases are clear. The heart is normal in size. The liver is normal. The gallbladder is unremarkable. The spleen is unremarkable. No adrenal mass is present.  The pancreas is normal. The kidneys are normal. The aorta is normal in caliber. There is no free fluid or adenopathy. The abdominal portions of the GI tract are unremarkable with no evidence of obstruction.  PELVIS: The appendix is normal.  The urinary bladder is unremarkable. Uterus and ovaries are unremarkable. There is no significant free fluid or adenopathy.      No evidence of acute intra-abdominal process.      This study was performed with techniques to keep radiation doses as low as reasonably achievable (ALARA). Individualized dose reduction techniques using automated exposure control or adjustment of mA and/or kV according to the patient size were employed.      Images were reviewed, interpreted, and dictated by Dr. Alejandro Price MD Transcribed by Dulce Goodwin PA-C.  This report was signed and finalized on 7/13/2022 2:04 PM by Alejandro Price MD.    CT Angiogram Chest Pulmonary Embolism    Result Date: 7/13/2022  PROCEDURE: CT ANGIOGRAM CHEST PULMONARY EMBOLISM-  HISTORY: sepsis  COMPARISON: September 2020.  TECHNIQUE: Multiple axial CT images were obtained from the thoracic inlet through the upper abdomen following the administration of Isovue 300 per the CT PE protocol. Coronal and oblique 3D MIP images were reconstructed from the original axial data set.  FINDINGS: There are no filling defects within the pulmonary arteries to suggest an embolus. The thoracic aorta is normal in caliber with no evidence of dissection. The heart is normal in size. There are no pleural or pericardial effusions. There is no adenopathy. There are a few peripheral groundglass opacities in the upper lobes bilaterally consistent with pneumonia, viral etiology is in the differential diagnosis. There is right lower lobe atelectasis. There is a small hiatal hernia without evidence of reflux. The visualized upper abdomen is unremarkable.      1. Mild multifocal pneumonia. Viral etiology is included in the differential diagnosis. 2. No evidence of pulmonary embolus or dissection.    15.22 mGy.cm   This study was performed with techniques to keep radiation doses as low as reasonably achievable (ALARA). Individualized dose reduction techniques using automated exposure control or adjustment of mA and/or kV according to the patient size were employed.     Images were reviewed, interpreted, and dictated by Dr. Alejandro Price MD Transcribed by Dulce  MAMIE Goodwin.  This report was signed and finalized on 7/13/2022 2:04 PM by Alejandro Price MD.    US Venous Doppler Lower Extremity Bilateral (duplex)    Addendum Date: 7/13/2022    PROCEDURE: US VENOUS DOPPLER LOWER EXTREMITY BILATERAL (DUPLEX)-   HISTORY: swelling  RIGHT LOWER EXTREMITY:  FINDINGS: Multiple transverse and longitudinal scans were performed of the femoropopliteal deep venous system, with augmentation and compression maneuvers.  Normal phasic flow was noted in the visualized deep venous system. No intraluminal increased echogenicity is noted to suggest thrombus. There is normal compression and augmentation of the venous structures. No abnormal venous collaterals are seen.  IMPRESSION: No evidence of deep venous thrombosis of the right lower extremity.    HISTORY:  swelling  LEFT LOWER EXTREMITY:  Findings: Multiple transverse and longitudinal scans were performed of the femoropopliteal deep venous system, with augmentation and compression maneuvers.  Normal phasic flow was noted in the visualized deep venous system. No intraluminal increased echogenicity is noted to suggest thrombus. There is normal compression and augmentation of the venous structures. No abnormal venous collaterals are seen.  IMPRESSION: No evidence of deep venous thrombosis of the left lower extremity.  This report was signed and finalized on 7/13/2022 1:56 PM by Alejandro Price MD.    Addendum Date: 7/13/2022    PROCEDURE: US VENOUS DOPPLER LOWER EXTREMITY BILATERAL (DUPLEX)-   HISTORY: swelling  RIGHT LOWER EXTREMITY:  FINDINGS: Multiple transverse and longitudinal scans were performed of the femoropopliteal deep venous system, with augmentation and compression maneuvers.  Normal phasic flow was noted in the visualized deep venous system. No intraluminal increased echogenicity is noted to suggest thrombus. There is normal compression and augmentation of the venous structures. No abnormal venous collaterals are seen.  IMPRESSION:  No evidence of deep venous thrombosis of the right lower extremity.    HISTORY:  swelling  LEFT LOWER EXTREMITY:  Findings: Multiple transverse and longitudinal scans were performed of the femoropopliteal deep venous system, with augmentation and compression maneuvers.  Normal phasic flow was noted in the visualized deep venous system. No intraluminal increased echogenicity is noted to suggest thrombus. There is normal compression and augmentation of the venous structures. No abnormal venous collaterals are seen.  IMPRESSION: No evidence of deep venous thrombosis of the left lower extremity.  This report was signed and finalized on 7/13/2022 1:56 PM by Alejandro Price MD.    Result Date: 7/13/2022  FINAL REPORT TECHNIQUE: Bilateral lower extremity venous duplex was performed with augmentation and compression. CLINICAL HISTORY: swelling FINDINGS: Proper flow is seen throughout the deep venous systems bilaterally.  There is no evidence of deep venous thrombosis.     No evidence of deep venous thrombosis. Authenticated and Electronically Signed by Henrry Pinon MD on 07/13/2022 12:49:38 PM      Assessment:    1. Sepsis secondary to #2-POA  2. Serratia marcescens and Corynebacterium bacteremia-POA  3. Mild multifocal pneumonia-POA  4. Anxiety/depression  5. Hypertension  6. GERD  7. History of IV drug abuse  8. Opiate use disorder on methadone  9. Severe right hip osteoarthritis with history of joint injections    Plan:    Sepsis  Serratia marcescens and Corynebacterium bacteremia  Pneumonia?  - Serratia is a space organism, however upon review of sensitivities it is sensitive to ceftazidime.  As such, we will choose to treat with Rocephin  -Vancomycin for Corynebacterium (though this could be a contaminant)  -Suspect that source could be related to pneumonia, I do not suspect septic joint as patient does not have pain upon palpation or erythema, however osteomyelitis is on my differential especially given elevated CRP  and sed rate, will ask radiology to review CT abdomen and pelvis with special attention to bone  -Repeat blood cultures  -Fortunately, patient is maintaining appropriate saturation on room air.  -We will trend CRP  -Supportive care    History of IV drug use  -UDS positive for amphetamines, benzodiazepine, methadone, opiates and TCAs  -Patient is prescribed medication for all except for benzodiazepines  -Denies history of recent IV injection  -Does admit to relapse with fentanyl  -We will continue with home methadone  -We will attempt pain control with Toradol and Tylenol    Bilateral lower extremity edema  - Lower extremity duplex venous ultrasounds negative for DVT  - Will obtain 2D echocardiogram    Risk Assessment: High  DVT Prophylaxis: Lovenox  Code Status: Full  Diet: Regular    Advance Care Planning   ACP discussion was declined by the patient. Patient does not have an advance directive, declines further assistance.           Ran Gaspar DO  07/13/22  16:54 EDT    Dictated utilizing Dragon dictation.      Electronically signed by Ran Gaspar DO at 07/13/22 1736          Emergency Department Notes      Carlos Patiño Jr., PA-C at 07/13/22 1107     Attestation signed by Margo Palmer MD at 07/13/22 1732        NON FACE TO FACE: This visit was performed by BOTH a physician and an APC. I performed all aspects of the MDM as documented.  Margo Palmer MD 7/13/2022 17:32 EDT                         Subjective   42-year-old female presents to the emergency department after receiving a phone call from [facility] for abnormal culture results.  The patient was seen in the emergency department several days ago for right hip pain, she had been receiving injections due to chronic hip pain, her last injection was approximately 3 to 4 months ago.  She had extensive work-up performed at that time and there was concern for a septic hip joint, this was ruled out and she was sent home with  pain medication.  Her blood cultures grew out Serratia several days later and there is been multiple attempts to try to reach her at home, she states she has had trouble with her phone.  She finally got the message or call her primary care physician who told her to come to the emergency department.  Cultures indicate an abnormal growth of Serratia with pan susceptibility to multiple antibiotics.  She also grew out cornybacterium with indications of susceptibility to vancomycin.  She reports that she has continued right hip pain and has been mostly immobile at home due to the pain.  She now has swelling in both legs.  She does have a significant past medical history of IV drug abuse and is currently on methadone.      History provided by:  Patient   used: No        Review of Systems   Cardiovascular: Positive for leg swelling.   Musculoskeletal:        Right hip pain   All other systems reviewed and are negative.      Past Medical History:   Diagnosis Date   • Anxiety    • Asthma    • Depression    • Disease of thyroid gland    • GERD (gastroesophageal reflux disease)    • Headache    • Hypertension    • Seizures (HCC)    • Substance abuse (HCC)    • Trauma        Allergies   Allergen Reactions   • Diphenhydramine Hcl Other (See Comments)       Past Surgical History:   Procedure Laterality Date   • DILATATION AND CURETTAGE     • INCISION AND DRAINAGE ABSCESS  2019    arm       Family History   Problem Relation Age of Onset   • Arthritis Mother    • Cancer Mother         breast   • Thyroid disease Mother    • Breast cancer Mother    • Diabetes Father    • Hypertension Father    • Mental illness Brother    • Breast cancer Maternal Aunt        Social History     Socioeconomic History   • Marital status:    Tobacco Use   • Smoking status: Current Every Day Smoker     Packs/day: 0.25     Years: 20.00     Pack years: 5.00     Types: Cigarettes   • Smokeless tobacco: Never Used   • Tobacco  comment: vapes also   Vaping Use   • Vaping Use: Every day   • Substances: Nicotine, Flavoring   • Devices: Pre-filled or refillable cartridge   Substance and Sexual Activity   • Alcohol use: No     Comment: stopped 2008   • Drug use: Yes     Types: IV     Comment: STATES SHE TOOK HER METHADONE AND SOME BENZOS 9/17/20   • Sexual activity: Not Currently     Partners: Male     Birth control/protection: Abstinence           Objective   Physical Exam  Vitals and nursing note reviewed.   Constitutional:       Appearance: She is well-developed.   HENT:      Head: Normocephalic and atraumatic.   Cardiovascular:      Rate and Rhythm: Normal rate and regular rhythm.   Pulmonary:      Effort: Pulmonary effort is normal.      Breath sounds: Normal breath sounds.   Abdominal:      Palpations: Abdomen is soft.   Musculoskeletal:      Cervical back: Normal range of motion and neck supple.      Right lower leg: Edema present.      Left lower leg: Edema present.      Comments: Tenderness to palpation right hip, pain with flexion and extension.   Skin:     General: Skin is warm and dry.   Neurological:      Mental Status: She is alert and oriented to person, place, and time.      Deep Tendon Reflexes: Reflexes are normal and symmetric.         Procedures          ED Course  ED Course as of 07/13/22 1614   Wed Jul 13, 2022   1350 EKG interpreted by me reveals sinus tachycardia with a rate of 112 bpm.  There is low QRS voltage in chest leads.  This is an abnormal appearing EKG. [TB]   1520 Discussed the case with Dr. Roman, he will review and call me back [CS]   1604 Discussed with , patient accepted for admission [CS]      ED Course User Index  [CS] Carlos Patiño Jr., PADONNIE  [TB] Margo Palmer MD                                           MDM  Number of Diagnoses or Management Options  Pneumonia of both lungs due to infectious organism, unspecified part of lung: new and requires workup  Sepsis, due to  unspecified organism, unspecified whether acute organ dysfunction present (HCC): new and requires workup     Amount and/or Complexity of Data Reviewed  Clinical lab tests: reviewed  Decide to obtain previous medical records or to obtain history from someone other than the patient: yes  Discuss the patient with other providers: yes    Risk of Complications, Morbidity, and/or Mortality  Presenting problems: moderate  Diagnostic procedures: low  Management options: moderate  General comments: 42-year-old female presents with concern for sepsis, from a previous blood culture results she was called back to the emergency department.  She presented at that time and today with increasingly worsening right hip pain over the last several weeks.  The pain is worse with any activity, she also states she has increased edema in both her legs.  She has had body aches and chills at home but denies any fever.  Work-up was performed here including labs, repeat cultures, and CT scans of her chest and abdomen.  She was found to have multifocal pneumonia, started on Vanco and Zosyn and IV fluids per sepsis protocol and admitted to telemetry is remained stable throughout    Patient Progress  Patient progress: stable      Final diagnoses:   Pneumonia of both lungs due to infectious organism, unspecified part of lung   Sepsis, due to unspecified organism, unspecified whether acute organ dysfunction present (HCC)       ED Disposition  ED Disposition     ED Disposition   Decision to Admit    Condition   --    Comment   Level of Care: Telemetry [5]   Diagnosis: Pneumonia of both lungs due to infectious organism, unspecified part of lung [[id number]]   Admitting Physician: JOSEPH ASHLEY [[id number]]   Attending Physician: JOSEPH ASHLEY [[id number]]   Certification: I Certify That Inpatient Hospital Services Are Medically Necessary For Greater Than 2 Midnights               No follow-up provider specified.       Medication List      No changes were  made to your prescriptions during this visit.          Carlos Patiño Jr., PA-C  07/13/22 1614      Electronically signed by Margo Palmer MD at 07/13/22 1732     Vicky Schneider, RN at 07/13/22 1235        Called from CT, Pt's ultrasound-guided IV had infiltrated. Carlos LANG notified.     Electronically signed by Vicky Schneider, RN at 07/13/22 1249     Vicky Schneider RN at 07/13/22 1736        Report given to Loretta MENA, on 4th floor.     Electronically signed by Vicky Schneider RN at 07/13/22 1736       Vital Signs (last day)     Date/Time Temp Temp src Pulse Resp BP Patient Position SpO2    07/14/22 1100 98.5 (36.9) Oral 114 18 156/92 Sitting --    07/14/22 0700 98.3 (36.8) Oral 113 18 148/90 Sitting --    07/14/22 0343 98.8 (37.1) Oral 111 18 137/78 Sitting 98    07/14/22 0019 97.8 (36.6) Oral 106 18 123/74 Lying 95    07/13/22 2145 98.3 (36.8) Oral 106 18 130/73 Lying 96    07/13/22 1832 98 (36.7) Oral 106 18 151/101 Sitting 96    07/13/22 0942 98 (36.7) Oral 123 18 128/77 Sitting 96            Current Facility-Administered Medications   Medication Dose Route Frequency Provider Last Rate Last Admin   • acetaminophen (TYLENOL) tablet 650 mg  650 mg Oral Q4H PRN Ran Gaspar DO   650 mg at 07/13/22 2018    Or   • acetaminophen (TYLENOL) 160 MG/5ML solution 650 mg  650 mg Oral Q4H PRN Ran Gaspar DO        Or   • acetaminophen (TYLENOL) suppository 650 mg  650 mg Rectal Q4H PRN Ran Gaspar DO       • sennosides-docusate (PERICOLACE) 8.6-50 MG per tablet 2 tablet  2 tablet Oral BID Ran Gaspar DO   2 tablet at 07/14/22 0825    And   • polyethylene glycol (MIRALAX) packet 17 g  17 g Oral Daily PRN Ran Gaspar DO        And   • bisacodyl (DULCOLAX) EC tablet 5 mg  5 mg Oral Daily PRN Ran Gaspar DO        And   • bisacodyl (DULCOLAX) suppository 10 mg  10 mg Rectal Daily PRN Ran Gaspar DO       • buPROPion XL (WELLBUTRIN XL) 24 hr tablet 300 mg  300 mg Oral Daily  Ran Gaspar DO   300 mg at 07/14/22 0824   • busPIRone (BUSPAR) tablet 30 mg  30 mg Oral BID Ran Gaspar DO   30 mg at 07/14/22 0825   • cefTRIAXone (ROCEPHIN) IVPB 2 g/50ml dextrose (premix)  2 g Intravenous Q24H Ran Gaspar DO       • doxepin (SINEquan) capsule 50 mg  50 mg Oral Nightly Ran Gaspar DO   50 mg at 07/13/22 2017   • DULoxetine (CYMBALTA) DR capsule 60 mg  60 mg Oral Daily Ran Gaspar DO   60 mg at 07/14/22 0824   • Enoxaparin Sodium (LOVENOX) syringe 40 mg  40 mg Subcutaneous Daily Ran Gaspar DO   40 mg at 07/14/22 0825   • gabapentin (NEURONTIN) capsule 300 mg  300 mg Oral Q8H Ran Gaspar DO   300 mg at 07/14/22 1320   • ketorolac (TORADOL) injection 30 mg  30 mg Intravenous Q4H PRN Margo Voss DO   30 mg at 07/14/22 1320   • melatonin tablet 5 mg  5 mg Oral Nightly PRN Ran Gaspar DO       • [START ON 7/15/2022] methadone (DOLOPHINE) tablet 125 mg  125 mg Oral Daily Ran Gaspar DO       • ondansetron (ZOFRAN) injection 4 mg  4 mg Intravenous Q6H PRN Ran Gaspar DO       • Pharmacy to dose vancomycin   Does not apply Continuous PRN Ran Gaspar DO       • QUEtiapine (SEROquel) tablet 100 mg  100 mg Oral Nightly Ran Gaspar DO   100 mg at 07/13/22 2018   • sodium chloride 0.9 % flush 10 mL  10 mL Intravenous Q12H Ran Gaspar DO   10 mL at 07/14/22 0826   • sodium chloride 0.9 % flush 10 mL  10 mL Intravenous PRN Ran Gaspar DO       • vancomycin 1250 mg/250 mL 0.9% NS IVPB ([facility])  1,250 mg Intravenous Q12H Ran Gaspar DO   1,250 mg at 07/14/22 1425       Lab Results (last 24 hours)     Procedure Component Value Units Date/Time    Body Fluid Culture - Body Fluid, Hip, Right [[id number]] Collected: 07/14/22 1200    Specimen: Body Fluid from Hip, Right Updated: 07/14/22 1407     Gram Stain No No organisms seen      No WBCs seen    Body Fluid Cell Count With Differential - Body Fluid, Hip, Right [[id number]]  (Abnormal)  Collected: 07/14/22 1200    Specimen: Body Fluid from Hip, Right Updated: 07/14/22 1303    Narrative:      The following orders were created for panel order Body Fluid Cell Count With Differential - Body Fluid, Hip, Right.  Procedure                               Abnormality         Status                     ---------                               -----------         ------                     Body fluid cell count - ...[[id number]]  Abnormal            Final result                 Please view results for these tests on the individual orders.    Body fluid cell count - Body Fluid, Hip, Right [[id number]]  (Abnormal) Collected: 07/14/22 1200    Specimen: Body Fluid from Hip, Right Updated: 07/14/22 1303     Color, Fluid Red     Appearance, Fluid Bloody     WBC, Fluid --     Comment: Unable to perform the count due to specimen clotted        RBC, Fluid --     Comment: Unable to perform the count due to specimen clotted       Blood Culture - Blood, Hand, Right [[id number]]  (Normal) Collected: 07/13/22 1202    Specimen: Blood from Hand, Right Updated: 07/14/22 1247     Blood Culture No growth at 24 hours    Blood Culture - Blood, Hand, Left [[id number]]  (Normal) Collected: 07/13/22 1202    Specimen: Blood from Hand, Left Updated: 07/14/22 1247     Blood Culture No growth at 24 hours    Basic Metabolic Panel [[id number]]  (Abnormal) Collected: 07/14/22 0653    Specimen: Blood Updated: 07/14/22 0728     Glucose 91 mg/dL      BUN 9 mg/dL      Creatinine 0.64 mg/dL      Sodium 137 mmol/L      Potassium 3.9 mmol/L      Chloride 103 mmol/L      CO2 23.6 mmol/L      Calcium 8.3 mg/dL      BUN/Creatinine Ratio 14.1     Anion Gap 10.4 mmol/L      eGFR 113.3 mL/min/1.73      Comment: National Kidney Foundation and American Society of Nephrology (ASN) Task Force recommended calculation based on the Chronic Kidney Disease Epidemiology Collaboration (CKD-EPI) equation refit without adjustment for race.       Narrative:      GFR Normal  >60  Chronic Kidney Disease <60  Kidney Failure <15      C-reactive Protein [[id number]]  (Abnormal) Collected: 07/14/22 0653    Specimen: Blood Updated: 07/14/22 0728     C-Reactive Protein 34.36 mg/dL     Sedimentation Rate [[id number]]  (Abnormal) Collected: 07/14/22 0653    Specimen: Blood Updated: 07/14/22 0714     Sed Rate 80 mm/hr     CBC Auto Differential [[id number]]  (Abnormal) Collected: 07/14/22 0653    Specimen: Blood Updated: 07/14/22 0708     WBC 16.07 10*3/mm3      RBC 3.02 10*6/mm3      Hemoglobin 9.0 g/dL      Hematocrit 27.0 %      MCV 89.4 fL      MCH 29.8 pg      MCHC 33.3 g/dL      RDW 14.4 %      RDW-SD 46.9 fl      MPV 8.8 fL      Platelets 376 10*3/mm3      Neutrophil % 83.5 %      Lymphocyte % 6.6 %      Monocyte % 7.2 %      Eosinophil % 1.1 %      Basophil % 0.2 %      Immature Grans % 1.4 %      Neutrophils, Absolute 13.44 10*3/mm3      Lymphocytes, Absolute 1.06 10*3/mm3      Monocytes, Absolute 1.15 10*3/mm3      Eosinophils, Absolute 0.17 10*3/mm3      Basophils, Absolute 0.03 10*3/mm3      Immature Grans, Absolute 0.22 10*3/mm3      nRBC 0.0 /100 WBC     Urinalysis, Microscopic Only - Urine, Clean Catch [[id number]]  (Abnormal) Collected: 07/13/22 1548    Specimen: Urine, Clean Catch Updated: 07/13/22 1655     RBC, UA None Seen /HPF      WBC, UA 0-2 /HPF      Comment: Urine culture not indicated.        Bacteria, UA Trace /HPF      Squamous Epithelial Cells, UA 0-2 /HPF      Hyaline Casts, UA None Seen /LPF      Methodology Manual Light Microscopy    Urinalysis With Culture If Indicated - Urine, Clean Catch [[id number]]  (Abnormal) Collected: 07/13/22 1548    Specimen: Urine, Clean Catch Updated: 07/13/22 1641     Color, UA Yellow     Appearance, UA Clear     pH, UA 6.5     Specific Gravity, UA >1.030     Glucose, UA Negative     Ketones, UA Negative     Bilirubin, UA Negative     Blood, UA Moderate (2+)     Protein, UA Negative     Leuk Esterase, UA Negative     Nitrite, UA Negative      Urobilinogen, UA 0.2 E.U./dL    Narrative:      In absence of clinical symptoms, the presence of pyuria, bacteria, and/or nitrites on the urinalysis result does not correlate with infection.    Urine Drug Screen - Urine, Clean Catch [[id number]]  (Abnormal) Collected: 07/13/22 1548    Specimen: Urine, Clean Catch Updated: 07/13/22 1605     THC, Screen, Urine Negative     Phencyclidine (PCP), Urine Negative     Cocaine Screen, Urine Negative     Methamphetamine, Ur Negative     Opiate Screen Positive     Amphetamine Screen, Urine Positive     Benzodiazepine Screen, Urine Positive     Tricyclic Antidepressants Screen Positive     Methadone Screen, Urine Positive     Barbiturates Screen, Urine Negative     Oxycodone Screen, Urine Negative     Propoxyphene Screen Negative     Buprenorphine, Screen, Urine Negative    Narrative:      Limitations of this procedure include the possibility of false positives due to interfering substances in the urine sample. Clinical data should be correlated with any questionable result. Positive results should be considered Presumptive Positive until results are confirmed with another methodology such as HPLC or GCMS.    COVID-19 and FLU A/B PCR - Swab, Nasopharynx [[id number]]  (Normal) Collected: 07/13/22 1443    Specimen: Swab from Nasopharynx Updated: 07/13/22 1510     COVID19 Not Detected     Influenza A PCR Not Detected     Influenza B PCR Not Detected    Narrative:      Fact sheet for providers: https://www.fda.gov/media/738493/download    Fact sheet for patients: https://www.fda.gov/media/065926/download    Test performed by PCR.        Imaging Results (Last 24 Hours)     Procedure Component Value Units Date/Time    FL Guided Aspiration Joint [[id number]] Collected: 07/14/22 1258     Updated: 07/14/22 1258    Narrative:      PROCEDURE: FL GUIDED ASPIRATION JOINT-     INDICATIONS: ABSCESS; J18.9-Pneumonia, unspecified organism;  A41.9-Sepsis, unspecified organism     FLUORO TIME: 31 s       IMAGES: 2.     FINDINGS: The patient was brought to the fluoroscopic suite and placed  in the supine position. After informed consent and a time-out procedure  the right hip joint space was localized under fluoroscopic guidance. The  area was then prepped and draped in the usual sterile fashion. The skin  was then anesthetized with 1% lidocaine without epinephrine. Utilizing a  3-1/2 inch 20-gauge spinal needle access to the joint space was  achieved. A small amount of fluid (less than 1 mL) was aspirated from  the joint. It was blood tinged. The procedure was then ended with no  immediate complications.       Impression:      Successful aspiration of a small amount of fluid from the  right hip joint space.        Images were reviewed, interpreted, and dictated by Dr. Alejandro Price MD  Transcribed by Dulce Goodwin PA-C.    CT Abdomen Pelvis With Contrast [[id number]] Collected: 07/13/22 1403     Updated: 07/14/22 1105    Addenda:        Additional review right hip shows a moderate size joint effusion. There  is loss of the joint space with subchondral sclerosis and cystic change  suggestive of osteoarthritic disease. There is superior lateral  subluxation which can be a sequela of chronic degenerative change.     However, given the presence of enlarging joint effusion since prior exam  and clinical concern for infection, aspiration may be considered to  assess for septic arthritis of the right hip.     This report was signed and finalized on 7/14/2022 11:03 AM by Alejandro Price MD.  Signed: 07/14/22 1103 by Alejandro Price MD    Narrative:      PROCEDURE: CT ABDOMEN PELVIS W CONTRAST-     HISTORY:  right hip pain c/w sepsis     COMPARISON: 07/09/2022.     TECHNIQUE: Multiple axial CT images were obtained from the lung bases  through the pubic symphysis following the administration of Isovue 300  and oral contrast.      FINDINGS:      ABDOMEN: The lung bases are clear. The heart is normal in size. The  liver  is normal. The gallbladder is unremarkable. The spleen is  unremarkable. No adrenal mass is present.  The pancreas is normal. The  kidneys are normal. The aorta is normal in caliber. There is no free  fluid or adenopathy. The abdominal portions of the GI tract are  unremarkable with no evidence of obstruction.     PELVIS: The appendix is normal.  The urinary bladder is unremarkable.  Uterus and ovaries are unremarkable. There is no significant free fluid  or adenopathy.       Impression:      No evidence of acute intra-abdominal process.              This study was performed with techniques to keep radiation doses as low  as reasonably achievable (ALARA). Individualized dose reduction  techniques using automated exposure control or adjustment of mA and/or  kV according to the patient size were employed.                  Images were reviewed, interpreted, and dictated by Dr. Alejandro Price MD  Transcribed by Dulce Goodwin PA-C.     This report was signed and finalized on 7/13/2022 2:04 PM by Alejandro Price MD.    US Venous Doppler Lower Extremity Bilateral (duplex) [[id number]] Collected: 07/13/22 1249     Updated: 07/13/22 1531    Addenda:        PROCEDURE: US VENOUS DOPPLER LOWER EXTREMITY BILATERAL (DUPLEX)-        HISTORY: swelling     RIGHT LOWER EXTREMITY:     FINDINGS: Multiple transverse and longitudinal scans were performed of  the femoropopliteal deep venous system, with augmentation and  compression maneuvers.     Normal phasic flow was noted in the visualized deep venous system. No  intraluminal increased echogenicity is noted to suggest thrombus. There  is normal compression and augmentation of the venous structures. No  abnormal venous collaterals are seen.      IMPRESSION: No evidence of deep venous thrombosis of the right lower  extremity.           HISTORY:  swelling     LEFT LOWER EXTREMITY:     Findings: Multiple transverse and longitudinal scans were performed of  the femoropopliteal deep venous  system, with augmentation and  compression maneuvers.     Normal phasic flow was noted in the visualized deep venous system. No  intraluminal increased echogenicity is noted to suggest thrombus. There  is normal compression and augmentation of the venous structures. No  abnormal venous collaterals are seen.      IMPRESSION: No evidence of deep venous thrombosis of the left lower  extremity.     This report was signed and finalized on 7/13/2022 1:56 PM by Alejandro Price MD.  Signed: 07/13/22 9356 by Alejandro Price MD            PROCEDURE: US VENOUS DOPPLER LOWER EXTREMITY BILATERAL (DUPLEX)-        HISTORY: swelling     RIGHT LOWER EXTREMITY:     FINDINGS: Multiple transverse and longitudinal scans were performed of  the femoropopliteal deep venous system, with augmentation and  compression maneuvers.     Normal phasic flow was noted in the visualized deep venous system. No  intraluminal increased echogenicity is noted to suggest thrombus. There  is normal compression and augmentation of the venous structures. No  abnormal venous collaterals are seen.      IMPRESSION: No evidence of deep venous thrombosis of the right lower  extremity.           HISTORY:  swelling     LEFT LOWER EXTREMITY:     Findings: Multiple transverse and longitudinal scans were performed of  the femoropopliteal deep venous system, with augmentation and  compression maneuvers.     Normal phasic flow was noted in the visualized deep venous system. No  intraluminal increased echogenicity is noted to suggest thrombus. There  is normal compression and augmentation of the venous structures. No  abnormal venous collaterals are seen.      IMPRESSION: No evidence of deep venous thrombosis of the left lower  extremity.     This report was signed and finalized on 7/13/2022 1:56 PM by Alejandro Price MD.  Signed: 07/13/22 1356 by Alejandro Price MD    Narrative:      FINAL REPORT    TECHNIQUE:  Bilateral lower extremity venous duplex was performed  with  augmentation and compression.    CLINICAL HISTORY:  swelling    FINDINGS:  Proper flow is seen throughout the deep venous systems  bilaterally.  There is no evidence of deep venous thrombosis.      Impression:      No evidence of deep venous thrombosis.    Authenticated and Electronically Signed by Henrry Pinon MD  on 07/13/2022 12:49:38 PM        Physician Progress Notes (last 24 hours)  Notes from 07/13/22 1442 through 07/14/22 1442   No notes of this type exist for this encounter.

## 2022-07-15 ENCOUNTER — TELEPHONE (OUTPATIENT)
Dept: INTERNAL MEDICINE | Facility: CLINIC | Age: 42
End: 2022-07-15

## 2022-07-15 LAB
ANION GAP SERPL CALCULATED.3IONS-SCNC: 15.2 MMOL/L (ref 5–15)
BUN SERPL-MCNC: 10 MG/DL (ref 6–20)
BUN/CREAT SERPL: 14.3 (ref 7–25)
CALCIUM SPEC-SCNC: 8.5 MG/DL (ref 8.6–10.5)
CHLORIDE SERPL-SCNC: 100 MMOL/L (ref 98–107)
CO2 SERPL-SCNC: 22.8 MMOL/L (ref 22–29)
CREAT SERPL-MCNC: 0.7 MG/DL (ref 0.57–1)
DEPRECATED RDW RBC AUTO: 47.8 FL (ref 37–54)
EGFRCR SERPLBLD CKD-EPI 2021: 110.9 ML/MIN/1.73
ERYTHROCYTE [DISTWIDTH] IN BLOOD BY AUTOMATED COUNT: 14.6 % (ref 12.3–15.4)
GLUCOSE SERPL-MCNC: 102 MG/DL (ref 65–99)
HCT VFR BLD AUTO: 26.3 % (ref 34–46.6)
HGB BLD-MCNC: 8.9 G/DL (ref 12–15.9)
MCH RBC QN AUTO: 30.3 PG (ref 26.6–33)
MCHC RBC AUTO-ENTMCNC: 33.8 G/DL (ref 31.5–35.7)
MCV RBC AUTO: 89.5 FL (ref 79–97)
PLATELET # BLD AUTO: 503 10*3/MM3 (ref 140–450)
PMV BLD AUTO: 9 FL (ref 6–12)
POTASSIUM SERPL-SCNC: 3.6 MMOL/L (ref 3.5–5.2)
RBC # BLD AUTO: 2.94 10*6/MM3 (ref 3.77–5.28)
SODIUM SERPL-SCNC: 138 MMOL/L (ref 136–145)
VANCOMYCIN SERPL-MCNC: 25.1 MCG/ML (ref 5–40)
WBC NRBC COR # BLD: 17.2 10*3/MM3 (ref 3.4–10.8)

## 2022-07-15 PROCEDURE — 85027 COMPLETE CBC AUTOMATED: CPT | Performed by: PHYSICIAN ASSISTANT

## 2022-07-15 PROCEDURE — 25010000002 KETOROLAC TROMETHAMINE PER 15 MG: Performed by: STUDENT IN AN ORGANIZED HEALTH CARE EDUCATION/TRAINING PROGRAM

## 2022-07-15 PROCEDURE — 25010000002 ENOXAPARIN PER 10 MG: Performed by: INTERNAL MEDICINE

## 2022-07-15 PROCEDURE — 25010000002 VANCOMYCIN 5 G RECONSTITUTED SOLUTION: Performed by: INTERNAL MEDICINE

## 2022-07-15 PROCEDURE — 99232 SBSQ HOSP IP/OBS MODERATE 35: CPT | Performed by: PHYSICIAN ASSISTANT

## 2022-07-15 PROCEDURE — 25010000002 CEFTRIAXONE SODIUM-DEXTROSE 2-2.22 GM-%(50ML) RECONSTITUTED SOLUTION: Performed by: INTERNAL MEDICINE

## 2022-07-15 PROCEDURE — 97110 THERAPEUTIC EXERCISES: CPT

## 2022-07-15 PROCEDURE — 25010000002 ONDANSETRON PER 1 MG: Performed by: INTERNAL MEDICINE

## 2022-07-15 PROCEDURE — 97116 GAIT TRAINING THERAPY: CPT

## 2022-07-15 PROCEDURE — 97535 SELF CARE MNGMENT TRAINING: CPT

## 2022-07-15 PROCEDURE — 80202 ASSAY OF VANCOMYCIN: CPT | Performed by: INTERNAL MEDICINE

## 2022-07-15 PROCEDURE — 80048 BASIC METABOLIC PNL TOTAL CA: CPT | Performed by: PHYSICIAN ASSISTANT

## 2022-07-15 RX ORDER — CHOLECALCIFEROL (VITAMIN D3) 125 MCG
10 CAPSULE ORAL NIGHTLY PRN
Status: DISCONTINUED | OUTPATIENT
Start: 2022-07-15 | End: 2022-07-16 | Stop reason: HOSPADM

## 2022-07-15 RX ADMIN — Medication 10 ML: at 02:00

## 2022-07-15 RX ADMIN — ENOXAPARIN SODIUM 40 MG: 40 INJECTION SUBCUTANEOUS at 08:26

## 2022-07-15 RX ADMIN — DULOXETINE 60 MG: 30 CAPSULE, DELAYED RELEASE ORAL at 08:26

## 2022-07-15 RX ADMIN — Medication 10 MG: at 21:23

## 2022-07-15 RX ADMIN — GABAPENTIN 300 MG: 300 CAPSULE ORAL at 14:22

## 2022-07-15 RX ADMIN — VANCOMYCIN HYDROCHLORIDE 1250 MG: 5 INJECTION, POWDER, LYOPHILIZED, FOR SOLUTION INTRAVENOUS at 02:00

## 2022-07-15 RX ADMIN — CEFTRIAXONE 2 G: 2 INJECTION, SOLUTION INTRAVENOUS at 16:57

## 2022-07-15 RX ADMIN — BUSPIRONE HYDROCHLORIDE 30 MG: 10 TABLET ORAL at 08:26

## 2022-07-15 RX ADMIN — ONDANSETRON 4 MG: 2 INJECTION INTRAMUSCULAR; INTRAVENOUS at 08:26

## 2022-07-15 RX ADMIN — METHADONE HYDROCHLORIDE 125 MG: 10 TABLET ORAL at 08:36

## 2022-07-15 RX ADMIN — Medication 10 ML: at 08:32

## 2022-07-15 RX ADMIN — ACETAMINOPHEN 650 MG: 325 TABLET ORAL at 21:23

## 2022-07-15 RX ADMIN — DOXEPIN HYDROCHLORIDE 50 MG: 25 CAPSULE ORAL at 21:23

## 2022-07-15 RX ADMIN — SENNOSIDES AND DOCUSATE SODIUM 2 TABLET: 50; 8.6 TABLET ORAL at 08:26

## 2022-07-15 RX ADMIN — SENNOSIDES AND DOCUSATE SODIUM 2 TABLET: 50; 8.6 TABLET ORAL at 21:23

## 2022-07-15 RX ADMIN — GABAPENTIN 300 MG: 300 CAPSULE ORAL at 05:40

## 2022-07-15 RX ADMIN — GABAPENTIN 300 MG: 300 CAPSULE ORAL at 21:23

## 2022-07-15 RX ADMIN — ONDANSETRON 4 MG: 2 INJECTION INTRAMUSCULAR; INTRAVENOUS at 02:00

## 2022-07-15 RX ADMIN — VANCOMYCIN HYDROCHLORIDE 1250 MG: 5 INJECTION, POWDER, LYOPHILIZED, FOR SOLUTION INTRAVENOUS at 14:22

## 2022-07-15 RX ADMIN — Medication 10 ML: at 02:50

## 2022-07-15 RX ADMIN — BUPROPION HYDROCHLORIDE 300 MG: 150 TABLET, FILM COATED, EXTENDED RELEASE ORAL at 08:26

## 2022-07-15 RX ADMIN — KETOROLAC TROMETHAMINE 30 MG: 30 INJECTION, SOLUTION INTRAMUSCULAR; INTRAVENOUS at 02:50

## 2022-07-15 RX ADMIN — Medication 10 ML: at 21:23

## 2022-07-15 RX ADMIN — QUETIAPINE FUMARATE 100 MG: 100 TABLET ORAL at 21:23

## 2022-07-15 RX ADMIN — BUSPIRONE HYDROCHLORIDE 30 MG: 10 TABLET ORAL at 21:23

## 2022-07-15 NOTE — THERAPY TREATMENT NOTE
Patient Name: Audrey Alonso  : 1980    MRN: [id number]                              Today's Date: 7/15/2022       Admit Date: 2022    Visit Dx:     ICD-10-CM ICD-9-CM   1. Pneumonia of both lungs due to infectious organism, unspecified part of lung  J18.9 483.8   2. Sepsis, due to unspecified organism, unspecified whether acute organ dysfunction present (HCC)  A41.9 038.9     995.91   3. Hip pain  M25.559 719.45   4. Impaired mobility  Z74.09 799.89     Patient Active Problem List   Diagnosis   • Thrombocytopenia (HCC)   • Pneumonia of both lungs due to infectious organism     Past Medical History:   Diagnosis Date   • Anxiety    • Asthma    • Depression    • Disease of thyroid gland    • GERD (gastroesophageal reflux disease)    • Headache    • Hypertension    • Seizures (HCC)    • Substance abuse (HCC)    • Trauma      Past Surgical History:   Procedure Laterality Date   • DILATATION AND CURETTAGE     • INCISION AND DRAINAGE ABSCESS  2019    arm      General Information     Row Name 07/15/22 1457          Physical Therapy Time and Intention    Document Type therapy note (daily note)  -MS     Mode of Treatment physical therapy  -MS     Row Name 07/15/22 1457          General Information    Patient Profile Reviewed yes  -MS           User Key  (r) = Recorded By, (t) = Taken By, (c) = Cosigned By    Initials Name Provider Type    Nehemiah Murray, PT Physical Therapist               Mobility     Row Name 07/15/22 1457          Bed-Chair Transfer    Bed-Chair [place] (Transfers) standby assist  -MS     Assistive Device (Bed-Chair Transfers) walker, front-wheeled  -MS     Row Name 07/15/22 1457          Sit-Stand Transfer    Sit-Stand [place] (Transfers) verbal cues;standby assist  -MS     Assistive Device (Sit-Stand Transfers) walker, front-wheeled  -MS     Row Name 07/15/22 1457          Gait/Stairs (Locomotion)    [place] Level (Gait) standby assist  -MS     Assistive Device (Gait)  walker, front-wheeled  -MS     Distance in Feet (Gait) 142  -MS     Deviations/Abnormal Patterns (Gait) samuel decreased;festinating/shuffling;antalgic  -MS     Right Sided Gait Deviations heel strike decreased  -MS           User Key  (r) = Recorded By, (t) = Taken By, (c) = Cosigned By    Initials Name Provider Type    Nehemiah Murray PT Physical Therapist               Obj/Interventions     Row Name 07/15/22 5899          Motor Skills    Therapeutic Exercise hip;knee;ankle  Pt performed B LE ther exer in sitting, AP, LAQ, hip flexion( AAROM on the R LE only able to perform 5 reps d/t pain)  -MS           User Key  (r) = Recorded By, (t) = Taken By, (c) = Cosigned By    Initials Name Provider Type    Nehemiah Murray PT Physical Therapist               Goals/Plan    No documentation.                Clinical Impression     Row Name 07/15/22 4539          Pain    Pretreatment Pain Rating 6/10  -MS     Posttreatment Pain Rating 6/10  -MS     Pain Location - Side/Orientation Right  -MS     Pain Location - hip  -MS     Pain Intervention(s) Repositioned;Ambulation/increased activity  -MS     Row Name 07/15/22 3706          Plan of Care Review    Plan of Care Reviewed With patient  -MS     Progress improving  -MS     Outcome Evaluation Pt receieved sitting EOB for PTx. Pt transferred to standing with SBA and Rwx. Pt then ambulated 142ft with SBA and step to gait pattern due to pain in the R hip. Pt demonstrated improved endurance and activity tolerance this date. Pt performed B LE ther exer in sitting, AP, LAQ, hip flexion( AAROM on the R LE only able to perform 5 reps d/t pain). Pt progressing, cont per POC  -MS     Row Name 07/15/22 5350          Therapy Assessment/Plan (PT)    Rehab Potential (PT) good, to achieve stated therapy goals  -MS     Criteria for Skilled Interventions Met (PT) yes;meets criteria  -MS     Therapy Frequency (PT) 5 times/wk  -MS     Row Name 07/15/22 5128          Vital Signs    O2  Delivery Pre Treatment room air  -MS     O2 Delivery Intra Treatment room air  -MS     O2 Delivery Post Treatment room air  -MS     Pre Patient Position Supine  -MS     Intra Patient Position Standing  -MS     Post Patient Position Sitting  -MS     Row Name 07/15/22 1459          Positioning and Restraints    Pre-Treatment Position in bed  -MS     Post Treatment Position bed  -MS     In Chair call light within reach;encouraged to call for assist;sitting  -MS           User Key  (r) = Recorded By, (t) = Taken By, (c) = Cosigned By    Initials Name Provider Type    Nehemiah Murray, IVETT Physical Therapist               Outcome Measures     Row Name 07/15/22 1503          How much help from another person do you currently need...    Turning from your back to your side while in flat bed without using bedrails? 4  -MS     Moving from lying on back to sitting on the side of a flat bed without bedrails? 3  -MS     Moving to and from a bed to a chair (including a wheelchair)? 3  -MS     Standing up from a chair using your arms (e.g., wheelchair, bedside chair)? 3  -MS     Climbing 3-5 steps with a railing? 2  -MS     To walk in hospital room? 3  -MS     AM-PAC 6 Clicks Score (PT) 18  -MS     Highest level of mobility 6 --> Walked 10 steps or more  -MS     Row Name 07/15/22 1320          Functional Assessment    Outcome Measure Options AM-PAC 6 Clicks Daily Activity (OT)  -AH           User Key  (r) = Recorded By, (t) = Taken By, (c) = Cosigned By    Initials Name Provider Type    Zeenat Prescott Occupational Therapist    Nehemiah Murray, PT Physical Therapist                             Physical Therapy Education                 Title: PT OT SLP Therapies (In Progress)     Topic: Physical Therapy (In Progress)     Point: Mobility training (Done)     Learning Progress Summary           Patient Acceptance, E, VU by MS at 7/15/2022 1504    Comment: importance of mobility    Acceptance, E, VU by MS at 7/14/2022 1312     Comment: importance of mobility                   Point: Home exercise program (Done)     Learning Progress Summary           Patient Acceptance, E, VU by MS at 7/15/2022 1504    Comment: importance of mobility    Acceptance, E, VU by MS at 7/14/2022 1312    Comment: importance of mobility                   Point: Body mechanics (Not Started)     Learner Progress:  Not documented in this visit.          Point: Precautions (Not Started)     Learner Progress:  Not documented in this visit.                      User Key     Initials Effective Dates Name Provider Type Discipline    MS 07/01/22 -  Nehemiah Morgan PT Physical Therapist PT              PT Recommendation and Plan  Planned Therapy Interventions (PT): balance training, bed mobility training, gait training, home exercise program, stair training, ROM (range of motion), transfer training, patient/family education, strengthening  Plan of Care Reviewed With: patient  Progress: improving  Outcome Evaluation: Pt receieved sitting EOB for PTx. Pt transferred to standing with SBA and Rwx. Pt then ambulated 142ft with SBA and step to gait pattern due to pain in the R hip. Pt demonstrated improved endurance and activity tolerance this date. Pt performed B LE ther exer in sitting, AP, LAQ, hip flexion( AAROM on the R LE only able to perform 5 reps d/t pain). Pt progressing, cont per POC     Time Calculation:    PT Charges     Row Name 07/15/22 1504             Time Calculation    Start Time 1320  -MS      Stop Time 1343  -MS      Time Calculation (min) 23 min  -MS      PT Received On 07/15/22  -MS              Timed Charges    94191 - PT Therapeutic Exercise Minutes 10  -MS      59990 - Gait Training Minutes  13  -MS              Total Minutes    Timed Charges Total Minutes 23  -MS       Total Minutes 23  -MS            User Key  (r) = Recorded By, (t) = Taken By, (c) = Cosigned By    Initials Name Provider Type    MS Nehemiah Morgan, PT Physical Therapist               Therapy Charges for Today     Code Description Service Date Service Provider Modifiers Qty    25186279444 HC PT EVAL LOW COMPLEXITY 3 7/14/2022 Nehemiah Morgan, PT GP 1    02970935246 HC PT THER PROC EA 15 MIN 7/15/2022 Nehemiah Morgan, PT GP 1    62428719246 HC GAIT TRAINING EA 15 MIN 7/15/2022 Nehemiah Morgan, PT GP 1          PT G-Codes  Outcome Measure Options: AM-PAC 6 Clicks Daily Activity (OT)  AM-PAC 6 Clicks Score (PT): 18  AM-PAC 6 Clicks Score (OT): 23    Nehemiah Morgan PT  7/15/2022

## 2022-07-15 NOTE — CONSULTS
Patient Care Team:  David Fleming DO as PCP - General (Internal Medicine)    Chief complaint right hip pain    Subjective     Patient is a 42 y.o. female presents with right hip pain..  This is a 42-year-old female who has a long history of right hip pain she is had progressive pain gradually in onset over the last year.  She has had treatment in the office she has had intra-articular injections in the past most recently about 4 months ago.  She has x-rays that demonstrate that she had developmental hip dysplasia.  And she is gone on to have worsening severe arthritis to that right hip.  She was admitted for hip pain and had CT scan that was concerning for pneumonia and had blood cultures that showed 2 different bacteria.  And she is being treated with antibiotics for these.  She states that she feels much better today she is participating with physical therapy and using a walker.  She did have a hip aspiration that is final results are pending her gram stain was negative.    Review of Systems   Pertinent items are noted in HPI    History  Past Medical History:   Diagnosis Date   • Anxiety    • Asthma    • Depression    • Disease of thyroid gland    • GERD (gastroesophageal reflux disease)    • Headache    • Hypertension    • Seizures (HCC)    • Substance abuse (HCC)    • Trauma      Past Surgical History:   Procedure Laterality Date   • DILATATION AND CURETTAGE     • INCISION AND DRAINAGE ABSCESS  2019    arm     Family History   Problem Relation Age of Onset   • Arthritis Mother    • Cancer Mother         breast   • Thyroid disease Mother    • Breast cancer Mother    • Diabetes Father    • Hypertension Father    • Mental illness Brother    • Breast cancer Maternal Aunt      Social History     Tobacco Use   • Smoking status: Current Every Day Smoker     Packs/day: 0.25     Years: 20.00     Pack years: 5.00     Types: Cigarettes   • Smokeless tobacco: Never Used   • Tobacco comment: vapes also   Vaping Use    • Vaping Use: Every day   • Substances: Nicotine, Flavoring   • Devices: Pre-filled or refillable cartridge   Substance Use Topics   • Alcohol use: No     Comment: stopped 2008   • Drug use: Yes     Types: IV     Comment: STATES SHE TOOK HER METHADONE AND SOME BENZOS 9/17/20     Medications Prior to Admission   Medication Sig Dispense Refill Last Dose   • amphetamine-dextroamphetamine (Adderall) 30 MG tablet Take 30 mg orally every morning and afternoon 60 tablet 0 7/13/2022 at Unknown time   • buPROPion XL (Wellbutrin XL) 300 MG 24 hr tablet Take 1 tablet by mouth Daily. WITH 150 mg 30 tablet 6 7/13/2022 at Unknown time   • busPIRone (BUSPAR) 15 MG tablet Take 2 tablets by mouth 2 (Two) Times a Day. 120 tablet 6 7/13/2022 at Unknown time   • doxepin (SINEquan) 50 MG capsule Take 1 capsule by mouth Every Night. 30 capsule 6 7/12/2022 at Unknown time   • DULoxetine (Cymbalta) 60 MG capsule Two po q am 60 capsule 6 7/13/2022 at Unknown time   • gabapentin (NEURONTIN) 800 MG tablet Take 1 tablet by mouth 3 (Three) Times a Day. 90 tablet 2 7/13/2022 at Unknown time   • lidocaine (LIDODERM) 5 % Place 1 patch on the skin as directed by provider Daily. Remove & Discard patch within 12 hours or as directed by MD 6 each 0 7/12/2022 at Unknown time   • meloxicam (MOBIC) 7.5 MG tablet Take 1 tablet by mouth Daily. 14 tablet 0 7/13/2022 at Unknown time   • methadone (DOLOPHINE) 10 MG tablet Take 125 mg by mouth Daily [facility] @ [facility] ([phone]) verified dose as 125 mg daily,   7/13/2022 at Unknown time   • prazosin (MINIPRESS) 5 MG capsule Two po q hs (Patient taking differently: Take 10 mg by mouth Every Night. Two po q hs) 60 capsule 6 7/12/2022 at Unknown time   • QUEtiapine (SEROquel) 100 MG tablet Take 1 tablet by mouth Every Night. 30 tablet 6 7/12/2022 at Unknown time   • diclofenac (VOLTAREN) 75 MG EC tablet Take 75 mg by mouth 2 (Two) Times a Day.   Unknown at Unknown time   •  ibuprofen (ADVIL,MOTRIN) 800 MG tablet Take 800 mg by mouth Every 8 (Eight) Hours As Needed.   Unknown at Unknown time     Allergies:  Diphenhydramine hcl    Objective     Vital Signs  Temp:  [98.5 °F (36.9 °C)-99.4 °F (37.4 °C)] 98.9 °F (37.2 °C)  Heart Rate:  [105-123] 105  Resp:  [18] 18  BP: (137-153)/(80-95) 137/84    Physical Exam:      General Appearance:    Alert, cooperative, in no acute distress   Head:    Normocephalic, without obvious abnormality, atraumatic   Eyes:            Lids and lashes normal, conjunctivae and sclerae normal, no   icterus, no pallor, corneas clear, PERRLA   Ears:    Ears appear intact with no abnormalities noted   Throat:   No oral lesions, no thrush, oral mucosa moist   Neck:   No adenopathy, supple, trachea midline, no thyromegaly, no     carotid bruit, no JVD   Back:     No kyphosis present, no scoliosis present, no skin lesions,       erythema or scars, no tenderness to percussion or                   palpation,   range of motion normal   Lungs:     Clear to auscultation,respirations regular, even and                   unlabored    Heart:    Regular rhythm and normal rate, normal S1 and S2, no            murmur, no gallop, no rub, no click   Breast Exam:    Deferred   Abdomen:     Normal bowel sounds, no masses, no organomegaly, soft        non-tender, non-distended, no guarding, no rebound                 tenderness   Genitalia:    Deferred   Extremities:   Moves all extremities well, no edema, no cyanosis, no              redness right hip has motion to about 80 of flexion internal rotation 0 external rotation of 20 but she has some crepitance on range of motion.   Pulses:   Pulses palpable and equal bilaterally   Skin:   No bleeding, bruising or rash   Lymph nodes:   No palpable adenopathy   Neurologic:   Cranial nerves 2 - 12 grossly intact, sensation intact, DTR        present and equal bilaterally       Results Review:    I reviewed the patient's new clinical  results.    Assessment & Plan       Pneumonia of both lungs due to infectious organism    Right hip pain that is consistent with her severe right hip arthritis.  She had an aspiration that is pending there was a question of infection to the right hip and septic arthritis.  However she is continuing to improve she is participating with physical therapy and is able to ambulate.  Her original blood cultures that shown Serratia and she has being followed for these with follow-up blood cultures.  At this point my suspicion for septic arthritis is low.  I do not think she rescored requiring surgical intervention at this time for her right hip pain.  She would benefit from hip replacement pending clearance of all of her current infective issues.  We will continue to follow her.        I discussed the patients findings and my recommendations with patient.     Caesar Francisco MD  07/15/22  15:15 EDT

## 2022-07-15 NOTE — PROGRESS NOTES
[facility]   PROGRESS NOTE    Name:  Audrey Alonso   Age:  42 y.o.  Sex:  female  :  1980  MRN:  [id number]   Visit Number:  [id number]  Admission Date:  2022  Date Of Service:  07/15/22  Primary Care Physician:  David Fleming DO     LOS: 2 days :  Patient Care Team:  David Fleming DO as PCP - General (Internal Medicine):    Chief Complaint:    Hip pain    Subjective / Interval History:   Patient seen and evaluated this morning.  She continues with right hip pain, reporting decreased ROM.  She has had interval improvement with increased dose of methadone.  Denies any respiratory symptoms, fever, chills.  Did not sleep well last night.      Hospital Course:  Ms. Alonso is a 42-year of age female with history of anxiety/depression, hypertension, substance abuse.  She presented to the ER at request of the ER after patient had positive blood cultures from previous visit.  Patient had initially presented to the emergency room on 2022 with complaints of progressive chronic right hip pain.  She is followed by Dr. Salinas with orthopedics.  She receives intra articular joint injections with the last known injection approximately 4 months ago.  At that time, there was concern for septic joint and so blood cultures were obtained.  This was ruled out and patient was discharged home with prescription for Norco and outpatient follow-up.  Patient does report history of bacteremia, unfortunately cannot recall the specific organism.  Upon ER evaluation, patient afebril, tachycardic with heart rate 123, otherwise hemodynamically stable.  CRP 39, sed rate 79, lactate normal, procalcitonin 0.6.  Leukocytosis of 14, hemoglobin 9.6, hematocrit 28, platelets 367.  Urinalysis negative for infection.  COVID-negative.  UDS positive for amphetamines, benzos, methadone, opiates, and TCAs.  CT angiogram of the chest negative for pulmonary embolism but does show evidence of multifocal pneumonia.   Additionally CT abdomen pelvis showed no acute intra-abdominal process.  Patient given pain medication, initiated on vancomycin and Zosyn in the emergency room.  Hospitalist service contacted for admission.    Review of Systems:   Review of Systems   Constitutional: Positive for activity change.   Musculoskeletal: Positive for arthralgias (right hip).   Neurological: Positive for weakness (generalized).   Psychiatric/Behavioral: Positive for sleep disturbance.         Vital Signs:    Temp:  [98.5 °F (36.9 °C)-99.4 °F (37.4 °C)] 98.9 °F (37.2 °C)  Heart Rate:  [110-123] 114  Resp:  [18] 18  BP: (138-156)/(80-95) 147/80    Intake and output:    I/O last 3 completed shifts:  In: 1500 [P.O.:1250; IV Piggyback:250]  Out: 1700 [Urine:1700]  No intake/output data recorded.    Physical Examination:  Physical Exam  Vitals and nursing note reviewed.   Constitutional:       Appearance: She is ill-appearing.   HENT:      Head: Normocephalic and atraumatic.      Mouth/Throat:      Mouth: Mucous membranes are moist.      Pharynx: Oropharynx is clear.   Eyes:      Extraocular Movements: Extraocular movements intact.      Pupils: Pupils are equal, round, and reactive to light.   Cardiovascular:      Rate and Rhythm: Regular rhythm. Tachycardia present.      Pulses: Normal pulses.   Pulmonary:      Effort: Pulmonary effort is normal.      Breath sounds: Normal breath sounds.   Abdominal:      General: Bowel sounds are normal. There is no distension.      Palpations: Abdomen is soft.      Tenderness: There is no abdominal tenderness.   Musculoskeletal:         General: Tenderness present.      Cervical back: Neck supple. No rigidity.      Comments: Decreased ROM to the RLE, neurovascularly intact.  No erythema or significant warmth.    Skin:     General: Skin is warm and dry.      Capillary Refill: Capillary refill takes less than 2 seconds.   Neurological:      Mental Status: She is alert and oriented to person, place, and time.    Psychiatric:         Mood and Affect: Mood normal.         Behavior: Behavior normal.           Laboratory results:  I have reviewed the patient's laboratory results.    Results from last 7 days   Lab Units 07/15/22  0523 07/14/22  0653 07/13/22  1202 07/08/22  2151   SODIUM mmol/L 138 137 140 137   POTASSIUM mmol/L 3.6 3.9 4.4 4.0   CHLORIDE mmol/L 100 103 102 102   CO2 mmol/L 22.8 23.6 25.7 22.1   BUN mg/dL 10 9 10 10   CREATININE mg/dL 0.70 0.64 0.76 0.82   CALCIUM mg/dL 8.5* 8.3* 8.6 8.3*   BILIRUBIN mg/dL  --   --  0.3 0.6   ALK PHOS U/L  --   --  145* 154*   ALT (SGPT) U/L  --   --  23 24   AST (SGOT) U/L  --   --  27 21   GLUCOSE mg/dL 102* 91 92 94     Results from last 7 days   Lab Units 07/15/22  0523 07/14/22  0653 07/13/22  1202   WBC 10*3/mm3 17.20* 16.07* 14.03*   HEMOGLOBIN g/dL 8.9* 9.0* 9.6*   HEMATOCRIT % 26.3* 27.0* 28.7*   PLATELETS 10*3/mm3 503* 376 367         Results from last 7 days   Lab Units 07/13/22  1202   CK TOTAL U/L 34   TROPONIN T ng/mL <0.010     Results from last 7 days   Lab Units 07/13/22  1202 07/09/22  0035 07/09/22  0030   BLOODCX  No growth at 24 hours  No growth at 24 hours Serratia marcescens* Serratia marcescens*  Corynebacterium species*       Radiology results:  I have reviewed the patient's radiology reports.  Imaging Results (Last 24 Hours)     Procedure Component Value Units Date/Time    FL Guided Aspiration Joint [[id number]] Collected: 07/14/22 1258     Updated: 07/14/22 1444    Narrative:      PROCEDURE: FL GUIDED ASPIRATION JOINT-     INDICATIONS: ABSCESS; J18.9-Pneumonia, unspecified organism;  A41.9-Sepsis, unspecified organism     FLUORO TIME: 31 s      IMAGES: 2.     FINDINGS: The patient was brought to the fluoroscopic suite and placed  in the supine position. After informed consent and a time-out procedure  the right hip joint space was localized under fluoroscopic guidance. The  area was then prepped and draped in the usual sterile fashion. The skin  was  then anesthetized with 1% lidocaine without epinephrine. Utilizing a  3-1/2 inch 20-gauge spinal needle access to the joint space was  achieved. A small amount of fluid (less than 1 mL) was aspirated from  the joint. It was blood tinged. The procedure was then ended with no  immediate complications.       Impression:      Successful aspiration of a small amount of fluid from the  right hip joint space.        Images were reviewed, interpreted, and dictated by Dr. Alejandro Price MD  Transcribed by Dulce Goodwin PA-C.     This report was signed and finalized on 7/14/2022 2:42 PM by Alejandro Price MD.    CT Abdomen Pelvis With Contrast [[id number]] Collected: 07/13/22 1403     Updated: 07/14/22 1105    Addenda:        Additional review right hip shows a moderate size joint effusion. There  is loss of the joint space with subchondral sclerosis and cystic change  suggestive of osteoarthritic disease. There is superior lateral  subluxation which can be a sequela of chronic degenerative change.     However, given the presence of enlarging joint effusion since prior exam  and clinical concern for infection, aspiration may be considered to  assess for septic arthritis of the right hip.     This report was signed and finalized on 7/14/2022 11:03 AM by Alejandro Price MD.  Signed: 07/14/22 1103 by Alejandro Price MD    Narrative:      PROCEDURE: CT ABDOMEN PELVIS W CONTRAST-     HISTORY:  right hip pain c/w sepsis     COMPARISON: 07/09/2022.     TECHNIQUE: Multiple axial CT images were obtained from the lung bases  through the pubic symphysis following the administration of Isovue 300  and oral contrast.      FINDINGS:      ABDOMEN: The lung bases are clear. The heart is normal in size. The  liver is normal. The gallbladder is unremarkable. The spleen is  unremarkable. No adrenal mass is present.  The pancreas is normal. The  kidneys are normal. The aorta is normal in caliber. There is no free  fluid or adenopathy. The  abdominal portions of the GI tract are  unremarkable with no evidence of obstruction.     PELVIS: The appendix is normal.  The urinary bladder is unremarkable.  Uterus and ovaries are unremarkable. There is no significant free fluid  or adenopathy.       Impression:      No evidence of acute intra-abdominal process.              This study was performed with techniques to keep radiation doses as low  as reasonably achievable (ALARA). Individualized dose reduction  techniques using automated exposure control or adjustment of mA and/or  kV according to the patient size were employed.                  Images were reviewed, interpreted, and dictated by Dr. Alejandro Price MD  Transcribed by Dulce Goodwin PA-C.     This report was signed and finalized on 7/13/2022 2:04 PM by Alejandro Price MD.              Medication Review:   I have reviewed the patients active and prn medications.     Assessment:    Pneumonia of both lungs due to infectious organism        Plan:  Sepsis  Serratia marcescens and Corynebacterium bacteremia  Pneumonia  - Continue Rocephin for Serratia, Vancomycin for Corynebacerium   - CRP 34.36 and sed rate 80  - Repeat BC no growth x 24 hr, leukocytosis worsening  - Discussed with radiologist CT A/P findings; small joint effusion with degenerative changes, recommending joint aspiration; preformed 7/14 pending  - High suspicion for osteomyelitis; consult orthopedic services, recommendations appreciated  - Patient denies any respiratory symptoms, continue with supportive care    History of IV drug use  - UDS positive for amphetamines, benzodiazepine, methadone, opiates, and TCAs  - Denies history of recent IV injection  - She is prescribed all except for benzodiazepines  - Continue home methadone, dose confirmed by pharmacy  - Will given one time order for Dilaudid today  - Continue Toradol and Tylenol    Bilateral lower extremity edema  - Venous doppler negative for DVT  - 2D echo pending    Insomnia  -  Chronic, add Melatonin PRN    Risk Assessment: High  DVT Prophylaxis: Lovenox  Code Status: Full  Diet: Regular       Angelika Frank PA-C  07/15/22  09:14 EDT

## 2022-07-15 NOTE — CASE MANAGEMENT/SOCIAL WORK
CM advised by OT pt would benefit from walker at DC. Order for walker requested and received. Cm delivered rolling walker to bedside and discussed with pt. Signature for DME provided by pt.

## 2022-07-15 NOTE — PLAN OF CARE
VSS, afebrile.       IV: WDL  RESP: Sats >90% on room air, no c/o SOA.   CARDIO: Normal sinus, occasionally tachy. +2-3 edema BLE.   SKIN: WDL  MUSC: Generalized weakness. Ambulate independently with walker  : WDL   NEURO: Alert and oriented x4       Pt resting in bed, visitor at bedside. Will continue to monitor.     ____________________________________________________    Problem: Adult Inpatient Plan of Care  Goal: Plan of Care Review  Outcome: Ongoing, Not Progressing     Problem: Fall Injury Risk  Goal: Absence of Fall and Fall-Related Injury  Outcome: Ongoing, Progressing  Intervention: Identify and Manage Contributors  Recent Flowsheet Documentation  Taken 7/15/2022 1400 by Veda Nielsen RN  Medication Review/Management: medications reviewed  Taken 7/15/2022 1200 by Veda Nielsen RN  Medication Review/Management: medications reviewed  Taken 7/15/2022 0836 by Veda Nielsen RN  Medication Review/Management: medications reviewed  Intervention: Promote Injury-Free Environment  Recent Flowsheet Documentation  Taken 7/15/2022 1400 by Veda Nielsen RN  Safety Promotion/Fall Prevention:  • safety round/check completed  • nonskid shoes/slippers when out of bed  • lighting adjusted  • assistive device/personal items within reach  Taken 7/15/2022 1200 by Veda Nielsen, RN  Safety Promotion/Fall Prevention: safety round/check completed  Taken 7/15/2022 1000 by Veda Nielsen RN  Safety Promotion/Fall Prevention: safety round/check completed  Taken 7/15/2022 0836 by Veda Nielsen RN  Safety Promotion/Fall Prevention:  • safety round/check completed  • activity supervised  • assistive device/personal items within reach  • clutter free environment maintained  • lighting adjusted  • nonskid shoes/slippers when out of bed     Problem: Respiratory Compromise (Pneumonia)  Goal: Effective Oxygenation and Ventilation  Outcome: Ongoing, Progressing  Intervention: Promote Airway Secretion Clearance  Recent Flowsheet  Documentation  Taken 7/15/2022 0836 by Veda Nielsen RN  Cough And Deep Breathing: done independently per patient  Intervention: Optimize Oxygenation and Ventilation  Recent Flowsheet Documentation  Taken 7/15/2022 1400 by Veda Nielsen, TRINA  Head of Bed (HOB) Positioning: HOB at 15 degrees  Taken 7/15/2022 0836 by Veda Nielsen RN  Head of Bed ([facility]) Positioning: HOB at 15 degrees   Goal Outcome Evaluation:

## 2022-07-15 NOTE — PLAN OF CARE
Goal Outcome Evaluation:  Plan of Care Reviewed With: patient        Progress: improving  Outcome Evaluation: Pt receieved sitting EOB for PTx. Pt transferred to standing with SBA and Rwx. Pt then ambulated 142ft with SBA and step to gait pattern due to pain in the R hip. Pt demonstrated improved endurance and activity tolerance this date. Pt performed B LE ther exer in sitting, AP, LAQ, hip flexion( AAROM on the R LE only able to perform 5 reps d/t pain). Pt progressing, cont per POC

## 2022-07-15 NOTE — PLAN OF CARE
Goal Outcome Evaluation:  Plan of Care Reviewed With: patient        Progress: no change  Outcome Evaluation: anxious patient with continual complaint of nausea and pain in right hip-PRN meds given per orders-medications given per hospitalist's orders-monitor labs and continue to monitor patient-Angelika LANG saw patient today-scheduled IV antibiotics per orders

## 2022-07-15 NOTE — TELEPHONE ENCOUNTER
Caller: FRANCESCA ALLEN    Relationship: Mother    Best call back number: [phone]    What is the best time to reach you: ANYTIME    Who are you requesting to speak with (clinical staff, provider,  specific staff member): PROVIDER    Do you know the name of the person who called: MOTHER    What was the call regarding: PATIENTS MOTHER WAS CALLING TO TELL DR. SURESH THAT HER DAUGHTER IS CURRENTLY BACK IN THE HOSPITAL.    Do you require a callback: YES

## 2022-07-15 NOTE — PLAN OF CARE
Goal Outcome Evaluation:  Plan of Care Reviewed With: patient        Progress: improving  Outcome Evaluation: Pt received sitting eob, educated on use of adaptive equipment for LB dressing.  Pt able to complete tasks with cga using adaptive equipment, pt able to stand and walked 120' with sba using RW.  Pt reports decreased pain today and was able to progress with therapy.  Cont OT per POC

## 2022-07-15 NOTE — PROGRESS NOTES
"Pharmacy Consult-Vancomycin Dosing    Audrey Alonso is a  42 y.o. female receiving vancomycin therapy.     Indication: Abnormal blood cultures (+) for Corynebacteria  Consulting Provider: Chasity  Goal AUC: 400-600 mg/L*hr    Current Antimicrobial Therapy  Anti-Infectives (From admission, onward)      Ordered     Dose/Rate Route Frequency Start Stop    07/14/22 1427  cefTRIAXone (ROCEPHIN) IVPB 2 g/50ml dextrose (premix)        Ordering Provider: Ran Gaspar DO    2 g  100 mL/hr over 30 Minutes Intravenous Every 24 Hours 07/14/22 1600 07/18/22 1559    07/14/22 0745  vancomycin 1250 mg/250 mL 0.9% NS IVPB ([facility])        Ordering Provider: Ran Gaspar DO    1,250 mg Intravenous Every 12 Hours 07/14/22 1400 07/21/22 0159    07/13/22 1837  Pharmacy to dose vancomycin        Ordering Provider: Ran Gaspar DO     Does not apply Continuous PRN 07/13/22 1837 07/20/22 1836    07/13/22 1116  vancomycin 1500 mg/500 mL 0.9% NS IVPB ([facility])        Ordering Provider: Carlos Patiño Jr., PA-C   \"And\" Linked Group Details    1,500 mg  over 90 Minutes Intravenous Once 07/13/22 1118 07/13/22 1636    07/13/22 1116  Pharmacy to dose vancomycin        Ordering Provider: Carlos Patiño Jr., PA-C   \"And\" Linked Group Details     Does not apply Once 07/13/22 1118 07/13/22 1405    07/13/22 1111  piperacillin-tazobactam (ZOSYN) 3.375 g in iso-osmotic dextrose 50 ml (premix)        Ordering Provider: Carlos Patiño Jr., PA-C    3.375 g  over 30 Minutes Intravenous Once 07/13/22 1113 07/13/22 1404            Labs  Results from last 7 days   Lab Units 07/15/22  0523 07/14/22  0653 07/13/22  1202   WBC 10*3/mm3 17.20* 16.07* 14.03*   CREATININE mg/dL 0.70 0.64 0.76      Estimated Creatinine Clearance: 104.8 mL/min (by C-G formula based on SCr of 0.7 mg/dL).  Temp Readings from Last 1 Encounters:   07/15/22 98.9 °F (37.2 °C) (Oral)       Microbiology Culture results  Microbiology Results (last 10 days)       Procedure " Component Value - Date/Time    Body Fluid Culture - Body Fluid, Hip, Right [[id number]] Collected: 07/14/22 1200    Lab Status: Preliminary result Specimen: Body Fluid from Hip, Right Updated: 07/14/22 1407     Gram Stain No No organisms seen      No WBCs seen    COVID-19 and FLU A/B PCR - Swab, Nasopharynx [[id number]]  (Normal) Collected: 07/13/22 1443    Lab Status: Final result Specimen: Swab from Nasopharynx Updated: 07/13/22 1510     COVID19 Not Detected     Influenza A PCR Not Detected     Influenza B PCR Not Detected    Narrative:      Fact sheet for providers: https://www.fda.gov/media/557797/download    Fact sheet for patients: https://www.fda.gov/media/060189/download    Test performed by PCR.    Blood Culture - Blood, Hand, Right [[id number]]  (Normal) Collected: 07/13/22 1202    Lab Status: Preliminary result Specimen: Blood from Hand, Right Updated: 07/14/22 1247     Blood Culture No growth at 24 hours    Blood Culture - Blood, Hand, Left [[id number]]  (Normal) Collected: 07/13/22 1202    Lab Status: Preliminary result Specimen: Blood from Hand, Left Updated: 07/14/22 1247     Blood Culture No growth at 24 hours    Blood Culture - Blood, Arm, Left [[id number]]  (Abnormal) Collected: 07/09/22 0035    Lab Status: Final result Specimen: Blood from Arm, Left Updated: 07/12/22 0612     Blood Culture Serratia marcescens     Isolated from Aerobic and Anaerobic Bottles     Gram Stain Anaerobic Bottle Gram negative bacilli      Aerobic Bottle Gram negative bacilli    Narrative:      Refer to previous blood culture collected on 7/9/2022 0030 for MICs    Blood Culture - Blood, Arm, Right [[id number]]  (Abnormal)  (Susceptibility) Collected: 07/09/22 0030    Lab Status: Edited Result - FINAL Specimen: Blood from Arm, Right Updated: 07/13/22 0652     Blood Culture Serratia marcescens     Isolated from Anaerobic Bottle     Blood Culture Corynebacterium species     Comment: No definitive guidelines. All are susceptible to  vancomycin. Resistance to penicillins & cephalosporins does occur.          Isolated from Aerobic Bottle     Gram Stain Anaerobic Bottle Gram negative bacilli      Aerobic Bottle Gram positive bacilli    Narrative:      Corynebacterium probable contaminant requires clinical correlation, susceptibility not performed unless requested by physician.    Blood culture does not meet the specified criteria for PCR identification.  If pregnant, immunocompromised, or clinical concern for meningitis, call lab to run BCID for Listeria monocytogenes.    Susceptibility        Serratia marcescens      OLIVER      Cefepime Susceptible      Ceftazidime Susceptible      Ceftriaxone Susceptible      Gentamicin Susceptible      Levofloxacin Susceptible      Piperacillin + Tazobactam Susceptible      Trimethoprim + Sulfamethoxazole Susceptible                       Susceptibility Comments       Serratia marcescens    Cefazolin sensitivity will not be reported for Enterobacteriaceae in non-urine isolates. If cefazolin is preferred, please call the microbiology lab to request an E-test.  With the exception of urinary-sourced infections, aminoglycosides should not be used as monotherapy.               Blood Culture ID, PCR - Blood, Arm, Right [[id number]]  (Abnormal) Collected: 07/09/22 0030    Lab Status: Final result Specimen: Blood from Arm, Right Updated: 07/09/22 2307     BCID, PCR Serratia marcescens. Identification by BCID2 PCR.     BOTTLE TYPE Anaerobic Bottle    Respiratory Panel PCR w/COVID-19(SARS-CoV-2) [facility] In-House, NP Swab in UTM/VTM, 3-4 HR TAT - Swab, Nasopharynx [[id number]]  (Normal) Collected: 07/08/22 2342    Lab Status: Final result Specimen: Swab from Nasopharynx Updated: 07/09/22 0036     ADENOVIRUS, PCR Not Detected     Coronavirus 229E Not Detected     Coronavirus HKU1 Not Detected     Coronavirus NL63 Not Detected     Coronavirus OC43 Not Detected     COVID19 Not Detected     Human  "Metapneumovirus Not Detected     Human Rhinovirus/Enterovirus Not Detected     Influenza A PCR Not Detected     Influenza B PCR Not Detected     Parainfluenza Virus 1 Not Detected     Parainfluenza Virus 2 Not Detected     Parainfluenza Virus 3 Not Detected     Parainfluenza Virus 4 Not Detected     RSV, PCR Not Detected     Bordetella pertussis pcr Not Detected     Bordetella parapertussis PCR Not Detected     Chlamydophila pneumoniae PCR Not Detected     Mycoplasma pneumo by PCR Not Detected    Narrative:      In the setting of a positive respiratory panel with a viral infection PLUS a negative procalcitonin without other underlying concern for bacterial infection, consider observing off antibiotics or discontinuation of antibiotics and continue supportive care. If the respiratory panel is positive for atypical bacterial infection (Bordetella pertussis, Chlamydophila pneumoniae, or Mycoplasma pneumoniae), consider antibiotic de-escalation to target atypical bacterial infection.            Evaluation of Dosing     Last Dose Received in the ED/Outside Facility: No  Is Patient on Dialysis or Renal Replacement: No    Ht - 160 cm (63\")  Wt - 79.8 kg (175 lb 14.8 oz)    Evaluation of Level    Results from last 7 days   Lab Units 07/15/22  0523   VANCOMYCIN RM mcg/mL 25.10                   [employer] AUC Calculation    Current AUC:   521 mg/L*hr    Predicted Steady State AUC on Current Dose: 543 mg/L*hr  _________________________________    Predicted Steady State AUC on New Dose:   Continue current dose    Assessment/Plan    Pharmacy to dose vancomycin for (+) Corynebacteria blood culture. Goal -600 mg/L*hr.  Vancomycin random level of 25.1 mg/L this morning is within the expected range and predicts attainment of goal AUC. Will continue current dose of Vancomycin 1250 mg IV Q12H .  Assess clearance by vancomycin random level as indicated pending renal function, clinical progress.  Pharmacy will continue to monitor " renal function, cultures and sensitivities, and clinical status to adjust regimen as necessary.    Thank you for the consult,    Eleazar BuchananD, BCPS   07/15/22 08:34 EDT

## 2022-07-16 ENCOUNTER — READMISSION MANAGEMENT (OUTPATIENT)
Dept: CALL CENTER | Facility: HOSPITAL | Age: 42
End: 2022-07-16

## 2022-07-16 VITALS
TEMPERATURE: 98.6 F | OXYGEN SATURATION: 95 % | BODY MASS INDEX: 31.17 KG/M2 | HEIGHT: 63 IN | WEIGHT: 175.93 LBS | SYSTOLIC BLOOD PRESSURE: 142 MMHG | DIASTOLIC BLOOD PRESSURE: 83 MMHG | HEART RATE: 115 BPM | RESPIRATION RATE: 14 BRPM

## 2022-07-16 DIAGNOSIS — F41.9 ANXIETY AND DEPRESSION: ICD-10-CM

## 2022-07-16 DIAGNOSIS — F32.A ANXIETY AND DEPRESSION: ICD-10-CM

## 2022-07-16 LAB
ANION GAP SERPL CALCULATED.3IONS-SCNC: 15.8 MMOL/L (ref 5–15)
BASOPHILS # BLD AUTO: 0.04 10*3/MM3 (ref 0–0.2)
BASOPHILS NFR BLD AUTO: 0.3 % (ref 0–1.5)
BUN SERPL-MCNC: 10 MG/DL (ref 6–20)
BUN/CREAT SERPL: 14.1 (ref 7–25)
CALCIUM SPEC-SCNC: 8.4 MG/DL (ref 8.6–10.5)
CHLORIDE SERPL-SCNC: 98 MMOL/L (ref 98–107)
CO2 SERPL-SCNC: 22.2 MMOL/L (ref 22–29)
CREAT SERPL-MCNC: 0.71 MG/DL (ref 0.57–1)
CRP SERPL-MCNC: 20.8 MG/DL (ref 0–0.5)
DEPRECATED RDW RBC AUTO: 49.1 FL (ref 37–54)
EGFRCR SERPLBLD CKD-EPI 2021: 109 ML/MIN/1.73
EOSINOPHIL # BLD AUTO: 0.13 10*3/MM3 (ref 0–0.4)
EOSINOPHIL NFR BLD AUTO: 1.1 % (ref 0.3–6.2)
ERYTHROCYTE [DISTWIDTH] IN BLOOD BY AUTOMATED COUNT: 14.7 % (ref 12.3–15.4)
ERYTHROCYTE [SEDIMENTATION RATE] IN BLOOD: 89 MM/HR (ref 0–20)
GLUCOSE SERPL-MCNC: 66 MG/DL (ref 65–99)
HCT VFR BLD AUTO: 28.3 % (ref 34–46.6)
HGB BLD-MCNC: 9.4 G/DL (ref 12–15.9)
IMM GRANULOCYTES # BLD AUTO: 0.45 10*3/MM3 (ref 0–0.05)
IMM GRANULOCYTES NFR BLD AUTO: 3.8 % (ref 0–0.5)
LYMPHOCYTES # BLD AUTO: 2.09 10*3/MM3 (ref 0.7–3.1)
LYMPHOCYTES NFR BLD AUTO: 17.4 % (ref 19.6–45.3)
MCH RBC QN AUTO: 30.2 PG (ref 26.6–33)
MCHC RBC AUTO-ENTMCNC: 33.2 G/DL (ref 31.5–35.7)
MCV RBC AUTO: 91 FL (ref 79–97)
MONOCYTES # BLD AUTO: 1.18 10*3/MM3 (ref 0.1–0.9)
MONOCYTES NFR BLD AUTO: 9.8 % (ref 5–12)
NEUTROPHILS NFR BLD AUTO: 67.6 % (ref 42.7–76)
NEUTROPHILS NFR BLD AUTO: 8.1 10*3/MM3 (ref 1.7–7)
NRBC BLD AUTO-RTO: 0.2 /100 WBC (ref 0–0.2)
PLATELET # BLD AUTO: 486 10*3/MM3 (ref 140–450)
PMV BLD AUTO: 8.9 FL (ref 6–12)
POTASSIUM SERPL-SCNC: 3.9 MMOL/L (ref 3.5–5.2)
RBC # BLD AUTO: 3.11 10*6/MM3 (ref 3.77–5.28)
SODIUM SERPL-SCNC: 136 MMOL/L (ref 136–145)
WBC NRBC COR # BLD: 11.99 10*3/MM3 (ref 3.4–10.8)

## 2022-07-16 PROCEDURE — 99239 HOSP IP/OBS DSCHRG MGMT >30: CPT | Performed by: INTERNAL MEDICINE

## 2022-07-16 PROCEDURE — 80048 BASIC METABOLIC PNL TOTAL CA: CPT | Performed by: PHYSICIAN ASSISTANT

## 2022-07-16 PROCEDURE — 85652 RBC SED RATE AUTOMATED: CPT | Performed by: PHYSICIAN ASSISTANT

## 2022-07-16 PROCEDURE — 25010000002 VANCOMYCIN 5 G RECONSTITUTED SOLUTION: Performed by: INTERNAL MEDICINE

## 2022-07-16 PROCEDURE — 85025 COMPLETE CBC W/AUTO DIFF WBC: CPT | Performed by: PHYSICIAN ASSISTANT

## 2022-07-16 PROCEDURE — 86140 C-REACTIVE PROTEIN: CPT | Performed by: PHYSICIAN ASSISTANT

## 2022-07-16 PROCEDURE — 25010000002 ENOXAPARIN PER 10 MG: Performed by: INTERNAL MEDICINE

## 2022-07-16 RX ORDER — L.ACID,PARA/B.BIFIDUM/S.THERM 8B CELL
1 CAPSULE ORAL DAILY
Qty: 30 CAPSULE | Refills: 0 | Status: SHIPPED | OUTPATIENT
Start: 2022-07-16 | End: 2022-08-10

## 2022-07-16 RX ORDER — LEVOFLOXACIN 750 MG/1
750 TABLET ORAL EVERY 24 HOURS
Status: DISCONTINUED | OUTPATIENT
Start: 2022-07-16 | End: 2022-07-16 | Stop reason: HOSPADM

## 2022-07-16 RX ORDER — LEVOFLOXACIN 750 MG/1
750 TABLET ORAL EVERY 24 HOURS
Qty: 12 TABLET | Refills: 0 | Status: SHIPPED | OUTPATIENT
Start: 2022-07-16 | End: 2022-07-28

## 2022-07-16 RX ADMIN — VANCOMYCIN HYDROCHLORIDE 1250 MG: 5 INJECTION, POWDER, LYOPHILIZED, FOR SOLUTION INTRAVENOUS at 03:55

## 2022-07-16 RX ADMIN — ENOXAPARIN SODIUM 40 MG: 40 INJECTION SUBCUTANEOUS at 09:23

## 2022-07-16 RX ADMIN — BUSPIRONE HYDROCHLORIDE 30 MG: 10 TABLET ORAL at 09:23

## 2022-07-16 RX ADMIN — Medication 10 ML: at 09:52

## 2022-07-16 RX ADMIN — METHADONE HYDROCHLORIDE 125 MG: 10 TABLET ORAL at 09:22

## 2022-07-16 RX ADMIN — BUPROPION HYDROCHLORIDE 300 MG: 150 TABLET, FILM COATED, EXTENDED RELEASE ORAL at 09:23

## 2022-07-16 RX ADMIN — DULOXETINE 60 MG: 30 CAPSULE, DELAYED RELEASE ORAL at 09:23

## 2022-07-16 RX ADMIN — SENNOSIDES AND DOCUSATE SODIUM 2 TABLET: 50; 8.6 TABLET ORAL at 09:23

## 2022-07-16 NOTE — CASE MANAGEMENT/SOCIAL WORK
Case Management Discharge Note  Pt discharged with a rolling walker supplied by [id number]              Selected Continued Care - Discharged on 7/16/2022 Admission date: 7/13/2022 - Discharge disposition: Home or Self Care    Destination    No services have been selected for the patient.              Durable Medical Equipment Coordination complete.    Service Provider Selected Services Address Phone Fax Patient Preferred    ROSALINDA  BRETT  [employer] [address] [place], [place] [address], [facility] [address] [phone] --       Internal Comment last updated by Aviva Bai, APRN 7/15/2022 1424    walker delivered to bedside. Pt Signature obtained                               Transportation Services  Private: Car    Final Discharge Disposition Code: 01 - home or self-care   Complex Repair Preamble Text (Leave Blank If You Do Not Want): Extensive wide undermining was performed.

## 2022-07-16 NOTE — PLAN OF CARE
Goal Outcome Evaluation:  Plan of Care Reviewed With: patient        Progress: no change   VSS, pt alert and oriented however very drowsy at times. Pt had a fall this shift. Educated patient and bed/chair alarm activated at all times. MD assessed pt at bedside.  Found lighter, vape, and multiple medication bottles in patients room. Sent to security/pt brianne. Will continue to monitor.

## 2022-07-16 NOTE — DISCHARGE SUMMARY
"    [facility]   [facility]      Name:  Audrey Alonso   Age:  42 y.o.  Sex:  female  :  1980  MRN:  [id number]   Visit Number:  [id number]    Admission Date:  2022  Date of Discharge: 2022   primary Care Physician:  David Fleming DO        Discharge Diagnoses:     Serratia marcescens bacteremia      Problem List:     Active Hospital Problems    Diagnosis  POA   • Pneumonia of both lungs due to infectious organism [J18.9]  Yes      Resolved Hospital Problems   No resolved problems to display.     Presenting Problem:    Chief Complaint   Patient presents with   • Abnormal Lab   • Hip Pain      Consults:     Consulting Physician(s)  Chat With All Active Members    Provider Relationship Specialty    Marc Salinas MD  Consulting Physician Orthopedic Surgery        Procedures Performed:        History of presenting illness/Hospital Course:    \"Ms. Alonso is a 42-year of age female with history of anxiety/depression, hypertension, substance abuse.  She presented to the ER at request of the ER after patient had positive blood cultures from previous visit.  Patient had initially presented to the emergency room on 2022 with complaints of progressive chronic right hip pain.  She is followed by Dr. Salinas with orthopedics.  She receives intra articular joint injections with the last known injection approximately 4 months ago.  At that time, there was concern for septic joint and so blood cultures were obtained.  This was ruled out and patient was discharged home with prescription for Norco and outpatient follow-up.    Patient was notified at home by ER staff that blood cultures grew positive for Serratia marcescens and Corynebacterium and was advised to return to the ER. Upon repeat ER evaluation, patient afebril, tachycardic with heart rate 123, otherwise hemodynamically stable.  CRP 39, sed rate 79, lactate normal, procalcitonin 0.6.  Leukocytosis of 14, " "hemoglobin 9.6, hematocrit 28, platelets 367.  Urinalysis negative for infection.  COVID-negative.  UDS positive for amphetamines, benzos, methadone, opiates, and TCAs.  CT angiogram of the chest negative for pulmonary embolism but does show evidence of multifocal pneumonia.  Additionally CT abdomen pelvis showed no acute intra-abdominal process.  Patient given pain medication, initiated on vancomycin and Zosyn in the emergency room.\"  Repeat blood cultures that were obtained 7/13/2022 remained negative.  CT abdomen pelvis was discussed with radiology to review osseous anatomy more closely for any signs concerning of osteomyelitis or infection.  They felt that there was a small joint effusion with degenerative changes.  Patient taken for joint aspiration 7/14.  That culture also resulted Serratia.  Corynebacterium is likely a contaminant.  Vancomycin was discontinued.  Based on sensitivities to Serratia, Zosyn was de-escalated to Levaquin.  Patient had adequate source control along with gram-negative bacteremia it is appropriate to transition to oral therapy.  She was discharged home with outpatient follow-up arranged with both orthopedic surgery as well as her primary physician.    Vital Signs:         Physical Exam:    General Appearance:  Alert and cooperative.    Head:  Atraumatic and normocephalic.   Eyes: Conjunctivae and sclerae normal, no icterus. No pallor.   Ears:  Ears with no abnormalities noted.   Throat: No oral lesions, no thrush, oral mucosa moist.   Neck: Supple, trachea midline, no thyromegaly.       Lungs:   Breath sounds heard bilaterally equally.  No crackles or wheezing. No Pleural rub or bronchial breathing.   Heart:  Normal S1 and S2, no murmur, no gallop, no rub. No JVD.   Abdomen:   Normal bowel sounds, no masses, no organomegaly. Soft, nontender, nondistended, no rebound tenderness.   Extremities: Supple, no edema, no cyanosis, no clubbing.   Pulses: Pulses palpable bilaterally.   Skin: No " bleeding or rash.   Neurologic: Alert and oriented x 3.      Pertinent Lab Results:     Results from last 7 days   Lab Units 07/16/22  0544 07/15/22  0523 07/14/22  0653 07/13/22  1202   SODIUM mmol/L 136 138 137 140   POTASSIUM mmol/L 3.9 3.6 3.9 4.4   CHLORIDE mmol/L 98 100 103 102   CO2 mmol/L 22.2 22.8 23.6 25.7   BUN mg/dL 10 10 9 10   CREATININE mg/dL 0.71 0.70 0.64 0.76   CALCIUM mg/dL 8.4* 8.5* 8.3* 8.6   BILIRUBIN mg/dL  --   --   --  0.3   ALK PHOS U/L  --   --   --  145*   ALT (SGPT) U/L  --   --   --  23   AST (SGOT) U/L  --   --   --  27   GLUCOSE mg/dL 66 102* 91 92     Results from last 7 days   Lab Units 07/16/22  0544 07/15/22  0523 07/14/22  0653   WBC 10*3/mm3 11.99* 17.20* 16.07*   HEMOGLOBIN g/dL 9.4* 8.9* 9.0*   HEMATOCRIT % 28.3* 26.3* 27.0*   PLATELETS 10*3/mm3 486* 503* 376         Results from last 7 days   Lab Units 07/13/22  1202   CK TOTAL U/L 34   TROPONIN T ng/mL <0.010                     Results from last 7 days   Lab Units 07/13/22  1202   BLOODCX  No growth at 5 days  No growth at 5 days       Pertinent Radiology Results:    Imaging Results (All)     Procedure Component Value Units Date/Time    FL Guided Aspiration Joint [[id number]] Collected: 07/14/22 1258     Updated: 07/14/22 1444    Narrative:      PROCEDURE: FL GUIDED ASPIRATION JOINT-     INDICATIONS: ABSCESS; J18.9-Pneumonia, unspecified organism;  A41.9-Sepsis, unspecified organism     FLUORO TIME: 31 s      IMAGES: 2.     FINDINGS: The patient was brought to the fluoroscopic suite and placed  in the supine position. After informed consent and a time-out procedure  the right hip joint space was localized under fluoroscopic guidance. The  area was then prepped and draped in the usual sterile fashion. The skin  was then anesthetized with 1% lidocaine without epinephrine. Utilizing a  3-1/2 inch 20-gauge spinal needle access to the joint space was  achieved. A small amount of fluid (less than 1 mL) was aspirated from  the  joint. It was blood tinged. The procedure was then ended with no  immediate complications.       Impression:      Successful aspiration of a small amount of fluid from the  right hip joint space.        Images were reviewed, interpreted, and dictated by Dr. Alejandro Price MD  Transcribed by Dulce Goodwin PA-C.     This report was signed and finalized on 7/14/2022 2:42 PM by Alejandro Price MD.    CT Abdomen Pelvis With Contrast [[id number]] Collected: 07/13/22 1403     Updated: 07/14/22 1105    Addenda:        Additional review right hip shows a moderate size joint effusion. There  is loss of the joint space with subchondral sclerosis and cystic change  suggestive of osteoarthritic disease. There is superior lateral  subluxation which can be a sequela of chronic degenerative change.     However, given the presence of enlarging joint effusion since prior exam  and clinical concern for infection, aspiration may be considered to  assess for septic arthritis of the right hip.     This report was signed and finalized on 7/14/2022 11:03 AM by Alejandro Price MD.  Signed: 07/14/22 1103 by Alejandro Price MD    Narrative:      PROCEDURE: CT ABDOMEN PELVIS W CONTRAST-     HISTORY:  right hip pain c/w sepsis     COMPARISON: 07/09/2022.     TECHNIQUE: Multiple axial CT images were obtained from the lung bases  through the pubic symphysis following the administration of Isovue 300  and oral contrast.      FINDINGS:      ABDOMEN: The lung bases are clear. The heart is normal in size. The  liver is normal. The gallbladder is unremarkable. The spleen is  unremarkable. No adrenal mass is present.  The pancreas is normal. The  kidneys are normal. The aorta is normal in caliber. There is no free  fluid or adenopathy. The abdominal portions of the GI tract are  unremarkable with no evidence of obstruction.     PELVIS: The appendix is normal.  The urinary bladder is unremarkable.  Uterus and ovaries are unremarkable. There is no  significant free fluid  or adenopathy.       Impression:      No evidence of acute intra-abdominal process.              This study was performed with techniques to keep radiation doses as low  as reasonably achievable (ALARA). Individualized dose reduction  techniques using automated exposure control or adjustment of mA and/or  kV according to the patient size were employed.                  Images were reviewed, interpreted, and dictated by Dr. Alejandro Price MD  Transcribed by Dulce Goodwin PA-C.     This report was signed and finalized on 7/13/2022 2:04 PM by Alejandro Price MD.    US Venous Doppler Lower Extremity Bilateral (duplex) [[id number]] Collected: 07/13/22 1249     Updated: 07/13/22 1531    Addenda:        PROCEDURE: US VENOUS DOPPLER LOWER EXTREMITY BILATERAL (DUPLEX)-        HISTORY: swelling     RIGHT LOWER EXTREMITY:     FINDINGS: Multiple transverse and longitudinal scans were performed of  the femoropopliteal deep venous system, with augmentation and  compression maneuvers.     Normal phasic flow was noted in the visualized deep venous system. No  intraluminal increased echogenicity is noted to suggest thrombus. There  is normal compression and augmentation of the venous structures. No  abnormal venous collaterals are seen.      IMPRESSION: No evidence of deep venous thrombosis of the right lower  extremity.           HISTORY:  swelling     LEFT LOWER EXTREMITY:     Findings: Multiple transverse and longitudinal scans were performed of  the femoropopliteal deep venous system, with augmentation and  compression maneuvers.     Normal phasic flow was noted in the visualized deep venous system. No  intraluminal increased echogenicity is noted to suggest thrombus. There  is normal compression and augmentation of the venous structures. No  abnormal venous collaterals are seen.      IMPRESSION: No evidence of deep venous thrombosis of the left lower  extremity.     This report was signed and finalized  on 7/13/2022 1:56 PM by Alejandro Price MD.  Signed: 07/13/22 6186 by Alejandro Price MD            PROCEDURE: US VENOUS DOPPLER LOWER EXTREMITY BILATERAL (DUPLEX)-        HISTORY: swelling     RIGHT LOWER EXTREMITY:     FINDINGS: Multiple transverse and longitudinal scans were performed of  the femoropopliteal deep venous system, with augmentation and  compression maneuvers.     Normal phasic flow was noted in the visualized deep venous system. No  intraluminal increased echogenicity is noted to suggest thrombus. There  is normal compression and augmentation of the venous structures. No  abnormal venous collaterals are seen.      IMPRESSION: No evidence of deep venous thrombosis of the right lower  extremity.           HISTORY:  swelling     LEFT LOWER EXTREMITY:     Findings: Multiple transverse and longitudinal scans were performed of  the femoropopliteal deep venous system, with augmentation and  compression maneuvers.     Normal phasic flow was noted in the visualized deep venous system. No  intraluminal increased echogenicity is noted to suggest thrombus. There  is normal compression and augmentation of the venous structures. No  abnormal venous collaterals are seen.      IMPRESSION: No evidence of deep venous thrombosis of the left lower  extremity.     This report was signed and finalized on 7/13/2022 1:56 PM by Alejandro Price MD.  Signed: 07/13/22 1696 by Alejandro Price MD    Narrative:      FINAL REPORT    TECHNIQUE:  Bilateral lower extremity venous duplex was performed with  augmentation and compression.    CLINICAL HISTORY:  swelling    FINDINGS:  Proper flow is seen throughout the deep venous systems  bilaterally.  There is no evidence of deep venous thrombosis.      Impression:      No evidence of deep venous thrombosis.    Authenticated and Electronically Signed by Henrry Pinon MD  on 07/13/2022 12:49:38 PM    CT Angiogram Chest Pulmonary Embolism [[id number]] Collected: 07/13/22 1401      Updated: 07/13/22 1406    Narrative:      PROCEDURE: CT ANGIOGRAM CHEST PULMONARY EMBOLISM-     HISTORY: sepsis     COMPARISON: September 2020.     TECHNIQUE: Multiple axial CT images were obtained from the thoracic  inlet through the upper abdomen following the administration of Isovue  300 per the CT PE protocol. Coronal and oblique 3D MIP images were  reconstructed from the original axial data set.      FINDINGS: There are no filling defects within the pulmonary arteries to  suggest an embolus. The thoracic aorta is normal in caliber with no  evidence of dissection. The heart is normal in size. There are no  pleural or pericardial effusions. There is no adenopathy. There are a  few peripheral groundglass opacities in the upper lobes bilaterally  consistent with pneumonia, viral etiology is in the differential  diagnosis. There is right lower lobe atelectasis. There is a small  hiatal hernia without evidence of reflux. The visualized upper abdomen  is unremarkable.        Impression:      1. Mild multifocal pneumonia. Viral etiology is included in the  differential diagnosis.  2. No evidence of pulmonary embolus or dissection.            15.22 mGy.cm        This study was performed with techniques to keep radiation doses as low  as reasonably achievable (ALARA). Individualized dose reduction  techniques using automated exposure control or adjustment of mA and/or  kV according to the patient size were employed.               Images were reviewed, interpreted, and dictated by Dr. Alejandro Price MD  Transcribed by Dulce Goodwin PA-C.     This report was signed and finalized on 7/13/2022 2:04 PM by Alejandro Price MD.          Echo:    Results for orders placed during the hospital encounter of 07/13/22    Adult Transthoracic Echo Complete W/ Cont if Necessary Per Protocol    Interpretation Summary  1.  Normal left ventricular size and systolic function, LVEF 60-65%.  2.  Normal LV diastolic filling pattern.  3.   Normal right ventricular size and systolic function.  4.  Normal left atrial volume index.  5.  Trace MR and TR.    Condition on Discharge:      Stable.    Code status during the hospital stay:    Code Status and Medical Interventions:   Ordered at: 07/13/22 8541     Code Status (Patient has no pulse and is not breathing):    CPR (Attempt to Resuscitate)     Medical Interventions (Patient has pulse or is breathing):    Full Support     Discharge Disposition:    Home or Self Care    Discharge Medications:       Discharge Medications      New Medications      Instructions Start Date   lactobacillus acidophilus capsule capsule   1 capsule, Oral, Daily      levoFLOXacin 750 MG tablet  Commonly known as: LEVAQUIN   750 mg, Oral, Every 24 Hours         Changes to Medications      Instructions Start Date   prazosin 5 MG capsule  Commonly known as: MINIPRESS  What changed:   · how much to take  · how to take this  · when to take this   Two po q hs         Continue These Medications      Instructions Start Date   amphetamine-dextroamphetamine 30 MG tablet  Commonly known as: Adderall   Take 30 mg orally every morning and afternoon      buPROPion  MG 24 hr tablet  Commonly known as: Wellbutrin XL   300 mg, Oral, Daily, WITH 150 mg      busPIRone 15 MG tablet  Commonly known as: BUSPAR   30 mg, Oral, 2 Times Daily      diclofenac 75 MG EC tablet  Commonly known as: VOLTAREN   75 mg, Oral, 2 Times Daily      doxepin 50 MG capsule  Commonly known as: SINEquan   50 mg, Oral, Nightly      DULoxetine 60 MG capsule  Commonly known as: Cymbalta   Two po q am      gabapentin 800 MG tablet  Commonly known as: NEURONTIN   800 mg, Oral, 3 Times Daily      ibuprofen 800 MG tablet  Commonly known as: ADVIL,MOTRIN   800 mg, Oral, Every 8 Hours PRN      lidocaine 5 %  Commonly known as: LIDODERM   1 patch, Transdermal, Every 24 Hours, Remove & Discard patch within 12 hours or as directed by MD      meloxicam 7.5 MG tablet  Commonly  known as: MOBIC   7.5 mg, Oral, Daily      methadone 10 MG tablet  Commonly known as: DOLOPHINE   125 mg, Oral, Daily, [place]mira Elaine @ [facility] ([phone]) verified dose as 125 mg daily      QUEtiapine 100 MG tablet  Commonly known as: SEROquel   100 mg, Oral, Nightly           Discharge Diet:     Diet Instructions     Diet: Regular      Discharge Diet: Regular        Activity at Discharge:       Follow-up Appointments:    Additional Instructions for the Follow-ups that You Need to Schedule     Discharge Follow-up with PCP   As directed       Currently Documented PCP:    David Fleming DO    PCP Phone Number:    [phone]     Follow Up Details: 1-2 weeks         Discharge Follow-up with Specified Provider: Dr Brad flores; 1 Week   As directed      To: Dr Brad flores    Follow Up: 1 Week            Contact information for follow-up providers     David Fleming DO .    Specialty: Internal Medicine  Why: 1-2 weeks  Contact information:  [address]  [facility] [place] [address]  [phone]             David Fleming DO .    Specialty: Internal Medicine  Contact information:  [address]  [facility] [place] [address]  [phone]                   Contact information for after-discharge care     Durable Medical Equipment     [facility] .    Service: Durable Medical Equipment  Contact information:  [address]  [facility] [address]  [phone]                           Future Appointments   Date Time Provider Department Center   7/26/2022  4:00 PM David Fleming DO [facility]   9/15/2022  8:00 AM Nathalia Anne APRN [facility]     Test Results Pending at Discharge:           Ran Gaspar DO  07/18/22  16:34 EDT    Time: I spent >30 minutes on this discharge activity which included: face-to-face encounter with the patient, reviewing the data in the system, coordination of the care with the nursing staff as well as  consultants, documentation, and entering orders.     Dictated utilizing Dragon dictation.

## 2022-07-16 NOTE — PLAN OF CARE
Goal Outcome Evaluation:                 Problem: Fall Injury Risk  Goal: Absence of Fall and Fall-Related Injury  Outcome: Unable to Meet, Plan Revised

## 2022-07-16 NOTE — OUTREACH NOTE
Prep Survey    Flowsheet Row Responses   [facility] patient discharged from? [place]   Is LACE score < 7 ? No   Emergency Room discharge w/ pulse ox? No   Eligibility [facility]   Date of Admission 07/13/22   Date of Discharge 07/16/22   Discharge Disposition Home or Self Care   Discharge diagnosis Sepsis,    Pneumonia   Does the patient have one of the following disease processes/diagnoses(primary or secondary)? Sepsis   Does the patient have Home health ordered? No   Is there a DME ordered? Yes   What DME was ordered? [facility] - walker    General alerts for this patient History of IV drug use,    UDS positive for amphetamines, benzodiazepine, methadone, opiates, and TCAs   Prep survey completed? Yes          YUMIKO JONES - Registered Nurse

## 2022-07-16 NOTE — NURSING NOTE
All patient medications returned from [facility]. IV removed. Tele removed. Patient education performed. Pt resting in chair, waiting for her mother to come pick her up.

## 2022-07-17 LAB
BACTERIA FLD CULT: ABNORMAL
GRAM STN SPEC: ABNORMAL
GRAM STN SPEC: ABNORMAL

## 2022-07-18 ENCOUNTER — TRANSITIONAL CARE MANAGEMENT TELEPHONE ENCOUNTER (OUTPATIENT)
Dept: CALL CENTER | Facility: HOSPITAL | Age: 42
End: 2022-07-18

## 2022-07-18 LAB
BACTERIA SPEC AEROBE CULT: NORMAL
BACTERIA SPEC AEROBE CULT: NORMAL

## 2022-07-18 RX ORDER — BUSPIRONE HYDROCHLORIDE 10 MG/1
TABLET ORAL
Qty: 180 TABLET | Refills: 2 | OUTPATIENT
Start: 2022-07-18

## 2022-07-18 NOTE — OUTREACH NOTE
Call Center TCM Note    Flowsheet Row Responses   [facility] patient discharged from? [place]   Does the patient have one of the following disease processes/diagnoses(primary or secondary)? Sepsis   TCM attempt successful? Yes   Call start time 1124   Call end time 1127   General alerts for this patient History of IV drug use,  UDS positive for amphetamines, benzodiazepine, methadone, opiates, and TCAs   Discharge diagnosis Sepsis,    Pneumonia   Meds reviewed with patient/caregiver? Yes   Is the patient having any side effects they believe may be caused by any medication additions or changes? No   Does the patient have all medications related to this admission filled (includes all antibiotics, inhalers, nebulizers,steroids,etc.) Yes   Is the patient taking all medications as directed (includes completed medication regime)? Yes   Does the patient have a primary care provider?  Yes   Comments regarding PCP Hospital d/c f/u appt is on 7/26/22 at 4:00 pm    Does the patient have an appointment with their PCP within 7 days of discharge? Yes   Has the patient kept scheduled appointments due by today? N/A   What DME was ordered? [employer] - [facility]er    Has all DME been delivered? Yes   Psychosocial issues? No   Did the patient receive a copy of their discharge instructions? Yes   Nursing interventions Reviewed instructions with patient   What is the patient's perception of their health status since discharge? Same   Is the patient/caregiver able to teach back Sepsis? S - Shivering,fever or very cold, S - Sleepy, difficult to arouse,confused, S - Short of breath   Nursing interventions Nurse provided patient education   Is patient/caregiver able to teach back steps to recovery at home? Set small, achievable goals for return to baseline health, Rest and regain strength, Eat a balanced diet   Is the patient/caregiver able to teach back signs and symptoms of worsening condition: Fever, Hyperthermia, Shortness of  breath/rapid respiratory rate, Altered mental status(confusion/coma)   If the patient is a current smoker, are they able to teach back resources for cessation? Not a smoker  [Vapes ]   Is the patient/caregiver able to teach back the hierarchy of who to call/visit for symptoms/problems? PCP, Specialist, Home health nurse, Urgent Care, ED, 911 Yes   TCM call completed? Yes          Huong Galicia RN    7/18/2022, 11:27 EDT

## 2022-07-26 ENCOUNTER — OFFICE VISIT (OUTPATIENT)
Dept: INTERNAL MEDICINE | Facility: CLINIC | Age: 42
End: 2022-07-26

## 2022-07-26 VITALS
RESPIRATION RATE: 17 BRPM | HEART RATE: 122 BPM | HEIGHT: 65 IN | BODY MASS INDEX: 26.99 KG/M2 | WEIGHT: 162 LBS | TEMPERATURE: 97.1 F | SYSTOLIC BLOOD PRESSURE: 123 MMHG | OXYGEN SATURATION: 98 % | DIASTOLIC BLOOD PRESSURE: 75 MMHG

## 2022-07-26 DIAGNOSIS — R00.0 TACHYCARDIA: ICD-10-CM

## 2022-07-26 DIAGNOSIS — R78.81 BACTEREMIA: ICD-10-CM

## 2022-07-26 DIAGNOSIS — M16.11 ARTHRITIS OF RIGHT HIP: ICD-10-CM

## 2022-07-26 DIAGNOSIS — D64.9 ANEMIA, UNSPECIFIED TYPE: Primary | ICD-10-CM

## 2022-07-26 DIAGNOSIS — J18.9 MULTIFOCAL PNEUMONIA: ICD-10-CM

## 2022-07-26 PROCEDURE — 99214 OFFICE O/P EST MOD 30 MIN: CPT | Performed by: INTERNAL MEDICINE

## 2022-07-26 RX ORDER — BUPROPION HYDROCHLORIDE 150 MG/1
TABLET ORAL
COMMUNITY
Start: 2022-07-16 | End: 2022-09-22 | Stop reason: SDUPTHER

## 2022-07-26 RX ORDER — ACETAMINOPHEN AND CODEINE PHOSPHATE 300; 30 MG/1; MG/1
TABLET ORAL
COMMUNITY
Start: 2022-07-11 | End: 2023-01-06

## 2022-07-28 LAB
ALBUMIN SERPL-MCNC: 4 G/DL (ref 3.5–5.2)
ALBUMIN/GLOB SERPL: 1.3 G/DL
ALP SERPL-CCNC: 97 U/L (ref 39–117)
ALT SERPL-CCNC: 11 U/L (ref 1–33)
AST SERPL-CCNC: 13 U/L (ref 1–32)
BASOPHILS # BLD AUTO: 0.04 10*3/MM3 (ref 0–0.2)
BASOPHILS NFR BLD AUTO: 0.6 % (ref 0–1.5)
BILIRUB SERPL-MCNC: 0.2 MG/DL (ref 0–1.2)
BUN SERPL-MCNC: 15 MG/DL (ref 6–20)
BUN/CREAT SERPL: 17.4 (ref 7–25)
CALCIUM SERPL-MCNC: 9.3 MG/DL (ref 8.6–10.5)
CHLORIDE SERPL-SCNC: 99 MMOL/L (ref 98–107)
CO2 SERPL-SCNC: 26 MMOL/L (ref 22–29)
CREAT SERPL-MCNC: 0.86 MG/DL (ref 0.57–1)
CRP SERPL-MCNC: 6.46 MG/DL (ref 0–0.5)
EGFRCR SERPLBLD CKD-EPI 2021: 86.6 ML/MIN/1.73
EOSINOPHIL # BLD AUTO: 0.11 10*3/MM3 (ref 0–0.4)
EOSINOPHIL NFR BLD AUTO: 1.6 % (ref 0.3–6.2)
ERYTHROCYTE [DISTWIDTH] IN BLOOD BY AUTOMATED COUNT: 13.7 % (ref 12.3–15.4)
ERYTHROCYTE [SEDIMENTATION RATE] IN BLOOD BY WESTERGREN METHOD: 64 MM/HR (ref 0–20)
GLOBULIN SER CALC-MCNC: 3 GM/DL
GLUCOSE SERPL-MCNC: 112 MG/DL (ref 65–99)
HCT VFR BLD AUTO: 32.9 % (ref 34–46.6)
HGB BLD-MCNC: 10.5 G/DL (ref 12–15.9)
IMM GRANULOCYTES # BLD AUTO: 0.03 10*3/MM3 (ref 0–0.05)
IMM GRANULOCYTES NFR BLD AUTO: 0.4 % (ref 0–0.5)
IRON SATN MFR SERPL: 7 % (ref 20–50)
IRON SERPL-MCNC: 24 MCG/DL (ref 37–145)
LYMPHOCYTES # BLD AUTO: 1.64 10*3/MM3 (ref 0.7–3.1)
LYMPHOCYTES NFR BLD AUTO: 24.2 % (ref 19.6–45.3)
MCH RBC QN AUTO: 28.6 PG (ref 26.6–33)
MCHC RBC AUTO-ENTMCNC: 31.9 G/DL (ref 31.5–35.7)
MCV RBC AUTO: 89.6 FL (ref 79–97)
MONOCYTES # BLD AUTO: 0.96 10*3/MM3 (ref 0.1–0.9)
MONOCYTES NFR BLD AUTO: 14.2 % (ref 5–12)
NEUTROPHILS # BLD AUTO: 3.99 10*3/MM3 (ref 1.7–7)
NEUTROPHILS NFR BLD AUTO: 59 % (ref 42.7–76)
NRBC BLD AUTO-RTO: 0 /100 WBC (ref 0–0.2)
PLATELET # BLD AUTO: 657 10*3/MM3 (ref 140–450)
POTASSIUM SERPL-SCNC: 4.9 MMOL/L (ref 3.5–5.2)
PROT SERPL-MCNC: 7 G/DL (ref 6–8.5)
RBC # BLD AUTO: 3.67 10*6/MM3 (ref 3.77–5.28)
SODIUM SERPL-SCNC: 138 MMOL/L (ref 136–145)
TIBC SERPL-MCNC: 320 MCG/DL
UIBC SERPL-MCNC: 296 MCG/DL (ref 112–346)
WBC # BLD AUTO: 6.77 10*3/MM3 (ref 3.4–10.8)

## 2022-07-28 NOTE — PROGRESS NOTES
Chief Complaint   Patient presents with   • Hospital Follow Up Visit     R 7/13-7/16 for bacteremia and double pneumonia       Subjective     History of Present Illness   Audrey Alonso is a 42 y.o. female presenting for follow up after hospitalization.  I reviewed and discussed the details of that call along with the discharge summary, hospital problems, inpatient lab results, inpatient diagnostic studies, and consultation reports with Audrey.    Hospitalization Summary:    Date of TCM Phone Call 7/16/2022   [facility]   Date of Admission 7/13/2022   Date of Discharge 7/16/2022   Discharge Disposition Home or Self Care     Risk for Readmission (LACE) Score: 7 (7/16/2022  6:02 AM)      Course During Hospital Stay:   Patient is a 42-year-old white female with a history of anxiety, depression, hypertension, prior substance abuse, and chronic hip pain who presented to [facility] ER 2 times with severe left hip pain.  Work-up eventually presented bacteremia and she was admitted to the hospital for treatment with Serratia infection in the blood and pneumonia.  Due to severe hip pain, arthrocentesis of the hip was performed however bloody fluid was collected.  Body fluid was positive for.  The time however it is not clear if infection have truly reached the hip.  Patient is currently on oral antibiotic and states that she is starting to feel much better.  Right hip pain continues to be a chronic issue for which she had been obtaining hip injections.        The following portions of the patient's history were reviewed and updated as appropriate: allergies, current medications, past family history, past medical history, past social history, past surgical history and problem list.    Review of Systems   Constitutional: Negative for chills, fatigue and fever.   HENT: Negative for congestion, ear pain, rhinorrhea, sinus pressure and sore throat.    Eyes: Negative for visual disturbance.    Respiratory: Negative for cough, chest tightness, shortness of breath and wheezing.    Cardiovascular: Negative for chest pain, palpitations and leg swelling.   Gastrointestinal: Negative for abdominal pain, blood in stool, constipation, diarrhea, nausea and vomiting.   Endocrine: Negative for polydipsia and polyuria.   Genitourinary: Negative for dysuria and hematuria.   Musculoskeletal: Positive for arthralgias. Negative for back pain.   Skin: Negative for rash.   Neurological: Negative for dizziness, light-headedness, numbness and headaches.   Psychiatric/Behavioral: Negative for dysphoric mood and sleep disturbance. The patient is not nervous/anxious.        Allergies   Allergen Reactions   • Diphenhydramine Hcl Other (See Comments)       Past Medical History:   Diagnosis Date   • Anxiety    • Asthma    • Depression    • Disease of thyroid gland    • GERD (gastroesophageal reflux disease)    • Headache    • Hypertension    • Seizures (HCC)    • Substance abuse (HCC)    • Trauma        Social History     Socioeconomic History   • Marital status:    Tobacco Use   • Smoking status: Current Every Day Smoker     Packs/day: 0.25     Years: 20.00     Pack years: 5.00     Types: Cigarettes   • Smokeless tobacco: Never Used   • Tobacco comment: vapes also   Vaping Use   • Vaping Use: Every day   • Substances: Nicotine, Flavoring   • Devices: Pre-filled or refillable cartridge   Substance and Sexual Activity   • Alcohol use: No     Comment: stopped 2008   • Drug use: Yes     Types: IV     Comment: STATES SHE TOOK HER METHADONE AND SOME BENZOS 9/17/20   • Sexual activity: Not Currently     Partners: Male     Birth control/protection: Abstinence       Past Surgical History:   Procedure Laterality Date   • DILATATION AND CURETTAGE     • INCISION AND DRAINAGE ABSCESS  2019    arm       Family History   Problem Relation Age of Onset   • Arthritis Mother    • Cancer Mother         breast   • Thyroid disease Mother     • Breast cancer Mother    • Diabetes Father    • Hypertension Father    • Mental illness Brother    • Breast cancer Maternal Aunt          Current Outpatient Medications:   •  acetaminophen-codeine (TYLENOL #3) 300-30 MG per tablet, , Disp: , Rfl:   •  amphetamine-dextroamphetamine (Adderall) 30 MG tablet, Take 30 mg orally every morning and afternoon, Disp: 60 tablet, Rfl: 0  •  buPROPion XL (WELLBUTRIN XL) 150 MG 24 hr tablet, , Disp: , Rfl:   •  buPROPion XL (Wellbutrin XL) 300 MG 24 hr tablet, Take 1 tablet by mouth Daily. WITH 150 mg, Disp: 30 tablet, Rfl: 6  •  busPIRone (BUSPAR) 15 MG tablet, Take 2 tablets by mouth 2 (Two) Times a Day., Disp: 120 tablet, Rfl: 6  •  diclofenac (VOLTAREN) 75 MG EC tablet, Take 75 mg by mouth 2 (Two) Times a Day., Disp: , Rfl:   •  doxepin (SINEquan) 50 MG capsule, Take 1 capsule by mouth Every Night., Disp: 30 capsule, Rfl: 6  •  DULoxetine (Cymbalta) 60 MG capsule, Two po q am, Disp: 60 capsule, Rfl: 6  •  gabapentin (NEURONTIN) 800 MG tablet, Take 1 tablet by mouth 3 (Three) Times a Day., Disp: 90 tablet, Rfl: 2  •  ibuprofen (ADVIL,MOTRIN) 800 MG tablet, Take 800 mg by mouth Every 8 (Eight) Hours As Needed., Disp: , Rfl:   •  lactobacillus acidophilus (RISAQUAD) capsule capsule, Take 1 capsule by mouth Daily for 30 days., Disp: 30 capsule, Rfl: 0  •  levoFLOXacin (LEVAQUIN) 750 MG tablet, Take 1 tablet by mouth Daily for 12 doses. Indications: Bacteria in the Blood, Pneumonia, Disp: 12 tablet, Rfl: 0  •  lidocaine (LIDODERM) 5 %, Place 1 patch on the skin as directed by provider Daily. Remove & Discard patch within 12 hours or as directed by MD, Disp: 6 each, Rfl: 0  •  meloxicam (MOBIC) 7.5 MG tablet, Take 1 tablet by mouth Daily., Disp: 14 tablet, Rfl: 0  •  methadone (DOLOPHINE) 10 MG tablet, Take 125 mg by mouth Daily Elmira Elaine @ [facility] ([phone]) verified dose as 125 mg daily,, Disp: , Rfl:   •  prazosin (MINIPRESS) 5 MG capsule,  "Two po q hs (Patient taking differently: Take 10 mg by mouth Every Night. Two po q hs), Disp: 60 capsule, Rfl: 6  •  QUEtiapine (SEROquel) 100 MG tablet, Take 1 tablet by mouth Every Night., Disp: 30 tablet, Rfl: 6    Objective   /75   Pulse (!) 122   Temp 97.1 °F (36.2 °C)   Resp 17   Ht 165.1 cm (65\")   Wt 73.5 kg (162 lb)   LMP 07/01/2022 (Approximate)   SpO2 98%   BMI 26.96 kg/m²     Physical Exam  Vitals and nursing note reviewed.   Constitutional:       Appearance: Normal appearance. She is well-developed.   HENT:      Head: Normocephalic and atraumatic.   Eyes:      Extraocular Movements: Extraocular movements intact.      Conjunctiva/sclera: Conjunctivae normal.   Pulmonary:      Effort: Pulmonary effort is normal.   Musculoskeletal:      Cervical back: Normal range of motion and neck supple.   Skin:     General: Skin is warm and dry.      Findings: No rash.   Neurological:      General: No focal deficit present.      Mental Status: She is alert and oriented to person, place, and time.   Psychiatric:         Mood and Affect: Mood normal.         Behavior: Behavior normal.         Assessment & Plan   Diagnoses and all orders for this visit:    1. Anemia, unspecified type (Primary)  -     CBC & Differential  -     Comprehensive Metabolic Panel  -     Sedimentation Rate  -     C-reactive protein  -     Iron Profile    2. Multifocal pneumonia  -     CBC & Differential  -     Comprehensive Metabolic Panel  -     Sedimentation Rate  -     C-reactive protein  -     Iron Profile    3. Arthritis of right hip  -     CBC & Differential  -     Comprehensive Metabolic Panel  -     Sedimentation Rate  -     C-reactive protein  -     Iron Profile    4. Bacteremia  -     CBC & Differential  -     Comprehensive Metabolic Panel  -     Sedimentation Rate  -     C-reactive protein  -     Iron Profile    5. Tachycardia          Discussion Summary:    Multifocal pneumonia/bacteremia due to Serratia marcescens  - " Patient is recovering well with oral Levaquin.  She was advised to complete therapy as prescribed.  - Blood work was been ordered to monitor for improvement in inflammatory markers as she continues antibiotics.    Right hip arthritis  - There was initial concern for septic arthritis however following work-up with orthopedics, it was thought that the joint was not septic.  She does eventually need hip replacement due to dysplasia.  She was encouraged to and shoulder her inflammatory markers and bacteremia are cleared completely before considering any surgical intervention.    Tachycardia,  - Chronic condition.  Continue to monitor, consider further work-up with cardiology if needed.      Transitional Care Management Certification  I certify that the following are true:  1. Within 48 business hours after discharge our office contacted her via telephone to coordinate her care and needs.   2. Complexity of Medical Decision Making is moderate.  3. Face to face visit occurred within 10 days.    *Note: 62542 is for high complexity patients with a face to face visit within 7 days of discharge.  92054 is for high complexity patients with a face to face on days 8-14 post discharge or medium complexity with face to face visit within 14 days post discharge.    Follow up:  Return in about 2 weeks (around 8/9/2022).

## 2022-07-28 NOTE — PROGRESS NOTES
Please let the patient know that her inflammatory markers are all improving.  There is still some mild elevation but this should improve with time. I would prefer to see complete resolution before surgery is considered in the future.

## 2022-07-29 DIAGNOSIS — F90.2 ATTENTION DEFICIT HYPERACTIVITY DISORDER (ADHD), COMBINED TYPE: ICD-10-CM

## 2022-08-01 RX ORDER — DEXTROAMPHETAMINE SACCHARATE, AMPHETAMINE ASPARTATE, DEXTROAMPHETAMINE SULFATE AND AMPHETAMINE SULFATE 7.5; 7.5; 7.5; 7.5 MG/1; MG/1; MG/1; MG/1
TABLET ORAL
Qty: 60 TABLET | Refills: 0 | Status: SHIPPED | OUTPATIENT
Start: 2022-08-01 | End: 2022-09-14 | Stop reason: SDUPTHER

## 2022-08-10 ENCOUNTER — OFFICE VISIT (OUTPATIENT)
Dept: INTERNAL MEDICINE | Facility: CLINIC | Age: 42
End: 2022-08-10

## 2022-08-10 VITALS
HEIGHT: 65 IN | BODY MASS INDEX: 27.82 KG/M2 | HEART RATE: 103 BPM | WEIGHT: 167 LBS | SYSTOLIC BLOOD PRESSURE: 113 MMHG | OXYGEN SATURATION: 97 % | RESPIRATION RATE: 16 BRPM | DIASTOLIC BLOOD PRESSURE: 62 MMHG | TEMPERATURE: 97.3 F

## 2022-08-10 DIAGNOSIS — R78.81 BACTEREMIA: ICD-10-CM

## 2022-08-10 DIAGNOSIS — R00.0 TACHYCARDIA: Primary | ICD-10-CM

## 2022-08-10 DIAGNOSIS — R60.0 BILATERAL LOWER EXTREMITY EDEMA: ICD-10-CM

## 2022-08-10 DIAGNOSIS — E61.1 IRON DEFICIENCY: ICD-10-CM

## 2022-08-10 LAB
ALBUMIN SERPL-MCNC: 3.7 G/DL (ref 3.5–5.2)
ALBUMIN/GLOB SERPL: 1.3 G/DL
ALP SERPL-CCNC: 91 U/L (ref 39–117)
ALT SERPL-CCNC: 18 U/L (ref 1–33)
AST SERPL-CCNC: 14 U/L (ref 1–32)
BASOPHILS # BLD AUTO: 0.03 10*3/MM3 (ref 0–0.2)
BASOPHILS NFR BLD AUTO: 0.5 % (ref 0–1.5)
BILIRUB SERPL-MCNC: <0.2 MG/DL (ref 0–1.2)
BUN SERPL-MCNC: 10 MG/DL (ref 6–20)
BUN/CREAT SERPL: 12 (ref 7–25)
CALCIUM SERPL-MCNC: 9.1 MG/DL (ref 8.6–10.5)
CHLORIDE SERPL-SCNC: 102 MMOL/L (ref 98–107)
CO2 SERPL-SCNC: 27.2 MMOL/L (ref 22–29)
CREAT SERPL-MCNC: 0.83 MG/DL (ref 0.57–1)
CRP SERPL-MCNC: 1.62 MG/DL (ref 0–0.5)
EGFRCR SERPLBLD CKD-EPI 2021: 90.4 ML/MIN/1.73
EOSINOPHIL # BLD AUTO: 0.21 10*3/MM3 (ref 0–0.4)
EOSINOPHIL NFR BLD AUTO: 3.6 % (ref 0.3–6.2)
ERYTHROCYTE [DISTWIDTH] IN BLOOD BY AUTOMATED COUNT: 13.6 % (ref 12.3–15.4)
ERYTHROCYTE [SEDIMENTATION RATE] IN BLOOD BY WESTERGREN METHOD: 54 MM/HR (ref 0–20)
GLOBULIN SER CALC-MCNC: 2.8 GM/DL
GLUCOSE SERPL-MCNC: 76 MG/DL (ref 65–99)
HCT VFR BLD AUTO: 32 % (ref 34–46.6)
HGB BLD-MCNC: 10.6 G/DL (ref 12–15.9)
IMM GRANULOCYTES # BLD AUTO: 0.01 10*3/MM3 (ref 0–0.05)
IMM GRANULOCYTES NFR BLD AUTO: 0.2 % (ref 0–0.5)
LYMPHOCYTES # BLD AUTO: 1.6 10*3/MM3 (ref 0.7–3.1)
LYMPHOCYTES NFR BLD AUTO: 27.4 % (ref 19.6–45.3)
MCH RBC QN AUTO: 28.9 PG (ref 26.6–33)
MCHC RBC AUTO-ENTMCNC: 33.1 G/DL (ref 31.5–35.7)
MCV RBC AUTO: 87.2 FL (ref 79–97)
MONOCYTES # BLD AUTO: 0.47 10*3/MM3 (ref 0.1–0.9)
MONOCYTES NFR BLD AUTO: 8 % (ref 5–12)
NEUTROPHILS # BLD AUTO: 3.52 10*3/MM3 (ref 1.7–7)
NEUTROPHILS NFR BLD AUTO: 60.3 % (ref 42.7–76)
NRBC BLD AUTO-RTO: 0 /100 WBC (ref 0–0.2)
PLATELET # BLD AUTO: 295 10*3/MM3 (ref 140–450)
POTASSIUM SERPL-SCNC: 4.6 MMOL/L (ref 3.5–5.2)
PROT SERPL-MCNC: 6.5 G/DL (ref 6–8.5)
RBC # BLD AUTO: 3.67 10*6/MM3 (ref 3.77–5.28)
SODIUM SERPL-SCNC: 141 MMOL/L (ref 136–145)
T4 FREE SERPL-MCNC: 0.91 NG/DL (ref 0.93–1.7)
TSH SERPL DL<=0.005 MIU/L-ACNC: 3.11 UIU/ML (ref 0.27–4.2)
WBC # BLD AUTO: 5.84 10*3/MM3 (ref 3.4–10.8)

## 2022-08-10 PROCEDURE — 99214 OFFICE O/P EST MOD 30 MIN: CPT | Performed by: INTERNAL MEDICINE

## 2022-08-10 RX ORDER — FERROUS SULFATE 325(65) MG
325 TABLET ORAL
Qty: 30 TABLET | Refills: 2 | Status: SHIPPED | OUTPATIENT
Start: 2022-08-10 | End: 2023-01-06

## 2022-08-10 NOTE — PROGRESS NOTES
Chief Complaint   Patient presents with   • Anemia   • Arthritis     Right hip-has been referred to Ortho in [facility] [address] [facility] on Monday 8/13       Subjective     History of Present Illness   Audrey Alonso is a 42 y.o. female presenting for follow up for bacteremia and chronic right hip joint pain.  Patient shares she feels better and denies signs of infection.  Chronic right hip joint pain persists.  In her last labs, residual inflammation was improving but still showed some elevations.  She has completed oral antibiotics.  Has appointment with  orthopedics for evaluation of her right hip.      Heart rate remains slightly high.     The following portions of the patient's history were reviewed and updated as appropriate: allergies, current medications, past family history, past medical history, past social history, past surgical history and problem list.    Review of Systems   Constitutional: Negative for chills, fatigue and fever.   HENT: Negative for congestion, ear pain, rhinorrhea, sinus pressure and sore throat.    Eyes: Negative for visual disturbance.   Respiratory: Negative for cough, chest tightness, shortness of breath and wheezing.    Cardiovascular: Negative for chest pain, palpitations and leg swelling.   Gastrointestinal: Negative for abdominal pain, blood in stool, constipation, diarrhea, nausea and vomiting.   Endocrine: Negative for polydipsia and polyuria.   Genitourinary: Negative for dysuria and hematuria.   Musculoskeletal: Positive for arthralgias. Negative for back pain.   Skin: Negative for rash.   Neurological: Negative for dizziness, light-headedness, numbness and headaches.   Psychiatric/Behavioral: Negative for dysphoric mood and sleep disturbance. The patient is not nervous/anxious.        Allergies   Allergen Reactions   • Diphenhydramine Hcl Other (See Comments)       Past Medical History:   Diagnosis Date   • Anxiety    • Asthma    • Depression    • Disease of thyroid gland     • GERD (gastroesophageal reflux disease)    • Headache    • Hypertension    • Seizures (HCC)    • Substance abuse (HCC)    • Trauma        Social History     Socioeconomic History   • Marital status:    Tobacco Use   • Smoking status: Current Every Day Smoker     Packs/day: 0.25     Years: 20.00     Pack years: 5.00     Types: Cigarettes   • Smokeless tobacco: Never Used   • Tobacco comment: vapes also   Vaping Use   • Vaping Use: Every day   • Substances: Nicotine, Flavoring   • Devices: Pre-filled or refillable cartridge   Substance and Sexual Activity   • Alcohol use: No     Comment: stopped 2008   • Drug use: Yes     Types: IV     Comment: STATES SHE TOOK HER METHADONE AND SOME BENZOS 9/17/20   • Sexual activity: Not Currently     Partners: Male     Birth control/protection: Abstinence        Past Surgical History:   Procedure Laterality Date   • DILATATION AND CURETTAGE     • INCISION AND DRAINAGE ABSCESS  2019    arm       Family History   Problem Relation Age of Onset   • Arthritis Mother    • Cancer Mother         breast   • Thyroid disease Mother    • Breast cancer Mother    • Diabetes Father    • Hypertension Father    • Mental illness Brother    • Breast cancer Maternal Aunt          Current Outpatient Medications:   •  acetaminophen-codeine (TYLENOL #3) 300-30 MG per tablet, , Disp: , Rfl:   •  amphetamine-dextroamphetamine (Adderall) 30 MG tablet, Take 30 mg orally every morning and afternoon, Disp: 60 tablet, Rfl: 0  •  buPROPion XL (WELLBUTRIN XL) 150 MG 24 hr tablet, , Disp: , Rfl:   •  buPROPion XL (Wellbutrin XL) 300 MG 24 hr tablet, Take 1 tablet by mouth Daily. WITH 150 mg, Disp: 30 tablet, Rfl: 6  •  busPIRone (BUSPAR) 15 MG tablet, Take 2 tablets by mouth 2 (Two) Times a Day., Disp: 120 tablet, Rfl: 6  •  diclofenac (VOLTAREN) 75 MG EC tablet, Take 75 mg by mouth 2 (Two) Times a Day., Disp: , Rfl:   •  doxepin (SINEquan) 50 MG capsule, Take 1 capsule by mouth Every Night., Disp: 30  "capsule, Rfl: 6  •  DULoxetine (Cymbalta) 60 MG capsule, Two po q am, Disp: 60 capsule, Rfl: 6  •  gabapentin (NEURONTIN) 800 MG tablet, Take 1 tablet by mouth 3 (Three) Times a Day., Disp: 90 tablet, Rfl: 2  •  ibuprofen (ADVIL,MOTRIN) 800 MG tablet, Take 800 mg by mouth Every 8 (Eight) Hours As Needed., Disp: , Rfl:   •  lidocaine (LIDODERM) 5 %, Place 1 patch on the skin as directed by provider Daily. Remove & Discard patch within 12 hours or as directed by MD, Disp: 6 each, Rfl: 0  •  meloxicam (MOBIC) 7.5 MG tablet, Take 1 tablet by mouth Daily., Disp: 14 tablet, Rfl: 0  •  methadone (DOLOPHINE) 10 MG tablet, Take 125 mg by mouth Daily Elmira Elaine @ [facility] ([phone]) verified dose as 125 mg daily,, Disp: , Rfl:   •  prazosin (MINIPRESS) 5 MG capsule, Two po q hs (Patient taking differently: Take 10 mg by mouth Every Night. Two po q hs), Disp: 60 capsule, Rfl: 6  •  QUEtiapine (SEROquel) 100 MG tablet, Take 1 tablet by mouth Every Night., Disp: 30 tablet, Rfl: 6  •  ferrous sulfate 325 (65 FE) MG tablet, Take 1 tablet by mouth Daily With Breakfast., Disp: 30 tablet, Rfl: 2    Objective   /62   Pulse 103   Temp 97.3 °F (36.3 °C)   Resp 16   Ht 165.1 cm (65\")   Wt 75.8 kg (167 lb)   SpO2 97%   BMI 27.79 kg/m²     Physical Exam  Vitals and nursing note reviewed.   Constitutional:       Appearance: Normal appearance. She is well-developed.   HENT:      Head: Normocephalic and atraumatic.   Eyes:      Extraocular Movements: Extraocular movements intact.      Conjunctiva/sclera: Conjunctivae normal.   Pulmonary:      Effort: Pulmonary effort is normal.   Musculoskeletal:      Cervical back: Normal range of motion and neck supple.   Skin:     General: Skin is warm and dry.      Findings: No rash.   Neurological:      General: No focal deficit present.      Mental Status: She is alert and oriented to person, place, and time.   Psychiatric:         Mood and Affect: Mood " normal.         Behavior: Behavior normal.         Assessment & Plan   Diagnoses and all orders for this visit:    1. Tachycardia (Primary)  -     TSH+Free T4    2. Iron deficiency  -     ferrous sulfate 325 (65 FE) MG tablet; Take 1 tablet by mouth Daily With Breakfast.  Dispense: 30 tablet; Refill: 2    3. Bacteremia  -     CBC & Differential  -     Comprehensive Metabolic Panel  -     TSH+Free T4  -     Sedimentation Rate  -     C-reactive protein    4. Bilateral lower extremity edema  -     CBC & Differential  -     Comprehensive Metabolic Panel  -     TSH+Free T4  -     Sedimentation Rate  -     C-reactive protein          Discussion Summary:  Patient is a 42 y.o. female presenting for follow up.    Serratia Bacteremia  - follow up inflammatory markers    Right hip arthritis  - septic arthritis ruled out.     Iron Deficiency anemia  - start Oral iron replacement    Tachycardia  - chronic issue, check TSH  - consider beta blocker based on lab results    Bilateral Lower Extremity Edema  - stable, follow up labs to identify cause    Follow up:  Return for Medicare Wellness.

## 2022-08-11 NOTE — PROGRESS NOTES
Labs continue to show improvement in inflammation.  Thyroid function is in normal range. She may follow up with specialists as scheduled.

## 2022-08-22 DIAGNOSIS — F33.1 MODERATE EPISODE OF RECURRENT MAJOR DEPRESSIVE DISORDER: ICD-10-CM

## 2022-08-22 DIAGNOSIS — F41.1 GENERALIZED ANXIETY DISORDER: ICD-10-CM

## 2022-08-22 RX ORDER — DULOXETIN HYDROCHLORIDE 60 MG/1
CAPSULE, DELAYED RELEASE ORAL
Qty: 60 CAPSULE | Refills: 0 | Status: SHIPPED | OUTPATIENT
Start: 2022-08-22 | End: 2022-09-20

## 2022-08-22 RX ORDER — DULOXETIN HYDROCHLORIDE 60 MG/1
CAPSULE, DELAYED RELEASE ORAL
Qty: 60 CAPSULE | Refills: 6 | OUTPATIENT
Start: 2022-08-22

## 2022-09-14 DIAGNOSIS — F90.2 ATTENTION DEFICIT HYPERACTIVITY DISORDER (ADHD), COMBINED TYPE: ICD-10-CM

## 2022-09-14 RX ORDER — DEXTROAMPHETAMINE SACCHARATE, AMPHETAMINE ASPARTATE, DEXTROAMPHETAMINE SULFATE AND AMPHETAMINE SULFATE 7.5; 7.5; 7.5; 7.5 MG/1; MG/1; MG/1; MG/1
TABLET ORAL
Qty: 60 TABLET | Refills: 0 | Status: SHIPPED | OUTPATIENT
Start: 2022-09-14 | End: 2022-09-20 | Stop reason: SDUPTHER

## 2022-09-14 NOTE — TELEPHONE ENCOUNTER
Incoming Refill Request      Medication requested (name and dose): ADDERALL 30 MG    Pharmacy where request should be sent: [facility] [place], [place]    Additional details provided by patient: N/A    Best call back number: [phone]    Does the patient have less than a 3 day supply:  [x] Yes  [] No    Vamsi Pedro Rep  09/14/22, 10:03 EDT

## 2022-09-15 DIAGNOSIS — F33.1 MODERATE EPISODE OF RECURRENT MAJOR DEPRESSIVE DISORDER: ICD-10-CM

## 2022-09-15 DIAGNOSIS — F41.1 GENERALIZED ANXIETY DISORDER: ICD-10-CM

## 2022-09-15 RX ORDER — BUSPIRONE HYDROCHLORIDE 15 MG/1
TABLET ORAL
Qty: 120 TABLET | Refills: 6 | OUTPATIENT
Start: 2022-09-15

## 2022-09-15 NOTE — TELEPHONE ENCOUNTER
Rx Refill Note  Requested Prescriptions     Pending Prescriptions Disp Refills   • DULoxetine (CYMBALTA) 60 MG capsule [Pharmacy Med Name: DULOXETINE HCL DR 60 MG CAPSULE] 60 capsule 0     Sig: TAKE TWO CAPSULES BY MOUTH EVERY MORNING      Last office visit with prescribing clinician: 6/14/2022      Next office visit with prescribing clinician: 9/22/2022            NICOLASA SORENSEN MA  09/15/22, 16:50 EDT

## 2022-09-16 ENCOUNTER — TELEPHONE (OUTPATIENT)
Dept: INTERNAL MEDICINE | Facility: CLINIC | Age: 42
End: 2022-09-16

## 2022-09-16 NOTE — TELEPHONE ENCOUNTER
Patient attempted to get prescription [facility] 30 MG [facility] but was informed it is indefinitely on backorder and they are unsure when they will have it back in stock. Patient is requesting another version of the medication be prescribed so she can continue to use [facility].     Patient states if she can not get another form of generic Adderall, she would like the prescription be sent to [place], [place]. Phone number verified.

## 2022-09-20 DIAGNOSIS — F90.2 ATTENTION DEFICIT HYPERACTIVITY DISORDER (ADHD), COMBINED TYPE: ICD-10-CM

## 2022-09-20 RX ORDER — DULOXETIN HYDROCHLORIDE 60 MG/1
CAPSULE, DELAYED RELEASE ORAL
Qty: 60 CAPSULE | Refills: 0 | Status: SHIPPED | OUTPATIENT
Start: 2022-09-20 | End: 2022-12-05

## 2022-09-20 RX ORDER — DEXTROAMPHETAMINE SACCHARATE, AMPHETAMINE ASPARTATE, DEXTROAMPHETAMINE SULFATE AND AMPHETAMINE SULFATE 7.5; 7.5; 7.5; 7.5 MG/1; MG/1; MG/1; MG/1
TABLET ORAL
Qty: 60 TABLET | Refills: 0 | Status: CANCELLED | OUTPATIENT
Start: 2022-09-20

## 2022-09-20 RX ORDER — DEXTROAMPHETAMINE SACCHARATE, AMPHETAMINE ASPARTATE, DEXTROAMPHETAMINE SULFATE AND AMPHETAMINE SULFATE 7.5; 7.5; 7.5; 7.5 MG/1; MG/1; MG/1; MG/1
TABLET ORAL
Qty: 60 TABLET | Refills: 0 | Status: SHIPPED | OUTPATIENT
Start: 2022-09-20 | End: 2022-09-22 | Stop reason: SDUPTHER

## 2022-09-20 NOTE — TELEPHONE ENCOUNTER
Incoming Refill Request      Medication requested (name and dose): adderall    [facility] where [facility] sent[facility]    Additional details provided by patient: patient states pharmacy and [facility] are out of stock on adderal 30mg. She states [facility] told her if we can send in 15mg 4 daily they could fill her medication that way.     Best call back number: [phone]    Does the patient have less than a 3 day supply:  [x] Yes  [] No    Chrystal Perez  09/20/22, 15:33 EDT

## 2022-09-22 ENCOUNTER — OFFICE VISIT (OUTPATIENT)
Dept: BEHAVIORAL HEALTH | Facility: CLINIC | Age: 42
End: 2022-09-22

## 2022-09-22 VITALS — BODY MASS INDEX: 27.82 KG/M2 | WEIGHT: 167 LBS | HEIGHT: 65 IN

## 2022-09-22 DIAGNOSIS — F51.5 NIGHTMARES: ICD-10-CM

## 2022-09-22 DIAGNOSIS — Z79.899 ENCOUNTER FOR LONG-TERM (CURRENT) USE OF MEDICATIONS: ICD-10-CM

## 2022-09-22 DIAGNOSIS — F33.1 MODERATE EPISODE OF RECURRENT MAJOR DEPRESSIVE DISORDER: ICD-10-CM

## 2022-09-22 DIAGNOSIS — F51.01 PRIMARY INSOMNIA: ICD-10-CM

## 2022-09-22 DIAGNOSIS — F90.2 ATTENTION DEFICIT HYPERACTIVITY DISORDER (ADHD), COMBINED TYPE: Primary | ICD-10-CM

## 2022-09-22 DIAGNOSIS — F41.1 GENERALIZED ANXIETY DISORDER: ICD-10-CM

## 2022-09-22 PROCEDURE — 99214 OFFICE O/P EST MOD 30 MIN: CPT

## 2022-09-22 RX ORDER — DOXEPIN HYDROCHLORIDE 50 MG/1
50 CAPSULE ORAL NIGHTLY
Qty: 30 CAPSULE | Refills: 2 | Status: SHIPPED | OUTPATIENT
Start: 2022-09-22 | End: 2022-12-22 | Stop reason: SDUPTHER

## 2022-09-22 RX ORDER — PRAZOSIN HYDROCHLORIDE 5 MG/1
10 CAPSULE ORAL NIGHTLY
Qty: 60 CAPSULE | Refills: 2 | Status: SHIPPED | OUTPATIENT
Start: 2022-09-22 | End: 2022-12-22 | Stop reason: SDUPTHER

## 2022-09-22 RX ORDER — BUPROPION HYDROCHLORIDE 150 MG/1
150 TABLET ORAL EVERY MORNING
Qty: 30 TABLET | Refills: 2 | Status: SHIPPED | OUTPATIENT
Start: 2022-09-22 | End: 2022-09-22 | Stop reason: SDUPTHER

## 2022-09-22 RX ORDER — BUPROPION HYDROCHLORIDE 300 MG/1
300 TABLET ORAL DAILY
Qty: 30 TABLET | Refills: 2 | Status: SHIPPED | OUTPATIENT
Start: 2022-09-22 | End: 2022-09-22 | Stop reason: SDUPTHER

## 2022-09-22 RX ORDER — QUETIAPINE FUMARATE 100 MG/1
100 TABLET, FILM COATED ORAL NIGHTLY
Qty: 30 TABLET | Refills: 2 | Status: SHIPPED | OUTPATIENT
Start: 2022-09-22 | End: 2022-09-22 | Stop reason: SDUPTHER

## 2022-09-22 RX ORDER — BUPROPION HYDROCHLORIDE 300 MG/1
300 TABLET ORAL DAILY
Qty: 30 TABLET | Refills: 2 | Status: SHIPPED | OUTPATIENT
Start: 2022-09-22 | End: 2022-12-22 | Stop reason: SDUPTHER

## 2022-09-22 RX ORDER — QUETIAPINE FUMARATE 100 MG/1
100 TABLET, FILM COATED ORAL NIGHTLY
Qty: 30 TABLET | Refills: 2 | Status: SHIPPED | OUTPATIENT
Start: 2022-09-22 | End: 2022-12-22 | Stop reason: SDUPTHER

## 2022-09-22 RX ORDER — BUPROPION HYDROCHLORIDE 150 MG/1
150 TABLET ORAL EVERY MORNING
Qty: 30 TABLET | Refills: 2 | Status: SHIPPED | OUTPATIENT
Start: 2022-09-22 | End: 2022-12-22 | Stop reason: SDUPTHER

## 2022-09-22 RX ORDER — BUSPIRONE HYDROCHLORIDE 15 MG/1
30 TABLET ORAL 2 TIMES DAILY
Qty: 120 TABLET | Refills: 2 | Status: SHIPPED | OUTPATIENT
Start: 2022-09-22 | End: 2022-09-22 | Stop reason: SDUPTHER

## 2022-09-22 RX ORDER — PRAZOSIN HYDROCHLORIDE 5 MG/1
10 CAPSULE ORAL NIGHTLY
Qty: 60 CAPSULE | Refills: 2 | Status: SHIPPED | OUTPATIENT
Start: 2022-09-22 | End: 2022-09-22 | Stop reason: SDUPTHER

## 2022-09-22 RX ORDER — DEXTROAMPHETAMINE SACCHARATE, AMPHETAMINE ASPARTATE, DEXTROAMPHETAMINE SULFATE AND AMPHETAMINE SULFATE 7.5; 7.5; 7.5; 7.5 MG/1; MG/1; MG/1; MG/1
TABLET ORAL
Qty: 60 TABLET | Refills: 0 | Status: SHIPPED | OUTPATIENT
Start: 2022-09-22 | End: 2022-11-28 | Stop reason: SDUPTHER

## 2022-09-22 RX ORDER — DOXEPIN HYDROCHLORIDE 50 MG/1
50 CAPSULE ORAL NIGHTLY
Qty: 30 CAPSULE | Refills: 2 | Status: SHIPPED | OUTPATIENT
Start: 2022-09-22 | End: 2022-09-22 | Stop reason: SDUPTHER

## 2022-09-22 RX ORDER — BUSPIRONE HYDROCHLORIDE 15 MG/1
30 TABLET ORAL 2 TIMES DAILY
Qty: 120 TABLET | Refills: 2 | Status: SHIPPED | OUTPATIENT
Start: 2022-09-22 | End: 2022-12-22 | Stop reason: SDUPTHER

## 2022-09-22 NOTE — PROGRESS NOTES
Follow Up Office Visit    Patient Name: Audrey Alonso  : 1980   MRN: [id number]   Care Team: Patient Care Team:  David Fleming DO as PCP - General (Internal Medicine)  Nathalia Anne APRN as Nurse Practitioner (Nurse Practitioner)        Chief Complaint:    Chief Complaint   Patient presents with   • ADHD   • Anxiety   • Depression   • Sleeping Problem   • Med Management       History of Present Illness: Audrey Alonso is a 42 y.o. female who is here today for a medication management follow up. Patient states that mediation continues to be effective. She states she is having the hardest time getting her Adderall because of pharmacies having it on back order. Other than that, things with medication are going well. Patient is going to have a hip replacement surgery in November which she is nervous about. Patient did not have any specific concerns at this time.      Subjective   Review of Systems:    Review of Systems   All other systems reviewed and are negative.      Current Medications:   Current Outpatient Medications   Medication Sig Dispense Refill   • acetaminophen-codeine (TYLENOL #3) 300-30 MG per tablet      • amphetamine-dextroamphetamine (Adderall) 30 MG tablet Take 30 mg orally every morning and afternoon 60 tablet 0   • buPROPion XL (WELLBUTRIN XL) 150 MG 24 hr tablet Take 1 tablet by mouth Every Morning. 30 tablet 2   • buPROPion XL (Wellbutrin XL) 300 MG 24 hr tablet Take 1 tablet by mouth Daily. WITH 150 mg 30 tablet 2   • busPIRone (BUSPAR) 15 MG tablet Take 2 tablets by mouth 2 (Two) Times a Day. 120 tablet 2   • diclofenac (VOLTAREN) 75 MG EC tablet Take 75 mg by mouth 2 (Two) Times a Day.     • doxepin (SINEquan) 50 MG capsule Take 1 capsule by mouth Every Night. 30 capsule 2   • DULoxetine (CYMBALTA) 60 MG capsule TAKE TWO CAPSULES BY MOUTH EVERY MORNING 60 capsule 0   • ferrous sulfate 325 (65 FE) MG tablet Take 1 tablet by mouth Daily With Breakfast. 30 tablet 2   •  "gabapentin (NEURONTIN) 800 MG tablet Take 1 tablet by mouth 3 (Three) Times a Day. 90 tablet 2   • ibuprofen (ADVIL,MOTRIN) 800 MG tablet Take 800 mg by mouth Every 8 (Eight) Hours As Needed.     • lidocaine (LIDODERM) 5 % Place 1 patch on the skin as directed by provider Daily. Remove & Discard patch within 12 hours or as directed by MD 6 each 0   • meloxicam (MOBIC) 7.5 MG tablet Take 1 tablet by mouth Daily. 14 tablet 0   • methadone (DOLOPHINE) 10 MG tablet Take 125 mg by mouth Daily [facility] Elaine @ [facility] ([phone]) verified dose as 125 mg daily,     • prazosin (MINIPRESS) 5 MG capsule Take 2 capsules by mouth Every Night. 60 capsule 2   • QUEtiapine (SEROquel) 100 MG tablet Take 1 tablet by mouth Every Night. 30 tablet 2     No current facility-administered medications for this visit.       Mental Status Exam:   Hygiene:   good  Cooperation:  Cooperative  Eye Contact:  Good  Psychomotor Behavior:  Appropriate  Affect:  Appropriate  Mood: normal  Speech:  Normal  Thought Process:  Goal directed and Linear  Thought Content:  Normal  Suicidal:  None  Homicidal:  None  Hallucinations:  None  Delusion:  None  Memory:  Intact  Orientation:  Person, Place, Time and Situation  Reliability:  good  Insight:  Good  Judgement:  Good  Impulse Control:  Good  Physical/Medical Issues:  Yes see chart     Objective   Vital Signs:   Ht 165.1 cm (65\")   Wt 75.8 kg (167 lb)   BMI 27.79 kg/m²       Assessment / Plan    Diagnoses and all orders for this visit:    1. Attention deficit hyperactivity disorder (ADHD), combined type (Primary)  -     amphetamine-dextroamphetamine (Adderall) 30 MG tablet; Take 30 mg orally every morning and afternoon  Dispense: 60 tablet; Refill: 0    2. Encounter for long-term (current) use of medications  -     Compliance Drug Analysis, Ur - Urine, Clean Catch    3. Generalized anxiety disorder  -     busPIRone (BUSPAR) 15 MG tablet; Take 2 tablets by mouth 2 (Two) " Times a Day.  Dispense: 120 tablet; Refill: 2    4. Moderate episode of recurrent major depressive disorder (HCC)  -     buPROPion XL (WELLBUTRIN XL) 150 MG 24 hr tablet; Take 1 tablet by mouth Every Morning.  Dispense: 30 tablet; Refill: 2  -     buPROPion XL (Wellbutrin XL) 300 MG 24 hr tablet; Take 1 tablet by mouth Daily. WITH 150 mg  Dispense: 30 tablet; Refill: 2    5. Primary insomnia  -     doxepin (SINEquan) 50 MG capsule; Take 1 capsule by mouth Every Night.  Dispense: 30 capsule; Refill: 2  -     QUEtiapine (SEROquel) 100 MG tablet; Take 1 tablet by mouth Every Night.  Dispense: 30 tablet; Refill: 2    6. Nightmares  -     QUEtiapine (SEROquel) 100 MG tablet; Take 1 tablet by mouth Every Night.  Dispense: 30 tablet; Refill: 2  -     prazosin (MINIPRESS) 5 MG capsule; Take 2 capsules by mouth Every Night.  Dispense: 60 capsule; Refill: 2       Will continue medication as ordered. Obtained urine drug screen.     A psychological evaluation was conducted in order to assess past and current level of functioning. Areas assessed included, but were not limited to: perception of social support, perception of ability to face and deal with challenges in life (positive functioning), anxiety symptoms, depressive symptoms, perspective on beliefs/belief system, coping skills for stress, intelligence level,  Therapeutic rapport was established. Interventions conducted today were geared towards incorporating medication management along with support for continued therapy. Education was also provided as to the med management with this provider and what to expect in subsequent sessions.      We discussed risks, benefits, goals and side effects of the above medication and the patient was agreeable with the plan. Patient was educated on the importance of compliance with treatment and follow-up appointments. Patient is aware to contact the [facility] with any worsening of symptoms. To call for questions or concerns and  return early if necessary. Patent is agreeable to go to the Emergency Department or call 911 should they begin SI/HI.    PHQ-2/PHQ-9: Depression Screening  Little Interest or Pleasure in Doing Things: 1-->several days  Feeling Down, Depressed or Hopeless: 1-->several days  PHQ-2 Total Score: 2  Trouble Falling or Staying Asleep, or Sleeping Too Much: 2-->more than half the days  Feeling Tired or Having Little Energy: 2-->more than half the days  Poor Appetite or Overeatin-->more than half the days  Feeling Bad about Yourself - or that You are a Failure or Have Let Yourself or Your Family Down: 1-->several days  Trouble Concentrating on Things, Such as Reading the Newspaper or Watching Television: 1-->several days  Moving or Speaking So Slowly that Other People Could Have Noticed? Or the Opposite - Being So Fidgety: 0-->not at all  Thoughts that You Would be Better Off Dead or of Hurting Yourself in Some Way: 0-->not at all  PHQ-9: Brief Depression Severity Measure Score: 10    PHQ-9 Score:   PHQ-9 Total Score: 10    Depression Screening:  Patient screened positive for depression based on a PHQ-9 score of 10 on 2022. Follow-up recommendations include: Prescribed antidepressant medication treatment and Suicide Risk Assessment performed.    Over the last two weeks, how often have you been bothered by the following problems?  Feeling nervous, anxious or on edge: Nearly every day  Not being able to stop or control worrying: More than half the days  Worrying too much about different things: More than half the days  Trouble Relaxing: More than half the days  Being so restless that it is hard to sit still: Several days  Becoming easily annoyed or irritable: Several days  Feeling afraid as if something awful might happen: Several days  TAMANNA 7 Total Score: 12  If you checked any problems, how difficult have these problems made it for you to do your work, take care of things at home, or get along with other people:  Very difficult        MEDS ORDERED DURING VISIT:  New Medications Ordered This Visit   Medications   • amphetamine-dextroamphetamine (Adderall) 30 MG tablet     Sig: Take 30 mg orally every morning and afternoon     Dispense:  60 tablet     Refill:  0     Take 30 mg orally every morning and afternoon.   • buPROPion XL (WELLBUTRIN XL) 150 MG 24 hr tablet     Sig: Take 1 tablet by mouth Every Morning.     Dispense:  30 tablet     Refill:  2   • buPROPion XL (Wellbutrin XL) 300 MG 24 hr tablet     Sig: Take 1 tablet by mouth Daily. WITH 150 mg     Dispense:  30 tablet     Refill:  2   • busPIRone (BUSPAR) 15 MG tablet     Sig: Take 2 tablets by mouth 2 (Two) Times a Day.     Dispense:  120 tablet     Refill:  2   • doxepin (SINEquan) 50 MG capsule     Sig: Take 1 capsule by mouth Every Night.     Dispense:  30 capsule     Refill:  2   • QUEtiapine (SEROquel) 100 MG tablet     Sig: Take 1 tablet by mouth Every Night.     Dispense:  30 tablet     Refill:  2   • prazosin (MINIPRESS) 5 MG capsule     Sig: Take 2 capsules by mouth Every Night.     Dispense:  60 capsule     Refill:  2         Follow Up   Return in about 3 months (around 12/22/2022).  Patient was given instructions and counseling regarding her condition or for health maintenance advice. Please see specific information pulled into the AVS if appropriate.     TREATMENT PLAN/GOALS: Continue supportive psychotherapy efforts and medications as indicated. Treatment and medication options discussed during today's visit. Patient acknowledged and verbally consented to continue with current treatment plan and was educated on the importance of compliance with treatment and follow-up appointments.    MEDICATION ISSUES:  Discussed medication options and treatment plan of prescribed medication as well as the risks, benefits, and side effects including potential falls, possible impaired driving and metabolic adversities among others. Patient is agreeable to call the office  with any worsening of symptoms or onset of side effects. Patient is agreeable to call 911 or go to the nearest ER should he/she begin having SI/HI.      [facility] [address] [address]  [phone]    September 22, 2022 16:53 EDT

## 2022-09-28 ENCOUNTER — OFFICE VISIT (OUTPATIENT)
Dept: INTERNAL MEDICINE | Facility: CLINIC | Age: 42
End: 2022-09-28

## 2022-09-28 VITALS
TEMPERATURE: 97.8 F | BODY MASS INDEX: 28.16 KG/M2 | DIASTOLIC BLOOD PRESSURE: 68 MMHG | HEART RATE: 103 BPM | RESPIRATION RATE: 17 BRPM | SYSTOLIC BLOOD PRESSURE: 106 MMHG | OXYGEN SATURATION: 100 % | HEIGHT: 65 IN | WEIGHT: 169 LBS

## 2022-09-28 DIAGNOSIS — F90.2 ATTENTION DEFICIT HYPERACTIVITY DISORDER (ADHD), COMBINED TYPE: Primary | ICD-10-CM

## 2022-09-28 DIAGNOSIS — R60.0 BILATERAL LOWER EXTREMITY EDEMA: ICD-10-CM

## 2022-09-28 DIAGNOSIS — D64.9 ANEMIA, UNSPECIFIED TYPE: ICD-10-CM

## 2022-09-28 DIAGNOSIS — I10 ESSENTIAL HYPERTENSION: ICD-10-CM

## 2022-09-28 PROCEDURE — G0402 INITIAL PREVENTIVE EXAM: HCPCS | Performed by: INTERNAL MEDICINE

## 2022-09-28 PROCEDURE — 1125F AMNT PAIN NOTED PAIN PRSNT: CPT | Performed by: INTERNAL MEDICINE

## 2022-09-28 PROCEDURE — 1159F MED LIST DOCD IN RCRD: CPT | Performed by: INTERNAL MEDICINE

## 2022-09-28 PROCEDURE — 1170F FXNL STATUS ASSESSED: CPT | Performed by: INTERNAL MEDICINE

## 2022-10-02 LAB — DRUGS UR: NORMAL

## 2022-10-21 DIAGNOSIS — F41.1 GENERALIZED ANXIETY DISORDER: ICD-10-CM

## 2022-10-21 RX ORDER — GABAPENTIN 800 MG/1
800 TABLET ORAL 3 TIMES DAILY
Qty: 90 TABLET | Refills: 2 | Status: CANCELLED | OUTPATIENT
Start: 2022-10-21

## 2022-10-21 NOTE — TELEPHONE ENCOUNTER
Rx Refill Note  Requested Prescriptions     Pending Prescriptions Disp Refills   • gabapentin (NEURONTIN) 800 MG tablet 90 tablet 2     Sig: Take 1 tablet by mouth 3 (Three) Times a Day.      Last office visit with prescribing clinician: 9/22/2022      Next office visit with prescribing clinician: 12/22/2022            Gabby Rhodes LPN  10/21/22, 16:49 EDT

## 2022-10-21 NOTE — TELEPHONE ENCOUNTER
Incoming Refill Request      Medication requested (name and dose): Gabapentin    Pharmacy where request should be sent: [facility]    Additional details provided by patient: Patient is out    Best call back number: in chart    Does the patient have less than a 3 day supply:  [x] Yes  [] No    Serenity López  10/21/22, 13:02 EDT

## 2022-10-24 RX ORDER — GABAPENTIN 800 MG/1
TABLET ORAL
Qty: 90 TABLET | OUTPATIENT
Start: 2022-10-24

## 2022-10-24 RX ORDER — GABAPENTIN 800 MG/1
800 TABLET ORAL 3 TIMES DAILY
Qty: 90 TABLET | Refills: 1 | Status: SHIPPED | OUTPATIENT
Start: 2022-10-24 | End: 2022-12-19

## 2022-11-28 DIAGNOSIS — F90.2 ATTENTION DEFICIT HYPERACTIVITY DISORDER (ADHD), COMBINED TYPE: ICD-10-CM

## 2022-11-28 RX ORDER — DEXTROAMPHETAMINE SACCHARATE, AMPHETAMINE ASPARTATE, DEXTROAMPHETAMINE SULFATE AND AMPHETAMINE SULFATE 7.5; 7.5; 7.5; 7.5 MG/1; MG/1; MG/1; MG/1
TABLET ORAL
Qty: 60 TABLET | Refills: 0 | Status: SHIPPED | OUTPATIENT
Start: 2022-11-28 | End: 2022-12-22 | Stop reason: SDUPTHER

## 2022-11-28 NOTE — TELEPHONE ENCOUNTER
Incoming Refill Request      Medication requested (name and dose): ADDERALL 30 MG    Pharmacy where request should be sent: [facility]    Additional details provided by patient: 2 DAY SUPPLY LEFT    Best call back number: [phone]    Does the patient have less than a 3 day supply:  [x] Yes  [] No    Vamsi Pedro Rep  11/28/22, 09:35 EST

## 2022-12-03 DIAGNOSIS — F33.1 MODERATE EPISODE OF RECURRENT MAJOR DEPRESSIVE DISORDER: ICD-10-CM

## 2022-12-03 DIAGNOSIS — F41.1 GENERALIZED ANXIETY DISORDER: ICD-10-CM

## 2022-12-05 RX ORDER — DULOXETIN HYDROCHLORIDE 60 MG/1
CAPSULE, DELAYED RELEASE ORAL
Qty: 60 CAPSULE | Refills: 0 | Status: SHIPPED | OUTPATIENT
Start: 2022-12-05 | End: 2022-12-22 | Stop reason: SDUPTHER

## 2022-12-19 DIAGNOSIS — F41.1 GENERALIZED ANXIETY DISORDER: ICD-10-CM

## 2022-12-19 RX ORDER — GABAPENTIN 800 MG/1
TABLET ORAL
Qty: 90 TABLET | Refills: 0 | Status: SHIPPED | OUTPATIENT
Start: 2022-12-19 | End: 2022-12-22 | Stop reason: SDUPTHER

## 2022-12-22 ENCOUNTER — OFFICE VISIT (OUTPATIENT)
Dept: BEHAVIORAL HEALTH | Facility: CLINIC | Age: 42
End: 2022-12-22

## 2022-12-22 VITALS — HEIGHT: 65 IN | BODY MASS INDEX: 30.66 KG/M2 | WEIGHT: 184 LBS

## 2022-12-22 DIAGNOSIS — F33.1 MODERATE EPISODE OF RECURRENT MAJOR DEPRESSIVE DISORDER: ICD-10-CM

## 2022-12-22 DIAGNOSIS — F41.1 GENERALIZED ANXIETY DISORDER: Primary | ICD-10-CM

## 2022-12-22 DIAGNOSIS — F51.5 NIGHTMARES: ICD-10-CM

## 2022-12-22 DIAGNOSIS — F51.01 PRIMARY INSOMNIA: ICD-10-CM

## 2022-12-22 DIAGNOSIS — F90.2 ATTENTION DEFICIT HYPERACTIVITY DISORDER (ADHD), COMBINED TYPE: ICD-10-CM

## 2022-12-22 PROCEDURE — 99214 OFFICE O/P EST MOD 30 MIN: CPT

## 2022-12-22 RX ORDER — GABAPENTIN 800 MG/1
800 TABLET ORAL 3 TIMES DAILY
Qty: 90 TABLET | Refills: 1 | Status: SHIPPED | OUTPATIENT
Start: 2022-12-22 | End: 2023-02-07 | Stop reason: SDUPTHER

## 2022-12-22 RX ORDER — BUSPIRONE HYDROCHLORIDE 15 MG/1
30 TABLET ORAL 2 TIMES DAILY
Qty: 120 TABLET | Refills: 1 | Status: SHIPPED | OUTPATIENT
Start: 2022-12-22 | End: 2023-02-07 | Stop reason: SDUPTHER

## 2022-12-22 RX ORDER — DULOXETIN HYDROCHLORIDE 60 MG/1
120 CAPSULE, DELAYED RELEASE ORAL EVERY MORNING
Qty: 60 CAPSULE | Refills: 1 | Status: SHIPPED | OUTPATIENT
Start: 2022-12-22 | End: 2023-02-07 | Stop reason: SDUPTHER

## 2022-12-22 RX ORDER — PRAZOSIN HYDROCHLORIDE 5 MG/1
10 CAPSULE ORAL NIGHTLY
Qty: 60 CAPSULE | Refills: 1 | Status: SHIPPED | OUTPATIENT
Start: 2022-12-22 | End: 2023-02-07 | Stop reason: SDUPTHER

## 2022-12-22 RX ORDER — QUETIAPINE FUMARATE 100 MG/1
100 TABLET, FILM COATED ORAL NIGHTLY
Qty: 30 TABLET | Refills: 1 | Status: SHIPPED | OUTPATIENT
Start: 2022-12-22 | End: 2023-02-07 | Stop reason: SDUPTHER

## 2022-12-22 RX ORDER — ZOLPIDEM TARTRATE 5 MG/1
5 TABLET ORAL NIGHTLY PRN
Qty: 30 TABLET | Refills: 1 | Status: SHIPPED | OUTPATIENT
Start: 2022-12-22 | End: 2023-02-07 | Stop reason: SDUPTHER

## 2022-12-22 RX ORDER — BUPROPION HYDROCHLORIDE 300 MG/1
300 TABLET ORAL DAILY
Qty: 30 TABLET | Refills: 1 | Status: SHIPPED | OUTPATIENT
Start: 2022-12-22 | End: 2023-02-07 | Stop reason: SDUPTHER

## 2022-12-22 RX ORDER — BUPROPION HYDROCHLORIDE 150 MG/1
150 TABLET ORAL EVERY MORNING
Qty: 30 TABLET | Refills: 1 | Status: SHIPPED | OUTPATIENT
Start: 2022-12-22 | End: 2023-02-07 | Stop reason: SDUPTHER

## 2022-12-22 RX ORDER — DOXEPIN HYDROCHLORIDE 50 MG/1
50 CAPSULE ORAL NIGHTLY
Qty: 30 CAPSULE | Refills: 1 | Status: SHIPPED | OUTPATIENT
Start: 2022-12-22 | End: 2023-02-07 | Stop reason: SDUPTHER

## 2022-12-22 RX ORDER — DEXTROAMPHETAMINE SACCHARATE, AMPHETAMINE ASPARTATE, DEXTROAMPHETAMINE SULFATE AND AMPHETAMINE SULFATE 7.5; 7.5; 7.5; 7.5 MG/1; MG/1; MG/1; MG/1
TABLET ORAL
Qty: 60 TABLET | Refills: 0 | Status: SHIPPED | OUTPATIENT
Start: 2022-12-22 | End: 2023-02-07 | Stop reason: SDUPTHER

## 2022-12-22 NOTE — PROGRESS NOTES
Follow Up Office Visit    Patient Name: Audrey Alonso  : 1980   MRN: [id number]   Care Team: Patient Care Team:  David Fleming DO as PCP - General (Internal Medicine)  Nathalia Anne APRN as Nurse Practitioner (Behavioral Health)        Chief Complaint:    Chief Complaint   Patient presents with   • Anxiety   • Depression   • ADHD   • Sleeping Problem   • Med Management       History of Present Illness: Audrey Alonso is a 42 y.o. female who is here today for a medication management follow up. Patient states that she continues to struggle with her anxiety and sleep. She states she is not sleeping well at all and that makes her anxiety worse. Then when her anxiety is high her sleep is effected. Patient states it is a cycle between the two. She did have the hip surgery since our last visit and spend a month and a half in the hospital due to the infection being so bad. She has an upcoming surgery in February that has her somewhat stressed as well.     Subjective   Review of Systems:    Review of Systems   Psychiatric/Behavioral: Positive for dysphoric mood, sleep disturbance and stress. The patient is nervous/anxious.    All other systems reviewed and are negative.      Current Medications:   Current Outpatient Medications   Medication Sig Dispense Refill   • acetaminophen-codeine (TYLENOL #3) 300-30 MG per tablet      • amphetamine-dextroamphetamine (Adderall) 30 MG tablet Take 30 mg orally every morning and afternoon 60 tablet 0   • buPROPion XL (WELLBUTRIN XL) 150 MG 24 hr tablet Take 1 tablet by mouth Every Morning. 30 tablet 1   • buPROPion XL (Wellbutrin XL) 300 MG 24 hr tablet Take 1 tablet by mouth Daily. WITH 150 mg 30 tablet 1   • busPIRone (BUSPAR) 15 MG tablet Take 2 tablets by mouth 2 (Two) Times a Day. 120 tablet 1   • diclofenac (VOLTAREN) 75 MG EC tablet Take 75 mg by mouth 2 (Two) Times a Day.     • doxepin (SINEquan) 50 MG capsule Take 1 capsule by mouth Every Night. 30  "capsule 1   • DULoxetine (CYMBALTA) 60 MG capsule Take 2 capsules by mouth Every Morning. 60 capsule 1   • ferrous sulfate 325 (65 FE) MG tablet Take 1 tablet by mouth Daily With Breakfast. 30 tablet 2   • gabapentin (NEURONTIN) 800 MG tablet Take 1 tablet by mouth 3 (Three) Times a Day. 90 tablet 1   • ibuprofen (ADVIL,MOTRIN) 800 MG tablet Take 800 mg by mouth Every 8 (Eight) Hours As Needed.     • lidocaine (LIDODERM) 5 % Place 1 patch on the skin as directed by provider Daily. Remove & Discard patch within 12 hours or as directed by MD 6 each 0   • meloxicam (MOBIC) 7.5 MG tablet Take 1 tablet by mouth Daily. 14 tablet 0   • methadone (DOLOPHINE) 10 MG tablet Take 125 mg by mouth Daily [facility] Elaine @ [facility] ([phone]) verified dose as 125 mg daily,     • prazosin (MINIPRESS) 5 MG capsule Take 2 capsules by mouth Every Night. 60 capsule 1   • QUEtiapine (SEROquel) 100 MG tablet Take 1 tablet by mouth Every Night. 30 tablet 1   • zolpidem (Ambien) 5 MG tablet Take 1 tablet by mouth At Night As Needed for Sleep. 30 tablet 1     No current facility-administered medications for this visit.       Mental Status Exam:   Hygiene:   good  Cooperation:  Cooperative  Eye Contact:  Good  Psychomotor Behavior:  Appropriate  Affect:  Appropriate  Mood: normal  Speech:  Normal  Thought Process:  Goal directed and Linear  Thought Content:  Normal and Mood congruent  Suicidal:  None  Homicidal:  None  Hallucinations:  None  Delusion:  None  Memory:  Intact  Orientation:  Person, Place, Time and Situation  Reliability:  good  Insight:  Good  Judgement:  Good  Impulse Control:  Good  Physical/Medical Issues:  Yes see chart     Objective   Vital Signs:   Ht 165.1 cm (65\")   Wt 83.5 kg (184 lb)   BMI 30.62 kg/m²       Assessment / Plan    Diagnoses and all orders for this visit:    1. Generalized anxiety disorder (Primary)  -     busPIRone (BUSPAR) 15 MG tablet; Take 2 tablets by mouth 2 (Two) " Times a Day.  Dispense: 120 tablet; Refill: 1  -     DULoxetine (CYMBALTA) 60 MG capsule; Take 2 capsules by mouth Every Morning.  Dispense: 60 capsule; Refill: 1  -     gabapentin (NEURONTIN) 800 MG tablet; Take 1 tablet by mouth 3 (Three) Times a Day.  Dispense: 90 tablet; Refill: 1    2. Moderate episode of recurrent major depressive disorder (HCC)  -     buPROPion XL (WELLBUTRIN XL) 150 MG 24 hr tablet; Take 1 tablet by mouth Every Morning.  Dispense: 30 tablet; Refill: 1  -     buPROPion XL (Wellbutrin XL) 300 MG 24 hr tablet; Take 1 tablet by mouth Daily. WITH 150 mg  Dispense: 30 tablet; Refill: 1  -     DULoxetine (CYMBALTA) 60 MG capsule; Take 2 capsules by mouth Every Morning.  Dispense: 60 capsule; Refill: 1    3. Attention deficit hyperactivity disorder (ADHD), combined type  -     amphetamine-dextroamphetamine (Adderall) 30 MG tablet; Take 30 mg orally every morning and afternoon  Dispense: 60 tablet; Refill: 0    4. Primary insomnia  -     zolpidem (Ambien) 5 MG tablet; Take 1 tablet by mouth At Night As Needed for Sleep.  Dispense: 30 tablet; Refill: 1  -     doxepin (SINEquan) 50 MG capsule; Take 1 capsule by mouth Every Night.  Dispense: 30 capsule; Refill: 1  -     QUEtiapine (SEROquel) 100 MG tablet; Take 1 tablet by mouth Every Night.  Dispense: 30 tablet; Refill: 1    5. Nightmares  -     prazosin (MINIPRESS) 5 MG capsule; Take 2 capsules by mouth Every Night.  Dispense: 60 capsule; Refill: 1  -     QUEtiapine (SEROquel) 100 MG tablet; Take 1 tablet by mouth Every Night.  Dispense: 30 tablet; Refill: 1     Will add Ambien for sleep. Continue all other medication as ordered.     A psychological evaluation was conducted in order to assess past and current level of functioning. Areas assessed included, but were not limited to: perception of social support, perception of ability to face and deal with challenges in life (positive functioning), anxiety symptoms, depressive symptoms, perspective on  beliefs/belief system, coping skills for stress, intelligence level,  Therapeutic rapport was established. Interventions conducted today were geared towards incorporating medication management along with support for continued therapy. Education was also provided as to the med management with this provider and what to expect in subsequent sessions.      We discussed risks, benefits, goals and side effects of the above medication and the patient was agreeable with the plan. Patient was educated on the importance of compliance with treatment and follow-up appointments. Patient is aware to contact the [facility] with any worsening of symptoms. To call for questions or concerns and return early if necessary. Patent is agreeable to go to the Emergency Department or call 911 should they begin SI/HI.PHQ-9 Score:       PHQ-9 Total Score: 11         Depression Screening:  Patient screened positive for depression based on a PHQ-9 score of 11 on 12/22/2022. Follow-up recommendations include: Prescribed antidepressant medication treatment and Suicide Risk Assessment performed.      Over the last two weeks, how often have you been bothered by the following problems?  Feeling nervous, anxious or on edge: Nearly every day  Not being able to stop or control worrying: More than half the days  Worrying too much about different things: More than half the days  Trouble Relaxing: More than half the days  Being so restless that it is hard to sit still: More than half the days  Becoming easily annoyed or irritable: More than half the days  Feeling afraid as if something awful might happen: Not at all  TAMANNA 7 Total Score: 13  If you checked any problems, how difficult have these problems made it for you to do your work, take care of things at home, or get along with other people: Somewhat difficult     Screening for Adults With ADHD - (1-6)  1. How often do you have trouble wrapping up the final details of a project, once the challenging  parts have been done?: Often  2. How often do you have difficulty getting things in order when you have to do a task that requires organization?: Often  3. How often do you have problems remembering appointments or obligations : Rarely  4. When you have a task that requires a lot of thought, how often do you avoid or delay getting started ?: Often  5. How often do you fidget or squirm with your hands or feet when you have to sit down for a long time?: Sometimes  6. How often do you feel overly active and compelled to do things, like you were driven by a motor?: Rarely  7. How often do you make careless mistakes when you have to work on a boring or difficult project?: Sometimes  8. How often do have difficulty keeping your attention when you are doing boring or repetitive work?: Often  9. How often do you have difficulty concentrating on what people say to you, even when they are speaking to you: Rarely  10.How often do you misplace or have difficulty finding things at home or at work?: Sometimes  11.How often are you distracted by activity or noise around you?: Sometimes  12.How often do you leave your seat in meetings or other situations in which you are expected to remain seated?: Rarely  13.How often do you feel restless or fidgety?: Sometimes  14.How often do you have difficulty unwinding and relaxing when you have time to yourself?: Often  15.How often do you find yourself talking too much when you are in social situations?: Sometimes  16.When you’re in a conversation, how often do you find yourself finishing the sentences of the people you are talking to, before they can finish them themselves?: Sometimes  17.How often do you have difficulty waiting your turn in situations when turn taking is required?: Rarely  18.How often do you interrupt others when they are busy?: Rarely        MEDS ORDERED DURING VISIT:  New Medications Ordered This Visit   Medications   • zolpidem (Ambien) 5 MG tablet     Sig: Take 1  tablet by mouth At Night As Needed for Sleep.     Dispense:  30 tablet     Refill:  1   • amphetamine-dextroamphetamine (Adderall) 30 MG tablet     Sig: Take 30 mg orally every morning and afternoon     Dispense:  60 tablet     Refill:  0     Take 30 mg orally every morning and afternoon.   • buPROPion XL (WELLBUTRIN XL) 150 MG 24 hr tablet     Sig: Take 1 tablet by mouth Every Morning.     Dispense:  30 tablet     Refill:  1   • buPROPion XL (Wellbutrin XL) 300 MG 24 hr tablet     Sig: Take 1 tablet by mouth Daily. WITH 150 mg     Dispense:  30 tablet     Refill:  1   • busPIRone (BUSPAR) 15 MG tablet     Sig: Take 2 tablets by mouth 2 (Two) Times a Day.     Dispense:  120 tablet     Refill:  1   • doxepin (SINEquan) 50 MG capsule     Sig: Take 1 capsule by mouth Every Night.     Dispense:  30 capsule     Refill:  1   • DULoxetine (CYMBALTA) 60 MG capsule     Sig: Take 2 capsules by mouth Every Morning.     Dispense:  60 capsule     Refill:  1   • gabapentin (NEURONTIN) 800 MG tablet     Sig: Take 1 tablet by mouth 3 (Three) Times a Day.     Dispense:  90 tablet     Refill:  1   • prazosin (MINIPRESS) 5 MG capsule     Sig: Take 2 capsules by mouth Every Night.     Dispense:  60 capsule     Refill:  1   • QUEtiapine (SEROquel) 100 MG tablet     Sig: Take 1 tablet by mouth Every Night.     Dispense:  30 tablet     Refill:  1         Follow Up   Return in about 6 weeks (around 2/2/2023).  Patient was given instructions and counseling regarding her condition or for health maintenance advice. Please see specific information pulled into the AVS if appropriate.     TREATMENT PLAN/GOALS: Continue supportive psychotherapy efforts and medications as indicated. Treatment and medication options discussed during today's visit. Patient acknowledged and verbally consented to continue with current treatment plan and was educated on the importance of compliance with treatment and follow-up appointments.    MEDICATION  ISSUES:  Discussed medication options and treatment plan of prescribed medication as well as the risks, benefits, and side effects including potential falls, possible impaired driving and metabolic adversities among others. Patient is agreeable to call the office with any worsening of symptoms or onset of side effects. Patient is agreeable to call 911 or go to the nearest ER should he/she begin having SI/HI.        [facility] [address] [address] [phone]    December 22, 2022 10:56 EST

## 2023-01-06 ENCOUNTER — OFFICE VISIT (OUTPATIENT)
Dept: INTERNAL MEDICINE | Facility: CLINIC | Age: 43
End: 2023-01-06
Payer: MEDICARE

## 2023-01-06 VITALS
SYSTOLIC BLOOD PRESSURE: 132 MMHG | HEART RATE: 103 BPM | TEMPERATURE: 97.3 F | WEIGHT: 187 LBS | BODY MASS INDEX: 31.16 KG/M2 | HEIGHT: 65 IN | DIASTOLIC BLOOD PRESSURE: 76 MMHG | OXYGEN SATURATION: 97 % | RESPIRATION RATE: 17 BRPM

## 2023-01-06 DIAGNOSIS — M00.9 PYOGENIC ARTHRITIS OF RIGHT HIP, DUE TO UNSPECIFIED ORGANISM: ICD-10-CM

## 2023-01-06 DIAGNOSIS — F90.2 ATTENTION DEFICIT HYPERACTIVITY DISORDER (ADHD), COMBINED TYPE: ICD-10-CM

## 2023-01-06 DIAGNOSIS — R00.0 TACHYCARDIA: ICD-10-CM

## 2023-01-06 DIAGNOSIS — D64.9 ANEMIA, UNSPECIFIED TYPE: ICD-10-CM

## 2023-01-06 DIAGNOSIS — F41.1 GENERALIZED ANXIETY DISORDER: Primary | ICD-10-CM

## 2023-01-06 PROCEDURE — 99214 OFFICE O/P EST MOD 30 MIN: CPT | Performed by: INTERNAL MEDICINE

## 2023-01-06 NOTE — PROGRESS NOTES
Chief Complaint   Patient presents with   • Follow-up     From hip surgery on 10/6   • Ear Problem     Wants ears checked       Subjective     History of Present Illness   Audrey Alonso is a 42 y.o. female presenting for follow up.  Since her last visit, patient did undergo left hip spacer placement for septic arthritis at [facility].  She completed IV antibiotics and has been doing very well since discharge.  She ambulates with a walker with great care as she plans for hip replacement in February.  Fast heart rate remains unchanged.  Mood and behaviors have been stable with current medication regimen per psychiatry.  She noticed that she was having some difficulty hearing at times.  She questions whether she has cerumen impaction.    The following portions of the patient's history were reviewed and updated as appropriate: allergies, current medications, past family history, past medical history, past social history, past surgical history and problem list.    Review of Systems   Constitutional: Negative for chills, fatigue and fever.   HENT: Negative for congestion, ear pain, rhinorrhea, sinus pressure and sore throat.    Eyes: Negative for visual disturbance.   Respiratory: Negative for cough, chest tightness, shortness of breath and wheezing.    Cardiovascular: Negative for chest pain, palpitations and leg swelling.   Gastrointestinal: Negative for abdominal pain, blood in stool, constipation, diarrhea, nausea and vomiting.   Endocrine: Negative for polydipsia and polyuria.   Genitourinary: Negative for dysuria and hematuria.   Musculoskeletal: Negative for arthralgias and back pain.   Skin: Negative for rash.   Neurological: Negative for dizziness, light-headedness, numbness and headaches.   Psychiatric/Behavioral: Negative for dysphoric mood and sleep disturbance. The patient is not nervous/anxious.        Allergies   Allergen Reactions   • Diphenhydramine Hcl Other (See Comments)   • Diphenhydramine Other  (See Comments)     Hyperactivity  Hyperactivity       Past Medical History:   Diagnosis Date   • Anxiety    • Asthma    • Depression    • Disease of thyroid gland    • GERD (gastroesophageal reflux disease)    • Headache    • Hypertension    • Seizures (HCC)    • Substance abuse (HCC)    • Trauma        Social History     Socioeconomic History   • Marital status:    Tobacco Use   • Smoking status: Former     Packs/day: 0.25     Years: 20.00     Pack years: 5.00     Types: Cigarettes     Quit date: 2022     Years since quittin.5   • Smokeless tobacco: Never   • Tobacco comments:     vapes also   Vaping Use   • Vaping Use: Every day   • Substances: Nicotine, Flavoring   • Devices: Pre-filled or refillable cartridge   Substance and Sexual Activity   • Alcohol use: No     Comment: stopped    • Drug use: Yes     Types: IV     Comment: STATES SHE TOOK HER METHADONE AND SOME BENZOS 20   • Sexual activity: Not Currently     Partners: Male     Birth control/protection: Abstinence        Past Surgical History:   Procedure Laterality Date   • DILATATION AND CURETTAGE     • INCISION AND DRAINAGE ABSCESS  2019    arm       Family History   Problem Relation Age of Onset   • Arthritis Mother    • Cancer Mother         breast   • Thyroid disease Mother    • Breast cancer Mother    • Diabetes Father    • Hypertension Father    • Mental illness Brother    • Breast cancer Maternal Aunt          Current Outpatient Medications:   •  amphetamine-dextroamphetamine (Adderall) 30 MG tablet, Take 30 mg orally every morning and afternoon, Disp: 60 tablet, Rfl: 0  •  buPROPion XL (WELLBUTRIN XL) 150 MG 24 hr tablet, Take 1 tablet by mouth Every Morning., Disp: 30 tablet, Rfl: 1  •  buPROPion XL (Wellbutrin XL) 300 MG 24 hr tablet, Take 1 tablet by mouth Daily. WITH 150 mg, Disp: 30 tablet, Rfl: 1  •  busPIRone (BUSPAR) 15 MG tablet, Take 2 tablets by mouth 2 (Two) Times a Day., Disp: 120 tablet, Rfl: 1  •  doxepin  (SINEquan) 50 MG capsule, Take 1 capsule by mouth Every Night., Disp: 30 capsule, Rfl: 1  •  DULoxetine (CYMBALTA) 60 MG capsule, Take 2 capsules by mouth Every Morning., Disp: 60 capsule, Rfl: 1  •  gabapentin (NEURONTIN) 800 MG tablet, Take 1 tablet by mouth 3 (Three) Times a Day., Disp: 90 tablet, Rfl: 1  •  methadone (DOLOPHINE) 10 MG tablet, Take 125 mg by mouth Daily Elmira Elaine @ [facility] ([phone]) verified dose as 125 mg daily,, Disp: , Rfl:   •  prazosin (MINIPRESS) 5 MG capsule, Take 2 capsules by mouth Every Night., Disp: 60 capsule, Rfl: 1  •  QUEtiapine (SEROquel) 100 MG tablet, Take 1 tablet by mouth Every Night., Disp: 30 tablet, Rfl: 1  •  zolpidem (Ambien) 5 MG tablet, Take 1 tablet by mouth At Night As Needed for Sleep., Disp: 30 tablet, Rfl: 1    Objective   /76   Pulse 103   Temp 97.3 °F (36.3 °C)   Resp 17   Ht 165.1 cm (65\")   Wt 84.8 kg (187 lb)   SpO2 97%   BMI 31.12 kg/m²     Physical Exam  Vitals and nursing note reviewed.   Constitutional:       General: She is not in acute distress.     Appearance: Normal appearance. She is well-developed.   HENT:      Head: Normocephalic and atraumatic.      Right Ear: Tympanic membrane and external ear normal.      Left Ear: Tympanic membrane and external ear normal.      Nose: Nose normal.      Mouth/Throat:      Mouth: Mucous membranes are moist.      Pharynx: No oropharyngeal exudate.   Eyes:      General: No scleral icterus.     Conjunctiva/sclera: Conjunctivae normal.      Pupils: Pupils are equal, round, and reactive to light.   Neck:      Thyroid: No thyromegaly.      Vascular: No JVD.   Cardiovascular:      Rate and Rhythm: Normal rate and regular rhythm.      Heart sounds: Normal heart sounds.   Pulmonary:      Effort: Pulmonary effort is normal. No respiratory distress.      Breath sounds: Normal breath sounds. No stridor. No wheezing.   Abdominal:      General: Bowel sounds are normal. There is  no distension.      Palpations: Abdomen is soft. There is no mass.      Tenderness: There is no abdominal tenderness. There is no guarding.   Genitourinary:     Comments: Deferred  Musculoskeletal:         General: No tenderness. Normal range of motion.      Cervical back: Normal range of motion and neck supple.   Lymphadenopathy:      Cervical: No cervical adenopathy.   Skin:     General: Skin is warm and dry.   Neurological:      Mental Status: She is alert and oriented to person, place, and time.      Cranial Nerves: No cranial nerve deficit.   Psychiatric:         Behavior: Behavior normal.         Thought Content: Thought content normal.         Judgment: Judgment normal.         Assessment & Plan   Diagnoses and all orders for this visit:    1. Generalized anxiety disorder (Primary)    2. Attention deficit hyperactivity disorder (ADHD), combined type    3. Anemia, unspecified type    4. Tachycardia    5. Pyogenic arthritis of right hip, due to unspecified organism (HCC)          Discussion Summary:  Patient is a 42 y.o. female presenting for follow up.    Right hip septic arthritis  - continues to follow with  orthopedics.  Completed IV abx and spacer placement.    - definitive hip replacement pending next month.   hospital records reviewed.     Generalized anxiety disorder /  ADHD  - Stable control since patient has been following with psychiatry.    Insomnia  - cont Ambien per psychiatry.     Chronic Tachycardia  - remains stable, seems not to be related to ADHD medications based on her history.     Hearing loss   - not due to cerumen, ears checked today.      Follow up:  Return in about 6 months (around 7/6/2023) for Annual physical.

## 2023-02-07 ENCOUNTER — OFFICE VISIT (OUTPATIENT)
Dept: BEHAVIORAL HEALTH | Facility: CLINIC | Age: 43
End: 2023-02-07
Payer: MEDICARE

## 2023-02-07 VITALS
HEART RATE: 86 BPM | BODY MASS INDEX: 31.16 KG/M2 | SYSTOLIC BLOOD PRESSURE: 128 MMHG | HEIGHT: 65 IN | WEIGHT: 187 LBS | DIASTOLIC BLOOD PRESSURE: 88 MMHG

## 2023-02-07 DIAGNOSIS — F51.01 PRIMARY INSOMNIA: ICD-10-CM

## 2023-02-07 DIAGNOSIS — F51.5 NIGHTMARES: ICD-10-CM

## 2023-02-07 DIAGNOSIS — F41.1 GENERALIZED ANXIETY DISORDER: ICD-10-CM

## 2023-02-07 DIAGNOSIS — F33.1 MODERATE EPISODE OF RECURRENT MAJOR DEPRESSIVE DISORDER: ICD-10-CM

## 2023-02-07 DIAGNOSIS — F90.2 ATTENTION DEFICIT HYPERACTIVITY DISORDER (ADHD), COMBINED TYPE: Primary | ICD-10-CM

## 2023-02-07 PROCEDURE — 99214 OFFICE O/P EST MOD 30 MIN: CPT

## 2023-02-07 RX ORDER — PRAZOSIN HYDROCHLORIDE 5 MG/1
10 CAPSULE ORAL NIGHTLY
Qty: 60 CAPSULE | Refills: 2 | Status: SHIPPED | OUTPATIENT
Start: 2023-02-07 | End: 2023-02-08 | Stop reason: SDUPTHER

## 2023-02-07 RX ORDER — DULOXETIN HYDROCHLORIDE 60 MG/1
120 CAPSULE, DELAYED RELEASE ORAL EVERY MORNING
Qty: 60 CAPSULE | Refills: 2 | Status: SHIPPED | OUTPATIENT
Start: 2023-02-07 | End: 2023-02-08 | Stop reason: SDUPTHER

## 2023-02-07 RX ORDER — BUPROPION HYDROCHLORIDE 300 MG/1
300 TABLET ORAL DAILY
Qty: 30 TABLET | Refills: 2 | Status: SHIPPED | OUTPATIENT
Start: 2023-02-07 | End: 2023-02-08 | Stop reason: SDUPTHER

## 2023-02-07 RX ORDER — DEXTROAMPHETAMINE SACCHARATE, AMPHETAMINE ASPARTATE, DEXTROAMPHETAMINE SULFATE AND AMPHETAMINE SULFATE 7.5; 7.5; 7.5; 7.5 MG/1; MG/1; MG/1; MG/1
TABLET ORAL
Qty: 60 TABLET | Refills: 0 | Status: SHIPPED | OUTPATIENT
Start: 2023-02-07 | End: 2023-02-08 | Stop reason: SDUPTHER

## 2023-02-07 RX ORDER — DOXEPIN HYDROCHLORIDE 50 MG/1
50 CAPSULE ORAL NIGHTLY
Qty: 30 CAPSULE | Refills: 2 | Status: SHIPPED | OUTPATIENT
Start: 2023-02-07 | End: 2023-02-08 | Stop reason: SDUPTHER

## 2023-02-07 RX ORDER — BUPROPION HYDROCHLORIDE 150 MG/1
150 TABLET ORAL EVERY MORNING
Qty: 30 TABLET | Refills: 2 | Status: SHIPPED | OUTPATIENT
Start: 2023-02-07 | End: 2023-02-08 | Stop reason: SDUPTHER

## 2023-02-07 RX ORDER — BUSPIRONE HYDROCHLORIDE 15 MG/1
30 TABLET ORAL 2 TIMES DAILY
Qty: 120 TABLET | Refills: 2 | Status: SHIPPED | OUTPATIENT
Start: 2023-02-07 | End: 2023-02-08 | Stop reason: SDUPTHER

## 2023-02-07 RX ORDER — ZOLPIDEM TARTRATE 5 MG/1
5 TABLET ORAL NIGHTLY PRN
Qty: 30 TABLET | Refills: 2 | Status: SHIPPED | OUTPATIENT
Start: 2023-02-07 | End: 2023-02-08 | Stop reason: SDUPTHER

## 2023-02-07 RX ORDER — QUETIAPINE FUMARATE 100 MG/1
100 TABLET, FILM COATED ORAL NIGHTLY
Qty: 30 TABLET | Refills: 2 | Status: SHIPPED | OUTPATIENT
Start: 2023-02-07 | End: 2023-02-08 | Stop reason: SDUPTHER

## 2023-02-07 RX ORDER — GABAPENTIN 800 MG/1
800 TABLET ORAL 3 TIMES DAILY
Qty: 90 TABLET | Refills: 2 | Status: SHIPPED | OUTPATIENT
Start: 2023-02-07 | End: 2023-02-08 | Stop reason: SDUPTHER

## 2023-02-07 NOTE — PROGRESS NOTES
Follow Up Office Visit    Patient Name: Audrey Alonso  : 1980   MRN: [id number]   Care Team: Patient Care Team:  David Fleming DO as PCP - General (Internal Medicine)  Nathalia Anne APRN as Nurse Practitioner (Behavioral Health)        Chief Complaint:    Chief Complaint   Patient presents with   • ADHD   • Anxiety   • Depression   • Sleeping Problem   • Nightmares   • Med Management       History of Present Illness: Audrey Alonso is a 43 y.o. female who is here today for a medication management follow up. Patient states that overall medication continues to be effective. She is still dealing with the stress of her hip and the upcoming surgeries. That makes her anxious but she feels that her medication helps make everything manageable. She did not see the need to change anything and did not have any specific medication concerns at today's visit.      Subjective   Review of Systems:    Review of Systems   Psychiatric/Behavioral: Positive for stress. The patient is nervous/anxious.    All other systems reviewed and are negative.      Current Medications:   Current Outpatient Medications   Medication Sig Dispense Refill   • amphetamine-dextroamphetamine (Adderall) 30 MG tablet Take 30 mg orally every morning and afternoon 60 tablet 0   • buPROPion XL (WELLBUTRIN XL) 150 MG 24 hr tablet Take 1 tablet by mouth Every Morning. 30 tablet 2   • buPROPion XL (Wellbutrin XL) 300 MG 24 hr tablet Take 1 tablet by mouth Daily. WITH 150 mg 30 tablet 2   • busPIRone (BUSPAR) 15 MG tablet Take 2 tablets by mouth 2 (Two) Times a Day. 120 tablet 2   • doxepin (SINEquan) 50 MG capsule Take 1 capsule by mouth Every Night. 30 capsule 2   • DULoxetine (CYMBALTA) 60 MG capsule Take 2 capsules by mouth Every Morning. 60 capsule 2   • gabapentin (NEURONTIN) 800 MG tablet Take 1 tablet by mouth 3 (Three) Times a Day. 90 tablet 2   • methadone (DOLOPHINE) 10 MG tablet Take 125 mg by mouth Daily Elmira Elaine @  "[facility] ([phone]) verified dose as 125 mg daily,     • prazosin (MINIPRESS) 5 MG capsule Take 2 capsules by mouth Every Night. 60 capsule 2   • QUEtiapine (SEROquel) 100 MG tablet Take 1 tablet by mouth Every Night. 30 tablet 2   • zolpidem (Ambien) 5 MG tablet Take 1 tablet by mouth At Night As Needed for Sleep. 30 tablet 2     No current facility-administered medications for this visit.       Mental Status Exam:   Hygiene:   good  Cooperation:  Cooperative  Eye Contact:  Good  Psychomotor Behavior:  Appropriate  Affect:  Appropriate  Mood: normal, anxious and stressed  Speech:  Normal  Thought Process:  Goal directed and Linear  Thought Content:  Normal and Mood congruent  Suicidal:  None  Homicidal:  None  Hallucinations:  None  Delusion:  None  Memory:  Intact  Orientation:  Person, Place, Time and Situation  Reliability:  good  Insight:  Good  Judgement:  Good  Impulse Control:  Good  Physical/Medical Issues:  Yes see chart     Objective   Vital Signs:   /88   Pulse 86   Ht 165.1 cm (65\")   Wt 84.8 kg (187 lb)   BMI 31.12 kg/m²       Assessment / Plan    Diagnoses and all orders for this visit:    1. Attention deficit hyperactivity disorder (ADHD), combined type (Primary)  -     amphetamine-dextroamphetamine (Adderall) 30 MG tablet; Take 30 mg orally every morning and afternoon  Dispense: 60 tablet; Refill: 0    2. Generalized anxiety disorder  -     busPIRone (BUSPAR) 15 MG tablet; Take 2 tablets by mouth 2 (Two) Times a Day.  Dispense: 120 tablet; Refill: 2  -     DULoxetine (CYMBALTA) 60 MG capsule; Take 2 capsules by mouth Every Morning.  Dispense: 60 capsule; Refill: 2  -     gabapentin (NEURONTIN) 800 MG tablet; Take 1 tablet by mouth 3 (Three) Times a Day.  Dispense: 90 tablet; Refill: 2    3. Moderate episode of recurrent major depressive disorder (HCC)  -     buPROPion XL (WELLBUTRIN XL) 150 MG 24 hr tablet; Take 1 tablet by mouth Every Morning.  Dispense: " 30 tablet; Refill: 2  -     buPROPion XL (Wellbutrin XL) 300 MG 24 hr tablet; Take 1 tablet by mouth Daily. WITH 150 mg  Dispense: 30 tablet; Refill: 2  -     DULoxetine (CYMBALTA) 60 MG capsule; Take 2 capsules by mouth Every Morning.  Dispense: 60 capsule; Refill: 2    4. Primary insomnia  -     doxepin (SINEquan) 50 MG capsule; Take 1 capsule by mouth Every Night.  Dispense: 30 capsule; Refill: 2  -     QUEtiapine (SEROquel) 100 MG tablet; Take 1 tablet by mouth Every Night.  Dispense: 30 tablet; Refill: 2  -     zolpidem (Ambien) 5 MG tablet; Take 1 tablet by mouth At Night As Needed for Sleep.  Dispense: 30 tablet; Refill: 2    5. Nightmares  -     prazosin (MINIPRESS) 5 MG capsule; Take 2 capsules by mouth Every Night.  Dispense: 60 capsule; Refill: 2  -     QUEtiapine (SEROquel) 100 MG tablet; Take 1 tablet by mouth Every Night.  Dispense: 30 tablet; Refill: 2     Patient appears to be doing well on current medication. No medication issues reported and no indications for change. Will continue medication as ordered.     A psychological evaluation was conducted in order to assess past and current level of functioning. Areas assessed included, but were not limited to: perception of social support, perception of ability to face and deal with challenges in life (positive functioning), anxiety symptoms, depressive symptoms, perspective on beliefs/belief system, coping skills for stress, intelligence level,  Therapeutic rapport was established. Interventions conducted today were geared towards incorporating medication management along with support for continued therapy. Education was also provided as to the med management with this provider and what to expect in subsequent sessions.      We discussed risks, benefits, goals and side effects of the above medication and the patient was agreeable with the plan. Patient was educated on the importance of compliance with treatment and follow-up appointments. Patient is  aware to contact the [facility] with any worsening of symptoms. To call for questions or concerns and return early if necessary. Patent is agreeable to go to the Emergency Department or call 911 should they begin SI/HI.      PHQ-2/PHQ-9: Depression Screening  Little Interest or Pleasure in Doing Things: 1-->several days  Feeling Down, Depressed or Hopeless: 1-->several days  PHQ-2 Total Score: 2  Trouble Falling or Staying Asleep, or Sleeping Too Much: 2-->more than half the days  Feeling Tired or Having Little Energy: 1-->several days  Poor Appetite or Overeatin-->more than half the days  Feeling Bad about Yourself - or that You are a Failure or Have Let Yourself or Your Family Down: 1-->several days  Trouble Concentrating on Things, Such as Reading the Newspaper or Watching Television: 1-->several days  Moving or Speaking So Slowly that Other People Could Have Noticed? Or the Opposite - Being So Fidgety: 0-->not at all  Thoughts that You Would be Better Off Dead or of Hurting Yourself in Some Way: 0-->not at all  PHQ-9: Brief Depression Severity Measure Score: 9  If You Checked Off Any Problems, How Difficult Have These Problems Made It For You to Do Your Work, Take Care of Things at Home, or Get Along with Other People?: somewhat difficult      PHQ-9 Score:   PHQ-9 Total Score: 9    Depression Screening:  Patient screened positive for depression based on a PHQ-9 score of 9 on 2023. Follow-up recommendations include: Prescribed antidepressant medication treatment and Suicide Risk Assessment performed.    Over the last two weeks, how often have you been bothered by the following problems?  Feeling nervous, anxious or on edge: More than half the days  Not being able to stop or control worrying: Several days  Worrying too much about different things: More than half the days  Trouble Relaxing: Several days  Being so restless that it is hard to sit still: Several days  Becoming easily annoyed or irritable:  Several days  Feeling afraid as if something awful might happen: Several days  TAMANNA 7 Total Score: 9  If you checked any problems, how difficult have these problems made it for you to do your work, take care of things at home, or get along with other people: Somewhat difficult          MEDS ORDERED DURING VISIT:  New Medications Ordered This Visit   Medications   • amphetamine-dextroamphetamine (Adderall) 30 MG tablet     Sig: Take 30 mg orally every morning and afternoon     Dispense:  60 tablet     Refill:  0     Take 30 mg orally every morning and afternoon.   • buPROPion XL (WELLBUTRIN XL) 150 MG 24 hr tablet     Sig: Take 1 tablet by mouth Every Morning.     Dispense:  30 tablet     Refill:  2   • buPROPion XL (Wellbutrin XL) 300 MG 24 hr tablet     Sig: Take 1 tablet by mouth Daily. WITH 150 mg     Dispense:  30 tablet     Refill:  2   • busPIRone (BUSPAR) 15 MG tablet     Sig: Take 2 tablets by mouth 2 (Two) Times a Day.     Dispense:  120 tablet     Refill:  2   • doxepin (SINEquan) 50 MG capsule     Sig: Take 1 capsule by mouth Every Night.     Dispense:  30 capsule     Refill:  2   • DULoxetine (CYMBALTA) 60 MG capsule     Sig: Take 2 capsules by mouth Every Morning.     Dispense:  60 capsule     Refill:  2   • gabapentin (NEURONTIN) 800 MG tablet     Sig: Take 1 tablet by mouth 3 (Three) Times a Day.     Dispense:  90 tablet     Refill:  2   • prazosin (MINIPRESS) 5 MG capsule     Sig: Take 2 capsules by mouth Every Night.     Dispense:  60 capsule     Refill:  2   • QUEtiapine (SEROquel) 100 MG tablet     Sig: Take 1 tablet by mouth Every Night.     Dispense:  30 tablet     Refill:  2   • zolpidem (Ambien) 5 MG tablet     Sig: Take 1 tablet by mouth At Night As Needed for Sleep.     Dispense:  30 tablet     Refill:  2         Follow Up   Return in about 3 months (around 5/7/2023).  Patient was given instructions and counseling regarding her condition or for health maintenance advice. Please see specific  information pulled into the AVS if appropriate.     TREATMENT PLAN/GOALS: Continue supportive psychotherapy efforts and medications as indicated. Treatment and medication options discussed during today's visit. Patient acknowledged and verbally consented to continue with current treatment plan and was educated on the importance of compliance with treatment and follow-up appointments.    MEDICATION ISSUES:  Discussed medication options and treatment plan of prescribed medication as well as the risks, benefits, and side effects including potential falls, possible impaired driving and metabolic adversities among others. Patient is agreeable to call the office with any worsening of symptoms or onset of side effects. Patient is agreeable to call 911 or go to the nearest ER should he/she begin having SI/HI.        [facility] [address] [address] [phone]    February 7, 2023 14:16 EST

## 2023-02-08 ENCOUNTER — TELEPHONE (OUTPATIENT)
Dept: INTERNAL MEDICINE | Facility: CLINIC | Age: 43
End: 2023-02-08
Payer: MEDICARE

## 2023-02-08 DIAGNOSIS — F33.1 MODERATE EPISODE OF RECURRENT MAJOR DEPRESSIVE DISORDER: ICD-10-CM

## 2023-02-08 DIAGNOSIS — F90.2 ATTENTION DEFICIT HYPERACTIVITY DISORDER (ADHD), COMBINED TYPE: ICD-10-CM

## 2023-02-08 DIAGNOSIS — F41.1 GENERALIZED ANXIETY DISORDER: ICD-10-CM

## 2023-02-08 DIAGNOSIS — F51.01 PRIMARY INSOMNIA: ICD-10-CM

## 2023-02-08 DIAGNOSIS — F51.5 NIGHTMARES: ICD-10-CM

## 2023-02-08 RX ORDER — BUPROPION HYDROCHLORIDE 150 MG/1
150 TABLET ORAL EVERY MORNING
Qty: 30 TABLET | Refills: 2 | Status: SHIPPED | OUTPATIENT
Start: 2023-02-08

## 2023-02-08 RX ORDER — BUSPIRONE HYDROCHLORIDE 15 MG/1
30 TABLET ORAL 2 TIMES DAILY
Qty: 120 TABLET | Refills: 2 | Status: SHIPPED | OUTPATIENT
Start: 2023-02-08

## 2023-02-08 RX ORDER — DULOXETIN HYDROCHLORIDE 60 MG/1
120 CAPSULE, DELAYED RELEASE ORAL EVERY MORNING
Qty: 60 CAPSULE | Refills: 2 | Status: SHIPPED | OUTPATIENT
Start: 2023-02-08

## 2023-02-08 RX ORDER — DEXTROAMPHETAMINE SACCHARATE, AMPHETAMINE ASPARTATE, DEXTROAMPHETAMINE SULFATE AND AMPHETAMINE SULFATE 7.5; 7.5; 7.5; 7.5 MG/1; MG/1; MG/1; MG/1
TABLET ORAL
Qty: 60 TABLET | Refills: 0 | Status: SHIPPED | OUTPATIENT
Start: 2023-02-08 | End: 2023-03-10 | Stop reason: SDUPTHER

## 2023-02-08 RX ORDER — DOXEPIN HYDROCHLORIDE 50 MG/1
50 CAPSULE ORAL NIGHTLY
Qty: 30 CAPSULE | Refills: 2 | Status: SHIPPED | OUTPATIENT
Start: 2023-02-08 | End: 2023-03-10 | Stop reason: SDUPTHER

## 2023-02-08 RX ORDER — PRAZOSIN HYDROCHLORIDE 5 MG/1
10 CAPSULE ORAL NIGHTLY
Qty: 60 CAPSULE | Refills: 2 | Status: SHIPPED | OUTPATIENT
Start: 2023-02-08

## 2023-02-08 RX ORDER — QUETIAPINE FUMARATE 100 MG/1
100 TABLET, FILM COATED ORAL NIGHTLY
Qty: 30 TABLET | Refills: 2 | Status: SHIPPED | OUTPATIENT
Start: 2023-02-08

## 2023-02-08 RX ORDER — ZOLPIDEM TARTRATE 5 MG/1
5 TABLET ORAL NIGHTLY PRN
Qty: 30 TABLET | Refills: 2 | Status: SHIPPED | OUTPATIENT
Start: 2023-02-08

## 2023-02-08 RX ORDER — GABAPENTIN 800 MG/1
800 TABLET ORAL 3 TIMES DAILY
Qty: 90 TABLET | Refills: 2 | Status: SHIPPED | OUTPATIENT
Start: 2023-02-08

## 2023-02-08 RX ORDER — BUPROPION HYDROCHLORIDE 300 MG/1
300 TABLET ORAL DAILY
Qty: 30 TABLET | Refills: 2 | Status: SHIPPED | OUTPATIENT
Start: 2023-02-08

## 2023-02-08 NOTE — TELEPHONE ENCOUNTER
Patient called and said that she told Nathalia Anne to send all her prescriptions to [facility] in [place] on [address] and she send all the prescriptions to [place].  Please resend to [facility].

## 2023-02-16 DIAGNOSIS — F51.5 NIGHTMARES: ICD-10-CM

## 2023-02-16 DIAGNOSIS — F51.01 PRIMARY INSOMNIA: ICD-10-CM

## 2023-02-16 RX ORDER — PRAZOSIN HYDROCHLORIDE 5 MG/1
CAPSULE ORAL
Qty: 180 CAPSULE | OUTPATIENT
Start: 2023-02-16

## 2023-02-16 RX ORDER — ZOLPIDEM TARTRATE 5 MG/1
5 TABLET ORAL NIGHTLY PRN
Qty: 30 TABLET | Refills: 2 | Status: CANCELLED | OUTPATIENT
Start: 2023-02-16

## 2023-02-16 NOTE — TELEPHONE ENCOUNTER
Incoming Refill Request      Medication requested (name and dose): AMBIEN 5MG     Pharmacy where request should be sent: [facility]    Additional details provided by patient: N/A    Best call back number: [phone]    Does the patient have less than a 3 day supply:  [x] Yes  [] No    Vamsi Walls Rep  02/16/23, 10:06 EST

## 2023-02-16 NOTE — TELEPHONE ENCOUNTER
Rx Refill Note  Requested Prescriptions     Pending Prescriptions Disp Refills   • prazosin (MINIPRESS) 5 MG capsule [Pharmacy Med Name: PRAZOSIN HCL 5MG CAPSULES] 180 capsule      Sig: TAKE 2 CAPSULES BY MOUTH ONCE NIGHTLY      Last office visit with prescribing clinician: 2/7/2023   Last telemedicine visit with prescribing clinician:   Next office visit with prescribing clinician: 5/9/2023       Eunice Case MA  02/16/23, 09:03 EST

## 2023-03-03 ENCOUNTER — TRANSCRIBE ORDERS (OUTPATIENT)
Dept: LAB | Facility: HOSPITAL | Age: 43
End: 2023-03-03
Payer: MEDICARE

## 2023-03-03 ENCOUNTER — LAB (OUTPATIENT)
Dept: LAB | Facility: HOSPITAL | Age: 43
End: 2023-03-03
Payer: MEDICARE

## 2023-03-03 DIAGNOSIS — Z01.818 PRE-OPERATIVE CLEARANCE: ICD-10-CM

## 2023-03-03 DIAGNOSIS — Z01.818 PRE-OPERATIVE CLEARANCE: Primary | ICD-10-CM

## 2023-03-03 PROCEDURE — U0005 INFEC AGEN DETEC AMPLI PROBE: HCPCS

## 2023-03-03 PROCEDURE — U0004 COV-19 TEST NON-CDC HGH THRU: HCPCS

## 2023-03-03 PROCEDURE — C9803 HOPD COVID-19 SPEC COLLECT: HCPCS

## 2023-03-04 LAB — SARS-COV-2 RNA NOSE QL NAA+PROBE: NOT DETECTED

## 2023-03-10 DIAGNOSIS — F51.01 PRIMARY INSOMNIA: ICD-10-CM

## 2023-03-10 DIAGNOSIS — F90.2 ATTENTION DEFICIT HYPERACTIVITY DISORDER (ADHD), COMBINED TYPE: ICD-10-CM

## 2023-03-10 NOTE — TELEPHONE ENCOUNTER
Incoming Refill Request      Medication requested (name and dose): Addereall 30mg, doxepin 50mg    Pharmacy where request should be sent: [facility] on [address]    Additional details provided by patient: Patient said that you increased the doxepin to 2 tablets instead of 1 and the last prescription did not reflect that so she will be running out.  Please send in enough to finish out the month.    Best call back number:  Verified    Does the patient have less than a 3 day supply:  [x] Yes  [] No    Serenity López  03/10/23, 09:44 EST

## 2023-03-13 ENCOUNTER — TELEPHONE (OUTPATIENT)
Dept: INTERNAL MEDICINE | Facility: CLINIC | Age: 43
End: 2023-03-13
Payer: MEDICARE

## 2023-03-13 RX ORDER — DEXTROAMPHETAMINE SACCHARATE, AMPHETAMINE ASPARTATE, DEXTROAMPHETAMINE SULFATE AND AMPHETAMINE SULFATE 7.5; 7.5; 7.5; 7.5 MG/1; MG/1; MG/1; MG/1
TABLET ORAL
Qty: 60 TABLET | Refills: 0 | Status: SHIPPED | OUTPATIENT
Start: 2023-03-13

## 2023-03-13 RX ORDER — DOXEPIN HYDROCHLORIDE 50 MG/1
50 CAPSULE ORAL NIGHTLY
Qty: 30 CAPSULE | Refills: 2 | Status: SHIPPED | OUTPATIENT
Start: 2023-03-13 | End: 2023-03-16 | Stop reason: DRUGHIGH

## 2023-03-13 NOTE — TELEPHONE ENCOUNTER
Patient states pharmacy informed her that doxepin refill was denied due to it being too soon to fill. Patient states she was advised by Nena to double daily dosage so she ran out too soon.

## 2023-03-16 ENCOUNTER — OFFICE VISIT (OUTPATIENT)
Dept: BEHAVIORAL HEALTH | Facility: CLINIC | Age: 43
End: 2023-03-16
Payer: MEDICARE

## 2023-03-16 VITALS
BODY MASS INDEX: 29.32 KG/M2 | WEIGHT: 176 LBS | HEIGHT: 65 IN | HEART RATE: 105 BPM | SYSTOLIC BLOOD PRESSURE: 132 MMHG | DIASTOLIC BLOOD PRESSURE: 76 MMHG

## 2023-03-16 DIAGNOSIS — F51.01 PRIMARY INSOMNIA: ICD-10-CM

## 2023-03-16 DIAGNOSIS — F51.5 NIGHTMARES: ICD-10-CM

## 2023-03-16 DIAGNOSIS — F41.1 GENERALIZED ANXIETY DISORDER: Primary | ICD-10-CM

## 2023-03-16 DIAGNOSIS — F33.1 MODERATE EPISODE OF RECURRENT MAJOR DEPRESSIVE DISORDER: ICD-10-CM

## 2023-03-16 PROCEDURE — 1159F MED LIST DOCD IN RCRD: CPT

## 2023-03-16 PROCEDURE — 99214 OFFICE O/P EST MOD 30 MIN: CPT

## 2023-03-16 PROCEDURE — 1160F RVW MEDS BY RX/DR IN RCRD: CPT

## 2023-03-16 RX ORDER — DOXEPIN HYDROCHLORIDE 100 MG/1
100 CAPSULE ORAL NIGHTLY
Qty: 30 CAPSULE | Refills: 2 | Status: SHIPPED | OUTPATIENT
Start: 2023-03-16

## 2023-03-16 NOTE — PROGRESS NOTES
Follow Up Office Visit    Patient Name: Audrey Alonso  : 1980   MRN: [id number]   Care Team: Patient Care Team:  David Fleming DO as PCP - General (Internal Medicine)  Nathalia Anne APRN as Nurse Practitioner (Behavioral Health)        Chief Complaint:    Chief Complaint   Patient presents with   • Anxiety   • Depression   • Sleeping Problem   • Med Management       History of Present Illness: Audrey Alonso is a 43 y.o. female who is here today for a medication management follow up. Patient reports that overall medication continues to be effective. She states that since increasing the Doxepin she has noticed a positive improvement in both her anxiety and sleep. She reports that she still has a lot of situational stressors revolving around her hip surgery but feels that medication helps make that manageable.     The following portion of the patient's history were reviewed and updated appropriately: allergies, current and past medications, family history, medical history and social history.  Subjective   Review of Systems:    Review of Systems   Psychiatric/Behavioral: Positive for stress. The patient is nervous/anxious.    All other systems reviewed and are negative.      Current Medications:   Current Outpatient Medications   Medication Sig Dispense Refill   • amphetamine-dextroamphetamine (Adderall) 30 MG tablet Take 30 mg orally every morning and afternoon 60 tablet 0   • buPROPion XL (WELLBUTRIN XL) 150 MG 24 hr tablet Take 1 tablet by mouth Every Morning. 30 tablet 2   • buPROPion XL (Wellbutrin XL) 300 MG 24 hr tablet Take 1 tablet by mouth Daily. WITH 150 mg 30 tablet 2   • busPIRone (BUSPAR) 15 MG tablet Take 2 tablets by mouth 2 (Two) Times a Day. 120 tablet 2   • doxepin (SINEquan) 100 MG capsule Take 1 capsule by mouth Every Night. 30 capsule 2   • DULoxetine (CYMBALTA) 60 MG capsule Take 2 capsules by mouth Every Morning. 60 capsule 2   • gabapentin (NEURONTIN) 800 MG tablet  "Take 1 tablet by mouth 3 (Three) Times a Day. 90 tablet 2   • methadone (DOLOPHINE) 10 MG tablet Take 125 mg by mouth Daily [facility] Elaine @ [facility] ([phone]) verified dose as 125 mg daily,     • prazosin (MINIPRESS) 5 MG capsule Take 2 capsules by mouth Every Night. 60 capsule 2   • QUEtiapine (SEROquel) 100 MG tablet Take 1 tablet by mouth Every Night. 30 tablet 2   • zolpidem (Ambien) 5 MG tablet Take 1 tablet by mouth At Night As Needed for Sleep. 30 tablet 2     No current facility-administered medications for this visit.       Mental Status Exam:   Hygiene:   good  Cooperation:  Cooperative  Eye Contact:  Good  Psychomotor Behavior:  Appropriate  Affect:  Appropriate  Mood: normal  Speech:  Normal  Thought Process:  Goal directed and Linear  Thought Content:  Normal and Mood congruent  Suicidal:  None  Homicidal:  None  Hallucinations:  None  Delusion:  None  Memory:  Intact  Orientation:  Person, Place, Time and Situation  Reliability:  good  Insight:  Good  Judgement:  Good  Impulse Control:  Good  Physical/Medical Issues:  Yes see chart     Objective   Vital Signs:   /76   Pulse 105   Ht 165.1 cm (65\")   Wt 79.8 kg (176 lb)   BMI 29.29 kg/m²       Assessment / Plan    Diagnoses and all orders for this visit:    1. Generalized anxiety disorder (Primary)  -     doxepin (SINEquan) 100 MG capsule; Take 1 capsule by mouth Every Night.  Dispense: 30 capsule; Refill: 2    2. Moderate episode of recurrent major depressive disorder (HCC)  -     doxepin (SINEquan) 100 MG capsule; Take 1 capsule by mouth Every Night.  Dispense: 30 capsule; Refill: 2    3. Nightmares  -     doxepin (SINEquan) 100 MG capsule; Take 1 capsule by mouth Every Night.  Dispense: 30 capsule; Refill: 2    4. Primary insomnia  -     doxepin (SINEquan) 100 MG capsule; Take 1 capsule by mouth Every Night.  Dispense: 30 capsule; Refill: 2       Patient appears to be doing well on current medication. No " issues reported and no indication for change. Will continue medication as ordered.     A psychological evaluation was conducted in order to assess past and current level of functioning. Areas assessed included, but were not limited to: perception of social support, perception of ability to face and deal with challenges in life (positive functioning), anxiety symptoms, depressive symptoms, perspective on beliefs/belief system, coping skills for stress, intelligence level,  Therapeutic rapport was established. Interventions conducted today were geared towards incorporating medication management along with support for continued therapy. Education was also provided as to the med management with this provider and what to expect in subsequent sessions.      We discussed risks, benefits, goals and side effects of the above medication and the patient was agreeable with the plan. Patient was educated on the importance of compliance with treatment and follow-up appointments. Patient is aware to contact the [facility] with any worsening of symptoms. To call for questions or concerns and return early if necessary. Patent is agreeable to go to the Emergency Department or call 911 should they begin SI/HI.      PHQ-2/PHQ-9: Depression Screening  Little Interest or Pleasure in Doing Things: 1-->several days  Feeling Down, Depressed or Hopeless: 1-->several days  PHQ-2 Total Score: 2  Trouble Falling or Staying Asleep, or Sleeping Too Much: 2-->more than half the days  Feeling Tired or Having Little Energy: 2-->more than half the days  Poor Appetite or Overeatin-->more than half the days  Feeling Bad about Yourself - or that You are a Failure or Have Let Yourself or Your Family Down: 2-->more than half the days  Trouble Concentrating on Things, Such as Reading the Newspaper or Watching Television: 1-->several days  Moving or Speaking So Slowly that Other People Could Have Noticed? Or the Opposite - Being So Fidgety:  1-->several days  Thoughts that You Would be Better Off Dead or of Hurting Yourself in Some Way: 0-->not at all  PHQ-9: Brief Depression Severity Measure Score: 12      PHQ-9 Score:   PHQ-9 Total Score: 12    Depression Screening:  Patient screened positive for depression based on a PHQ-9 score of 12 on 3/16/2023. Follow-up recommendations include: Prescribed antidepressant medication treatment and Suicide Risk Assessment performed.      Over the last two weeks, how often have you been bothered by the following problems?  Feeling nervous, anxious or on edge: More than half the days  Not being able to stop or control worrying: More than half the days  Worrying too much about different things: More than half the days  Trouble Relaxing: More than half the days  Being so restless that it is hard to sit still: More than half the days  Becoming easily annoyed or irritable: More than half the days  Feeling afraid as if something awful might happen: Several days  TAMANNA 7 Total Score: 13  If you checked any problems, how difficult have these problems made it for you to do your work, take care of things at home, or get along with other people: Somewhat difficult        Screening for Adults With ADHD - (1-6)  1. How often do you have trouble wrapping up the final details of a project, once the challenging parts have been done?: Sometimes  2. How often do you have difficulty getting things in order when you have to do a task that requires organization?: Sometimes  3. How often do you have problems remembering appointments or obligations : Rarely  4. When you have a task that requires a lot of thought, how often do you avoid or delay getting started ?: Often  5. How often do you fidget or squirm with your hands or feet when you have to sit down for a long time?: Sometimes  6. How often do you feel overly active and compelled to do things, like you were driven by a motor?: Rarely  7. How often do you make careless mistakes when you  have to work on a boring or difficult project?: Sometimes  8. How often do have difficulty keeping your attention when you are doing boring or repetitive work?: Sometimes  9. How often do you have difficulty concentrating on what people say to you, even when they are speaking to you: Sometimes  10.How often do you misplace or have difficulty finding things at home or at work?: Rarely  11.How often are you distracted by activity or noise around you?: Sometimes  12.How often do you leave your seat in meetings or other situations in which you are expected to remain seated?: Rarely  13.How often do you feel restless or fidgety?: Often  14.How often do you have difficulty unwinding and relaxing when you have time to yourself?: Sometimes  15.How often do you find yourself talking too much when you are in social situations?: Rarely  16.When you’re in a conversation, how often do you find yourself finishing the sentences of the people you are talking to, before they can finish them themselves?: Rarely  17.How often do you have difficulty waiting your turn in situations when turn taking is required?: Never  18.How often do you interrupt others when they are busy?: Never        MEDS ORDERED DURING VISIT:  New Medications Ordered This Visit   Medications   • doxepin (SINEquan) 100 MG capsule     Sig: Take 1 capsule by mouth Every Night.     Dispense:  30 capsule     Refill:  2         Follow Up   Return in about 8 weeks (around 5/11/2023).  Patient was given instructions and counseling regarding her condition or for health maintenance advice. Please see specific information pulled into the AVS if appropriate.     TREATMENT PLAN/GOALS: Continue supportive psychotherapy efforts and medications as indicated. Treatment and medication options discussed during today's visit. Patient acknowledged and verbally consented to continue with current treatment plan and was educated on the importance of compliance with treatment and  follow-up appointments.    MEDICATION ISSUES:  Discussed medication options and treatment plan of prescribed medication as well as the risks, benefits, and side effects including potential falls, possible impaired driving and metabolic adversities among others. Patient is agreeable to call the office with any worsening of symptoms or onset of side effects. Patient is agreeable to call 911 or go to the nearest ER should he/she begin having SI/HI.        [facility] [address] [address]  [phone]    March 16, 2023 11:22 EDT

## 2023-04-13 DIAGNOSIS — F90.2 ATTENTION DEFICIT HYPERACTIVITY DISORDER (ADHD), COMBINED TYPE: ICD-10-CM

## 2023-04-13 DIAGNOSIS — F51.01 PRIMARY INSOMNIA: ICD-10-CM

## 2023-04-13 DIAGNOSIS — F51.5 NIGHTMARES: ICD-10-CM

## 2023-04-13 RX ORDER — QUETIAPINE FUMARATE 100 MG/1
100 TABLET, FILM COATED ORAL NIGHTLY
Qty: 30 TABLET | Refills: 2 | Status: SHIPPED | OUTPATIENT
Start: 2023-04-13

## 2023-04-13 RX ORDER — DEXTROAMPHETAMINE SACCHARATE, AMPHETAMINE ASPARTATE, DEXTROAMPHETAMINE SULFATE AND AMPHETAMINE SULFATE 7.5; 7.5; 7.5; 7.5 MG/1; MG/1; MG/1; MG/1
TABLET ORAL
Qty: 60 TABLET | Refills: 0 | Status: SHIPPED | OUTPATIENT
Start: 2023-04-13

## 2023-04-13 NOTE — TELEPHONE ENCOUNTER
Incoming Refill Request      Medication requested (name and dose): adderall and seroquel     Ph[facility] where [facility] sent[facility]     Additional details provided by patient: n/a    Best call back number: [phone]    Does the patient have less than a 3 day supply:  [x] Yes  [] No    Chrystal Perez  04/13/23, 10:22 EDT          
Rx Refill Note  Requested Prescriptions     Pending Prescriptions Disp Refills   • amphetamine-dextroamphetamine (Adderall) 30 MG tablet 60 tablet 0     Sig: Take 30 mg orally every morning and afternoon   • QUEtiapine (SEROquel) 100 MG tablet 30 tablet 2     Sig: Take 1 tablet by mouth Every Night.      Last office visit with prescribing clinician: 3/16/2023   Last telemedicine visit with prescribing clinician: 10/4/2023   Next office visit with prescribing clinician: 5/11/2023                         Would you like a call back once the refill request has been completed: [] Yes [] No    If the office needs to give you a call back, can they leave a voicemail: [] Yes [] No    Nadya Pate MA  04/13/23, 10:30 EDT  
98.6

## 2023-04-15 DIAGNOSIS — F41.1 GENERALIZED ANXIETY DISORDER: ICD-10-CM

## 2023-04-17 RX ORDER — GABAPENTIN 800 MG/1
TABLET ORAL
Qty: 90 TABLET | Refills: 0 | Status: SHIPPED | OUTPATIENT
Start: 2023-04-17

## 2023-04-17 NOTE — TELEPHONE ENCOUNTER
Rx Refill Note  Requested Prescriptions     Pending Prescriptions Disp Refills   • gabapentin (NEURONTIN) 800 MG tablet [Pharmacy Med Name: GABAPENTIN 800MG TABLETS] 90 tablet      Sig: TAKE 1 TABLET BY MOUTH THREE TIMES DAILY      Last office visit with prescribing clinician: 3/16/2023   Next office visit with prescribing clinician: 5/11/2023    UDS: 9/23/22  CSA: 3/23/21      Eunice Case MA  04/17/23, 09:20 EDT

## 2023-04-20 DIAGNOSIS — F51.01 PRIMARY INSOMNIA: ICD-10-CM

## 2023-04-20 DIAGNOSIS — F51.5 NIGHTMARES: ICD-10-CM

## 2023-04-20 RX ORDER — QUETIAPINE FUMARATE 100 MG/1
100 TABLET, FILM COATED ORAL NIGHTLY
Qty: 90 TABLET | OUTPATIENT
Start: 2023-04-20

## 2023-04-20 NOTE — TELEPHONE ENCOUNTER
Prescription was sent to [facility] on 4/13/2023 with 2 refills.    Patient will need to speak with pharmacy.

## 2023-05-02 DIAGNOSIS — F33.1 MODERATE EPISODE OF RECURRENT MAJOR DEPRESSIVE DISORDER: ICD-10-CM

## 2023-05-02 RX ORDER — BUPROPION HYDROCHLORIDE 150 MG/1
150 TABLET ORAL EVERY MORNING
Qty: 30 TABLET | Refills: 2 | Status: SHIPPED | OUTPATIENT
Start: 2023-05-02

## 2023-05-02 NOTE — TELEPHONE ENCOUNTER
Rx Refill Note  Requested Prescriptions     Pending Prescriptions Disp Refills   • buPROPion XL (WELLBUTRIN XL) 150 MG 24 hr tablet [Pharmacy Med Name: BUPROPION XL 150MG TABLETS (24 H)] 30 tablet 2     Sig: TAKE 1 TABLET BY MOUTH EVERY MORNING      Last office visit with prescribing clinician: 3/16/2023   Last telemedicine visit with prescribing clinician:   Next office visit with prescribing clinician: 5/11/2023

## 2023-05-11 ENCOUNTER — OFFICE VISIT (OUTPATIENT)
Dept: BEHAVIORAL HEALTH | Facility: CLINIC | Age: 43
End: 2023-05-11
Payer: MEDICARE

## 2023-05-11 VITALS — HEIGHT: 65 IN | BODY MASS INDEX: 30.16 KG/M2 | WEIGHT: 181 LBS

## 2023-05-11 DIAGNOSIS — F51.5 NIGHTMARES: ICD-10-CM

## 2023-05-11 DIAGNOSIS — F90.2 ATTENTION DEFICIT HYPERACTIVITY DISORDER (ADHD), COMBINED TYPE: Primary | ICD-10-CM

## 2023-05-11 DIAGNOSIS — F51.01 PRIMARY INSOMNIA: ICD-10-CM

## 2023-05-11 DIAGNOSIS — F33.1 MODERATE EPISODE OF RECURRENT MAJOR DEPRESSIVE DISORDER: ICD-10-CM

## 2023-05-11 DIAGNOSIS — F41.1 GENERALIZED ANXIETY DISORDER: ICD-10-CM

## 2023-05-11 PROCEDURE — 1160F RVW MEDS BY RX/DR IN RCRD: CPT

## 2023-05-11 PROCEDURE — 99214 OFFICE O/P EST MOD 30 MIN: CPT

## 2023-05-11 PROCEDURE — 1159F MED LIST DOCD IN RCRD: CPT

## 2023-05-11 RX ORDER — BUPROPION HYDROCHLORIDE 150 MG/1
150 TABLET ORAL EVERY MORNING
Qty: 30 TABLET | Refills: 2 | Status: SHIPPED | OUTPATIENT
Start: 2023-05-11

## 2023-05-11 RX ORDER — BUSPIRONE HYDROCHLORIDE 15 MG/1
30 TABLET ORAL 2 TIMES DAILY
Qty: 120 TABLET | Refills: 2 | Status: SHIPPED | OUTPATIENT
Start: 2023-05-11

## 2023-05-11 RX ORDER — BUPROPION HYDROCHLORIDE 300 MG/1
300 TABLET ORAL DAILY
Qty: 30 TABLET | Refills: 2 | Status: SHIPPED | OUTPATIENT
Start: 2023-05-11

## 2023-05-11 RX ORDER — QUETIAPINE FUMARATE 100 MG/1
100 TABLET, FILM COATED ORAL NIGHTLY
Qty: 30 TABLET | Refills: 2 | Status: SHIPPED | OUTPATIENT
Start: 2023-05-11

## 2023-05-11 RX ORDER — DEXTROAMPHETAMINE SACCHARATE, AMPHETAMINE ASPARTATE, DEXTROAMPHETAMINE SULFATE AND AMPHETAMINE SULFATE 7.5; 7.5; 7.5; 7.5 MG/1; MG/1; MG/1; MG/1
TABLET ORAL
Qty: 60 TABLET | Refills: 0 | Status: SHIPPED | OUTPATIENT
Start: 2023-05-11

## 2023-05-11 RX ORDER — PRAZOSIN HYDROCHLORIDE 5 MG/1
10 CAPSULE ORAL NIGHTLY
Qty: 60 CAPSULE | Refills: 2 | Status: SHIPPED | OUTPATIENT
Start: 2023-05-11

## 2023-05-11 RX ORDER — DOXEPIN HYDROCHLORIDE 100 MG/1
100 CAPSULE ORAL NIGHTLY
Qty: 30 CAPSULE | Refills: 2 | Status: SHIPPED | OUTPATIENT
Start: 2023-05-11

## 2023-05-11 RX ORDER — DULOXETIN HYDROCHLORIDE 60 MG/1
120 CAPSULE, DELAYED RELEASE ORAL EVERY MORNING
Qty: 60 CAPSULE | Refills: 2 | Status: SHIPPED | OUTPATIENT
Start: 2023-05-11

## 2023-05-11 RX ORDER — ZOLPIDEM TARTRATE 5 MG/1
5 TABLET ORAL NIGHTLY PRN
Qty: 30 TABLET | Refills: 2 | Status: SHIPPED | OUTPATIENT
Start: 2023-05-11

## 2023-05-11 RX ORDER — GABAPENTIN 800 MG/1
800 TABLET ORAL 3 TIMES DAILY
Qty: 90 TABLET | Refills: 2 | Status: SHIPPED | OUTPATIENT
Start: 2023-05-11

## 2023-05-11 NOTE — PROGRESS NOTES
Follow Up Office Visit    Patient Name: Audrey Alonos  : 1980   MRN: [id number]   Care Team: Patient Care Team:  David Fleming DO as PCP - General (Internal Medicine)  Nathalia Anne APRN as Nurse Practitioner (Behavioral Health)        Chief Complaint:    Chief Complaint   Patient presents with   • ADHD   • Anxiety   • Depression   • Sleeping Problem   • Nightmares   • Med Management       History of Present Illness: Audrey Alonso is a 43 y.o. female who is here today for a medication management follow up. Patient reports that medication continues to be effective. She states her focus and task completion are good, her mood and anxiety are managed well and she is also sleeping well. She is still waiting of her hip surgery but she has a roberto positive attitude about the wait and is managing that stress well. She did not see the need for any changes and did not have any medication concerns at today's visit.     The following portion of the patient's history were reviewed and updated appropriately: allergies, current and past medications, family history, medical history and social history.  Subjective   Review of Systems:    Review of Systems   All other systems reviewed and are negative.      Current Medications:   Current Outpatient Medications   Medication Sig Dispense Refill   • amphetamine-dextroamphetamine (Adderall) 30 MG tablet Take 30 mg orally every morning and afternoon 60 tablet 0   • buPROPion XL (WELLBUTRIN XL) 150 MG 24 hr tablet Take 1 tablet by mouth Every Morning. 30 tablet 2   • buPROPion XL (Wellbutrin XL) 300 MG 24 hr tablet Take 1 tablet by mouth Daily. WITH 150 mg 30 tablet 2   • busPIRone (BUSPAR) 15 MG tablet Take 2 tablets by mouth 2 (Two) Times a Day. 120 tablet 2   • doxepin (SINEquan) 100 MG capsule Take 1 capsule by mouth Every Night. 30 capsule 2   • DULoxetine (CYMBALTA) 60 MG capsule Take 2 capsules by mouth Every Morning. 60 capsule 2   • gabapentin  "(NEURONTIN) 800 MG tablet Take 1 tablet by mouth 3 (Three) Times a Day. 90 tablet 2   • methadone (DOLOPHINE) 10 MG tablet Take 12.5 tablets by mouth Daily [facility] @ [facility] ([phone]) verified dose as 125 mg daily,     • prazosin (MINIPRESS) 5 MG capsule Take 2 capsules by mouth Every Night. 60 capsule 2   • QUEtiapine (SEROquel) 100 MG tablet Take 1 tablet by mouth Every Night. 30 tablet 2   • zolpidem (Ambien) 5 MG tablet Take 1 tablet by mouth At Night As Needed for Sleep. 30 tablet 2     No current facility-administered medications for this visit.       Mental Status Exam:   Hygiene:   good  Cooperation:  Cooperative  Eye Contact:  Good  Psychomotor Behavior:  Appropriate  Affect:  Appropriate  Mood: normal  Speech:  Normal  Thought Process:  Goal directed and Linear  Thought Content:  Normal  Suicidal:  None  Homicidal:  None  Hallucinations:  None  Delusion:  None  Memory:  Intact  Orientation:  Person, Place, Time and Situation  Reliability:  good  Insight:  Good  Judgement:  Good  Impulse Control:  Good  Physical/Medical Issues:  Yes see chart     Objective   Vital Signs:   Ht 165.1 cm (65\")   Wt 82.1 kg (181 lb)   BMI 30.12 kg/m²       Assessment / Plan    Diagnoses and all orders for this visit:    1. Attention deficit hyperactivity disorder (ADHD), combined type (Primary)  -     amphetamine-dextroamphetamine (Adderall) 30 MG tablet; Take 30 mg orally every morning and afternoon  Dispense: 60 tablet; Refill: 0    2. Primary insomnia  -     QUEtiapine (SEROquel) 100 MG tablet; Take 1 tablet by mouth Every Night.  Dispense: 30 tablet; Refill: 2  -     doxepin (SINEquan) 100 MG capsule; Take 1 capsule by mouth Every Night.  Dispense: 30 capsule; Refill: 2  -     zolpidem (Ambien) 5 MG tablet; Take 1 tablet by mouth At Night As Needed for Sleep.  Dispense: 30 tablet; Refill: 2    3. Nightmares  -     QUEtiapine (SEROquel) 100 MG tablet; Take 1 tablet by mouth Every " Night.  Dispense: 30 tablet; Refill: 2  -     doxepin (SINEquan) 100 MG capsule; Take 1 capsule by mouth Every Night.  Dispense: 30 capsule; Refill: 2  -     prazosin (MINIPRESS) 5 MG capsule; Take 2 capsules by mouth Every Night.  Dispense: 60 capsule; Refill: 2    4. Generalized anxiety disorder  -     gabapentin (NEURONTIN) 800 MG tablet; Take 1 tablet by mouth 3 (Three) Times a Day.  Dispense: 90 tablet; Refill: 2  -     busPIRone (BUSPAR) 15 MG tablet; Take 2 tablets by mouth 2 (Two) Times a Day.  Dispense: 120 tablet; Refill: 2  -     doxepin (SINEquan) 100 MG capsule; Take 1 capsule by mouth Every Night.  Dispense: 30 capsule; Refill: 2  -     DULoxetine (CYMBALTA) 60 MG capsule; Take 2 capsules by mouth Every Morning.  Dispense: 60 capsule; Refill: 2    5. Moderate episode of recurrent major depressive disorder  -     buPROPion XL (WELLBUTRIN XL) 150 MG 24 hr tablet; Take 1 tablet by mouth Every Morning.  Dispense: 30 tablet; Refill: 2  -     buPROPion XL (Wellbutrin XL) 300 MG 24 hr tablet; Take 1 tablet by mouth Daily. WITH 150 mg  Dispense: 30 tablet; Refill: 2  -     doxepin (SINEquan) 100 MG capsule; Take 1 capsule by mouth Every Night.  Dispense: 30 capsule; Refill: 2  -     DULoxetine (CYMBALTA) 60 MG capsule; Take 2 capsules by mouth Every Morning.  Dispense: 60 capsule; Refill: 2       Patient appears to be doing well on current medication. No issues reported and no indication for change. Will continue medication as ordered.     A psychological evaluation was conducted in order to assess past and current level of functioning. Areas assessed included, but were not limited to: perception of social support, perception of ability to face and deal with challenges in life (positive functioning), anxiety symptoms, depressive symptoms, perspective on beliefs/belief system, coping skills for stress, intelligence level,  Therapeutic rapport was established. Interventions conducted today were geared towards  incorporating medication management along with support for continued therapy. Education was also provided as to the med management with this provider and what to expect in subsequent sessions.      We discussed risks, benefits, goals and side effects of the above medication and the patient was agreeable with the plan. Patient was educated on the importance of compliance with treatment and follow-up appointments. Patient is aware to contact the [facility] with any worsening of symptoms. To call for questions or concerns and return early if necessary. Patent is agreeable to go to the Emergency Department or call 911 should they begin SI/HI.      PHQ-2/PHQ-9: Depression Screening  Little Interest or Pleasure in Doing Things: 1-->several days  Feeling Down, Depressed or Hopeless: 1-->several days  PHQ-2 Total Score: 2  Trouble Falling or Staying Asleep, or Sleeping Too Much: 2-->more than half the days  Feeling Tired or Having Little Energy: 2-->more than half the days  Poor Appetite or Overeatin-->more than half the days  Feeling Bad about Yourself - or that You are a Failure or Have Let Yourself or Your Family Down: 1-->several days  Trouble Concentrating on Things, Such as Reading the Newspaper or Watching Television: 2-->more than half the days  Moving or Speaking So Slowly that Other People Could Have Noticed? Or the Opposite - Being So Fidgety: 0-->not at all  Thoughts that You Would be Better Off Dead or of Hurting Yourself in Some Way: 0-->not at all  PHQ-9: Brief Depression Severity Measure Score: 11  If You Checked Off Any Problems, How Difficult Have These Problems Made It For You to Do Your Work, Take Care of Things at Home, or Get Along with Other People?: somewhat difficult      PHQ-9 Score:   PHQ-9 Total Score: 11    Depression Screening:  Patient screened positive for depression based on a PHQ-9 score of 11 on 2023. Follow-up recommendations include: Prescribed antidepressant medication  treatment and Suicide Risk Assessment performed.      Over the last two weeks, how often have you been bothered by the following problems?  Feeling nervous, anxious or on edge: More than half the days  Not being able to stop or control worrying: More than half the days  Worrying too much about different things: More than half the days  Trouble Relaxing: More than half the days  Being so restless that it is hard to sit still: Several days  Becoming easily annoyed or irritable: Several days  Feeling afraid as if something awful might happen: Several days  TAMANNA 7 Total Score: 11  If you checked any problems, how difficult have these problems made it for you to do your work, take care of things at home, or get along with other people: Somewhat difficult        Screening for Adults With ADHD - (1-6)  1. How often do you have trouble wrapping up the final details of a project, once the challenging parts have been done?: Often  2. How often do you have difficulty getting things in order when you have to do a task that requires organization?: Often  3. How often do you have problems remembering appointments or obligations : Rarely  4. When you have a task that requires a lot of thought, how often do you avoid or delay getting started ?: Often  5. How often do you fidget or squirm with your hands or feet when you have to sit down for a long time?: Sometimes  6. How often do you feel overly active and compelled to do things, like you were driven by a motor?: Rarely  7. How often do you make careless mistakes when you have to work on a boring or difficult project?: Sometimes  8. How often do have difficulty keeping your attention when you are doing boring or repetitive work?: Sometimes  9. How often do you have difficulty concentrating on what people say to you, even when they are speaking to you: Sometimes  10.How often do you misplace or have difficulty finding things at home or at work?: Often  11.How often are you  distracted by activity or noise around you?: Sometimes  12.How often do you leave your seat in meetings or other situations in which you are expected to remain seated?: Rarely  13.How often do you feel restless or fidgety?: Sometimes  14.How often do you have difficulty unwinding and relaxing when you have time to yourself?: Rarely  15.How often do you find yourself talking too much when you are in social situations?: Rarely  16.When you’re in a conversation, how often do you find yourself finishing the sentences of the people you are talking to, before they can finish them themselves?: Never  17.How often do you have difficulty waiting your turn in situations when turn taking is required?: Rarely  18.How often do you interrupt others when they are busy?: Rarely        MEDS ORDERED DURING VISIT:  New Medications Ordered This Visit   Medications   • QUEtiapine (SEROquel) 100 MG tablet     Sig: Take 1 tablet by mouth Every Night.     Dispense:  30 tablet     Refill:  2   • gabapentin (NEURONTIN) 800 MG tablet     Sig: Take 1 tablet by mouth 3 (Three) Times a Day.     Dispense:  90 tablet     Refill:  2   • amphetamine-dextroamphetamine (Adderall) 30 MG tablet     Sig: Take 30 mg orally every morning and afternoon     Dispense:  60 tablet     Refill:  0     Take 30 mg orally every morning and afternoon.   • buPROPion XL (WELLBUTRIN XL) 150 MG 24 hr tablet     Sig: Take 1 tablet by mouth Every Morning.     Dispense:  30 tablet     Refill:  2   • buPROPion XL (Wellbutrin XL) 300 MG 24 hr tablet     Sig: Take 1 tablet by mouth Daily. WITH 150 mg     Dispense:  30 tablet     Refill:  2   • busPIRone (BUSPAR) 15 MG tablet     Sig: Take 2 tablets by mouth 2 (Two) Times a Day.     Dispense:  120 tablet     Refill:  2   • doxepin (SINEquan) 100 MG capsule     Sig: Take 1 capsule by mouth Every Night.     Dispense:  30 capsule     Refill:  2   • DULoxetine (CYMBALTA) 60 MG capsule     Sig: Take 2 capsules by mouth Every  Morning.     Dispense:  60 capsule     Refill:  2   • prazosin (MINIPRESS) 5 MG capsule     Sig: Take 2 capsules by mouth Every Night.     Dispense:  60 capsule     Refill:  2   • zolpidem (Ambien) 5 MG tablet     Sig: Take 1 tablet by mouth At Night As Needed for Sleep.     Dispense:  30 tablet     Refill:  2         Follow Up   Return in about 3 months (around 8/11/2023).  Patient was given instructions and counseling regarding her condition or for health maintenance advice. Please see specific information pulled into the AVS if appropriate.     TREATMENT PLAN/GOALS: Continue supportive psychotherapy efforts and medications as indicated. Treatment and medication options discussed during today's visit. Patient acknowledged and verbally consented to continue with current treatment plan and was educated on the importance of compliance with treatment and follow-up appointments.    MEDICATION ISSUES:  Discussed medication options and treatment plan of prescribed medication as well as the risks, benefits, and side effects including potential falls, possible impaired driving and metabolic adversities among others. Patient is agreeable to call the office with any worsening of symptoms or onset of side effects. Patient is agreeable to call 911 or go to the nearest ER should he/she begin having SI/HI.        [facility] [address] [address] [phone]    May 11, 2023 09:21 EDT

## 2023-06-13 DIAGNOSIS — F90.2 ATTENTION DEFICIT HYPERACTIVITY DISORDER (ADHD), COMBINED TYPE: ICD-10-CM

## 2023-06-13 RX ORDER — DEXTROAMPHETAMINE SACCHARATE, AMPHETAMINE ASPARTATE, DEXTROAMPHETAMINE SULFATE AND AMPHETAMINE SULFATE 7.5; 7.5; 7.5; 7.5 MG/1; MG/1; MG/1; MG/1
TABLET ORAL
Qty: 60 TABLET | Refills: 0 | Status: SHIPPED | OUTPATIENT
Start: 2023-06-13

## 2023-06-13 NOTE — TELEPHONE ENCOUNTER
Incoming Refill Request      Medication requested (name and dose): ADDERALL 30 MG    Pharmacy where request should be sent: [facility]    Additional details provided by patient: N/A    Best call back number: [phone]    Does the patient have less than a 3 day supply:  [] Yes  [x] No    Vamsi Walls Rep  06/13/23, 11:47 EDT

## 2023-08-01 DIAGNOSIS — F51.01 PRIMARY INSOMNIA: ICD-10-CM

## 2023-08-01 DIAGNOSIS — F41.1 GENERALIZED ANXIETY DISORDER: ICD-10-CM

## 2023-08-01 DIAGNOSIS — F51.5 NIGHTMARES: ICD-10-CM

## 2023-08-01 RX ORDER — QUETIAPINE FUMARATE 100 MG/1
100 TABLET, FILM COATED ORAL NIGHTLY
Qty: 30 TABLET | Refills: 0 | Status: SHIPPED | OUTPATIENT
Start: 2023-08-01

## 2023-08-01 RX ORDER — GABAPENTIN 800 MG/1
800 TABLET ORAL 3 TIMES DAILY
Qty: 90 TABLET | Refills: 0 | Status: SHIPPED | OUTPATIENT
Start: 2023-08-01

## 2023-08-03 DIAGNOSIS — F51.01 PRIMARY INSOMNIA: ICD-10-CM

## 2023-08-03 RX ORDER — ZOLPIDEM TARTRATE 5 MG/1
5 TABLET ORAL NIGHTLY PRN
Qty: 30 TABLET | Refills: 2 | Status: SHIPPED | OUTPATIENT
Start: 2023-08-03

## 2023-08-06 DIAGNOSIS — F51.01 PRIMARY INSOMNIA: ICD-10-CM

## 2023-08-09 RX ORDER — ZOLPIDEM TARTRATE 5 MG/1
TABLET ORAL
Qty: 30 TABLET | OUTPATIENT
Start: 2023-08-09

## 2023-08-15 ENCOUNTER — OFFICE VISIT (OUTPATIENT)
Dept: BEHAVIORAL HEALTH | Facility: CLINIC | Age: 43
End: 2023-08-15
Payer: MEDICARE

## 2023-08-15 VITALS — HEIGHT: 65 IN | WEIGHT: 180 LBS | BODY MASS INDEX: 29.99 KG/M2

## 2023-08-15 DIAGNOSIS — F51.5 NIGHTMARES: ICD-10-CM

## 2023-08-15 DIAGNOSIS — F51.01 PRIMARY INSOMNIA: ICD-10-CM

## 2023-08-15 DIAGNOSIS — F90.2 ATTENTION DEFICIT HYPERACTIVITY DISORDER (ADHD), COMBINED TYPE: Primary | ICD-10-CM

## 2023-08-15 DIAGNOSIS — F33.1 MODERATE EPISODE OF RECURRENT MAJOR DEPRESSIVE DISORDER: ICD-10-CM

## 2023-08-15 DIAGNOSIS — Z51.81 ENCOUNTER FOR THERAPEUTIC DRUG MONITORING: ICD-10-CM

## 2023-08-15 DIAGNOSIS — F41.1 GENERALIZED ANXIETY DISORDER: ICD-10-CM

## 2023-08-15 PROCEDURE — 99214 OFFICE O/P EST MOD 30 MIN: CPT

## 2023-08-15 PROCEDURE — 1160F RVW MEDS BY RX/DR IN RCRD: CPT

## 2023-08-15 PROCEDURE — 1159F MED LIST DOCD IN RCRD: CPT

## 2023-08-15 RX ORDER — L. ACIDOPHILUS/L.BULGARICUS 1MM CELL
1 TABLET ORAL
COMMUNITY
Start: 2023-07-28 | End: 2023-10-24

## 2023-08-15 RX ORDER — MELOXICAM 15 MG/1
15 TABLET ORAL DAILY
Qty: 30 TABLET | Refills: 0 | COMMUNITY
Start: 2023-07-28 | End: 2023-08-27

## 2023-08-15 RX ORDER — ZOLPIDEM TARTRATE 5 MG/1
5 TABLET ORAL NIGHTLY PRN
Qty: 30 TABLET | Refills: 2 | Status: SHIPPED | OUTPATIENT
Start: 2023-08-15

## 2023-08-15 RX ORDER — PRAZOSIN HYDROCHLORIDE 5 MG/1
10 CAPSULE ORAL NIGHTLY
Qty: 60 CAPSULE | Refills: 2 | Status: SHIPPED | OUTPATIENT
Start: 2023-08-15

## 2023-08-15 RX ORDER — ACETAMINOPHEN 500 MG
1000 TABLET ORAL EVERY 8 HOURS
COMMUNITY
Start: 2023-07-28

## 2023-08-15 RX ORDER — BUSPIRONE HYDROCHLORIDE 15 MG/1
30 TABLET ORAL 2 TIMES DAILY
Qty: 120 TABLET | Refills: 2 | Status: SHIPPED | OUTPATIENT
Start: 2023-08-15

## 2023-08-15 RX ORDER — DULOXETIN HYDROCHLORIDE 60 MG/1
120 CAPSULE, DELAYED RELEASE ORAL EVERY MORNING
Qty: 60 CAPSULE | Refills: 2 | Status: SHIPPED | OUTPATIENT
Start: 2023-08-15

## 2023-08-15 RX ORDER — BUPROPION HYDROCHLORIDE 150 MG/1
150 TABLET ORAL EVERY MORNING
Qty: 30 TABLET | Refills: 2 | Status: SHIPPED | OUTPATIENT
Start: 2023-08-15

## 2023-08-15 RX ORDER — PSEUDOEPHEDRINE HCL 30 MG
250 TABLET ORAL
COMMUNITY
Start: 2023-07-28 | End: 2023-08-27

## 2023-08-15 RX ORDER — GABAPENTIN 800 MG/1
800 TABLET ORAL 3 TIMES DAILY
Qty: 90 TABLET | Refills: 2 | Status: SHIPPED | OUTPATIENT
Start: 2023-08-15

## 2023-08-15 RX ORDER — BUPROPION HYDROCHLORIDE 300 MG/1
300 TABLET ORAL DAILY
Qty: 30 TABLET | Refills: 2 | Status: SHIPPED | OUTPATIENT
Start: 2023-08-15

## 2023-08-15 RX ORDER — DOXEPIN HYDROCHLORIDE 100 MG/1
100 CAPSULE ORAL NIGHTLY
Qty: 30 CAPSULE | Refills: 2 | Status: SHIPPED | OUTPATIENT
Start: 2023-08-15

## 2023-08-15 RX ORDER — DOXYCYCLINE HYCLATE 100 MG
100 TABLET ORAL
COMMUNITY
Start: 2023-07-28 | End: 2023-10-24

## 2023-08-15 RX ORDER — OMEPRAZOLE 20 MG/1
20 CAPSULE, DELAYED RELEASE ORAL DAILY
Qty: 28 CAPSULE | Refills: 0 | COMMUNITY
Start: 2023-07-28 | End: 2023-08-25

## 2023-08-15 RX ORDER — ALBUTEROL SULFATE 90 UG/1
2 AEROSOL, METERED RESPIRATORY (INHALATION) EVERY 6 HOURS PRN
COMMUNITY

## 2023-08-15 RX ORDER — QUETIAPINE FUMARATE 100 MG/1
100 TABLET, FILM COATED ORAL NIGHTLY
Qty: 30 TABLET | Refills: 2 | Status: SHIPPED | OUTPATIENT
Start: 2023-08-15

## 2023-08-15 RX ORDER — ASPIRIN 81 MG/1
81 TABLET ORAL
COMMUNITY
Start: 2023-08-10 | End: 2023-09-04

## 2023-08-15 RX ORDER — DEXTROAMPHETAMINE SACCHARATE, AMPHETAMINE ASPARTATE, DEXTROAMPHETAMINE SULFATE AND AMPHETAMINE SULFATE 7.5; 7.5; 7.5; 7.5 MG/1; MG/1; MG/1; MG/1
TABLET ORAL
Qty: 60 TABLET | Refills: 0 | Status: SHIPPED | OUTPATIENT
Start: 2023-08-15

## 2023-08-15 RX ORDER — METHOCARBAMOL 500 MG/1
500 TABLET, FILM COATED ORAL
COMMUNITY
Start: 2023-08-09

## 2023-08-15 RX ORDER — OXYCODONE HYDROCHLORIDE 5 MG/1
5 TABLET ORAL EVERY 6 HOURS PRN
COMMUNITY
Start: 2023-08-10

## 2023-08-15 RX ORDER — ONDANSETRON 4 MG/1
TABLET, ORALLY DISINTEGRATING ORAL
COMMUNITY
Start: 2023-07-28

## 2023-08-15 NOTE — PROGRESS NOTES
Follow Up Office Visit    Patient Name: Audrey Alonso  : 1980   MRN: [id number]   Care Team: Patient Care Team:  David Fleming DO as PCP - General (Internal Medicine)  Nathalia Anne APRN as Nurse Practitioner (Behavioral Health)         Chief Complaint:    Chief Complaint   Patient presents with    ADHD    Anxiety    Depression    Sleeping Problem    Med Management       History of Present Illness: Audrey Alonso is a 43 y.o. female who is here today for a medication management follow up. Patient reports that overall medication continues to be effective. She had her hip surgery and is recovering from that. She feels that for the most part her focus and task completion are doing good and her mood and anxiety are managed well. She is also sleeping as well as she can be since surgery. She did not see the need to make any changes and did not have any medication concerns at today's visit.     The following portion of the patient's history were reviewed and updated appropriately: allergies, current and past medications, family history, medical history and social history.  Subjective   Review of Systems:    Review of Systems   Psychiatric/Behavioral:  Positive for sleep disturbance and stress. The patient is nervous/anxious.    All other systems reviewed and are negative.    Current Medications:   Current Outpatient Medications   Medication Sig Dispense Refill    acetaminophen (TYLENOL) 500 MG tablet Take 2 tablets by mouth Every 8 (Eight) Hours.      acidophilus (FLORANEX) tablet tablet Take 1 tablet by mouth.      albuterol sulfate  (90 Base) MCG/ACT inhaler Inhale 2 puffs Every 6 (Six) Hours As Needed.      amphetamine-dextroamphetamine (Adderall) 30 MG tablet Take 30 mg orally every morning and afternoon 60 tablet 0    aspirin 81 MG EC tablet Take 1 tablet by mouth.      buPROPion XL (WELLBUTRIN XL) 150 MG 24 hr tablet Take 1 tablet by mouth Every Morning. 30 tablet 2    buPROPion  XL (Wellbutrin XL) 300 MG 24 hr tablet Take 1 tablet by mouth Daily. WITH 150 mg 30 tablet 2    busPIRone (BUSPAR) 15 MG tablet Take 2 tablets by mouth 2 (Two) Times a Day. 120 tablet 2    docusate sodium 250 MG capsule Take 250 mg by mouth.      doxepin (SINEquan) 100 MG capsule Take 1 capsule by mouth Every Night. 30 capsule 2    doxycycline (VIBRAMYICN) 100 MG tablet Take 1 tablet by mouth.      DULoxetine (CYMBALTA) 60 MG capsule Take 2 capsules by mouth Every Morning. 60 capsule 2    gabapentin (NEURONTIN) 800 MG tablet Take 1 tablet by mouth 3 (Three) Times a Day. 90 tablet 2    meloxicam (MOBIC) 15 MG tablet Take 1 tablet by mouth Daily. 30 tablet 0    methadone (DOLOPHINE) 10 MG tablet Take 12.5 tablets by mouth Daily [facility] Elaine @ [facility] ([phone]) verified dose as 125 mg daily,      methocarbamol (ROBAXIN) 500 MG tablet Take 1 tablet by mouth.      omeprazole (priLOSEC) 20 MG capsule Take 1 capsule by mouth Daily. 28 capsule 0    ondansetron ODT (ZOFRAN-ODT) 4 MG disintegrating tablet       oxyCODONE (ROXICODONE) 5 MG immediate release tablet Take 1 tablet by mouth Every 6 (Six) Hours As Needed.      prazosin (MINIPRESS) 5 MG capsule Take 2 capsules by mouth Every Night. 60 capsule 2    QUEtiapine (SEROquel) 100 MG tablet Take 1 tablet by mouth Every Night. 30 tablet 2    zolpidem (Ambien) 5 MG tablet Take 1 tablet by mouth At Night As Needed for Sleep. 30 tablet 2     No current facility-administered medications for this visit.       Mental Status Exam:   Hygiene:   good  Cooperation:  Cooperative  Eye Contact:  Good  Psychomotor Behavior:  Appropriate  Affect:  Appropriate  Mood: normal  Speech:  Normal  Thought Process:  Goal directed and Linear  Thought Content:  Normal and Mood congruent  Suicidal:  None  Homicidal:  None  Hallucinations:  None  Delusion:  None  Memory:  Intact  Orientation:  Person, Place, Time, and Situation  Reliability:  good  Insight:   "Good  Judgement:  Good  Impulse Control:  Good  Physical/Medical Issues:  Yes see chart      Objective   Vital Signs:   Ht 165.1 cm (65\")   Wt 81.6 kg (180 lb)   BMI 29.95 kg/mý       Assessment / Plan    Diagnoses and all orders for this visit:    1. Attention deficit hyperactivity disorder (ADHD), combined type (Primary)  -     amphetamine-dextroamphetamine (Adderall) 30 MG tablet; Take 30 mg orally every morning and afternoon  Dispense: 60 tablet; Refill: 0    2. Encounter for therapeutic drug monitoring  -     Compliance Drug Analysis, Ur - Urine, Clean Catch    3. Generalized anxiety disorder  -     gabapentin (NEURONTIN) 800 MG tablet; Take 1 tablet by mouth 3 (Three) Times a Day.  Dispense: 90 tablet; Refill: 2  -     busPIRone (BUSPAR) 15 MG tablet; Take 2 tablets by mouth 2 (Two) Times a Day.  Dispense: 120 tablet; Refill: 2  -     DULoxetine (CYMBALTA) 60 MG capsule; Take 2 capsules by mouth Every Morning.  Dispense: 60 capsule; Refill: 2  -     doxepin (SINEquan) 100 MG capsule; Take 1 capsule by mouth Every Night.  Dispense: 30 capsule; Refill: 2    4. Primary insomnia  -     QUEtiapine (SEROquel) 100 MG tablet; Take 1 tablet by mouth Every Night.  Dispense: 30 tablet; Refill: 2  -     doxepin (SINEquan) 100 MG capsule; Take 1 capsule by mouth Every Night.  Dispense: 30 capsule; Refill: 2  -     zolpidem (Ambien) 5 MG tablet; Take 1 tablet by mouth At Night As Needed for Sleep.  Dispense: 30 tablet; Refill: 2    5. Nightmares  -     QUEtiapine (SEROquel) 100 MG tablet; Take 1 tablet by mouth Every Night.  Dispense: 30 tablet; Refill: 2  -     doxepin (SINEquan) 100 MG capsule; Take 1 capsule by mouth Every Night.  Dispense: 30 capsule; Refill: 2  -     prazosin (MINIPRESS) 5 MG capsule; Take 2 capsules by mouth Every Night.  Dispense: 60 capsule; Refill: 2    6. Moderate episode of recurrent major depressive disorder  -     buPROPion XL (Wellbutrin XL) 300 MG 24 hr tablet; Take 1 tablet by mouth Daily. " WITH 150 mg  Dispense: 30 tablet; Refill: 2  -     buPROPion XL (WELLBUTRIN XL) 150 MG 24 hr tablet; Take 1 tablet by mouth Every Morning.  Dispense: 30 tablet; Refill: 2  -     DULoxetine (CYMBALTA) 60 MG capsule; Take 2 capsules by mouth Every Morning.  Dispense: 60 capsule; Refill: 2  -     doxepin (SINEquan) 100 MG capsule; Take 1 capsule by mouth Every Night.  Dispense: 30 capsule; Refill: 2     Overall patient appears to be doing well on current medication. No issues reported and no indication for change. Will continue medication as ordered. Obtained yearly urine drug screen and controlled substance agreement signed.     A psychological evaluation was conducted in order to assess past and current level of functioning. Areas assessed included, but were not limited to: perception of social support, perception of ability to face and deal with challenges in life (positive functioning), anxiety symptoms, depressive symptoms, perspective on beliefs/belief system, coping skills for stress, intelligence level,  Therapeutic rapport was established. Interventions conducted today were geared towards incorporating medication management along with support for continued therapy. Education was also provided as to the med management with this provider and what to expect in subsequent sessions.      We discussed risks, benefits, goals and side effects of the above medication and the patient was agreeable with the plan. Patient was educated on the importance of compliance with treatment and follow-up appointments. Patient is aware to contact the [facility] with any worsening of symptoms. To call for questions or concerns and return early if necessary. Patent is agreeable to go to the Emergency Department or call 911 should they begin SI/HI.      PHQ-2/PHQ-9: Depression Screening  Little Interest or Pleasure in Doing Things: 1-->several days  Feeling Down, Depressed or Hopeless: 1-->several days  PHQ-2 Total Score:  2  Trouble Falling or Staying Asleep, or Sleeping Too Much: 2-->more than half the days  Feeling Tired or Having Little Energy: 1-->several days  Poor Appetite or Overeatin-->more than half the days  Feeling Bad about Yourself - or that You are a Failure or Have Let Yourself or Your Family Down: 2-->more than half the days  Trouble Concentrating on Things, Such as Reading the Newspaper or Watching Television: 1-->several days  Moving or Speaking So Slowly that Other People Could Have Noticed? Or the Opposite - Being So Fidgety: 0-->not at all  Thoughts that You Would be Better Off Dead or of Hurting Yourself in Some Way: 0-->not at all  PHQ-9: Brief Depression Severity Measure Score: 10  If You Checked Off Any Problems, How Difficult Have These Problems Made It For You to Do Your Work, Take Care of Things at Home, or Get Along with Other People?: somewhat difficult      PHQ-9 Score:   PHQ-9 Total Score: 10    Depression Screening:  Patient screened positive for depression based on a PHQ-9 score of 10 on 8/15/2023. Follow-up recommendations include: Prescribed antidepressant medication treatment and Suicide Risk Assessment performed.      Over the last two weeks, how often have you been bothered by the following problems?  Feeling nervous, anxious or on edge: Nearly every day  Not being able to stop or control worrying: More than half the days  Worrying too much about different things: More than half the days  Trouble Relaxing: More than half the days  Being so restless that it is hard to sit still: Several days  Becoming easily annoyed or irritable: More than half the days  Feeling afraid as if something awful might happen: Several days  TAMANNA 7 Total Score: 13  If you checked any problems, how difficult have these problems made it for you to do your work, take care of things at home, or get along with other people: Somewhat difficult        Screening for Adults With ADHD - (1-6)  1. How often do you have trouble  wrapping up the final details of a project, once the challenging parts have been done?: Often  2. How often do you have difficulty getting things in order when you have to do a task that requires organization?: Often  3. How often do you have problems remembering appointments or obligations : Rarely  4. When you have a task that requires a lot of thought, how often do you avoid or delay getting started ?: Often  5. How often do you fidget or squirm with your hands or feet when you have to sit down for a long time?: Sometimes  6. How often do you feel overly active and compelled to do things, like you were driven by a motor?: Never  7. How often do you make careless mistakes when you have to work on a boring or difficult project?: Rarely  8. How often do have difficulty keeping your attention when you are doing boring or repetitive work?: Rarely  9. How often do you have difficulty concentrating on what people say to you, even when they are speaking to you: Sometimes  10.How often do you misplace or have difficulty finding things at home or at work?: Rarely  11.How often are you distracted by activity or noise around you?: Sometimes  12.How often do you leave your seat in meetings or other situations in which you are expected to remain seated?: Never  13.How often do you feel restless or fidgety?: Sometimes  14.How often do you have difficulty unwinding and relaxing when you have time to yourself?: Rarely  15.How often do you find yourself talking too much when you are in social situations?: Never  16.When you're in a conversation, how often do you find yourself finishing the sentences of the people you are talking to, before they can finish them themselves?: Rarely  17.How often do you have difficulty waiting your turn in situations when turn taking is required?: Never  18.How often do you interrupt others when they are busy?: Never        MEDS ORDERED DURING VISIT:  New Medications Ordered This Visit   Medications     gabapentin (NEURONTIN) 800 MG tablet     Sig: Take 1 tablet by mouth 3 (Three) Times a Day.     Dispense:  90 tablet     Refill:  2    QUEtiapine (SEROquel) 100 MG tablet     Sig: Take 1 tablet by mouth Every Night.     Dispense:  30 tablet     Refill:  2    buPROPion XL (Wellbutrin XL) 300 MG 24 hr tablet     Sig: Take 1 tablet by mouth Daily. WITH 150 mg     Dispense:  30 tablet     Refill:  2    buPROPion XL (WELLBUTRIN XL) 150 MG 24 hr tablet     Sig: Take 1 tablet by mouth Every Morning.     Dispense:  30 tablet     Refill:  2    amphetamine-dextroamphetamine (Adderall) 30 MG tablet     Sig: Take 30 mg orally every morning and afternoon     Dispense:  60 tablet     Refill:  0     Take 30 mg orally every morning and afternoon.    busPIRone (BUSPAR) 15 MG tablet     Sig: Take 2 tablets by mouth 2 (Two) Times a Day.     Dispense:  120 tablet     Refill:  2    DULoxetine (CYMBALTA) 60 MG capsule     Sig: Take 2 capsules by mouth Every Morning.     Dispense:  60 capsule     Refill:  2    doxepin (SINEquan) 100 MG capsule     Sig: Take 1 capsule by mouth Every Night.     Dispense:  30 capsule     Refill:  2    zolpidem (Ambien) 5 MG tablet     Sig: Take 1 tablet by mouth At Night As Needed for Sleep.     Dispense:  30 tablet     Refill:  2    prazosin (MINIPRESS) 5 MG capsule     Sig: Take 2 capsules by mouth Every Night.     Dispense:  60 capsule     Refill:  2         Follow Up   Return in about 3 months (around 11/15/2023).  Patient was given instructions and counseling regarding her condition or for health maintenance advice. Please see specific information pulled into the AVS if appropriate.     TREATMENT PLAN/GOALS: Continue supportive psychotherapy efforts and medications as indicated. Treatment and medication options discussed during today's visit. Patient acknowledged and verbally consented to continue with current treatment plan and was educated on the importance of compliance with treatment and  follow-up appointments.    MEDICATION ISSUES:  Discussed medication options and treatment plan of prescribed medication as well as the risks, benefits, and side effects including potential falls, possible impaired driving and metabolic adversities among others. Patient is agreeable to call the office with any worsening of symptoms or onset of side effects. Patient is agreeable to call 911 or go to the nearest ER should he/she begin having SI/HI.        [facility]  [address] [place] [id number] [phone]    August 15, 2023 11:30 EDT

## 2023-08-23 LAB — DRUGS UR: NORMAL

## 2023-08-29 DIAGNOSIS — F41.1 GENERALIZED ANXIETY DISORDER: ICD-10-CM

## 2023-08-29 RX ORDER — GABAPENTIN 800 MG/1
TABLET ORAL
Qty: 90 TABLET | OUTPATIENT
Start: 2023-08-29

## 2023-08-29 NOTE — TELEPHONE ENCOUNTER
Rx Refill Note  Requested Prescriptions     Pending Prescriptions Disp Refills    gabapentin (NEURONTIN) 800 MG tablet [Pharmacy Med Name: GABAPENTIN 800 MG TABLET] 90 tablet      Sig: TAKE ONE TABLET BY MOUTH THREE TIMES A DAY      Last office visit with prescribing clinician: 8/15/2023   Next office visit with prescribing clinician: 11/16/2023     Will deny for DUPLICATE was signed 8/15/23       Eunice Case MA  08/29/23, 11:27 EDT

## 2023-08-31 ENCOUNTER — OFFICE VISIT (OUTPATIENT)
Dept: INTERNAL MEDICINE | Facility: CLINIC | Age: 43
End: 2023-08-31
Payer: MEDICARE

## 2023-08-31 VITALS
TEMPERATURE: 97.5 F | HEART RATE: 108 BPM | RESPIRATION RATE: 16 BRPM | BODY MASS INDEX: 31.99 KG/M2 | HEIGHT: 65 IN | OXYGEN SATURATION: 98 % | WEIGHT: 192 LBS | DIASTOLIC BLOOD PRESSURE: 80 MMHG | SYSTOLIC BLOOD PRESSURE: 128 MMHG

## 2023-08-31 DIAGNOSIS — H57.12 PAIN OF LEFT EYE: ICD-10-CM

## 2023-08-31 DIAGNOSIS — H53.9 CHANGE IN VISION: ICD-10-CM

## 2023-08-31 DIAGNOSIS — H57.89 EYE DISCHARGE: Primary | ICD-10-CM

## 2023-08-31 PROCEDURE — 1159F MED LIST DOCD IN RCRD: CPT | Performed by: FAMILY MEDICINE

## 2023-08-31 PROCEDURE — 99213 OFFICE O/P EST LOW 20 MIN: CPT | Performed by: FAMILY MEDICINE

## 2023-08-31 PROCEDURE — 1160F RVW MEDS BY RX/DR IN RCRD: CPT | Performed by: FAMILY MEDICINE

## 2023-08-31 RX ORDER — OFLOXACIN 3 MG/ML
1 SOLUTION/ DROPS OPHTHALMIC 4 TIMES DAILY
Qty: 5 ML | Refills: 0 | Status: SHIPPED | OUTPATIENT
Start: 2023-08-31

## 2023-08-31 NOTE — PROGRESS NOTES
"Chief Complaint  Eye Pain (Right eye, painful red and watery, started at about 2 in the morning, no itching )    Subjective        Audrey Alonso presents to [facility]  History of Present Illness  Was fine at 11 pm when she went to bed. Woke up at 2 am with blood shot red watery painful left eye. Was able to go back to sleep but it kept waking her up. Using lumify drops so eye is not red. No issues like this before. Wears contacts last eye exam was a year ago. Ice helps some. Feels like razor blades in eye. Has had three hip surgeries in a year, last was a month ago approx, on antibiotic since then. On Wellbutrin but has been on it for years and no issues with right eye.     Objective   Vital Signs:  /80 (BP Location: Left arm, Patient Position: Sitting, Cuff Size: Adult)   Pulse 108   Temp 97.5 øF (36.4 øC) (Temporal)   Resp 16   Ht 165.1 cm (65\")   Wt 87.1 kg (192 lb)   SpO2 98%   BMI 31.95 kg/mý   Estimated body mass index is 31.95 kg/mý as calculated from the following:    Height as of this encounter: 165.1 cm (65\").    Weight as of this encounter: 87.1 kg (192 lb).             Physical Exam  Vitals and nursing note reviewed.   Constitutional:       Comments: Looks uncomfortable, has ice pack on left eye   Eyes:      General: Lids are normal.         Right eye: No discharge.         Left eye: Discharge (watery) present.     Extraocular Movements: Extraocular movements intact.      Conjunctiva/sclera:      Left eye: Left conjunctiva is not injected (has used lumify drops). No chemosis, exudate or hemorrhage.     Pupils: Pupils are equal, round, and reactive to light.      Result Review :                   Assessment and Plan   Diagnoses and all orders for this visit:    1. Eye discharge (Primary)  -     ofloxacin (Ocuflox) 0.3 % ophthalmic solution; Apply 1 drop to eye(s) as directed by provider 4 (Four) Times a Day.  Dispense: 5 mL; Refill: 0  -     Ambulatory " Referral to Optometry    2. Pain of left eye  -     ofloxacin (Ocuflox) 0.3 % ophthalmic solution; Apply 1 drop to eye(s) as directed by provider 4 (Four) Times a Day.  Dispense: 5 mL; Refill: 0  -     Ambulatory Referral to Optometry    3. Change in vision  -     ofloxacin (Ocuflox) 0.3 % ophthalmic solution; Apply 1 drop to eye(s) as directed by provider 4 (Four) Times a Day.  Dispense: 5 mL; Refill: 0  -     Ambulatory Referral to Optometry             Follow Up   Return for As Needed.  Patient was given instructions and counseling regarding her condition or for health maintenance advice. Please see specific information pulled into the AVS if appropriate.

## 2023-09-06 DIAGNOSIS — F90.2 ATTENTION DEFICIT HYPERACTIVITY DISORDER (ADHD), COMBINED TYPE: ICD-10-CM

## 2023-09-06 RX ORDER — DEXTROAMPHETAMINE SACCHARATE, AMPHETAMINE ASPARTATE, DEXTROAMPHETAMINE SULFATE AND AMPHETAMINE SULFATE 7.5; 7.5; 7.5; 7.5 MG/1; MG/1; MG/1; MG/1
TABLET ORAL
Qty: 60 TABLET | Refills: 0 | Status: SHIPPED | OUTPATIENT
Start: 2023-09-06

## 2023-09-06 NOTE — TELEPHONE ENCOUNTER
Incoming Refill Request      Medication requested (name and dose): ADDERALL 30MG    Pharmacy where request should be sent: [facility]    Additional details provided by patient: HAS 3 DAYS LEFT    Best call back number: [phone]    Does the patient have less than a 3 day supply:  [] Yes  [x] No    Jeane Guadalupe  09/06/23, 12:54 EDT

## 2023-09-12 ENCOUNTER — OFFICE VISIT (OUTPATIENT)
Dept: CARDIOLOGY | Facility: CLINIC | Age: 43
End: 2023-09-12
Payer: MEDICARE

## 2023-09-12 VITALS
DIASTOLIC BLOOD PRESSURE: 68 MMHG | OXYGEN SATURATION: 98 % | RESPIRATION RATE: 18 BRPM | HEART RATE: 115 BPM | BODY MASS INDEX: 31.65 KG/M2 | WEIGHT: 190 LBS | SYSTOLIC BLOOD PRESSURE: 122 MMHG | HEIGHT: 65 IN

## 2023-09-12 DIAGNOSIS — R00.0 SINUS TACHYCARDIA: Primary | ICD-10-CM

## 2023-09-12 PROCEDURE — 99204 OFFICE O/P NEW MOD 45 MIN: CPT | Performed by: INTERNAL MEDICINE

## 2023-09-12 PROCEDURE — 93000 ELECTROCARDIOGRAM COMPLETE: CPT | Performed by: INTERNAL MEDICINE

## 2023-09-12 NOTE — PROGRESS NOTES
"     [facility] Cardiology OP Consult Note    Audrey Alonso  [id number]  2023    Referred By: Referring, Self    Chief Complaint: Tachycardia    History of Present Illness:   Mrs. Audrey Alonso is a 43 y.o. female who presents to the Cardiology Clinic for evaluation of tachycardia.  The patient does not have any significant past cardiac history.  She does, however, report a long history of a \"fast heart rate.\"  She reports that for \"years\" her heart rate has been elevated, as high as the 130s-140s.  Her tachycardia is occasionally associated with mild palpitations.  She denies any episodes of dizziness or lightheadedness.  No presyncopal or syncopal events.  She has no limitations in her daily activities related to her tachycardia.  No lower extremity edema, orthopnea, or PND.  No other specific complaints today.    Past Cardiac Testin. Last Coronary Angio: None  2. Prior Stress Testing: None  3. Last Echo:   1.  Normal left ventricular size and systolic function, LVEF 60-65%.  2.  Normal LV diastolic filling pattern.  3.  Normal right ventricular size and systolic function.  4.  Normal left atrial volume index.  5.  Trace MR and TR.  4. Prior Holter Monitor: None    Review of Systems:   Review of Systems   Constitutional:  Negative for activity change, appetite change, chills, diaphoresis, fatigue, fever, unexpected weight gain and unexpected weight loss.   Eyes:  Negative for blurred vision and double vision.   Respiratory:  Negative for cough, chest tightness, shortness of breath and wheezing.    Cardiovascular:  Positive for palpitations. Negative for chest pain and leg swelling.   Gastrointestinal:  Negative for abdominal pain, anal bleeding, blood in stool and GERD.   Endocrine: Negative for cold intolerance and heat intolerance.   Genitourinary:  Negative for hematuria.   Neurological:  Negative for dizziness, syncope, weakness and light-headedness.   Hematological:  Does not " bruise/bleed easily.   Psychiatric/Behavioral:  Negative for depressed mood and stress. The patient is not nervous/anxious.      Past Medical History:   Past Medical History:   Diagnosis Date    Anxiety     Asthma     Depression     Disease of thyroid gland     GERD (gastroesophageal reflux disease)     Headache     Hypertension     Seizures     Substance abuse     Trauma        Past Surgical History:   Past Surgical History:   Procedure Laterality Date    DILATATION AND CURETTAGE      INCISION AND DRAINAGE ABSCESS  2019    arm       Family History:   Family History   Problem Relation Age of Onset    Arthritis Mother     Cancer Mother         breast    Thyroid disease Mother     Breast cancer Mother     Diabetes Father     Hypertension Father     Mental illness Brother     Breast cancer Maternal Aunt        Social History:   Social History     Socioeconomic History    Marital status:    Tobacco Use    Smoking status: Former     Packs/day: 0.25     Years: 20.00     Pack years: 5.00     Types: Cigarettes     Quit date: 2022     Years since quittin.2     Passive exposure: Past    Smokeless tobacco: Never    Tobacco comments:     vapes also   Vaping Use    Vaping Use: Every day    Substances: Nicotine, Flavoring    Devices: Pre-filled or refillable cartridge   Substance and Sexual Activity    Alcohol use: No     Comment: stopped     Drug use: Yes     Types: IV     Comment: STATES SHE TOOK HER METHADONE AND SOME BENZOS 20    Sexual activity: Not Currently     Partners: Male     Birth control/protection: Abstinence       Medications:     Current Outpatient Medications:     acetaminophen (TYLENOL) 500 MG tablet, Take 2 tablets by mouth Every 8 (Eight) Hours., Disp: , Rfl:     acidophilus (FLORANEX) tablet tablet, Take 1 tablet by mouth., Disp: , Rfl:     albuterol sulfate  (90 Base) MCG/ACT inhaler, Inhale 2 puffs Every 6 (Six) Hours As Needed., Disp: , Rfl:     amphetamine-dextroamphetamine  (Adderall) 30 MG tablet, Take 30 mg orally every morning and afternoon, Disp: 60 tablet, Rfl: 0    buPROPion XL (WELLBUTRIN XL) 150 MG 24 hr tablet, Take 1 tablet by mouth Every Morning., Disp: 30 tablet, Rfl: 2    buPROPion XL (Wellbutrin XL) 300 MG 24 hr tablet, Take 1 tablet by mouth Daily. WITH 150 mg, Disp: 30 tablet, Rfl: 2    busPIRone (BUSPAR) 15 MG tablet, Take 2 tablets by mouth 2 (Two) Times a Day., Disp: 120 tablet, Rfl: 2    doxepin (SINEquan) 100 MG capsule, Take 1 capsule by mouth Every Night., Disp: 30 capsule, Rfl: 2    doxycycline (VIBRAMYICN) 100 MG tablet, Take 1 tablet by mouth., Disp: , Rfl:     DULoxetine (CYMBALTA) 60 MG capsule, Take 2 capsules by mouth Every Morning., Disp: 60 capsule, Rfl: 2    gabapentin (NEURONTIN) 800 MG tablet, Take 1 tablet by mouth 3 (Three) Times a Day., Disp: 90 tablet, Rfl: 2    methadone (DOLOPHINE) 10 MG tablet, Take 12.5 tablets by mouth Daily Elmira Elaine @ [facility] ([phone]) verified dose as 125 mg daily,, Disp: , Rfl:     methocarbamol (ROBAXIN) 500 MG tablet, Take 1 tablet by mouth., Disp: , Rfl:     ofloxacin (Ocuflox) 0.3 % ophthalmic solution, Apply 1 drop to eye(s) as directed by provider 4 (Four) Times a Day., Disp: 5 mL, Rfl: 0    ondansetron ODT (ZOFRAN-ODT) 4 MG disintegrating tablet, , Disp: , Rfl:     oxyCODONE (ROXICODONE) 5 MG immediate release tablet, Take 1 tablet by mouth Every 6 (Six) Hours As Needed., Disp: , Rfl:     prazosin (MINIPRESS) 5 MG capsule, Take 2 capsules by mouth Every Night., Disp: 60 capsule, Rfl: 2    QUEtiapine (SEROquel) 100 MG tablet, Take 1 tablet by mouth Every Night., Disp: 30 tablet, Rfl: 2    zolpidem (Ambien) 5 MG tablet, Take 1 tablet by mouth At Night As Needed for Sleep., Disp: 30 tablet, Rfl: 2    Allergies:   Allergies   Allergen Reactions    Hazelnut (Filbert) Allergy Skin Test Rash    Diphenhydramine Hcl Other (See Comments)    Diphenhydramine Other (See Comments)      "Hyperactivity  Hyperactivity       Physical Exam:  Vital Signs:   Vitals:    09/12/23 0829   BP: 122/68   BP Location: Right arm   Patient Position: Sitting   Pulse: 115   Resp: 18   SpO2: 98%   Weight: 86.2 kg (190 lb)   Height: 165.1 cm (65\")       Physical Exam  Constitutional:       General: She is not in acute distress.     Appearance: Normal appearance. She is well-developed. She is not diaphoretic.   HENT:      Head: Normocephalic and atraumatic.   Eyes:      General: No scleral icterus.     Pupils: Pupils are equal, round, and reactive to light.   Neck:      Trachea: No tracheal deviation.   Cardiovascular:      Rate and Rhythm: Regular rhythm. Tachycardia present.      Heart sounds: Normal heart sounds. No murmur heard.    No friction rub. No gallop.      Comments: Normal JVD.  Pulmonary:      Effort: Pulmonary effort is normal. No respiratory distress.      Breath sounds: Normal breath sounds. No stridor. No wheezing or rales.   Chest:      Chest wall: No tenderness.   Abdominal:      General: Bowel sounds are normal. There is no distension.      Palpations: Abdomen is soft.      Tenderness: There is no abdominal tenderness. There is no guarding or rebound.   Musculoskeletal:         General: No swelling. Normal range of motion.      Cervical back: Neck supple. No tenderness.   Lymphadenopathy:      Cervical: No cervical adenopathy.   Skin:     General: Skin is warm and dry.      Findings: No erythema.   Neurological:      General: No focal deficit present.      Mental Status: She is alert and oriented to person, place, and time.   Psychiatric:         Mood and Affect: Mood normal.         Behavior: Behavior normal.       Results Review:   I reviewed the patient's new clinical results.  I personally viewed and interpreted the patient's EKG/Telemetry data      ECG 12 Lead    Date/Time: 9/12/2023 9:58 AM  Performed by: Pierre Vick MD  Authorized by: Pierre Vick MD   Comparison: not compared " with previous ECG   Previous ECG: no previous ECG available  Rhythm: sinus tachycardia  Rate: tachycardic  QRS axis: normal  Clinical impression comment: Normal ECG other than rate        Assessment / Plan:     1. Sinus tachycardia   -- Appears to have a long history of sinus tachycardia, minimally symptomatic  -- ECG today continues to show mild sinus tachycardia  -- Will repeat TSH to again rule out hyperthyroidism  -- Outpatient cardiac monitoring to further assess average ventricular rate and assess for syndrome of inappropriate sinus tachycardia  -- Pending results of TSH and outpatient cardiac monitoring, will consider AV danuta blocker for heart rate suppression  -- Follow-up in 3 months, sooner if required      Follow Up:   Return in about 3 months (around 12/12/2023).      Thank you for allowing me to participate in the care of your patient. Please to not hesitate to contact me with additional questions or concerns.     TIKA Vick MD  Interventional Cardiology   09/12/2023  08:45 EDT

## 2023-09-15 ENCOUNTER — PATIENT ROUNDING (BHMG ONLY) (OUTPATIENT)
Dept: CARDIOLOGY | Facility: CLINIC | Age: 43
End: 2023-09-15
Payer: MEDICARE

## 2023-09-15 NOTE — PROGRESS NOTES
[facility] Cardiology welcome email and rounding message has been sent to patient via [employer].

## 2023-09-28 ENCOUNTER — LAB (OUTPATIENT)
Dept: LAB | Facility: HOSPITAL | Age: 43
End: 2023-09-28
Payer: MEDICARE

## 2023-09-28 DIAGNOSIS — R00.0 SINUS TACHYCARDIA: ICD-10-CM

## 2023-09-28 LAB — TSH SERPL DL<=0.05 MIU/L-ACNC: 4.04 UIU/ML (ref 0.27–4.2)

## 2023-09-28 PROCEDURE — 84443 ASSAY THYROID STIM HORMONE: CPT

## 2023-09-28 PROCEDURE — 36415 COLL VENOUS BLD VENIPUNCTURE: CPT

## 2023-10-05 ENCOUNTER — TELEPHONE (OUTPATIENT)
Dept: INTERNAL MEDICINE | Facility: CLINIC | Age: 43
End: 2023-10-05
Payer: MEDICARE

## 2023-10-05 NOTE — TELEPHONE ENCOUNTER
There is no release of information on file to discuss information with [facility] or Soraya Cheng. Called patient to inform her they were requesting to speak with Nena and in order to do that we would need her to sign a consent to release information:    PATIENT DID NOT WANT TO GIVE CONSENT AT THIS TIME.    ! PATIENT DOES NOT CONSENT TO GIVING ANY INFORMATION !

## 2023-10-17 ENCOUNTER — TELEPHONE (OUTPATIENT)
Dept: INTERNAL MEDICINE | Facility: CLINIC | Age: 43
End: 2023-10-17
Payer: MEDICARE

## 2023-10-17 NOTE — TELEPHONE ENCOUNTER
Attempted to contact patient to change 10/20/2023 appointment to a video visit (Per Dr. Fleming's request) but no answer. LVM to call the office back.

## 2023-10-20 ENCOUNTER — TELEMEDICINE (OUTPATIENT)
Dept: INTERNAL MEDICINE | Facility: CLINIC | Age: 43
End: 2023-10-20
Payer: MEDICARE

## 2023-10-20 DIAGNOSIS — M00.9 PYOGENIC ARTHRITIS OF RIGHT HIP, DUE TO UNSPECIFIED ORGANISM: ICD-10-CM

## 2023-10-20 DIAGNOSIS — E66.09 OBESITY DUE TO EXCESS CALORIES WITH SERIOUS COMORBIDITY, UNSPECIFIED CLASSIFICATION: ICD-10-CM

## 2023-10-20 DIAGNOSIS — J30.2 SEASONAL ALLERGIES: ICD-10-CM

## 2023-10-20 DIAGNOSIS — R79.9 ABNORMAL FINDING OF BLOOD CHEMISTRY, UNSPECIFIED: ICD-10-CM

## 2023-10-20 DIAGNOSIS — Z00.00 MEDICARE ANNUAL WELLNESS VISIT, SUBSEQUENT: ICD-10-CM

## 2023-10-20 DIAGNOSIS — F41.1 GENERALIZED ANXIETY DISORDER: ICD-10-CM

## 2023-10-20 DIAGNOSIS — D64.9 ANEMIA, UNSPECIFIED TYPE: Primary | ICD-10-CM

## 2023-10-20 DIAGNOSIS — Z87.898 HISTORY OF SUBSTANCE USE DISORDER: ICD-10-CM

## 2023-10-20 DIAGNOSIS — F33.1 MODERATE EPISODE OF RECURRENT MAJOR DEPRESSIVE DISORDER: ICD-10-CM

## 2023-10-20 DIAGNOSIS — E61.1 IRON DEFICIENCY: ICD-10-CM

## 2023-10-20 RX ORDER — FLUTICASONE PROPIONATE 50 MCG
2 SPRAY, SUSPENSION (ML) NASAL DAILY
Qty: 18.2 G | Refills: 2 | Status: SHIPPED | OUTPATIENT
Start: 2023-10-20

## 2023-10-20 NOTE — PATIENT INSTRUCTIONS
Medicare Wellness  Personal Prevention Plan of Service     Date of Office Visit:    Encounter Provider:  David Fleming DO  Place of Service:  [facility] PRIMARY [facility]ARE  Patient Name: Audrey Alonso  :  1980    As part of the Medicare Wellness portion of your visit today, we are providing you with this personalized preventive plan of services (PPPS). This plan is based upon recommendations of the [place] Preventive Services Task Force (USPSTF) and the Advisory Committee on Immunization Practices (ACIP).    This lists the preventive care services that should be considered, and provides dates of when you are due. Items listed as completed are up-to-date and do not require any further intervention.    Health Maintenance   Topic Date Due    TDAP/TD VACCINES (1 - Tdap) Never done    BMI FOLLOWUP  2023    COVID-19 Vaccine (1) 2024 (Originally 1980)    INFLUENZA VACCINE  2026 (Originally 2023)    PAP SMEAR  2023    ANNUAL WELLNESS VISIT  10/20/2024    HEPATITIS C SCREENING  Completed    Pneumococcal Vaccine 0-64  Aged Out       Orders Placed This Encounter   Procedures    Comprehensive Metabolic Panel     Order Specific Question:   Release to patient     Answer:   Routine Release [[id number]]    Lipid Panel     Order Specific Question:   Release to patient     Answer:   Routine Release [[id number]]    Iron Profile     Order Specific Question:   Release to patient     Answer:   Routine Release [[id number]]    TSH     Order Specific Question:   Release to patient     Answer:   Routine Release [[id number]]    Vitamin B12     Order Specific Question:   Release to patient     Answer:   Routine Release [[id number]]    Vitamin D,25-Hydroxy     Order Specific Question:   Release to patient     Answer:   Routine Release [[id number]]    Hemoglobin A1c     Order Specific Question:   Release to patient     Answer:   Routine Release [[id number]]    CBC & Differential      Order Specific Question:   Manual Differential     Answer:   No     Order Specific Question:   Release to patient     Answer:   Routine Release [[id number]]       No follow-ups on file.

## 2023-10-20 NOTE — PROGRESS NOTES
This visit was performed by telemedicine.  Patient was located in [facility].     QUICK REFERENCE INFORMATION:  The ABCs of the Annual Wellness Visit    Subsequent Medicare Wellness Visit    Chief Complaint   Patient presents with    Medicare Wellness-subsequent        Subjective   History of Present Illness    Audrey Alonso is a 43 y.o. female who presents for a Subsequent Wellness Visit. In addition, we addressed the following health issues: Patient shares that over the last year, she has been following closely with orthopedics at  for her chronic hip infection.  She shares that she had her second hip replacement at the end of June and unfortunately this shortly became dislocated.  She required a third hip replacement surgery which has been successful so far.  She has completed physical therapy over the last couple of months and shares that she does not require a walker and is able to perform all of her ADLs at home without needing assistance.  Orthopedics has lifted all of her restrictions.  Weight has been fairly stable although she thinks she may have lost about 5 pounds by doing physical therapy.    Mental health remains stable.  He has been able to stay off of cigarettes over the last year.  She continues to follow with methadone clinic and denies any recent illicit substance use.    It is noted that during the time of her surgeries, she had blood test showing persistent anemia.  She denies any GI bleeding or melena.  She also shared that she was being evaluated with cardiology for chronic tachycardia.    HEALTH RISK ASSESSMENT    1980  Open he had a smoking quit  Recent Hospitalizations:  Recently treated at the following:  Other: [facility] .        Current Medical Providers:  Patient Care Team:  David Fleming DO as PCP - General (Internal Medicine)  Nathalia Anne APRN as Nurse Practitioner (Behavioral Health)  Labs White  Discharge a smaller portion    Smoking Status:  Social  History     Tobacco Use   Smoking Status Former    Packs/day: 0.25    Years: 20.00    Additional pack years: 0.00    Total pack years: 5.00    Types: Cigarettes    Quit date: 2022    Years since quittin.3    Passive exposure: Past   Smokeless Tobacco Never   Tobacco Comments    vapes also       Alcohol Consumption:  Social History     Substance and Sexual Activity   Alcohol Use No    Comment: stopped        Depression Screen:       10/20/2023    10:09 AM   PHQ-2/PHQ-9 Depression Screening   Little Interest or Pleasure in Doing Things 0-->not at all   Feeling Down, Depressed or Hopeless 0-->not at all   PHQ-9: Brief Depression Severity Measure Score 0       Health Habits and Functional and Cognitive Screening:      10/20/2023    10:06 AM   Functional & Cognitive Status   Do you have difficulty preparing food and eating? No   Do you have difficulty bathing yourself, getting dressed or grooming yourself? No   Do you have difficulty using the toilet? No   Do you have difficulty moving around from place to place? No   Do you have trouble with steps or getting out of a bed or a chair? Yes   Current Diet Limited Junk Food   Dental Exam Up to date   Eye Exam Up to date   Current Exercises Include No Regular Exercise   Do you need help using the phone?  No   Are you deaf or do you have serious difficulty hearing?  No   Do you need help to go to places out of walking distance? Yes   Do you need help shopping? No   Do you need help preparing meals?  No   Do you need help with housework?  No   Do you need help with laundry? No   Do you need help taking your medications? No   Do you need help managing money? No   Do you ever drive or ride in a car without wearing a seat belt? No   Have you felt unusual stress, anger or loneliness in the last month? No   Who do you live with? Other   If you need help, do you have trouble finding someone available to you? No   Have you been bothered in the last four weeks by sexual  problems? No   Do you have difficulty concentrating, remembering or making decisions? No           Does the patient have evidence of cognitive impairment? No    Aspirin use counseling: Does not need ASA (and currently is not on it)      Recent Lab Results:  CMP:  Lab Results   Component Value Date    BUN 10 08/10/2022    CREATININE 0.83 08/10/2022    EGFRIFNONA 76 11/18/2021    EGFRIFAFRI 68 10/08/2021    BCR 12.0 08/10/2022     08/10/2022    K 3.5 (L) 07/27/2023    CO2 27.2 08/10/2022    CALCIUM 9.1 08/10/2022    PROTENTOTREF 6.5 08/10/2022    ALBUMIN 4.1 07/10/2023    LABGLOBREF 2.8 08/10/2022    LABIL2 1.3 08/10/2022    BILITOT <0.2 08/10/2022    ALKPHOS 91 08/10/2022    AST 14 08/10/2022    ALT 18 08/10/2022     Lipid Panel:     HbA1c:  Lab Results   Component Value Date    HGBA1C 5.6 08/07/2023       Visual Acuity:  No results found.    Age-appropriate Screening Schedule:  Refer to the list below for future screening recommendations based on patient's age, sex and/or medical conditions. Orders for these recommended tests are listed in the plan section. The patient has been provided with a written plan.    Health Maintenance   Topic Date Due    TDAP/TD VACCINES (1 - Tdap) Never done    BMI FOLLOWUP  09/28/2023    COVID-19 Vaccine (1) 01/08/2024 (Originally 1980)    INFLUENZA VACCINE  03/31/2026 (Originally 8/1/2023)    PAP SMEAR  12/09/2023    ANNUAL WELLNESS VISIT  10/20/2024    HEPATITIS C SCREENING  Completed    Pneumococcal Vaccine 0-64  Aged Out          The following portions of the patient's history were reviewed and updated as appropriate: allergies, current medications, past family history, past medical history, past social history, past surgical history, and problem list.    Outpatient Medications Prior to Visit   Medication Sig Dispense Refill    acetaminophen (TYLENOL) 500 MG tablet Take 2 tablets by mouth Every 8 (Eight) Hours.      albuterol sulfate  (90 Base) MCG/ACT inhaler  Inhale 2 puffs Every 6 (Six) Hours As Needed.      amphetamine-dextroamphetamine (Adderall) 30 MG tablet Take 30 mg orally every morning and afternoon 60 tablet 0    buPROPion XL (WELLBUTRIN XL) 150 MG 24 hr tablet Take 1 tablet by mouth Every Morning. 30 tablet 2    buPROPion XL (Wellbutrin XL) 300 MG 24 hr tablet Take 1 tablet by mouth Daily. WITH 150 mg 30 tablet 2    busPIRone (BUSPAR) 15 MG tablet Take 2 tablets by mouth 2 (Two) Times a Day. 120 tablet 2    doxepin (SINEquan) 100 MG capsule Take 1 capsule by mouth Every Night. 30 capsule 2    DULoxetine (CYMBALTA) 60 MG capsule Take 2 capsules by mouth Every Morning. 60 capsule 2    gabapentin (NEURONTIN) 800 MG tablet Take 1 tablet by mouth 3 (Three) Times a Day. 90 tablet 2    methadone (DOLOPHINE) 10 MG tablet Take 12.5 tablets by mouth Daily Elmira Elaine @ [facility] ([phone]) verified dose as 125 mg daily,      methocarbamol (ROBAXIN) 500 MG tablet Take 1 tablet by mouth.      prazosin (MINIPRESS) 5 MG capsule Take 2 capsules by mouth Every Night. 60 capsule 2    QUEtiapine (SEROquel) 100 MG tablet Take 1 tablet by mouth Every Night. 30 tablet 2    zolpidem (Ambien) 5 MG tablet Take 1 tablet by mouth At Night As Needed for Sleep. 30 tablet 2    acidophilus (FLORANEX) tablet tablet Take 1 tablet by mouth.      doxycycline (VIBRAMYICN) 100 MG tablet Take 1 tablet by mouth.      ofloxacin (Ocuflox) 0.3 % ophthalmic solution Apply 1 drop to eye(s) as directed by provider 4 (Four) Times a Day. 5 mL 0    ondansetron ODT (ZOFRAN-ODT) 4 MG disintegrating tablet       oxyCODONE (ROXICODONE) 5 MG immediate release tablet Take 1 tablet by mouth Every 6 (Six) Hours As Needed.       No facility-administered medications prior to visit.       Patient Active Problem List   Diagnosis    Thrombocytopenia    Pneumonia of both lungs due to infectious organism       Advance Care Planning:  ACP discussion was held with the patient during this  visit. Patient does not have an advance directive, information provided.    Identification of Risk Factors:  Risk factors include: Advance Directive Discussion.    Review of Systems   Constitutional:  Negative for chills, fatigue and fever.   HENT:  Negative for congestion, ear pain, rhinorrhea, sinus pressure and sore throat.    Eyes:  Negative for visual disturbance.   Respiratory:  Negative for cough, chest tightness, shortness of breath and wheezing.    Cardiovascular:  Negative for chest pain, palpitations and leg swelling.   Gastrointestinal:  Negative for abdominal pain, blood in stool, constipation, diarrhea, nausea and vomiting.   Endocrine: Negative for polydipsia and polyuria.   Genitourinary:  Negative for dysuria and hematuria.   Musculoskeletal:  Negative for arthralgias and back pain.   Skin:  Negative for rash.   Neurological:  Negative for dizziness, light-headedness, numbness and headaches.   Psychiatric/Behavioral:  Negative for dysphoric mood and sleep disturbance. The patient is not nervous/anxious.        Compared to one year ago, the patient feels her physical health is better.  Compared to one year ago, the patient feels her mental health is worse.    Objective     Physical Exam  Vitals and nursing note reviewed.   Constitutional:       Appearance: Normal appearance. She is well-developed.   HENT:      Head: Normocephalic and atraumatic.   Eyes:      Extraocular Movements: Extraocular movements intact.      Conjunctiva/sclera: Conjunctivae normal.   Pulmonary:      Effort: Pulmonary effort is normal.   Musculoskeletal:      Cervical back: Normal range of motion and neck supple.   Skin:     General: Skin is warm and dry.      Findings: No rash.   Neurological:      General: No focal deficit present.      Mental Status: She is alert and oriented to person, place, and time.   Psychiatric:         Mood and Affect: Mood normal.         Behavior: Behavior normal.     Limited due to video  visit    There were no vitals filed for this visit.           Assessment & Plan   Patient Self-Management and Personalized Health Advice  The patient has been provided with information about: exercise and weight management and preventive services including:   Annual Wellness Visit (AWV).    Visit Diagnoses:    ICD-10-CM ICD-9-CM   1. Anemia, unspecified type  D64.9 285.9   2. Generalized anxiety disorder  F41.1 300.02   3. Moderate episode of recurrent major depressive disorder  F33.1 296.32   4. Pyogenic arthritis of right hip, due to unspecified organism  M00.9 711.05   5. Iron deficiency  E61.1 280.9   6. History of substance use disorder  Z87.898 V15.89   7. Medicare annual wellness visit, subsequent  Z00.00 V70.0   8. Obesity due to excess calories with serious comorbidity, unspecified classification  E66.09 278.00   9. Seasonal allergies  J30.2 477.9   10. Body mass index (BMI) 30.0-30.9, adult  Z68.30 V85.30   11. Abnormal finding of blood chemistry, unspecified  R79.9 790.6       Discussion Summary:  Patient is a 43 y.o. female who presents for a Subsequent Wellness Visit.    1. Preventive Health Maintenance  - Baseline labs ordered per above.  - Vaccines reviewed and updated  - Preventive health measures were discussed including: healthy diet with increase in fruits and vegetables, regular exercise at least 3 times a week, safe sex practices, avoidance of drugs, tobacco, and alcohol, and regular seatbelt use.    2.  Anemia  - Uncertain cause.  Patient denies any significant GI bleeding.  Check iron profile.  - Need for transfusions at such an age is concerning.  Further management will be based on blood work.  Vitamin levels have also been ordered to evaluate.    3.  Pyogenic right hip arthritis with history of hip replacement x3  - Continue following with  orthopedics services.  At this time, it appears her hip is doing quite well after having completed physical therapy.  Mobility restrictions have been  lifted by orthopedics.    4.  Obesity  - Encourage monitoring of caloric intake.  Check A1c and labs as noted above.          Orders Placed This Encounter   Procedures    Comprehensive Metabolic Panel     Order Specific Question:   Release to patient     Answer:   Routine Release [[id number]]    Lipid Panel     Order Specific Question:   Release to patient     Answer:   Routine Release [[id number]]    Iron Profile     Order Specific Question:   Release to patient     Answer:   Routine Release [[id number]]    TSH     Order Specific Question:   Release to patient     Answer:   Routine Release [[id number]]    Vitamin B12     Order Specific Question:   Release to patient     Answer:   Routine Release [[id number]]    Vitamin D,25-Hydroxy     Order Specific Question:   Release to patient     Answer:   Routine Release [[id number]]    Hemoglobin A1c     Order Specific Question:   Release to patient     Answer:   Routine Release [[id number]]    CBC & Differential     Order Specific Question:   Manual Differential     Answer:   No     Order Specific Question:   Release to patient     Answer:   Routine Release [[id number]]       Outpatient Encounter Medications as of 10/20/2023   Medication Sig Dispense Refill    acetaminophen (TYLENOL) 500 MG tablet Take 2 tablets by mouth Every 8 (Eight) Hours.      albuterol sulfate  (90 Base) MCG/ACT inhaler Inhale 2 puffs Every 6 (Six) Hours As Needed.      amphetamine-dextroamphetamine (Adderall) 30 MG tablet Take 30 mg orally every morning and afternoon 60 tablet 0    buPROPion XL (WELLBUTRIN XL) 150 MG 24 hr tablet Take 1 tablet by mouth Every Morning. 30 tablet 2    buPROPion XL (Wellbutrin XL) 300 MG 24 hr tablet Take 1 tablet by mouth Daily. WITH 150 mg 30 tablet 2    busPIRone (BUSPAR) 15 MG tablet Take 2 tablets by mouth 2 (Two) Times a Day. 120 tablet 2    doxepin (SINEquan) 100 MG capsule Take 1 capsule by mouth Every Night. 30 capsule 2    DULoxetine (CYMBALTA) 60 MG  capsule Take 2 capsules by mouth Every Morning. 60 capsule 2    gabapentin (NEURONTIN) 800 MG tablet Take 1 tablet by mouth 3 (Three) Times a Day. 90 tablet 2    methadone (DOLOPHINE) 10 MG tablet Take 12.5 tablets by mouth Daily Elmira Elaine @ [facility] ([phone]) verified dose as 125 mg daily,      methocarbamol (ROBAXIN) 500 MG tablet Take 1 tablet by mouth.      prazosin (MINIPRESS) 5 MG capsule Take 2 capsules by mouth Every Night. 60 capsule 2    QUEtiapine (SEROquel) 100 MG tablet Take 1 tablet by mouth Every Night. 30 tablet 2    zolpidem (Ambien) 5 MG tablet Take 1 tablet by mouth At Night As Needed for Sleep. 30 tablet 2    acidophilus (FLORANEX) tablet tablet Take 1 tablet by mouth.      doxycycline (VIBRAMYICN) 100 MG tablet Take 1 tablet by mouth.      fluticasone (FLONASE) 50 MCG/ACT nasal spray 2 sprays into the nostril(s) as directed by provider Daily. 18.2 g 2    ofloxacin (Ocuflox) 0.3 % ophthalmic solution Apply 1 drop to eye(s) as directed by provider 4 (Four) Times a Day. 5 mL 0    ondansetron ODT (ZOFRAN-ODT) 4 MG disintegrating tablet       oxyCODONE (ROXICODONE) 5 MG immediate release tablet Take 1 tablet by mouth Every 6 (Six) Hours As Needed.       No facility-administered encounter medications on file as of 10/20/2023.       Reviewed use of high risk medication in the elderly: yes  Reviewed for potential of harmful drug interactions in the elderly: yes    Follow Up:  No follow-ups on file.     An After Visit Summary and PPPS with all of these plans were given to the patient.

## 2023-10-24 DIAGNOSIS — F90.2 ATTENTION DEFICIT HYPERACTIVITY DISORDER (ADHD), COMBINED TYPE: ICD-10-CM

## 2023-10-24 RX ORDER — DEXTROAMPHETAMINE SACCHARATE, AMPHETAMINE ASPARTATE, DEXTROAMPHETAMINE SULFATE AND AMPHETAMINE SULFATE 7.5; 7.5; 7.5; 7.5 MG/1; MG/1; MG/1; MG/1
TABLET ORAL
Qty: 60 TABLET | Refills: 0 | Status: SHIPPED | OUTPATIENT
Start: 2023-10-24

## 2023-10-24 NOTE — TELEPHONE ENCOUNTER
Incoming Refill Request      Medication requested (name and dose): ADDERALL 30 MG     Pharmacy where request should be sent: [facility]    Additional details provided by patient: N/A    Best call back number: [phone]    Does the patient have less than a 3 day supply:  [x] Yes  [] No    Vamsi Walls Rep  10/24/23, 14:19 EDT        Incoming Refill Request      Medication requested (name and dose):    Pharmacy where request should be sent:   Additional details provided by patient:     Best call back number:     Does the patient have less than a 3 day supply:  [x] Yes  [] No    Vamsi Walls Rep  10/24/23, 14:16 EDT

## 2023-10-31 DIAGNOSIS — F51.01 PRIMARY INSOMNIA: ICD-10-CM

## 2023-11-02 NOTE — TELEPHONE ENCOUNTER
Rx Refill Note  Requested Prescriptions     Pending Prescriptions Disp Refills    zolpidem (AMBIEN) 5 MG tablet [Pharmacy Med Name: ZOLPIDEM TARTRATE 5 MG TABLET] 30 tablet 0     Sig: TAKE ONE TABLET BY MOUTH ONCE NIGHTLY AS NEEDED FOR SLEEP      Last office visit with prescribing clinician: 8/15/2023   Last telemedicine visit with prescribing clinician: 10/20/2023   Next office visit with prescribing clinician: 11/16/2023     Last lab- active   Last UDS- 08/15/2023

## 2023-11-03 RX ORDER — ZOLPIDEM TARTRATE 5 MG/1
5 TABLET ORAL NIGHTLY PRN
Qty: 30 TABLET | Refills: 0 | Status: SHIPPED | OUTPATIENT
Start: 2023-11-03

## 2023-11-07 DIAGNOSIS — F51.01 PRIMARY INSOMNIA: ICD-10-CM

## 2023-11-07 RX ORDER — ZOLPIDEM TARTRATE 5 MG/1
5 TABLET ORAL NIGHTLY PRN
Qty: 30 TABLET | Refills: 0 | Status: SHIPPED | OUTPATIENT
Start: 2023-11-07

## 2023-11-07 NOTE — TELEPHONE ENCOUNTER
Incoming Refill Request      Medication requested (name and dose): ZOLPIDEM 5 MG    Pharmacy where request should be sent: [facility]    Additional details provided by patient: N/A    Best call back number: [phone]    Does the patient have less than a 3 day supply:  [x] Yes  [] No    Vamsi Walls Rep  11/07/23, 08:11 EST

## 2023-11-14 DIAGNOSIS — F41.1 GENERALIZED ANXIETY DISORDER: ICD-10-CM

## 2023-11-14 DIAGNOSIS — F33.1 MODERATE EPISODE OF RECURRENT MAJOR DEPRESSIVE DISORDER: ICD-10-CM

## 2023-11-14 DIAGNOSIS — F51.5 NIGHTMARES: ICD-10-CM

## 2023-11-16 ENCOUNTER — OFFICE VISIT (OUTPATIENT)
Dept: BEHAVIORAL HEALTH | Facility: CLINIC | Age: 43
End: 2023-11-16
Payer: MEDICARE

## 2023-11-16 VITALS — HEIGHT: 65 IN | BODY MASS INDEX: 31.49 KG/M2 | WEIGHT: 189 LBS

## 2023-11-16 DIAGNOSIS — F41.1 GENERALIZED ANXIETY DISORDER: ICD-10-CM

## 2023-11-16 DIAGNOSIS — F33.1 MODERATE EPISODE OF RECURRENT MAJOR DEPRESSIVE DISORDER: ICD-10-CM

## 2023-11-16 DIAGNOSIS — F51.01 PRIMARY INSOMNIA: Primary | ICD-10-CM

## 2023-11-16 DIAGNOSIS — F51.5 NIGHTMARES: ICD-10-CM

## 2023-11-16 PROCEDURE — 99214 OFFICE O/P EST MOD 30 MIN: CPT

## 2023-11-16 PROCEDURE — 1159F MED LIST DOCD IN RCRD: CPT

## 2023-11-16 PROCEDURE — 1160F RVW MEDS BY RX/DR IN RCRD: CPT

## 2023-11-16 RX ORDER — PRAZOSIN HYDROCHLORIDE 5 MG/1
10 CAPSULE ORAL NIGHTLY
Qty: 60 CAPSULE | Refills: 2 | Status: SHIPPED | OUTPATIENT
Start: 2023-11-16

## 2023-11-16 RX ORDER — BUSPIRONE HYDROCHLORIDE 15 MG/1
30 TABLET ORAL 2 TIMES DAILY
Qty: 120 TABLET | Refills: 2 | Status: SHIPPED | OUTPATIENT
Start: 2023-11-16

## 2023-11-16 RX ORDER — QUETIAPINE FUMARATE 100 MG/1
100 TABLET, FILM COATED ORAL NIGHTLY
Qty: 30 TABLET | Refills: 2 | Status: SHIPPED | OUTPATIENT
Start: 2023-11-16

## 2023-11-16 RX ORDER — BUPROPION HYDROCHLORIDE 150 MG/1
150 TABLET ORAL EVERY MORNING
Qty: 30 TABLET | Refills: 2 | Status: SHIPPED | OUTPATIENT
Start: 2023-11-16

## 2023-11-16 RX ORDER — ZOLPIDEM TARTRATE 5 MG/1
5 TABLET ORAL NIGHTLY PRN
Qty: 30 TABLET | Refills: 1 | Status: SHIPPED | OUTPATIENT
Start: 2023-11-16

## 2023-11-16 RX ORDER — DULOXETIN HYDROCHLORIDE 60 MG/1
120 CAPSULE, DELAYED RELEASE ORAL EVERY MORNING
Qty: 60 CAPSULE | Refills: 2 | Status: SHIPPED | OUTPATIENT
Start: 2023-11-16

## 2023-11-16 RX ORDER — GABAPENTIN 800 MG/1
800 TABLET ORAL 3 TIMES DAILY
Qty: 90 TABLET | Refills: 2 | Status: SHIPPED | OUTPATIENT
Start: 2023-11-16

## 2023-11-16 NOTE — PROGRESS NOTES
Follow Up Office Visit    Patient Name: Audrey Alonso  : 1980   MRN: [id number]   Care Team: Patient Care Team:  David Fleming DO as PCP - General (Internal Medicine)  Nathalia Anne APRN as Nurse Practitioner (Behavioral Health)         Chief Complaint:    Chief Complaint   Patient presents with    Anxiety    Sleeping Problem    Depression    Med Management       History of Present Illness: Audrey Alonso is a 43 y.o. female who is here today for a medication management follow up. Patient reports that overall medication continues to be effective. She feels that her mood and anxiety are managed well.S he continues to struggle with sleep at times but feels that she is used to that at this point. She did not see the need to make any changes and did not have any medication concerns at today's visit. She denies SI/HI today.       The following portion of the patient's history were reviewed and updated appropriately: allergies, current and past medications, family history, medical history and social history.  Subjective   Review of Systems:    Review of Systems   Psychiatric/Behavioral:  Positive for sleep disturbance and stress.    All other systems reviewed and are negative.      Current Medications:   Current Outpatient Medications   Medication Sig Dispense Refill    acetaminophen (TYLENOL) 500 MG tablet Take 2 tablets by mouth Every 8 (Eight) Hours.      albuterol sulfate  (90 Base) MCG/ACT inhaler Inhale 2 puffs Every 6 (Six) Hours As Needed.      amphetamine-dextroamphetamine (Adderall) 30 MG tablet Take 30 mg orally every morning and afternoon 60 tablet 0    buPROPion XL (WELLBUTRIN XL) 150 MG 24 hr tablet TAKE 1 TABLET BY MOUTH EVERY MORNING 30 tablet 2    buPROPion XL (Wellbutrin XL) 300 MG 24 hr tablet Take 1 tablet by mouth Daily. WITH 150 mg 30 tablet 2    busPIRone (BUSPAR) 15 MG tablet Take 2 tablets by mouth 2 (Two) Times a Day. 120 tablet 2    doxepin (SINEquan) 100 MG  "capsule Take 1 capsule by mouth Every Night. 30 capsule 2    DULoxetine (CYMBALTA) 60 MG capsule TAKE 2 CAPSULES BY MOUTH EVERY MORNING 60 capsule 2    fluticasone (FLONASE) 50 MCG/ACT nasal spray 2 sprays into the nostril(s) as directed by provider Daily. 18.2 g 2    gabapentin (NEURONTIN) 800 MG tablet Take 1 tablet by mouth 3 (Three) Times a Day. 90 tablet 2    methadone (DOLOPHINE) 10 MG tablet Take 12.5 tablets by mouth Daily Elmira Elaine @ [facility] ([phone]) verified dose as 125 mg daily,      methocarbamol (ROBAXIN) 500 MG tablet Take 1 tablet by mouth.      ofloxacin (Ocuflox) 0.3 % ophthalmic solution Apply 1 drop to eye(s) as directed by provider 4 (Four) Times a Day. 5 mL 0    ondansetron ODT (ZOFRAN-ODT) 4 MG disintegrating tablet       oxyCODONE (ROXICODONE) 5 MG immediate release tablet Take 1 tablet by mouth Every 6 (Six) Hours As Needed.      prazosin (MINIPRESS) 5 MG capsule TAKE 2 CAPSULES BY MOUTH EVERY NIGHT 60 capsule 2    QUEtiapine (SEROquel) 100 MG tablet Take 1 tablet by mouth Every Night. 30 tablet 2    zolpidem (AMBIEN) 5 MG tablet Take 1 tablet by mouth At Night As Needed for Sleep. 30 tablet 1     No current facility-administered medications for this visit.       Mental Status Exam:   Hygiene:   good  Cooperation:  Cooperative  Eye Contact:  Good  Psychomotor Behavior:  Appropriate  Affect:  Appropriate  Mood: normal  Speech:  Normal  Thought Process:  Goal directed and Linear  Thought Content:  Normal and Mood congruent  Suicidal:  None  Homicidal:  None  Hallucinations:  None  Delusion:  None  Memory:  Intact  Orientation:  Person, Place, Time, and Situation  Reliability:  good  Insight:  Good  Judgement:  Good  Impulse Control:  Good  Physical/Medical Issues:  Yes see chart       Objective   Vital Signs:   Ht 165.1 cm (65\")   Wt 85.7 kg (189 lb)   BMI 31.45 kg/m²       Assessment / Plan    Diagnoses and all orders for this visit:    1. Primary " insomnia (Primary)  -     QUEtiapine (SEROquel) 100 MG tablet; Take 1 tablet by mouth Every Night.  Dispense: 30 tablet; Refill: 2  -     zolpidem (AMBIEN) 5 MG tablet; Take 1 tablet by mouth At Night As Needed for Sleep.  Dispense: 30 tablet; Refill: 1    2. Generalized anxiety disorder  -     gabapentin (NEURONTIN) 800 MG tablet; Take 1 tablet by mouth 3 (Three) Times a Day.  Dispense: 90 tablet; Refill: 2  -     busPIRone (BUSPAR) 15 MG tablet; Take 2 tablets by mouth 2 (Two) Times a Day.  Dispense: 120 tablet; Refill: 2    3. Nightmares  -     QUEtiapine (SEROquel) 100 MG tablet; Take 1 tablet by mouth Every Night.  Dispense: 30 tablet; Refill: 2    4. Moderate episode of recurrent major depressive disorder     Patient appears to be doing well on current medication. No issues reported and no indication for change. Will continue medication as ordered.     A psychological evaluation was conducted in order to assess past and current level of functioning. Areas assessed included, but were not limited to: perception of social support, perception of ability to face and deal with challenges in life (positive functioning), anxiety symptoms, depressive symptoms, perspective on beliefs/belief system, coping skills for stress, intelligence level,  Therapeutic rapport was established. Interventions conducted today were geared towards incorporating medication management along with support for continued therapy. Education was also provided as to the med management with this provider and what to expect in subsequent sessions.      We discussed risks, benefits, goals and side effects of the above medication and the patient was agreeable with the plan. Patient was educated on the importance of compliance with treatment and follow-up appointments. Patient is aware to contact the [facility] with any worsening of symptoms. To call for questions or concerns and return early if necessary. Patent is agreeable to go to the  Emergency Department or call 911 should they begin SI/HI.      PHQ-2/PHQ-9: Depression Screening  Little Interest or Pleasure in Doing Things: 0-->not at all  Feeling Down, Depressed or Hopeless: 0-->not at all  PHQ-2 Total Score: 0  PHQ-9: Brief Depression Severity Measure Score: 0      PHQ-9 Score:   PHQ-9 Total Score: 0      Over the last two weeks, how often have you been bothered by the following problems?  Feeling nervous, anxious or on edge: More than half the days  Not being able to stop or control worrying: More than half the days  Worrying too much about different things: More than half the days  Trouble Relaxing: More than half the days  Being so restless that it is hard to sit still: Several days  Becoming easily annoyed or irritable: More than half the days  Feeling afraid as if something awful might happen: Several days  TAMANNA 7 Total Score: 12  If you checked any problems, how difficult have these problems made it for you to do your work, take care of things at home, or get along with other people: Somewhat difficult        Screening for Adults With ADHD - (1-6)  1. How often do you have trouble wrapping up the final details of a project, once the challenging parts have been done?: Often  2. How often do you have difficulty getting things in order when you have to do a task that requires organization?: Often  3. How often do you have problems remembering appointments or obligations : Rarely  4. When you have a task that requires a lot of thought, how often do you avoid or delay getting started ?: Often  5. How often do you fidget or squirm with your hands or feet when you have to sit down for a long time?: Sometimes  6. How often do you feel overly active and compelled to do things, like you were driven by a motor?: Rarely  7. How often do you make careless mistakes when you have to work on a boring or difficult project?: Sometimes  8. How often do have difficulty keeping your attention when you are  doing boring or repetitive work?: Sometimes  9. How often do you have difficulty concentrating on what people say to you, even when they are speaking to you: Sometimes  10.How often do you misplace or have difficulty finding things at home or at work?: Often  11.How often are you distracted by activity or noise around you?: Often  12.How often do you leave your seat in meetings or other situations in which you are expected to remain seated?: Sometimes  13.How often do you feel restless or fidgety?: Often  14.How often do you have difficulty unwinding and relaxing when you have time to yourself?: Sometimes  15.How often do you find yourself talking too much when you are in social situations?: Rarely  16.When you’re in a conversation, how often do you find yourself finishing the sentences of the people you are talking to, before they can finish them themselves?: Never  17.How often do you have difficulty waiting your turn in situations when turn taking is required?: Never  18.How often do you interrupt others when they are busy?: Rarely        MEDS ORDERED DURING VISIT:  New Medications Ordered This Visit   Medications    QUEtiapine (SEROquel) 100 MG tablet     Sig: Take 1 tablet by mouth Every Night.     Dispense:  30 tablet     Refill:  2    gabapentin (NEURONTIN) 800 MG tablet     Sig: Take 1 tablet by mouth 3 (Three) Times a Day.     Dispense:  90 tablet     Refill:  2    busPIRone (BUSPAR) 15 MG tablet     Sig: Take 2 tablets by mouth 2 (Two) Times a Day.     Dispense:  120 tablet     Refill:  2    zolpidem (AMBIEN) 5 MG tablet     Sig: Take 1 tablet by mouth At Night As Needed for Sleep.     Dispense:  30 tablet     Refill:  1         Follow Up   Return in about 3 months (around 2/16/2024).  Patient was given instructions and counseling regarding her condition or for health maintenance advice. Please see specific information pulled into the AVS if appropriate.     TREATMENT PLAN/GOALS: Continue supportive  psychotherapy efforts and medications as indicated. Treatment and medication options discussed during today's visit. Patient acknowledged and verbally consented to continue with current treatment plan and was educated on the importance of compliance with treatment and follow-up appointments.    MEDICATION ISSUES:  Discussed medication options and treatment plan of prescribed medication as well as the risks, benefits, and side effects including potential falls, possible impaired driving and metabolic adversities among others. Patient is agreeable to call the office with any worsening of symptoms or onset of side effects. Patient is agreeable to call 911 or go to the nearest ER should he/she begin having SI/HI.        [facility]  [address] [place] [id number] [phone]    November 16, 2023 15:24 EST

## 2023-11-17 DIAGNOSIS — F41.8 OTHER SPECIFIED ANXIETY DISORDERS: ICD-10-CM

## 2023-11-17 DIAGNOSIS — R79.89 ELEVATED TSH: Primary | ICD-10-CM

## 2023-11-17 LAB
25(OH)D3+25(OH)D2 SERPL-MCNC: 23.9 NG/ML (ref 30–100)
ALBUMIN SERPL-MCNC: 4.5 G/DL (ref 3.5–5.2)
ALBUMIN/GLOB SERPL: 1.9 G/DL
ALP SERPL-CCNC: 72 U/L (ref 39–117)
ALT SERPL-CCNC: 13 U/L (ref 1–33)
AST SERPL-CCNC: 14 U/L (ref 1–32)
BASOPHILS # BLD AUTO: 0.03 10*3/MM3 (ref 0–0.2)
BASOPHILS NFR BLD AUTO: 0.6 % (ref 0–1.5)
BILIRUB SERPL-MCNC: <0.2 MG/DL (ref 0–1.2)
BUN SERPL-MCNC: 16 MG/DL (ref 6–20)
BUN/CREAT SERPL: 17.8 (ref 7–25)
CALCIUM SERPL-MCNC: 9.2 MG/DL (ref 8.6–10.5)
CHLORIDE SERPL-SCNC: 100 MMOL/L (ref 98–107)
CHOLEST SERPL-MCNC: 185 MG/DL (ref 0–200)
CO2 SERPL-SCNC: 30.1 MMOL/L (ref 22–29)
CREAT SERPL-MCNC: 0.9 MG/DL (ref 0.57–1)
EGFRCR SERPLBLD CKD-EPI 2021: 81.5 ML/MIN/1.73
EOSINOPHIL # BLD AUTO: 0.12 10*3/MM3 (ref 0–0.4)
EOSINOPHIL NFR BLD AUTO: 2.3 % (ref 0.3–6.2)
ERYTHROCYTE [DISTWIDTH] IN BLOOD BY AUTOMATED COUNT: 14.9 % (ref 12.3–15.4)
GLOBULIN SER CALC-MCNC: 2.4 GM/DL
GLUCOSE SERPL-MCNC: 82 MG/DL (ref 65–99)
HBA1C MFR BLD: 5.4 % (ref 4.8–5.6)
HCT VFR BLD AUTO: 36.8 % (ref 34–46.6)
HDLC SERPL-MCNC: 42 MG/DL (ref 40–60)
HGB BLD-MCNC: 11.8 G/DL (ref 12–15.9)
IMM GRANULOCYTES # BLD AUTO: 0.01 10*3/MM3 (ref 0–0.05)
IMM GRANULOCYTES NFR BLD AUTO: 0.2 % (ref 0–0.5)
IRON SATN MFR SERPL: 16 % (ref 20–50)
IRON SERPL-MCNC: 68 MCG/DL (ref 37–145)
LDLC SERPL CALC-MCNC: 112 MG/DL (ref 0–100)
LYMPHOCYTES # BLD AUTO: 1.92 10*3/MM3 (ref 0.7–3.1)
LYMPHOCYTES NFR BLD AUTO: 36.3 % (ref 19.6–45.3)
MCH RBC QN AUTO: 27.3 PG (ref 26.6–33)
MCHC RBC AUTO-ENTMCNC: 32.1 G/DL (ref 31.5–35.7)
MCV RBC AUTO: 85.2 FL (ref 79–97)
MONOCYTES # BLD AUTO: 0.5 10*3/MM3 (ref 0.1–0.9)
MONOCYTES NFR BLD AUTO: 9.5 % (ref 5–12)
NEUTROPHILS # BLD AUTO: 2.71 10*3/MM3 (ref 1.7–7)
NEUTROPHILS NFR BLD AUTO: 51.1 % (ref 42.7–76)
NRBC BLD AUTO-RTO: 0 /100 WBC (ref 0–0.2)
PLATELET # BLD AUTO: 406 10*3/MM3 (ref 140–450)
POTASSIUM SERPL-SCNC: 4.4 MMOL/L (ref 3.5–5.2)
PROT SERPL-MCNC: 6.9 G/DL (ref 6–8.5)
RBC # BLD AUTO: 4.32 10*6/MM3 (ref 3.77–5.28)
SODIUM SERPL-SCNC: 137 MMOL/L (ref 136–145)
TIBC SERPL-MCNC: 437 MCG/DL
TRIGL SERPL-MCNC: 175 MG/DL (ref 0–150)
TSH SERPL DL<=0.005 MIU/L-ACNC: 12.6 UIU/ML (ref 0.27–4.2)
UIBC SERPL-MCNC: 369 MCG/DL (ref 112–346)
VIT B12 SERPL-MCNC: 605 PG/ML (ref 211–946)
VLDLC SERPL CALC-MCNC: 31 MG/DL (ref 5–40)
WBC # BLD AUTO: 5.29 10*3/MM3 (ref 3.4–10.8)

## 2023-12-05 DIAGNOSIS — F51.01 PRIMARY INSOMNIA: ICD-10-CM

## 2023-12-05 RX ORDER — ZOLPIDEM TARTRATE 5 MG/1
5 TABLET ORAL NIGHTLY PRN
Qty: 30 TABLET | OUTPATIENT
Start: 2023-12-05

## 2023-12-05 NOTE — TELEPHONE ENCOUNTER
Patient requesting medication be sent to [facility].    Rx Refill Note  Requested Prescriptions     Pending Prescriptions Disp Refills    zolpidem (AMBIEN) 5 MG tablet [Pharmacy Med Name: ZOLPIDEM TARTRATE 5 MG TABLET] 30 tablet      Sig: TAKE ONE TABLET BY MOUTH EVERY NIGHT AT BEDTIME AS NEEDED FOR SLEEP      Last office visit with prescribing clinician: 11/16/2023   Last telemedicine visit with prescribing clinician: 10/20/2023   Next office visit with prescribing clinician: 2/15/2024                         Would you like a call back once the refill request has been completed: [] Yes [] No    If the office needs to give you a call back, can they leave a voicemail: [] Yes [] No    Nadya Pate MA  12/05/23, 12:44 EST

## 2023-12-05 NOTE — TELEPHONE ENCOUNTER
Incoming Refill Request      Medication requested (name and dose): zolpiden     Ph[facility] where [facility] should be sent[facility]    Additional details provided by patient: 1 day    Best call back number: verified    Does the patient have less than a 3 day supply:  [x] Yes  [] No    Serenity López  12/05/23, 11:00 EST

## 2023-12-05 NOTE — TELEPHONE ENCOUNTER
Informed patient that due to Ambien getting picked up at both [facility] and [facility] no more refills will be sent in at this time. Patient has an order at [facility] with 1 refill left. Provider will not be sending anymore Ambien into pharmacy.

## 2023-12-13 DIAGNOSIS — F33.1 MODERATE EPISODE OF RECURRENT MAJOR DEPRESSIVE DISORDER: ICD-10-CM

## 2023-12-13 DIAGNOSIS — F41.1 GENERALIZED ANXIETY DISORDER: ICD-10-CM

## 2023-12-13 DIAGNOSIS — F51.01 PRIMARY INSOMNIA: ICD-10-CM

## 2023-12-13 DIAGNOSIS — F51.5 NIGHTMARES: ICD-10-CM

## 2023-12-13 RX ORDER — DOXEPIN HYDROCHLORIDE 100 MG/1
100 CAPSULE ORAL NIGHTLY
Qty: 30 CAPSULE | Refills: 1 | Status: SHIPPED | OUTPATIENT
Start: 2023-12-13

## 2023-12-18 ENCOUNTER — OFFICE VISIT (OUTPATIENT)
Dept: CARDIOLOGY | Facility: CLINIC | Age: 43
End: 2023-12-18
Payer: MEDICARE

## 2023-12-18 VITALS
OXYGEN SATURATION: 97 % | HEART RATE: 100 BPM | DIASTOLIC BLOOD PRESSURE: 72 MMHG | RESPIRATION RATE: 16 BRPM | WEIGHT: 191 LBS | BODY MASS INDEX: 31.82 KG/M2 | HEIGHT: 65 IN | SYSTOLIC BLOOD PRESSURE: 122 MMHG

## 2023-12-18 DIAGNOSIS — R00.0 SINUS TACHYCARDIA: Primary | ICD-10-CM

## 2023-12-18 PROCEDURE — 99214 OFFICE O/P EST MOD 30 MIN: CPT | Performed by: INTERNAL MEDICINE

## 2023-12-18 RX ORDER — PROPRANOLOL HCL 60 MG
60 CAPSULE, EXTENDED RELEASE 24HR ORAL DAILY
Qty: 90 CAPSULE | Refills: 3 | Status: SHIPPED | OUTPATIENT
Start: 2023-12-18

## 2023-12-18 NOTE — PROGRESS NOTES
"             [facility] Cardiology Office Follow Up Note    Audrey Alonso  [id number]  2023    Primary Care Provider: David Fleming DO    Chief Complaint: Routine follow-up    History of Present Illness:   Mrs. Audrey Alonso is a 43 y.o. female who presents to the Cardiology Clinic for routine follow-up after cardiac testing.  The patient does not have any significant past cardiac history.  She initially presented to cardiology clinic for evaluation of palpitations.  Outpatient cardiac monitor in  showed no significant arrhythmias.  Her average ventricular rate was 98 bpm.  She had 4 symptomatic events corresponding to NSR and mild sinus tachycardia.  Today, the patient reports ongoing episodes of palpitations.  The episodes of the palpitations are typically brief in duration.  She continues to describe the episodes as a sensation of a \"racing heart rate.\"  No associated dizziness or lightheadedness.  No history of presyncopal or syncopal events.  The episodes are currently occurring on a near daily basis.  She does report increased stress and anxiety appear to precipitate the episodes.  No other new complaints.     Past Cardiac Testin. Last Coronary Angio: None  2. Prior Stress Testing: None  3. Last Echo:   1.  Normal left ventricular size and systolic function, LVEF 60-65%.  2.  Normal LV diastolic filling pattern.  3.  Normal right ventricular size and systolic function.  4.  Normal left atrial volume index.  5.  Trace MR and TR.  4. Prior Holter Monitor: 48-hour Holter   1.  The predominant rhythm is sinus rhythm with an average heart rate 98 bpm.  2.  No sustained arrhythmias.  3.  Rare supraventricular ectopy.  4.  Four symptomatic events corresponded to NSR and mild sinus tachycardia.       Review of Systems:   Review of Systems   Constitutional:  Negative for activity change, appetite change, chills, diaphoresis, fatigue, fever, unexpected weight gain and " unexpected weight loss.   Eyes:  Negative for blurred vision and double vision.   Respiratory:  Negative for cough, chest tightness, shortness of breath and wheezing.    Cardiovascular:  Positive for palpitations. Negative for chest pain and leg swelling.   Gastrointestinal:  Negative for abdominal pain, anal bleeding, blood in stool and GERD.   Endocrine: Negative for cold intolerance and heat intolerance.   Genitourinary:  Negative for hematuria.   Neurological:  Negative for dizziness, syncope, weakness and light-headedness.   Hematological:  Does not bruise/bleed easily.   Psychiatric/Behavioral:  Positive for stress. Negative for depressed mood. The patient is nervous/anxious.        I have reviewed and/or updated the patient's past medical, past surgical, family, social history, problem list and allergies as appropriate.     Medications:     Current Outpatient Medications:     acetaminophen (TYLENOL) 500 MG tablet, Take 2 tablets by mouth Every 8 (Eight) Hours., Disp: , Rfl:     albuterol sulfate  (90 Base) MCG/ACT inhaler, Inhale 2 puffs Every 6 (Six) Hours As Needed., Disp: , Rfl:     amphetamine-dextroamphetamine (Adderall) 30 MG tablet, Take 30 mg orally every morning and afternoon, Disp: 60 tablet, Rfl: 0    buPROPion XL (WELLBUTRIN XL) 150 MG 24 hr tablet, TAKE 1 TABLET BY MOUTH EVERY MORNING, Disp: 30 tablet, Rfl: 2    buPROPion XL (Wellbutrin XL) 300 MG 24 hr tablet, Take 1 tablet by mouth Daily. WITH 150 mg, Disp: 30 tablet, Rfl: 2    busPIRone (BUSPAR) 15 MG tablet, Take 2 tablets by mouth 2 (Two) Times a Day., Disp: 120 tablet, Rfl: 2    doxepin (SINEquan) 100 MG capsule, TAKE 1 CAPSULE BY MOUTH EVERY NIGHT, Disp: 30 capsule, Rfl: 1    DULoxetine (CYMBALTA) 60 MG capsule, TAKE 2 CAPSULES BY MOUTH EVERY MORNING, Disp: 60 capsule, Rfl: 2    fluticasone (FLONASE) 50 MCG/ACT nasal spray, 2 sprays into the nostril(s) as directed by provider Daily., Disp: 18.2 g, Rfl: 2    gabapentin (NEURONTIN)  "800 MG tablet, Take 1 tablet by mouth 3 (Three) Times a Day., Disp: 90 tablet, Rfl: 2    methadone (DOLOPHINE) 10 MG tablet, Take 12.5 tablets by mouth Daily Elmira Elaine @ [facility] ([phone]) verified dose as 125 mg daily,, Disp: , Rfl:     methocarbamol (ROBAXIN) 500 MG tablet, Take 1 tablet by mouth., Disp: , Rfl:     ofloxacin (Ocuflox) 0.3 % ophthalmic solution, Apply 1 drop to eye(s) as directed by provider 4 (Four) Times a Day., Disp: 5 mL, Rfl: 0    ondansetron ODT (ZOFRAN-ODT) 4 MG disintegrating tablet, , Disp: , Rfl:     oxyCODONE (ROXICODONE) 5 MG immediate release tablet, Take 1 tablet by mouth Every 6 (Six) Hours As Needed., Disp: , Rfl:     prazosin (MINIPRESS) 5 MG capsule, TAKE 2 CAPSULES BY MOUTH EVERY NIGHT, Disp: 60 capsule, Rfl: 2    QUEtiapine (SEROquel) 100 MG tablet, Take 1 tablet by mouth Every Night., Disp: 30 tablet, Rfl: 2    zolpidem (AMBIEN) 5 MG tablet, Take 1 tablet by mouth At Night As Needed for Sleep., Disp: 30 tablet, Rfl: 1    propranolol LA (Inderal LA) 60 MG 24 hr capsule, Take 1 capsule by mouth Daily., Disp: 90 capsule, Rfl: 3    Physical Exam:  Vital Signs:   Vitals:    12/18/23 1315   BP: 122/72   BP Location: Left arm   Patient Position: Sitting   Pulse: 100   Resp: 16   SpO2: 97%   Weight: 86.6 kg (191 lb)   Height: 165.1 cm (65\")       Physical Exam  Constitutional:       General: She is not in acute distress.     Appearance: Normal appearance. She is well-developed. She is not diaphoretic.   HENT:      Head: Normocephalic and atraumatic.   Eyes:      General: No scleral icterus.     Pupils: Pupils are equal, round, and reactive to light.   Neck:      Trachea: No tracheal deviation.   Cardiovascular:      Rate and Rhythm: Normal rate and regular rhythm.      Heart sounds: Normal heart sounds. No murmur heard.     No friction rub. No gallop.      Comments: Normal JVD.  Pulmonary:      Effort: Pulmonary effort is normal. No respiratory " distress.      Breath sounds: Normal breath sounds. No stridor. No wheezing or rales.   Chest:      Chest wall: No tenderness.   Abdominal:      General: Bowel sounds are normal. There is no distension.      Palpations: Abdomen is soft.      Tenderness: There is no abdominal tenderness. There is no guarding or rebound.   Musculoskeletal:         General: No swelling. Normal range of motion.      Cervical back: Neck supple. No tenderness.   Lymphadenopathy:      Cervical: No cervical adenopathy.   Skin:     General: Skin is warm and dry.      Findings: No erythema.   Neurological:      General: No focal deficit present.      Mental Status: She is alert and oriented to person, place, and time.   Psychiatric:         Mood and Affect: Mood normal.         Behavior: Behavior normal.         Results Review:   I reviewed the patient's new clinical results.        Assessment / Plan:     1. Sinus tachycardia   -- Outpatient cardiac monitoring showed an average ventricular rate 98 bpm, occasional correlation of palpitations to mild sinus tachycardia  --Suspect sinus tachycardia likely being driven by underlying stress/anxiety  -- No evidence of hyperthyroidism on recent labs (PCP evaluating possible hypothyroidism)  -- Will start a trial of low-dose propranolol for rate control  -- Follow-up in 6 months to reassess symptoms    Follow Up:   Return in about 6 months (around 6/18/2024).      Thank you for allowing me to participate in the care of your patient. Please to not hesitate to contact me with additional questions or concerns.     TIKA Vick MD  Interventional Cardiology   12/18/2023  13:22 EST

## 2024-01-18 ENCOUNTER — TELEPHONE (OUTPATIENT)
Dept: CARDIOLOGY | Facility: CLINIC | Age: 44
End: 2024-01-18
Payer: MEDICARE

## 2024-01-18 NOTE — TELEPHONE ENCOUNTER
Patient had a visit with Dr. Vick on 12/18. She was prescribed Propranolol.  Patient states that it caused here to have anxiety and she stopped taking it.  She wanted to know if she needs to be seen or if there is something else she may be able take?  Advise.

## 2024-01-18 NOTE — TELEPHONE ENCOUNTER
She can discontinue this medication, but she should follow up with PCP for management of anxiety.  Please let me know if she would like a referral to behavioral health.

## 2024-02-11 DIAGNOSIS — F41.1 GENERALIZED ANXIETY DISORDER: ICD-10-CM

## 2024-02-13 DIAGNOSIS — F51.01 PRIMARY INSOMNIA: ICD-10-CM

## 2024-02-13 DIAGNOSIS — F33.1 MODERATE EPISODE OF RECURRENT MAJOR DEPRESSIVE DISORDER: ICD-10-CM

## 2024-02-13 DIAGNOSIS — F51.5 NIGHTMARES: ICD-10-CM

## 2024-02-13 DIAGNOSIS — F41.1 GENERALIZED ANXIETY DISORDER: ICD-10-CM

## 2024-02-13 RX ORDER — BUPROPION HYDROCHLORIDE 300 MG/1
TABLET ORAL
Qty: 30 TABLET | Refills: 0 | Status: SHIPPED | OUTPATIENT
Start: 2024-02-13 | End: 2024-02-15 | Stop reason: SDUPTHER

## 2024-02-13 RX ORDER — GABAPENTIN 800 MG/1
800 TABLET ORAL 3 TIMES DAILY
Qty: 90 TABLET | Refills: 0 | Status: SHIPPED | OUTPATIENT
Start: 2024-02-13 | End: 2024-02-15 | Stop reason: SDUPTHER

## 2024-02-15 ENCOUNTER — OFFICE VISIT (OUTPATIENT)
Dept: BEHAVIORAL HEALTH | Facility: CLINIC | Age: 44
End: 2024-02-15
Payer: MEDICARE

## 2024-02-15 VITALS
HEIGHT: 65 IN | DIASTOLIC BLOOD PRESSURE: 90 MMHG | WEIGHT: 179 LBS | SYSTOLIC BLOOD PRESSURE: 152 MMHG | BODY MASS INDEX: 29.82 KG/M2

## 2024-02-15 DIAGNOSIS — F51.5 NIGHTMARES: ICD-10-CM

## 2024-02-15 DIAGNOSIS — F90.2 ATTENTION DEFICIT HYPERACTIVITY DISORDER (ADHD), COMBINED TYPE: Primary | ICD-10-CM

## 2024-02-15 DIAGNOSIS — F41.1 GENERALIZED ANXIETY DISORDER: ICD-10-CM

## 2024-02-15 DIAGNOSIS — F33.1 MODERATE EPISODE OF RECURRENT MAJOR DEPRESSIVE DISORDER: ICD-10-CM

## 2024-02-15 DIAGNOSIS — F51.01 PRIMARY INSOMNIA: ICD-10-CM

## 2024-02-15 PROCEDURE — 1159F MED LIST DOCD IN RCRD: CPT

## 2024-02-15 PROCEDURE — 99214 OFFICE O/P EST MOD 30 MIN: CPT

## 2024-02-15 PROCEDURE — 1160F RVW MEDS BY RX/DR IN RCRD: CPT

## 2024-02-15 RX ORDER — BUSPIRONE HYDROCHLORIDE 15 MG/1
30 TABLET ORAL 2 TIMES DAILY
Qty: 120 TABLET | Refills: 2 | OUTPATIENT
Start: 2024-02-15

## 2024-02-15 RX ORDER — GABAPENTIN 800 MG/1
800 TABLET ORAL 3 TIMES DAILY
Qty: 90 TABLET | Refills: 0 | Status: SHIPPED | OUTPATIENT
Start: 2024-02-15

## 2024-02-15 RX ORDER — DOXEPIN HYDROCHLORIDE 100 MG/1
100 CAPSULE ORAL NIGHTLY
Qty: 30 CAPSULE | Refills: 1 | Status: SHIPPED | OUTPATIENT
Start: 2024-02-15

## 2024-02-15 RX ORDER — DULOXETIN HYDROCHLORIDE 60 MG/1
60 CAPSULE, DELAYED RELEASE ORAL EVERY MORNING
Qty: 30 CAPSULE | Refills: 2 | Status: SHIPPED | OUTPATIENT
Start: 2024-02-15

## 2024-02-15 RX ORDER — BUPROPION HYDROCHLORIDE 300 MG/1
300 TABLET ORAL EVERY MORNING
Qty: 30 TABLET | Refills: 2 | Status: SHIPPED | OUTPATIENT
Start: 2024-02-15

## 2024-02-15 RX ORDER — QUETIAPINE FUMARATE 100 MG/1
100 TABLET, FILM COATED ORAL NIGHTLY
Qty: 30 TABLET | Refills: 2 | Status: SHIPPED | OUTPATIENT
Start: 2024-02-15

## 2024-02-15 RX ORDER — PRAZOSIN HYDROCHLORIDE 5 MG/1
10 CAPSULE ORAL NIGHTLY
Qty: 60 CAPSULE | Refills: 2 | Status: SHIPPED | OUTPATIENT
Start: 2024-02-15

## 2024-02-15 RX ORDER — QUETIAPINE FUMARATE 100 MG/1
100 TABLET, FILM COATED ORAL NIGHTLY
Qty: 30 TABLET | Refills: 2 | OUTPATIENT
Start: 2024-02-15

## 2024-02-15 RX ORDER — BUSPIRONE HYDROCHLORIDE 15 MG/1
30 TABLET ORAL 2 TIMES DAILY
Qty: 120 TABLET | Refills: 2 | Status: SHIPPED | OUTPATIENT
Start: 2024-02-15

## 2024-02-15 RX ORDER — ZOLPIDEM TARTRATE 5 MG/1
5 TABLET ORAL NIGHTLY PRN
Qty: 30 TABLET | Refills: 2 | Status: SHIPPED | OUTPATIENT
Start: 2024-02-15

## 2024-02-15 RX ORDER — DEXTROAMPHETAMINE SACCHARATE, AMPHETAMINE ASPARTATE, DEXTROAMPHETAMINE SULFATE AND AMPHETAMINE SULFATE 7.5; 7.5; 7.5; 7.5 MG/1; MG/1; MG/1; MG/1
TABLET ORAL
Qty: 60 TABLET | Refills: 0 | Status: SHIPPED | OUTPATIENT
Start: 2024-02-15

## 2024-02-16 DIAGNOSIS — J30.2 SEASONAL ALLERGIES: ICD-10-CM

## 2024-02-16 RX ORDER — FLUTICASONE PROPIONATE 50 MCG
SPRAY, SUSPENSION (ML) NASAL
Qty: 16 G | Refills: 2 | Status: SHIPPED | OUTPATIENT
Start: 2024-02-16

## 2024-04-09 DIAGNOSIS — F41.1 GENERALIZED ANXIETY DISORDER: ICD-10-CM

## 2024-04-09 RX ORDER — GABAPENTIN 800 MG/1
800 TABLET ORAL 3 TIMES DAILY
Qty: 90 TABLET | Refills: 0 | Status: SHIPPED | OUTPATIENT
Start: 2024-04-09

## 2024-04-17 DIAGNOSIS — F41.1 GENERALIZED ANXIETY DISORDER: ICD-10-CM

## 2024-04-17 DIAGNOSIS — F51.5 NIGHTMARES: ICD-10-CM

## 2024-04-17 DIAGNOSIS — F51.01 PRIMARY INSOMNIA: ICD-10-CM

## 2024-04-17 DIAGNOSIS — F33.1 MODERATE EPISODE OF RECURRENT MAJOR DEPRESSIVE DISORDER: ICD-10-CM

## 2024-04-17 RX ORDER — DOXEPIN HYDROCHLORIDE 100 MG/1
100 CAPSULE ORAL NIGHTLY
Qty: 30 CAPSULE | Refills: 1 | Status: SHIPPED | OUTPATIENT
Start: 2024-04-17

## 2024-04-17 RX ORDER — ALBUTEROL SULFATE 90 UG/1
2 AEROSOL, METERED RESPIRATORY (INHALATION) EVERY 6 HOURS PRN
Qty: 18 G | Refills: 1 | Status: SHIPPED | OUTPATIENT
Start: 2024-04-17

## 2024-05-07 DIAGNOSIS — F41.1 GENERALIZED ANXIETY DISORDER: ICD-10-CM

## 2024-05-07 DIAGNOSIS — F51.5 NIGHTMARES: ICD-10-CM

## 2024-05-07 DIAGNOSIS — F51.01 PRIMARY INSOMNIA: ICD-10-CM

## 2024-05-07 RX ORDER — QUETIAPINE FUMARATE 100 MG/1
100 TABLET, FILM COATED ORAL NIGHTLY
Qty: 30 TABLET | Refills: 2 | Status: SHIPPED | OUTPATIENT
Start: 2024-05-07

## 2024-05-07 RX ORDER — GABAPENTIN 800 MG/1
800 TABLET ORAL 3 TIMES DAILY
Qty: 90 TABLET | Refills: 0 | Status: SHIPPED | OUTPATIENT
Start: 2024-05-07

## 2024-05-15 ENCOUNTER — OFFICE VISIT (OUTPATIENT)
Dept: BEHAVIORAL HEALTH | Facility: CLINIC | Age: 44
End: 2024-05-15
Payer: MEDICARE

## 2024-05-15 VITALS
BODY MASS INDEX: 29.99 KG/M2 | WEIGHT: 180 LBS | OXYGEN SATURATION: 98 % | HEIGHT: 65 IN | HEART RATE: 93 BPM | SYSTOLIC BLOOD PRESSURE: 128 MMHG | DIASTOLIC BLOOD PRESSURE: 72 MMHG

## 2024-05-15 DIAGNOSIS — F33.1 MODERATE EPISODE OF RECURRENT MAJOR DEPRESSIVE DISORDER: ICD-10-CM

## 2024-05-15 DIAGNOSIS — F51.01 PRIMARY INSOMNIA: ICD-10-CM

## 2024-05-15 DIAGNOSIS — F90.2 ATTENTION DEFICIT HYPERACTIVITY DISORDER (ADHD), COMBINED TYPE: Primary | ICD-10-CM

## 2024-05-15 DIAGNOSIS — F51.5 NIGHTMARES: ICD-10-CM

## 2024-05-15 DIAGNOSIS — F41.1 GENERALIZED ANXIETY DISORDER: ICD-10-CM

## 2024-05-15 PROCEDURE — 1160F RVW MEDS BY RX/DR IN RCRD: CPT

## 2024-05-15 PROCEDURE — 99214 OFFICE O/P EST MOD 30 MIN: CPT

## 2024-05-15 PROCEDURE — 1159F MED LIST DOCD IN RCRD: CPT

## 2024-05-15 RX ORDER — PRAZOSIN HYDROCHLORIDE 5 MG/1
10 CAPSULE ORAL NIGHTLY
Qty: 60 CAPSULE | Refills: 2 | Status: SHIPPED | OUTPATIENT
Start: 2024-05-15

## 2024-05-15 RX ORDER — DEXTROAMPHETAMINE SACCHARATE, AMPHETAMINE ASPARTATE, DEXTROAMPHETAMINE SULFATE AND AMPHETAMINE SULFATE 7.5; 7.5; 7.5; 7.5 MG/1; MG/1; MG/1; MG/1
TABLET ORAL
Qty: 60 TABLET | Refills: 0 | Status: SHIPPED | OUTPATIENT
Start: 2024-05-15

## 2024-05-15 RX ORDER — BUPROPION HYDROCHLORIDE 300 MG/1
300 TABLET ORAL EVERY MORNING
Qty: 30 TABLET | Refills: 2 | Status: SHIPPED | OUTPATIENT
Start: 2024-05-15

## 2024-05-15 RX ORDER — GABAPENTIN 800 MG/1
800 TABLET ORAL 3 TIMES DAILY
Qty: 90 TABLET | Refills: 2 | Status: SHIPPED | OUTPATIENT
Start: 2024-05-15

## 2024-05-15 RX ORDER — DOXEPIN HYDROCHLORIDE 100 MG/1
100 CAPSULE ORAL NIGHTLY
Qty: 30 CAPSULE | Refills: 2 | Status: SHIPPED | OUTPATIENT
Start: 2024-05-15

## 2024-05-15 RX ORDER — ZOLPIDEM TARTRATE 5 MG/1
5 TABLET ORAL NIGHTLY PRN
Qty: 30 TABLET | Refills: 2 | Status: SHIPPED | OUTPATIENT
Start: 2024-05-15

## 2024-05-15 RX ORDER — DULOXETIN HYDROCHLORIDE 60 MG/1
60 CAPSULE, DELAYED RELEASE ORAL EVERY MORNING
Qty: 30 CAPSULE | Refills: 2 | Status: SHIPPED | OUTPATIENT
Start: 2024-05-15

## 2024-05-15 RX ORDER — BUSPIRONE HYDROCHLORIDE 15 MG/1
30 TABLET ORAL 2 TIMES DAILY
Qty: 120 TABLET | Refills: 2 | Status: SHIPPED | OUTPATIENT
Start: 2024-05-15

## 2024-05-15 RX ORDER — QUETIAPINE FUMARATE 100 MG/1
100 TABLET, FILM COATED ORAL NIGHTLY
Qty: 30 TABLET | Refills: 2 | Status: SHIPPED | OUTPATIENT
Start: 2024-05-15

## 2024-05-15 NOTE — PROGRESS NOTES
Follow Up Office Visit    Patient Name: Audrey Alonso  : 1980   MRN: [id number]   Care Team: Patient Care Team:  David Fleming DO as PCP - General (Internal Medicine)  Nathalia Anne APRN as Nurse Practitioner (Behavioral Health)         Chief Complaint:    Chief Complaint   Patient presents with    ADHD    Anxiety    Depression    Sleeping Problem    Med Management       History of Present Illness: Audrey Alonso is a 44 y.o. female who is here today for a medication management follow up. Patient reports that overall medication continues to be effective. She feels that her focus and concentration are doing good and her mood and anxiety have been managed well. She states that for the most part she is sleeping well. She endorses some situational stressors and feels like some days are better than others. However, she feels that she is managing well. She did not see the need to make any changes and did not have any medication concerns at today's visit. She denies SI/HI today.     The following portion of the patient's history were reviewed and updated appropriately: allergies, current and past medications, family history, medical history and social history. SACHIN reviewed and appropriate.   Subjective   Review of Systems:    Review of Systems   Respiratory: Negative.     Cardiovascular: Negative.  Negative for chest pain and palpitations.   Neurological: Negative.    Psychiatric/Behavioral:  Positive for stress. The patient is nervous/anxious.    All other systems reviewed and are negative.      Current Medications:   Current Outpatient Medications   Medication Sig Dispense Refill    acetaminophen (TYLENOL) 500 MG tablet Take 2 tablets by mouth Every 8 (Eight) Hours.      albuterol sulfate  (90 Base) MCG/ACT inhaler Inhale 2 puffs Every 6 (Six) Hours As Needed for Wheezing. 18 g 1    amphetamine-dextroamphetamine (Adderall) 30 MG tablet Take 30 mg orally every morning and afternoon  60 tablet 0    buPROPion XL (WELLBUTRIN XL) 300 MG 24 hr tablet Take 1 tablet by mouth Every Morning. 30 tablet 2    busPIRone (BUSPAR) 15 MG tablet Take 2 tablets by mouth 2 (Two) Times a Day. 120 tablet 2    doxepin (SINEquan) 100 MG capsule Take 1 capsule by mouth Every Night. 30 capsule 2    DULoxetine (CYMBALTA) 60 MG capsule Take 1 capsule by mouth Every Morning. 30 capsule 2    fluticasone (FLONASE) 50 MCG/ACT nasal spray INSERT 2 SPRAYS INTO THE NOSTRIL DAILY 16 g 2    gabapentin (NEURONTIN) 800 MG tablet Take 1 tablet by mouth 3 (Three) Times a Day. 90 tablet 2    methadone (DOLOPHINE) 10 MG tablet Take 12.5 tablets by mouth Daily Elmira Elaine @ [facility] ([phone]) verified dose as 125 mg daily,      methocarbamol (ROBAXIN) 500 MG tablet Take 1 tablet by mouth.      ofloxacin (Ocuflox) 0.3 % ophthalmic solution Apply 1 drop to eye(s) as directed by provider 4 (Four) Times a Day. 5 mL 0    ondansetron ODT (ZOFRAN-ODT) 4 MG disintegrating tablet       oxyCODONE (ROXICODONE) 5 MG immediate release tablet Take 1 tablet by mouth Every 6 (Six) Hours As Needed.      prazosin (MINIPRESS) 5 MG capsule Take 2 capsules by mouth Every Night. 60 capsule 2    QUEtiapine (SEROquel) 100 MG tablet Take 1 tablet by mouth Every Night. 30 tablet 2    zolpidem (AMBIEN) 5 MG tablet Take 1 tablet by mouth At Night As Needed for Sleep. 30 tablet 2     No current facility-administered medications for this visit.       Mental Status Exam:   Hygiene:   good  Cooperation:  Cooperative  Eye Contact:  Good  Psychomotor Behavior:  Appropriate  Affect:  Appropriate  Mood: normal  Speech:  Normal  Thought Process:  Goal directed and Linear  Thought Content:  Normal and Mood congruent  Suicidal:  None  Homicidal:  None  Hallucinations:  None  Delusion:  None  Memory:  Intact  Orientation:  Person, Place, Time, and Situation  Reliability:  good  Insight:  Good  Judgement:  Good  Impulse Control:   "Good  Physical/Medical Issues:  Yes see chart       Objective   Vital Signs:   /72   Pulse 93   Ht 165.1 cm (65\")   Wt 81.6 kg (180 lb)   SpO2 98%   BMI 29.95 kg/m²       Assessment / Plan    Diagnoses and all orders for this visit:    1. Attention deficit hyperactivity disorder (ADHD), combined type (Primary)  -     amphetamine-dextroamphetamine (Adderall) 30 MG tablet; Take 30 mg orally every morning and afternoon  Dispense: 60 tablet; Refill: 0    2. Primary insomnia  -     QUEtiapine (SEROquel) 100 MG tablet; Take 1 tablet by mouth Every Night.  Dispense: 30 tablet; Refill: 2  -     doxepin (SINEquan) 100 MG capsule; Take 1 capsule by mouth Every Night.  Dispense: 30 capsule; Refill: 2  -     zolpidem (AMBIEN) 5 MG tablet; Take 1 tablet by mouth At Night As Needed for Sleep.  Dispense: 30 tablet; Refill: 2    3. Nightmares  -     QUEtiapine (SEROquel) 100 MG tablet; Take 1 tablet by mouth Every Night.  Dispense: 30 tablet; Refill: 2  -     doxepin (SINEquan) 100 MG capsule; Take 1 capsule by mouth Every Night.  Dispense: 30 capsule; Refill: 2  -     prazosin (MINIPRESS) 5 MG capsule; Take 2 capsules by mouth Every Night.  Dispense: 60 capsule; Refill: 2    4. Generalized anxiety disorder  -     gabapentin (NEURONTIN) 800 MG tablet; Take 1 tablet by mouth 3 (Three) Times a Day.  Dispense: 90 tablet; Refill: 2  -     busPIRone (BUSPAR) 15 MG tablet; Take 2 tablets by mouth 2 (Two) Times a Day.  Dispense: 120 tablet; Refill: 2  -     doxepin (SINEquan) 100 MG capsule; Take 1 capsule by mouth Every Night.  Dispense: 30 capsule; Refill: 2  -     DULoxetine (CYMBALTA) 60 MG capsule; Take 1 capsule by mouth Every Morning.  Dispense: 30 capsule; Refill: 2    5. Moderate episode of recurrent major depressive disorder  -     buPROPion XL (WELLBUTRIN XL) 300 MG 24 hr tablet; Take 1 tablet by mouth Every Morning.  Dispense: 30 tablet; Refill: 2  -     doxepin (SINEquan) 100 MG capsule; Take 1 capsule by mouth " Every Night.  Dispense: 30 capsule; Refill: 2  -     DULoxetine (CYMBALTA) 60 MG capsule; Take 1 capsule by mouth Every Morning.  Dispense: 30 capsule; Refill: 2       Patient appears to be doing well on current medication. No issues reported and no indication for change. Will continue medication as ordered.     History and physical exam exhibit continued safe and appropriate use of controlled substance.    As part of patient's treatment plan I am prescribing a controlled substance.  The patient has been made aware of the appropriate use of such medications, including potential risk of somnolence, limited ability to drive and/or work safely, and potential for dependence and/or overdose.  It has also been made clear that these medications are for use by this patient only, without concomitant use of alcohol or other substances, unless prescribed.    Patient has completed a prescribing agreement detailing terms of continued prescribing of controlled substances, including monitoring SACHIN reports, urine drug screening, and pill counts if necessary.  Patient is aware that inappropriate use will result in cessation of prescribing such medications.      AIMS  Facial and Oral Movements  Muscles of Facial Expression: None, normal  Lips and Perioral Area: None, normal  Jaw: None, normal  Tongue: None, normal  Extremity Movements  Upper (arms, wrists, hands, fingers): None, normal  Lower (legs, knees, ankles, toes): None, normal  Trunk Movements  Neck, shoulders, hips: None, normal  Overall Severity  Severity of abnormal movements (max 4): 0  Incapacitation due to abnormal movements: None, normal  Patient's awareness of abnormal movements (rate only patient's report): Aware, no distress  Dental Status  Current problems with teeth and/or dentures?: No  Does patient usually wear dentures?: No      PHQ-9  PHQ-2/PHQ-9: Depression Screening  Little Interest or Pleasure in Doing Things: 1-->several days  Feeling Down, Depressed or  Hopeless: 2-->more than half the days  PHQ-2 Total Score: 3  Trouble Falling or Staying Asleep, or Sleeping Too Much: 2-->more than half the days  Feeling Tired or Having Little Energy: 2-->more than half the days  Poor Appetite or Overeating: 3-->nearly every day  Feeling Bad about Yourself - or that You are a Failure or Have Let Yourself or Your Family Down: 1-->several days  Trouble Concentrating on Things, Such as Reading the Newspaper or Watching Television: 1-->several days  Moving or Speaking So Slowly that Other People Could Have Noticed? Or the Opposite - Being So Fidgety: 1-->several days  Thoughts that You Would be Better Off Dead or of Hurting Yourself in Some Way: 0-->not at all  PHQ-9: Brief Depression Severity Measure Score: 13  If You Checked Off Any Problems, How Difficult Have These Problems Made It For You to Do Your Work, Take Care of Things at Home, or Get Along with Other People?: not difficult at all      PHQ-9 Score:   PHQ-9 Total Score: 13    Depression Screening:  Patient screened positive for depression based on a PHQ-9 score of 13 on 5/15/2024. Follow-up recommendations include: Prescribed antidepressant medication treatment and Suicide Risk Assessment performed.        TAMANNA-7  Over the last two weeks, how often have you been bothered by the following problems?  Feeling nervous, anxious or on edge: Nearly every day  Not being able to stop or control worrying: More than half the days  Worrying too much about different things: More than half the days  Trouble Relaxing: More than half the days  Being so restless that it is hard to sit still: Several days  Becoming easily annoyed or irritable: More than half the days  Feeling afraid as if something awful might happen: Several days  TAMANNA 7 Total Score: 13  If you checked any problems, how difficult have these problems made it for you to do your work, take care of things at home, or get along with other people: Somewhat  difficult      ADHD  Screening for Adults With ADHD - (1-6)  1. How often do you have trouble wrapping up the final details of a project, once the challenging parts have been done?: Often  2. How often do you have difficulty getting things in order when you have to do a task that requires organization?: Often  3. How often do you have problems remembering appointments or obligations : Rarely  4. When you have a task that requires a lot of thought, how often do you avoid or delay getting started ?: Very Often  5. How often do you fidget or squirm with your hands or feet when you have to sit down for a long time?: Often  6. How often do you feel overly active and compelled to do things, like you were driven by a motor?: Rarely  7. How often do you make careless mistakes when you have to work on a boring or difficult project?: Sometimes  8. How often do have difficulty keeping your attention when you are doing boring or repetitive work?: Often  9. How often do you have difficulty concentrating on what people say to you, even when they are speaking to you: Sometimes  10.How often do you misplace or have difficulty finding things at home or at work?: Sometimes  11.How often are you distracted by activity or noise around you?: Sometimes  12.How often do you leave your seat in meetings or other situations in which you are expected to remain seated?: Rarely  13.How often do you feel restless or fidgety?: Sometimes  14.How often do you have difficulty unwinding and relaxing when you have time to yourself?: Rarely  15.How often do you find yourself talking too much when you are in social situations?: Sometimes  16.When you’re in a conversation, how often do you find yourself finishing the sentences of the people you are talking to, before they can finish them themselves?: Rarely  17.How often do you have difficulty waiting your turn in situations when turn taking is required?: Rarely  18.How often do you interrupt others when  they are busy?: Rarely    A psychological evaluation was conducted in order to assess past and current level of functioning. Areas assessed included, but were not limited to: perception of social support, perception of ability to face and deal with challenges in life (positive functioning), anxiety symptoms, depressive symptoms, perspective on beliefs/belief system, coping skills for stress, intelligence level,  Therapeutic rapport was established. Interventions conducted today were geared towards incorporating medication management along with support for continued therapy. Education was also provided as to the med management with this provider and what to expect in subsequent sessions.      We discussed risks, benefits, goals and side effects of the above medication and the patient was agreeable with the plan. Patient was educated on the importance of compliance with treatment and follow-up appointments. Patient is aware to contact the [facility] with any worsening of symptoms. To call for questions or concerns and return early if necessary. Patent is agreeable to go to the Emergency Department or call 911 should they begin SI/HI.    MEDS ORDERED DURING VISIT:  New Medications Ordered This Visit   Medications    QUEtiapine (SEROquel) 100 MG tablet     Sig: Take 1 tablet by mouth Every Night.     Dispense:  30 tablet     Refill:  2    gabapentin (NEURONTIN) 800 MG tablet     Sig: Take 1 tablet by mouth 3 (Three) Times a Day.     Dispense:  90 tablet     Refill:  2    busPIRone (BUSPAR) 15 MG tablet     Sig: Take 2 tablets by mouth 2 (Two) Times a Day.     Dispense:  120 tablet     Refill:  2    buPROPion XL (WELLBUTRIN XL) 300 MG 24 hr tablet     Sig: Take 1 tablet by mouth Every Morning.     Dispense:  30 tablet     Refill:  2    amphetamine-dextroamphetamine (Adderall) 30 MG tablet     Sig: Take 30 mg orally every morning and afternoon     Dispense:  60 tablet     Refill:  0     Take 30 mg orally every morning  and afternoon.    doxepin (SINEquan) 100 MG capsule     Sig: Take 1 capsule by mouth Every Night.     Dispense:  30 capsule     Refill:  2    DULoxetine (CYMBALTA) 60 MG capsule     Sig: Take 1 capsule by mouth Every Morning.     Dispense:  30 capsule     Refill:  2    prazosin (MINIPRESS) 5 MG capsule     Sig: Take 2 capsules by mouth Every Night.     Dispense:  60 capsule     Refill:  2    zolpidem (AMBIEN) 5 MG tablet     Sig: Take 1 tablet by mouth At Night As Needed for Sleep.     Dispense:  30 tablet     Refill:  2         Follow Up   Return in about 3 months (around 8/15/2024).  Patient was given instructions and counseling regarding her condition or for health maintenance advice. Please see specific information pulled into the AVS if appropriate.     TREATMENT PLAN/GOALS: Continue supportive psychotherapy efforts and medications as indicated. Treatment and medication options discussed during today's visit. Patient acknowledged and verbally consented to continue with current treatment plan and was educated on the importance of compliance with treatment and follow-up appointments.    MEDICATION ISSUES:  Discussed medication options and treatment plan of prescribed medication as well as the risks, benefits, and side effects including potential falls, possible impaired driving and metabolic adversities among others. Patient is agreeable to call the office with any worsening of symptoms or onset of side effects. Patient is agreeable to call 911 or go to the nearest ER should he/she begin having SI/HI.        [facility]  [address] [place] [address] [place] [address]  [phone]    May 15, 2024 10:34 EDT

## 2024-06-17 ENCOUNTER — OFFICE VISIT (OUTPATIENT)
Dept: CARDIOLOGY | Facility: CLINIC | Age: 44
End: 2024-06-17
Payer: MEDICARE

## 2024-06-17 VITALS
SYSTOLIC BLOOD PRESSURE: 108 MMHG | HEART RATE: 96 BPM | BODY MASS INDEX: 29.49 KG/M2 | WEIGHT: 177 LBS | OXYGEN SATURATION: 96 % | DIASTOLIC BLOOD PRESSURE: 72 MMHG | RESPIRATION RATE: 19 BRPM | HEIGHT: 65 IN

## 2024-06-17 DIAGNOSIS — R00.0 SINUS TACHYCARDIA: Primary | ICD-10-CM

## 2024-06-17 PROCEDURE — 99213 OFFICE O/P EST LOW 20 MIN: CPT | Performed by: INTERNAL MEDICINE

## 2024-06-17 NOTE — PROGRESS NOTES
[facility] Cardiology Office Follow Up Note    Audrey Alonso  [id number]  2024    Primary Care Provider: David Fleming DO    Chief Complaint: Routine follow-up    History of Present Illness:   Mrs. Audrey Alonso is a 44 y.o. female who presents to the Cardiology Clinic for routine follow-up.  The patient does not have any significant past cardiac history.  She initially presented for evaluation of palpitations.  Outpatient cardiac monitor in  showed no significant arrhythmias.  Her average ventricular rate was 98 bpm.  She had 4 symptomatic events corresponding to NSR and mild sinus tachycardia.  Since her last appointment, the patient discontinued propranolol due to difficulty sleeping.  While on propranolol, she did not have any significant change in her palpitations.  Her palpitations currently remain occasional and do not limit daily activities.  No associated dizziness or lightheadedness.  No presyncopal or syncopal events.  No other specific complaints today.     Past Cardiac Testin. Last Coronary Angio: None  2. Prior Stress Testing: None  3. Last Echo:   1.  Normal left ventricular size and systolic function, LVEF 60-65%.  2.  Normal LV diastolic filling pattern.  3.  Normal right ventricular size and systolic function.  4.  Normal left atrial volume index.  5.  Trace MR and TR.  4. Prior Holter Monitor: 48-hour Holter   1.  The predominant rhythm is sinus rhythm with an average heart rate 98 bpm.  2.  No sustained arrhythmias.  3.  Rare supraventricular ectopy.  4.  Four symptomatic events corresponded to NSR and mild sinus tachycardia.       Review of Systems:   Review of Systems   Constitutional:  Negative for activity change, appetite change, chills, diaphoresis, fatigue, fever, unexpected weight gain and unexpected weight loss.   Eyes:  Negative for blurred vision and double vision.   Respiratory:  Negative for cough, chest tightness, shortness  of breath and wheezing.    Cardiovascular:  Positive for palpitations. Negative for chest pain and leg swelling.   Gastrointestinal:  Negative for abdominal pain, anal bleeding, blood in stool and GERD.   Endocrine: Negative for cold intolerance and heat intolerance.   Genitourinary:  Negative for hematuria.   Neurological:  Negative for dizziness, syncope, weakness and light-headedness.   Hematological:  Does not bruise/bleed easily.   Psychiatric/Behavioral:  Negative for depressed mood and stress. The patient is not nervous/anxious.        I have reviewed and/or updated the patient's past medical, past surgical, family, social history, problem list and allergies as appropriate.     Medications:     Current Outpatient Medications:     acetaminophen (TYLENOL) 500 MG tablet, Take 2 tablets by mouth Every 8 (Eight) Hours., Disp: , Rfl:     albuterol sulfate  (90 Base) MCG/ACT inhaler, Inhale 2 puffs Every 6 (Six) Hours As Needed for Wheezing., Disp: 18 g, Rfl: 1    amphetamine-dextroamphetamine (Adderall) 30 MG tablet, Take 30 mg orally every morning and afternoon, Disp: 60 tablet, Rfl: 0    buPROPion XL (WELLBUTRIN XL) 300 MG 24 hr tablet, Take 1 tablet by mouth Every Morning., Disp: 30 tablet, Rfl: 2    busPIRone (BUSPAR) 15 MG tablet, Take 2 tablets by mouth 2 (Two) Times a Day., Disp: 120 tablet, Rfl: 2    doxepin (SINEquan) 100 MG capsule, Take 1 capsule by mouth Every Night., Disp: 30 capsule, Rfl: 2    DULoxetine (CYMBALTA) 60 MG capsule, Take 1 capsule by mouth Every Morning., Disp: 30 capsule, Rfl: 2    fluticasone (FLONASE) 50 MCG/ACT nasal spray, INSERT 2 SPRAYS INTO THE NOSTRIL DAILY, Disp: 16 g, Rfl: 2    gabapentin (NEURONTIN) 800 MG tablet, Take 1 tablet by mouth 3 (Three) Times a Day., Disp: 90 tablet, Rfl: 2    methadone (DOLOPHINE) 10 MG tablet, Take 12.5 tablets by mouth Daily Elmira Elaine @ [facility] ([phone]) verified dose as 125 mg daily,, Disp: , Rfl:      "ondansetron ODT (ZOFRAN-ODT) 4 MG disintegrating tablet, , Disp: , Rfl:     prazosin (MINIPRESS) 5 MG capsule, Take 2 capsules by mouth Every Night., Disp: 60 capsule, Rfl: 2    QUEtiapine (SEROquel) 100 MG tablet, Take 1 tablet by mouth Every Night., Disp: 30 tablet, Rfl: 2    zolpidem (AMBIEN) 5 MG tablet, Take 1 tablet by mouth At Night As Needed for Sleep., Disp: 30 tablet, Rfl: 2    methocarbamol (ROBAXIN) 500 MG tablet, Take 1 tablet by mouth. (Patient not taking: Reported on 6/17/2024), Disp: , Rfl:     ofloxacin (Ocuflox) 0.3 % ophthalmic solution, Apply 1 drop to eye(s) as directed by provider 4 (Four) Times a Day., Disp: 5 mL, Rfl: 0    oxyCODONE (ROXICODONE) 5 MG immediate release tablet, Take 1 tablet by mouth Every 6 (Six) Hours As Needed. (Patient not taking: Reported on 6/17/2024), Disp: , Rfl:     Physical Exam:  Vital Signs:   Vitals:    06/17/24 1116   BP: 108/72   BP Location: Right arm   Patient Position: Sitting   Cuff Size: Adult   Pulse: 96   Resp: 19   SpO2: 96%   Weight: 80.3 kg (177 lb)   Height: 165.1 cm (65\")       Physical Exam  Constitutional:       General: She is not in acute distress.     Appearance: Normal appearance. She is well-developed. She is not diaphoretic.   HENT:      Head: Normocephalic and atraumatic.   Eyes:      General: No scleral icterus.     Pupils: Pupils are equal, round, and reactive to light.   Neck:      Trachea: No tracheal deviation.   Cardiovascular:      Rate and Rhythm: Normal rate and regular rhythm.      Heart sounds: Normal heart sounds. No murmur heard.     No friction rub. No gallop.      Comments: Normal JVD.  Pulmonary:      Effort: Pulmonary effort is normal. No respiratory distress.      Breath sounds: Normal breath sounds. No stridor. No wheezing or rales.   Chest:      Chest wall: No tenderness.   Abdominal:      General: Bowel sounds are normal. There is no distension.      Palpations: Abdomen is soft.      Tenderness: There is no abdominal " tenderness. There is no guarding or rebound.   Musculoskeletal:         General: No swelling. Normal range of motion.      Cervical back: Neck supple. No tenderness.   Lymphadenopathy:      Cervical: No cervical adenopathy.   Skin:     General: Skin is warm and dry.      Findings: No erythema.   Neurological:      General: No focal deficit present.      Mental Status: She is alert and oriented to person, place, and time.   Psychiatric:         Mood and Affect: Mood normal.         Behavior: Behavior normal.         Results Review:   I reviewed the patient's new clinical results.        Assessment / Plan:     1. Sinus tachycardia   -- Outpatient cardiac monitoring showed an average ventricular rate 98 bpm, occasional correlation of palpitations to mild sinus tachycardia  --Suspect sinus tachycardia likely being driven by underlying stress/anxiety  -- Palpitations remain minimal and do not inhibit daily activities  -- Can continue propranolol as needed for increased palpitations  -- Follow-up in 1 year, consider as needed thereafter      Follow Up:   Return in about 1 year (around 6/17/2025).      Thank you for allowing me to participate in the care of your patient. Please to not hesitate to contact me with additional questions or concerns.     TIKA Vick MD  Interventional Cardiology   06/17/2024  11:21 EDT

## 2024-07-07 DIAGNOSIS — H57.89 EYE DISCHARGE: ICD-10-CM

## 2024-07-07 DIAGNOSIS — H57.12 PAIN OF LEFT EYE: ICD-10-CM

## 2024-07-07 DIAGNOSIS — H53.9 CHANGE IN VISION: ICD-10-CM

## 2024-07-08 RX ORDER — OFLOXACIN 3 MG/ML
SOLUTION/ DROPS OPHTHALMIC
Qty: 5 ML | Refills: 0 | Status: SHIPPED | OUTPATIENT
Start: 2024-07-08

## 2024-08-15 ENCOUNTER — OFFICE VISIT (OUTPATIENT)
Dept: BEHAVIORAL HEALTH | Facility: CLINIC | Age: 44
End: 2024-08-15
Payer: MEDICARE

## 2024-08-15 VITALS
BODY MASS INDEX: 27.66 KG/M2 | HEIGHT: 65 IN | SYSTOLIC BLOOD PRESSURE: 126 MMHG | DIASTOLIC BLOOD PRESSURE: 72 MMHG | HEART RATE: 94 BPM | WEIGHT: 166 LBS | OXYGEN SATURATION: 98 %

## 2024-08-15 DIAGNOSIS — F90.2 ATTENTION DEFICIT HYPERACTIVITY DISORDER (ADHD), COMBINED TYPE: ICD-10-CM

## 2024-08-15 DIAGNOSIS — F41.1 GENERALIZED ANXIETY DISORDER: Primary | ICD-10-CM

## 2024-08-15 DIAGNOSIS — F33.1 MODERATE EPISODE OF RECURRENT MAJOR DEPRESSIVE DISORDER: ICD-10-CM

## 2024-08-15 DIAGNOSIS — Z51.81 ENCOUNTER FOR THERAPEUTIC DRUG MONITORING: ICD-10-CM

## 2024-08-15 DIAGNOSIS — F51.01 PRIMARY INSOMNIA: ICD-10-CM

## 2024-08-15 DIAGNOSIS — F51.5 NIGHTMARES: ICD-10-CM

## 2024-08-15 RX ORDER — DULOXETIN HYDROCHLORIDE 60 MG/1
60 CAPSULE, DELAYED RELEASE ORAL EVERY MORNING
Qty: 30 CAPSULE | Refills: 2 | Status: SHIPPED | OUTPATIENT
Start: 2024-08-15

## 2024-08-15 RX ORDER — GABAPENTIN 800 MG/1
800 TABLET ORAL 3 TIMES DAILY
Qty: 90 TABLET | Refills: 2 | Status: SHIPPED | OUTPATIENT
Start: 2024-08-15

## 2024-08-15 RX ORDER — ZOLPIDEM TARTRATE 5 MG/1
5 TABLET ORAL NIGHTLY PRN
Qty: 30 TABLET | Refills: 2 | Status: SHIPPED | OUTPATIENT
Start: 2024-08-15

## 2024-08-15 RX ORDER — QUETIAPINE FUMARATE 100 MG/1
100 TABLET, FILM COATED ORAL NIGHTLY
Qty: 30 TABLET | Refills: 2 | Status: SHIPPED | OUTPATIENT
Start: 2024-08-15

## 2024-08-15 RX ORDER — DOXEPIN HYDROCHLORIDE 100 MG/1
100 CAPSULE ORAL NIGHTLY
Qty: 30 CAPSULE | Refills: 2 | Status: SHIPPED | OUTPATIENT
Start: 2024-08-15

## 2024-08-15 RX ORDER — BUSPIRONE HYDROCHLORIDE 15 MG/1
15 TABLET ORAL 2 TIMES DAILY
Qty: 60 TABLET | Refills: 2 | Status: SHIPPED | OUTPATIENT
Start: 2024-08-15

## 2024-08-15 RX ORDER — PRAZOSIN HYDROCHLORIDE 5 MG/1
10 CAPSULE ORAL NIGHTLY
Qty: 60 CAPSULE | Refills: 2 | Status: SHIPPED | OUTPATIENT
Start: 2024-08-15

## 2024-08-15 RX ORDER — DEXTROAMPHETAMINE SACCHARATE, AMPHETAMINE ASPARTATE, DEXTROAMPHETAMINE SULFATE AND AMPHETAMINE SULFATE 7.5; 7.5; 7.5; 7.5 MG/1; MG/1; MG/1; MG/1
TABLET ORAL
Qty: 60 TABLET | Refills: 0 | Status: SHIPPED | OUTPATIENT
Start: 2024-08-15

## 2024-08-15 RX ORDER — BUPROPION HYDROCHLORIDE 300 MG/1
300 TABLET ORAL EVERY MORNING
Qty: 30 TABLET | Refills: 2 | Status: SHIPPED | OUTPATIENT
Start: 2024-08-15

## 2024-08-15 NOTE — PROGRESS NOTES
Follow Up Office Visit    Patient Name: Audrey Alonso  : 1980   MRN: [id number]   Care Team: Patient Care Team:  David Fleming DO as PCP - General (Internal Medicine)  Nathalia Anne APRN as Nurse Practitioner (Behavioral Health)         Chief Complaint:    Chief Complaint   Patient presents with    ADHD    Anxiety    Depression    Sleeping Problem    Med Management       History of Present Illness: Audrey Alonso is a 44 y.o. female who is here today for a medication management follow up. Patient reports that overall medication continues to be effective. She feels that her focus and concentration have been good and her mood and anxiety have been managed well. She also reports that she is sleeping good. She did decrease her Buspar to 15mg BID and feel that has been going well and denies any increased anxiety since decreasing. She did not see the need to make any changes and did not have any medication concerns at today's visit. She denies SI/HI today.     The following portion of the patient's history were reviewed and updated appropriately: allergies, current and past medications, family history, medical history and social history. SACHIN reviewed and appropriate.   Subjective   Review of Systems:    Review of Systems   Respiratory: Negative.     Cardiovascular: Negative.  Negative for chest pain and palpitations.   Neurological: Negative.    Psychiatric/Behavioral: Negative.     All other systems reviewed and are negative.      Current Medications:   Current Outpatient Medications   Medication Sig Dispense Refill    acetaminophen (TYLENOL) 500 MG tablet Take 2 tablets by mouth Every 8 (Eight) Hours.      albuterol sulfate  (90 Base) MCG/ACT inhaler Inhale 2 puffs Every 6 (Six) Hours As Needed for Wheezing. 18 g 1    amphetamine-dextroamphetamine (Adderall) 30 MG tablet Take 30 mg orally every morning and afternoon 60 tablet 0    buPROPion XL (WELLBUTRIN XL) 300 MG 24 hr tablet  Take 1 tablet by mouth Every Morning. 30 tablet 2    busPIRone (BUSPAR) 15 MG tablet Take 1 tablet by mouth 2 (Two) Times a Day. 60 tablet 2    doxepin (SINEquan) 100 MG capsule Take 1 capsule by mouth Every Night. 30 capsule 2    DULoxetine (CYMBALTA) 60 MG capsule Take 1 capsule by mouth Every Morning. 30 capsule 2    fluticasone (FLONASE) 50 MCG/ACT nasal spray INSERT 2 SPRAYS INTO THE NOSTRIL DAILY 16 g 2    gabapentin (NEURONTIN) 800 MG tablet Take 1 tablet by mouth 3 (Three) Times a Day. 90 tablet 2    methadone (DOLOPHINE) 10 MG tablet Take 12.5 tablets by mouth Daily [facility]ira Elaine @ [facility] ([phone]) verified dose as 125 mg daily,      methocarbamol (ROBAXIN) 500 MG tablet Take 1 tablet by mouth.      ofloxacin (OCUFLOX) 0.3 % ophthalmic solution APPLY 1 DROP TO EYE AS DIRECTED BY PROVIDER FOUR TIMES A DAY 5 mL 0    ondansetron ODT (ZOFRAN-ODT) 4 MG disintegrating tablet       oxyCODONE (ROXICODONE) 5 MG immediate release tablet Take 1 tablet by mouth Every 6 (Six) Hours As Needed.      prazosin (MINIPRESS) 5 MG capsule Take 2 capsules by mouth Every Night. 60 capsule 2    QUEtiapine (SEROquel) 100 MG tablet Take 1 tablet by mouth Every Night. 30 tablet 2    zolpidem (AMBIEN) 5 MG tablet Take 1 tablet by mouth At Night As Needed for Sleep. 30 tablet 2     No current facility-administered medications for this visit.       Mental Status Exam:   Hygiene:   good  Cooperation:  Cooperative  Eye Contact:  Good  Psychomotor Behavior:  Appropriate  Affect:  Appropriate  Mood: normal  Speech:  Normal  Thought Process:  Goal directed and Linear  Thought Content:  Normal and Mood congruent  Suicidal:  None  Homicidal:  None  Hallucinations:  None  Delusion:  None  Memory:  Intact  Orientation:  Person, Place, Time, and Situation  Reliability:  good  Insight:  Good  Judgement:  Good  Impulse Control:  Good  Physical/Medical Issues:  Yes see chart       Objective   Vital Signs:   BP  "126/72   Pulse 94   Ht 165.1 cm (65\")   Wt 75.3 kg (166 lb)   SpO2 98%   BMI 27.62 kg/m²       Assessment / Plan    Diagnoses and all orders for this visit:    1. Generalized anxiety disorder (Primary)  -     gabapentin (NEURONTIN) 800 MG tablet; Take 1 tablet by mouth 3 (Three) Times a Day.  Dispense: 90 tablet; Refill: 2  -     busPIRone (BUSPAR) 15 MG tablet; Take 1 tablet by mouth 2 (Two) Times a Day.  Dispense: 60 tablet; Refill: 2  -     doxepin (SINEquan) 100 MG capsule; Take 1 capsule by mouth Every Night.  Dispense: 30 capsule; Refill: 2  -     DULoxetine (CYMBALTA) 60 MG capsule; Take 1 capsule by mouth Every Morning.  Dispense: 30 capsule; Refill: 2    2. Encounter for therapeutic drug monitoring  -     Compliance Drug Analysis, Ur - Urine, Clean Catch    3. Primary insomnia  -     QUEtiapine (SEROquel) 100 MG tablet; Take 1 tablet by mouth Every Night.  Dispense: 30 tablet; Refill: 2  -     doxepin (SINEquan) 100 MG capsule; Take 1 capsule by mouth Every Night.  Dispense: 30 capsule; Refill: 2  -     zolpidem (AMBIEN) 5 MG tablet; Take 1 tablet by mouth At Night As Needed for Sleep.  Dispense: 30 tablet; Refill: 2    4. Nightmares  -     QUEtiapine (SEROquel) 100 MG tablet; Take 1 tablet by mouth Every Night.  Dispense: 30 tablet; Refill: 2  -     doxepin (SINEquan) 100 MG capsule; Take 1 capsule by mouth Every Night.  Dispense: 30 capsule; Refill: 2  -     prazosin (MINIPRESS) 5 MG capsule; Take 2 capsules by mouth Every Night.  Dispense: 60 capsule; Refill: 2    5. Attention deficit hyperactivity disorder (ADHD), combined type  -     amphetamine-dextroamphetamine (Adderall) 30 MG tablet; Take 30 mg orally every morning and afternoon  Dispense: 60 tablet; Refill: 0    6. Moderate episode of recurrent major depressive disorder  -     buPROPion XL (WELLBUTRIN XL) 300 MG 24 hr tablet; Take 1 tablet by mouth Every Morning.  Dispense: 30 tablet; Refill: 2  -     doxepin (SINEquan) 100 MG capsule; Take 1 " capsule by mouth Every Night.  Dispense: 30 capsule; Refill: 2  -     DULoxetine (CYMBALTA) 60 MG capsule; Take 1 capsule by mouth Every Morning.  Dispense: 30 capsule; Refill: 2       Overall, patient appears to be doing well on current medication. No issues reported and no indication for change. Will continue medication as ordered. Obtained yearly urine drug screen and controlled substance agreement signed.     History and physical exam exhibit continued safe and appropriate use of controlled substance.    As part of patient's treatment plan I am prescribing a controlled substance.  The patient has been made aware of the appropriate use of such medications, including potential risk of somnolence, limited ability to drive and/or work safely, and potential for dependence and/or overdose.  It has also been made clear that these medications are for use by this patient only, without concomitant use of alcohol or other substances, unless prescribed.    Patient has completed a prescribing agreement detailing terms of continued prescribing of controlled substances, including monitoring SACHIN reports, urine drug screening, and pill counts if necessary.  Patient is aware that inappropriate use will result in cessation of prescribing such medications.      PHQ-9  PHQ-2/PHQ-9: Depression Screening  Little Interest or Pleasure in Doing Things: 1-->several days  Feeling Down, Depressed or Hopeless: 1-->several days  PHQ-2 Total Score: 2  Trouble Falling or Staying Asleep, or Sleeping Too Much: 2-->more than half the days  Feeling Tired or Having Little Energy: 1-->several days  Poor Appetite or Overeatin-->more than half the days  Feeling Bad about Yourself - or that You are a Failure or Have Let Yourself or Your Family Down: 1-->several days  Trouble Concentrating on Things, Such as Reading the Newspaper or Watching Television: 1-->several days  Moving or Speaking So Slowly that Other People Could Have Noticed? Or the  Opposite - Being So Fidgety: 0-->not at all  Thoughts that You Would be Better Off Dead or of Hurting Yourself in Some Way: 0-->not at all  PHQ-9: Brief Depression Severity Measure Score: 9      PHQ-9 Score:   PHQ-9 Total Score: 9    Depression Screening:  Patient screened positive for depression based on a PHQ-9 score of 9 on 8/15/2024. Follow-up recommendations include: Prescribed antidepressant medication treatment and Suicide Risk Assessment performed.        TAMANNA-7  Over the last two weeks, how often have you been bothered by the following problems?  Feeling nervous, anxious or on edge: Nearly every day  Not being able to stop or control worrying: More than half the days  Worrying too much about different things: More than half the days  Trouble Relaxing: More than half the days  Being so restless that it is hard to sit still: Several days  Becoming easily annoyed or irritable: Several days  Feeling afraid as if something awful might happen: Several days  TAMANNA 7 Total Score: 12  If you checked any problems, how difficult have these problems made it for you to do your work, take care of things at home, or get along with other people: Somewhat difficult      ADHD  Screening for Adults With ADHD - (1-6)  1. How often do you have trouble wrapping up the final details of a project, once the challenging parts have been done?: Sometimes  2. How often do you have difficulty getting things in order when you have to do a task that requires organization?: Sometimes  3. How often do you have problems remembering appointments or obligations : Rarely  4. When you have a task that requires a lot of thought, how often do you avoid or delay getting started ?: Often  5. How often do you fidget or squirm with your hands or feet when you have to sit down for a long time?: Sometimes  6. How often do you feel overly active and compelled to do things, like you were driven by a motor?: Never  7. How often do you make careless mistakes  when you have to work on a boring or difficult project?: Sometimes  8. How often do have difficulty keeping your attention when you are doing boring or repetitive work?: Often  9. How often do you have difficulty concentrating on what people say to you, even when they are speaking to you: Sometimes  10.How often do you misplace or have difficulty finding things at home or at work?: Sometimes  11.How often are you distracted by activity or noise around you?: Often  12.How often do you leave your seat in meetings or other situations in which you are expected to remain seated?: Rarely  13.How often do you feel restless or fidgety?: Sometimes  14.How often do you have difficulty unwinding and relaxing when you have time to yourself?: Sometimes  15.How often do you find yourself talking too much when you are in social situations?: Rarely  16.When you’re in a conversation, how often do you find yourself finishing the sentences of the people you are talking to, before they can finish them themselves?: Sometimes  17.How often do you have difficulty waiting your turn in situations when turn taking is required?: Rarely  18.How often do you interrupt others when they are busy?: Sometimes    A psychological evaluation was conducted in order to assess past and current level of functioning. Areas assessed included, but were not limited to: perception of social support, perception of ability to face and deal with challenges in life (positive functioning), anxiety symptoms, depressive symptoms, perspective on beliefs/belief system, coping skills for stress, intelligence level,  Therapeutic rapport was established. Interventions conducted today were geared towards incorporating medication management along with support for continued therapy. Education was also provided as to the med management with this provider and what to expect in subsequent sessions.      We discussed risks, benefits, goals and side effects of the above  medication and the patient was agreeable with the plan. Patient was educated on the importance of compliance with treatment and follow-up appointments. Patient is aware to contact the [facility] with any worsening of symptoms. To call for questions or concerns and return early if necessary. Patent is agreeable to go to the Emergency Department or call 911 should they begin SI/HI.    MEDS ORDERED DURING VISIT:  New Medications Ordered This Visit   Medications    QUEtiapine (SEROquel) 100 MG tablet     Sig: Take 1 tablet by mouth Every Night.     Dispense:  30 tablet     Refill:  2    gabapentin (NEURONTIN) 800 MG tablet     Sig: Take 1 tablet by mouth 3 (Three) Times a Day.     Dispense:  90 tablet     Refill:  2    amphetamine-dextroamphetamine (Adderall) 30 MG tablet     Sig: Take 30 mg orally every morning and afternoon     Dispense:  60 tablet     Refill:  0     Take 30 mg orally every morning and afternoon.    buPROPion XL (WELLBUTRIN XL) 300 MG 24 hr tablet     Sig: Take 1 tablet by mouth Every Morning.     Dispense:  30 tablet     Refill:  2    busPIRone (BUSPAR) 15 MG tablet     Sig: Take 1 tablet by mouth 2 (Two) Times a Day.     Dispense:  60 tablet     Refill:  2    doxepin (SINEquan) 100 MG capsule     Sig: Take 1 capsule by mouth Every Night.     Dispense:  30 capsule     Refill:  2    DULoxetine (CYMBALTA) 60 MG capsule     Sig: Take 1 capsule by mouth Every Morning.     Dispense:  30 capsule     Refill:  2    prazosin (MINIPRESS) 5 MG capsule     Sig: Take 2 capsules by mouth Every Night.     Dispense:  60 capsule     Refill:  2    zolpidem (AMBIEN) 5 MG tablet     Sig: Take 1 tablet by mouth At Night As Needed for Sleep.     Dispense:  30 tablet     Refill:  2         Follow Up   Return in about 3 months (around 11/15/2024).  Patient was given instructions and counseling regarding her condition or for health maintenance advice. Please see specific information pulled into the AVS if appropriate.      TREATMENT PLAN/GOALS: Continue supportive psychotherapy efforts and medications as indicated. Treatment and medication options discussed during today's visit. Patient acknowledged and verbally consented to continue with current treatment plan and was educated on the importance of compliance with treatment and follow-up appointments.    MEDICATION ISSUES:  Discussed medication options and treatment plan of prescribed medication as well as the risks, benefits, and side effects including potential falls, possible impaired driving and metabolic adversities among others. Patient is agreeable to call the office with any worsening of symptoms or onset of side effects. Patient is agreeable to call 911 or go to the nearest ER should he/she begin having SI/HI.        [facility]  [address] [place] [address] [phone]    August 15, 2024 09:42 EDT

## 2024-08-21 LAB — DRUGS UR: NORMAL

## 2024-10-25 ENCOUNTER — OFFICE VISIT (OUTPATIENT)
Dept: INTERNAL MEDICINE | Facility: CLINIC | Age: 44
End: 2024-10-25
Payer: MEDICARE

## 2024-10-25 VITALS
SYSTOLIC BLOOD PRESSURE: 126 MMHG | OXYGEN SATURATION: 97 % | DIASTOLIC BLOOD PRESSURE: 70 MMHG | WEIGHT: 168 LBS | HEIGHT: 65 IN | TEMPERATURE: 97.8 F | BODY MASS INDEX: 27.99 KG/M2 | RESPIRATION RATE: 18 BRPM | HEART RATE: 102 BPM

## 2024-10-25 DIAGNOSIS — Z87.898 HISTORY OF SUBSTANCE USE DISORDER: ICD-10-CM

## 2024-10-25 DIAGNOSIS — R79.9 ABNORMAL FINDING OF BLOOD CHEMISTRY, UNSPECIFIED: ICD-10-CM

## 2024-10-25 DIAGNOSIS — Z12.31 ENCOUNTER FOR SCREENING MAMMOGRAM FOR MALIGNANT NEOPLASM OF BREAST: ICD-10-CM

## 2024-10-25 DIAGNOSIS — R79.89 ELEVATED TSH: ICD-10-CM

## 2024-10-25 DIAGNOSIS — F43.22 ADJUSTMENT DISORDER WITH ANXIETY: ICD-10-CM

## 2024-10-25 DIAGNOSIS — M25.551 CHRONIC HIP PAIN AFTER TOTAL REPLACEMENT OF RIGHT HIP JOINT: ICD-10-CM

## 2024-10-25 DIAGNOSIS — Z00.00 MEDICARE ANNUAL WELLNESS VISIT, SUBSEQUENT: Primary | ICD-10-CM

## 2024-10-25 DIAGNOSIS — D22.9 NUMEROUS SKIN MOLES: ICD-10-CM

## 2024-10-25 DIAGNOSIS — J30.2 SEASONAL ALLERGIES: ICD-10-CM

## 2024-10-25 DIAGNOSIS — L70.0 ACNE VULGARIS: ICD-10-CM

## 2024-10-25 DIAGNOSIS — E55.9 VITAMIN D DEFICIENCY: ICD-10-CM

## 2024-10-25 DIAGNOSIS — F33.1 MODERATE EPISODE OF RECURRENT MAJOR DEPRESSIVE DISORDER: ICD-10-CM

## 2024-10-25 DIAGNOSIS — G89.29 CHRONIC HIP PAIN AFTER TOTAL REPLACEMENT OF RIGHT HIP JOINT: ICD-10-CM

## 2024-10-25 DIAGNOSIS — Z96.641 CHRONIC HIP PAIN AFTER TOTAL REPLACEMENT OF RIGHT HIP JOINT: ICD-10-CM

## 2024-10-25 DIAGNOSIS — F41.1 GENERALIZED ANXIETY DISORDER: ICD-10-CM

## 2024-10-25 RX ORDER — PROPRANOLOL HCL 60 MG
1 CAPSULE, EXTENDED RELEASE 24HR ORAL DAILY
COMMUNITY
Start: 2024-09-23

## 2024-10-25 NOTE — PATIENT INSTRUCTIONS
Medicare Wellness  Personal Prevention Plan of Service     Date of Office Visit:    Encounter Provider:  David Fleming DO  Place of Service:  [facility]  Patient Name: Audrey Alonso  :  1980    As part of the Medicare Wellness portion of your visit today, we are providing you with this personalized preventive plan of services (PPPS). This plan is based upon recommendations of the [place] Preventive Services Task Force (USPSTF) and the Advisory Committee on Immunization Practices (ACIP).    This lists the preventive care services that should be considered, and provides dates of when you are due. Items listed as completed are up-to-date and do not require any further intervention.    Health Maintenance   Topic Date Due    MAMMOGRAM  Never done    Pneumococcal Vaccine 0-64 (1 of 2 - PCV) Never done    TDAP/TD VACCINES (1 - Tdap) Never done    BMI FOLLOWUP  2023    PAP SMEAR  2023    ANNUAL WELLNESS VISIT  10/20/2024    COVID-19 Vaccine ( - -24 season) 10/27/2024 (Originally 2024)    INFLUENZA VACCINE  2026 (Originally 2024)    HEPATITIS C SCREENING  Completed       Orders Placed This Encounter   Procedures    Mammo Screening Digital Tomosynthesis Bilateral With CAD     Standing Status:   Future     Standing Expiration Date:   10/25/2025     Order Specific Question:   Reason for Exam:     Answer:   screen breast cancer     Order Specific Question:   Patient Pregnant     Answer:   No     Order Specific Question:   Release to patient     Answer:   Routine Release [[id number]]    Comprehensive Metabolic Panel     Order Specific Question:   Release to patient     Answer:   Routine Release [[id number]]    Lipid Panel     Order Specific Question:   Release to patient     Answer:   Routine Release [[id number]]    Vitamin D,25-Hydroxy     Order Specific Question:   Release to patient     Answer:   Routine Release [[id number]]    TSH     Order  Specific Question:   Release to patient     Answer:   Routine Release [[id number]]    Ambulatory Referral to Dermatology     Referral Priority:   Routine     Referral Type:   Consultation     Referral Reason:   Specialty Services Required     Requested Specialty:   Dermatology     Number of Visits Requested:   1    CBC & Differential     Order Specific Question:   Manual Differential     Answer:   No     Order Specific Question:   Release to patient     Answer:   Routine Release [[id number]]       No follow-ups on file.

## 2024-10-25 NOTE — PROGRESS NOTES
Subjective   The ABCs of the Annual Wellness Visit  Medicare Wellness Visit      Audrey Alonso is a 44 y.o. patient who presents for a Medicare Wellness Visit.    The following portions of the patient's history were reviewed and   updated as appropriate: allergies, current medications, past family history, past medical history, past social history, past surgical history, and problem list.    Compared to one year ago, the patient's physical   health is the same.  Compared to one year ago, the patient's mental   health is the same.    Recent Hospitalizations:  She was not admitted to the hospital during the last year.     Current Medical Providers:  Patient Care Team:  David Fleming DO as PCP - General (Internal Medicine)  Nathalia Anne APRN as Nurse Practitioner (Behavioral Health)    Outpatient Medications Prior to Visit   Medication Sig Dispense Refill    acetaminophen (TYLENOL) 500 MG tablet Take 2 tablets by mouth Every 8 (Eight) Hours.      albuterol sulfate  (90 Base) MCG/ACT inhaler Inhale 2 puffs Every 6 (Six) Hours As Needed for Wheezing. 18 g 1    amphetamine-dextroamphetamine (Adderall) 30 MG tablet Take 30 mg orally every morning and afternoon 60 tablet 0    buPROPion XL (WELLBUTRIN XL) 300 MG 24 hr tablet Take 1 tablet by mouth Every Morning. 30 tablet 2    busPIRone (BUSPAR) 15 MG tablet Take 1 tablet by mouth 2 (Two) Times a Day. 60 tablet 2    doxepin (SINEquan) 100 MG capsule Take 1 capsule by mouth Every Night. 30 capsule 2    DULoxetine (CYMBALTA) 60 MG capsule Take 1 capsule by mouth Every Morning. 30 capsule 2    fluticasone (FLONASE) 50 MCG/ACT nasal spray INSERT 2 SPRAYS INTO THE NOSTRIL DAILY 16 g 2    gabapentin (NEURONTIN) 800 MG tablet Take 1 tablet by mouth 3 (Three) Times a Day. 90 tablet 2    methadone (DOLOPHINE) 10 MG tablet Take 12.5 tablets by mouth Daily [facility]mira Elaine @ [facility] ([phone]) verified dose as 125 mg daily,       "ofloxacin (OCUFLOX) 0.3 % ophthalmic solution APPLY 1 DROP TO EYE AS DIRECTED BY PROVIDER FOUR TIMES A DAY 5 mL 0    prazosin (MINIPRESS) 5 MG capsule Take 2 capsules by mouth Every Night. 60 capsule 2    QUEtiapine (SEROquel) 100 MG tablet Take 1 tablet by mouth Every Night. 30 tablet 2    zolpidem (AMBIEN) 5 MG tablet Take 1 tablet by mouth At Night As Needed for Sleep. 30 tablet 2    propranolol LA (INDERAL LA) 60 MG 24 hr capsule Take 1 capsule by mouth Daily. (Patient not taking: Reported on 10/25/2024)      methocarbamol (ROBAXIN) 500 MG tablet Take 1 tablet by mouth.      ondansetron ODT (ZOFRAN-ODT) 4 MG disintegrating tablet       oxyCODONE (ROXICODONE) 5 MG immediate release tablet Take 1 tablet by mouth Every 6 (Six) Hours As Needed.       No facility-administered medications prior to visit.     Opioid medication/s are on active medication list.  and I have evaluated her active treatment plan and pain score trends (see table).  Vitals:    10/25/24 0905   PainSc:   6   PainLoc: Hip     I have reviewed the chart for potential of high risk medication and harmful drug interactions in the elderly.        Aspirin is not on active medication list.  Aspirin use is not indicated based on review of current medical condition/s. Risk of harm outweighs potential benefits.  .    Patient Active Problem List   Diagnosis    Thrombocytopenia    Pneumonia of both lungs due to infectious organism     Advance Care Planning Advance Directive is not on file.  ACP discussion was held with the patient during this visit. Patient does not have an advance directive, information provided.            Objective   Vitals:    10/25/24 0905   BP: 126/70   Pulse: 102   Resp: 18   Temp: 97.8 °F (36.6 °C)   TempSrc: Temporal   SpO2: 97%   Weight: 76.2 kg (168 lb)   Height: 165.1 cm (65\")   PainSc:   6   PainLoc: Hip       Estimated body mass index is 27.96 kg/m² as calculated from the following:    Height as of this encounter: 165.1 cm " "(65\").    Weight as of this encounter: 76.2 kg (168 lb).    BMI is >= 25 and <30. (Overweight) The following options were offered after discussion;: exercise counseling/recommendations and nutrition counseling/recommendations       Does the patient have evidence of cognitive impairment? No                                                                                                Health  Risk Assessment    Smoking Status:  Social History     Tobacco Use   Smoking Status Former    Current packs/day: 0.00    Average packs/day: 0.3 packs/day for 20.0 years (5.0 ttl pk-yrs)    Types: Cigarettes    Start date: 2002    Quit date: 2022    Years since quittin.3    Passive exposure: Past   Smokeless Tobacco Never   Tobacco Comments    vapes also     Alcohol Consumption:  Social History     Substance and Sexual Activity   Alcohol Use No    Comment: stopped        Fall Risk Screen  STEADI Fall Risk Assessment was completed, and patient is at LOW risk for falls.Assessment completed on:10/25/2024    Depression Screenin/15/2024     9:00 AM   PHQ-2/PHQ-9 Depression Screening   Retired Little Interest or Pleasure in Doing Things 1-->several days   Retired Feeling Down, Depressed or Hopeless 1-->several days   Retired Trouble Falling or Staying Asleep, or Sleeping Too Much 2-->more than half the days   Retired Feeling Tired or Having Little Energy 1-->several days   Retired Poor Appetite or Overeating 2-->more than half the days   Retired Feeling Bad about Yourself - or that You are a Failure or Have Let Yourself or Your Family Down 1-->several days   Retired Trouble Concentrating on Things, Such as Reading the Newspaper or Watching Television 1-->several days   Retired Moving or Speaking So Slowly that Other People Could Have Noticed? Or the Opposite - Being So Fidgety 0-->not at all   Retired Thoughts that You Would be Better Off Dead or of Hurting Yourself in Some Way 0-->not at all   Retired PHQ-9: " Brief Depression Severity Measure Score 9     Health Habits and Functional and Cognitive Screening:      10/25/2024     9:08 AM   Functional & Cognitive Status   Do you have difficulty preparing food and eating? No   Do you have difficulty bathing yourself, getting dressed or grooming yourself? No   Do you have difficulty using the toilet? No   Do you have difficulty moving around from place to place? No   Do you have trouble with steps or getting out of a bed or a chair? Yes   Current Diet Limited Junk Food   Dental Exam Not up to date   Eye Exam Up to date   Exercise (times per week) 7 times per week   Current Exercises Include Walking   Do you need help using the phone?  No   Are you deaf or do you have serious difficulty hearing?  No   Do you need help to go to places out of walking distance? Yes   Do you need help shopping? No   Do you need help preparing meals?  No   Do you need help with housework?  No   Do you need help with laundry? No   Do you need help taking your medications? No   Do you need help managing money? No   Do you ever drive or ride in a car without wearing a seat belt? No   Have you felt unusual stress, anger or loneliness in the last month? No   Who do you live with? Other   If you need help, do you have trouble finding someone available to you? No   Have you been bothered in the last four weeks by sexual problems? No   Do you have difficulty concentrating, remembering or making decisions? Yes           Age-appropriate Screening Schedule:  Refer to the list below for future screening recommendations based on patient's age, sex and/or medical conditions. Orders for these recommended tests are listed in the plan section. The patient has been provided with a written plan.    Health Maintenance List  Health Maintenance   Topic Date Due    MAMMOGRAM  Never done    Pneumococcal Vaccine 0-64 (1 of 2 - PCV) Never done    TDAP/TD VACCINES (1 - Tdap) Never done    BMI FOLLOWUP  09/28/2023    PAP SMEAR  " 12/09/2023    ANNUAL WELLNESS VISIT  10/20/2024    COVID-19 Vaccine (1 - 2023-24 season) 10/27/2024 (Originally 9/1/2024)    INFLUENZA VACCINE  03/31/2026 (Originally 8/1/2024)    HEPATITIS C SCREENING  Completed                                                                                                                                                CMS Preventative Services Quick Reference  Risk Factors Identified During Encounter  Immunizations Discussed/Encouraged: Tdap, Influenza, and Pneumococcal 23    The above risks/problems have been discussed with the patient.  Pertinent information has been shared with the patient in the After Visit Summary.  An After Visit Summary and PPPS were made available to the patient.    Follow Up:   Next Medicare Wellness visit to be scheduled in 1 year.         Additional E&M Note during same encounter follows:  Patient has additional, significant, and separately identifiable condition(s)/problem(s) that require work above and beyond the Medicare Wellness Visit     Chief Complaint  Medicare Wellness-subsequent and Skin Problem (Moles and other issues would like to see one in [place])        Objective   Vital Signs:  /70   Pulse 102   Temp 97.8 °F (36.6 °C) (Temporal)   Resp 18   Ht 165.1 cm (65\")   Wt 76.2 kg (168 lb)   SpO2 97%   BMI 27.96 kg/m²   Physical Exam  Vitals and nursing note reviewed.   Constitutional:       General: She is not in acute distress.     Appearance: Normal appearance. She is well-developed.   HENT:      Head: Normocephalic and atraumatic.      Right Ear: Tympanic membrane and external ear normal.      Left Ear: Tympanic membrane and external ear normal.      Nose: Nose normal.      Mouth/Throat:      Mouth: Mucous membranes are moist.      Pharynx: No oropharyngeal exudate.   Eyes:      General: No scleral icterus.     Conjunctiva/sclera: Conjunctivae normal.      Pupils: Pupils are equal, round, and reactive to light.   Neck:      " Thyroid: No thyromegaly.      Vascular: No JVD.   Cardiovascular:      Rate and Rhythm: Normal rate and regular rhythm.      Heart sounds: Normal heart sounds.   Pulmonary:      Effort: Pulmonary effort is normal. No respiratory distress.      Breath sounds: Normal breath sounds. No stridor. No wheezing.   Abdominal:      General: Bowel sounds are normal. There is no distension.      Palpations: Abdomen is soft. There is no mass.      Tenderness: There is no abdominal tenderness. There is no guarding.   Genitourinary:     Comments: Deferred  Musculoskeletal:         General: No tenderness. Normal range of motion.      Cervical back: Normal range of motion and neck supple.   Lymphadenopathy:      Cervical: No cervical adenopathy.   Skin:     General: Skin is warm and dry.      Comments: Facial acne, multiple moles and skin lesions on arms and back of neck   Neurological:      Mental Status: She is alert and oriented to person, place, and time.      Cranial Nerves: No cranial nerve deficit.   Psychiatric:         Behavior: Behavior normal.         Thought Content: Thought content normal.         Judgment: Judgment normal.           History of Present Illness  The patient is a 44-year-old female being seen today for a Medicare wellness visit.    She has a history of ADHD, anxiety, depression, and insomnia, for which she is currently under psychiatric care. Her anxiety remains stable, neither improving nor worsening. She expresses concern about her sleep but notes that her current nighttime medication seems to be effective. She is on disability due to anxiety and depression.    She also has a history of substance abuse and is following up with a methadone clinic. She has reduced her medication intake to half of what it was previously, except for the capsule medications which cannot be halved. She plans to discuss this with Nathalia during her next visit.    She has lost approximately 20 pounds over the past year,  attributing this to her medication. She began taking a vitamin D supplement last year based on blood work results. She was prescribed thyroid medication many years ago but discontinued it when she stopped seeing that doctor.    She is seeking a referral to a dermatologist due to persistent skin breakouts and a few moles she wishes to have examined. She has a large mole on her neck that she believes could be easily removed.    She uses an inhaler and is hesitant about vaccines unless absolutely necessary. She has had a mammogram three times due to a family history of breast cancer on her mother's side but has not had one in the past five years. She tested negative for the BRCA gene.    Her last surgery was around [facility] of the previous year. She experiences hip pain when using a walker, which causes her to limp for the rest of the day. She is considering contacting the doctor who performed her hip surgery to discuss her ongoing hip issues.    SOCIAL HISTORY  She vapes. She worked as a school psychologist.    FAMILY HISTORY  Her father had a stroke 3 weeks ago and had a stent put in last week. Her mother has breast cancer.    IMMUNIZATIONS  She had influenza vaccine once in the last 10 or 15 years.    Review of Systems   Constitutional:  Negative for chills, fatigue and fever.   HENT:  Negative for congestion, ear pain, rhinorrhea, sinus pressure and sore throat.    Eyes:  Negative for visual disturbance.   Respiratory:  Negative for cough, chest tightness, shortness of breath and wheezing.    Cardiovascular:  Negative for chest pain, palpitations and leg swelling.   Gastrointestinal:  Negative for abdominal pain, blood in stool, constipation, diarrhea, nausea and vomiting.   Endocrine: Negative for polydipsia and polyuria.   Genitourinary:  Negative for dysuria and hematuria.   Musculoskeletal:  Negative for arthralgias and back pain.   Skin:  Positive for color change. Negative for rash.   Neurological:  Negative  "for dizziness, light-headedness, numbness and headaches.   Psychiatric/Behavioral:  Negative for dysphoric mood and sleep disturbance. The patient is not nervous/anxious.       Vitals:    10/25/24 0905   BP: 126/70   Pulse: 102   Resp: 18   Temp: 97.8 °F (36.6 °C)   TempSrc: Temporal   SpO2: 97%   Weight: 76.2 kg (168 lb)   Height: 165.1 cm (65\")   PainSc:   6   PainLoc: Hip         Diagnoses and all orders for this visit:    1. Medicare annual wellness visit, subsequent (Primary)    2. Seasonal allergies    3. Moderate episode of recurrent major depressive disorder    4. Chronic hip pain after total replacement of right hip joint    5. Generalized anxiety disorder  -     CBC & Differential  -     Comprehensive Metabolic Panel  -     Lipid Panel  -     TSH    6. History of substance use disorder    7. Acne vulgaris  -     Ambulatory Referral to Dermatology    8. Numerous skin moles  -     Ambulatory Referral to Dermatology    9. Encounter for screening mammogram for malignant neoplasm of breast  -     Mammo Screening Digital Tomosynthesis Bilateral With CAD; Future    10. Elevated TSH  -     TSH    11. Vitamin D deficiency  -     Vitamin D,25-Hydroxy    12. Adjustment disorder with anxiety  -     CBC & Differential    13. Abnormal finding of blood chemistry, unspecified  -     Lipid Panel      Assessment & Plan  Medicare Wellness visit  - Baseline labs ordered per above.  - Vaccines reviewed and updated  - Preventive health measures were discussed including: healthy diet with increase in fruits and vegetables, regular exercise at least 3 times a week, safe sex practices, avoidance of drugs, tobacco, and alcohol, and regular seatbelt use.    Skin issues: Multiple moles, facial acne - new concern  She reports persistent acne and has concerns about several moles. A referral to a dermatologist in [place] will be made for further evaluation and potential mole removal.    Elevated TSH  - noted last year, pt did not " follow up on labs for repeat TSH  - labs reordered for this year.     Anxiety.  She reports that her anxiety has not improved but remains stable. She continues to follow up with her psychiatrist and is on several medications. She has reduced the dosage of many of her medications independently. She will discuss further adjustments with her psychiatrist.    Insomnia.  She reports that her current nighttime medications are effectively managing her sleep. She will continue with her current regimen.    ADHD / Depression   She continues to follow up with her psychiatrist for ADHD management. No changes in her treatment plan were discussed during this visit.    Substance abuse.  She is currently following up with the methadone clinic for her substance abuse treatment. No changes in her treatment plan were discussed during this visit.      Health Maintenance.  A mammogram will be ordered as it has been 5 years since her last one. Routine labs including CBC, CMP, lipids, TSH, and vitamin D level will be ordered to follow up on previous findings of low vitamin D and mildly elevated TSH. She was informed about the tetanus, pneumonia, and flu vaccines, but she declined them.      No follow-ups on file.  Patient was given instructions and counseling regarding her condition or for health maintenance advice. Please see specific information pulled into the AVS if appropriate.    Patient or patient representative verbalized consent for the use of Ambient Listening during the visit with  David Fleming DO for chart documentation. 10/25/2024  09:06 EDT

## 2024-10-30 ENCOUNTER — TELEPHONE (OUTPATIENT)
Dept: INTERNAL MEDICINE | Facility: CLINIC | Age: 44
End: 2024-10-30
Payer: MEDICARE

## 2024-10-30 NOTE — TELEPHONE ENCOUNTER
PT CALLED IN REGARDS TO HAVING CHEST PAIN STARTING ON 10/26/2024. SHE STATED THAT IT IS HARD TO SWALLOW HER FOOD, AND SHE IS HAVING REALLY BAD ACID REFLUX. I SPOKE WITH VENTURA ABOUT THE SITUATION AND SHE RECOMMENDED THAT THE PT GO TO THE ER. I LET THE PT KNOW THAT WE RECOMMENDED HER TO GO TO THE ER, BUT SHE DID NOT WANT TO GO. SHE WANTED ME TO SCHEDULE HER AN APPT TO SEE DR. SURESH, SO I SCHEDULED IT FOR 11/06/2024. SHE SAID IF THE SYMPTOMS WORSEN, SHE WILL GO TO THE ER.

## 2024-11-05 ENCOUNTER — TELEPHONE (OUTPATIENT)
Dept: INTERNAL MEDICINE | Facility: CLINIC | Age: 44
End: 2024-11-05
Payer: MEDICARE

## 2024-11-05 LAB
25(OH)D3+25(OH)D2 SERPL-MCNC: 27.9 NG/ML (ref 30–100)
ALBUMIN SERPL-MCNC: 4 G/DL (ref 3.5–5.2)
ALBUMIN/GLOB SERPL: 1.5 G/DL
ALP SERPL-CCNC: 73 U/L (ref 39–117)
ALT SERPL-CCNC: 13 U/L (ref 1–33)
AST SERPL-CCNC: 17 U/L (ref 1–32)
BASOPHILS # BLD AUTO: 0.04 10*3/MM3 (ref 0–0.2)
BASOPHILS NFR BLD AUTO: 0.8 % (ref 0–1.5)
BILIRUB SERPL-MCNC: <0.2 MG/DL (ref 0–1.2)
BUN SERPL-MCNC: 12 MG/DL (ref 6–20)
BUN/CREAT SERPL: 12.9 (ref 7–25)
CALCIUM SERPL-MCNC: 8.9 MG/DL (ref 8.6–10.5)
CHLORIDE SERPL-SCNC: 103 MMOL/L (ref 98–107)
CHOLEST SERPL-MCNC: 163 MG/DL (ref 0–200)
CO2 SERPL-SCNC: 28 MMOL/L (ref 22–29)
CREAT SERPL-MCNC: 0.93 MG/DL (ref 0.57–1)
EGFRCR SERPLBLD CKD-EPI 2021: 77.9 ML/MIN/1.73
EOSINOPHIL # BLD AUTO: 0.2 10*3/MM3 (ref 0–0.4)
EOSINOPHIL NFR BLD AUTO: 4.2 % (ref 0.3–6.2)
ERYTHROCYTE [DISTWIDTH] IN BLOOD BY AUTOMATED COUNT: 13 % (ref 12.3–15.4)
GLOBULIN SER CALC-MCNC: 2.6 GM/DL
GLUCOSE SERPL-MCNC: 87 MG/DL (ref 65–99)
HCT VFR BLD AUTO: 38.4 % (ref 34–46.6)
HDLC SERPL-MCNC: 37 MG/DL (ref 40–60)
HGB BLD-MCNC: 12.6 G/DL (ref 12–15.9)
IMM GRANULOCYTES # BLD AUTO: 0.01 10*3/MM3 (ref 0–0.05)
IMM GRANULOCYTES NFR BLD AUTO: 0.2 % (ref 0–0.5)
LDLC SERPL CALC-MCNC: 102 MG/DL (ref 0–100)
LYMPHOCYTES # BLD AUTO: 1.64 10*3/MM3 (ref 0.7–3.1)
LYMPHOCYTES NFR BLD AUTO: 34.2 % (ref 19.6–45.3)
MCH RBC QN AUTO: 29.9 PG (ref 26.6–33)
MCHC RBC AUTO-ENTMCNC: 32.8 G/DL (ref 31.5–35.7)
MCV RBC AUTO: 91.2 FL (ref 79–97)
MONOCYTES # BLD AUTO: 0.43 10*3/MM3 (ref 0.1–0.9)
MONOCYTES NFR BLD AUTO: 9 % (ref 5–12)
NEUTROPHILS # BLD AUTO: 2.47 10*3/MM3 (ref 1.7–7)
NEUTROPHILS NFR BLD AUTO: 51.6 % (ref 42.7–76)
NRBC BLD AUTO-RTO: 0 /100 WBC (ref 0–0.2)
PLATELET # BLD AUTO: 271 10*3/MM3 (ref 140–450)
POTASSIUM SERPL-SCNC: 4.6 MMOL/L (ref 3.5–5.2)
PROT SERPL-MCNC: 6.6 G/DL (ref 6–8.5)
RBC # BLD AUTO: 4.21 10*6/MM3 (ref 3.77–5.28)
SODIUM SERPL-SCNC: 140 MMOL/L (ref 136–145)
TRIGL SERPL-MCNC: 136 MG/DL (ref 0–150)
TSH SERPL DL<=0.005 MIU/L-ACNC: 2.7 UIU/ML (ref 0.27–4.2)
VLDLC SERPL CALC-MCNC: 24 MG/DL (ref 5–40)
WBC # BLD AUTO: 4.79 10*3/MM3 (ref 3.4–10.8)

## 2024-11-06 ENCOUNTER — OFFICE VISIT (OUTPATIENT)
Dept: INTERNAL MEDICINE | Facility: CLINIC | Age: 44
End: 2024-11-06
Payer: MEDICARE

## 2024-11-06 VITALS
TEMPERATURE: 98.3 F | WEIGHT: 166 LBS | HEART RATE: 94 BPM | HEIGHT: 65 IN | BODY MASS INDEX: 27.66 KG/M2 | SYSTOLIC BLOOD PRESSURE: 135 MMHG | OXYGEN SATURATION: 99 % | DIASTOLIC BLOOD PRESSURE: 73 MMHG | RESPIRATION RATE: 20 BRPM

## 2024-11-06 DIAGNOSIS — K21.00 GASTROESOPHAGEAL REFLUX DISEASE WITH ESOPHAGITIS WITHOUT HEMORRHAGE: Primary | ICD-10-CM

## 2024-11-06 PROCEDURE — 1125F AMNT PAIN NOTED PAIN PRSNT: CPT | Performed by: INTERNAL MEDICINE

## 2024-11-06 PROCEDURE — 99214 OFFICE O/P EST MOD 30 MIN: CPT | Performed by: INTERNAL MEDICINE

## 2024-11-06 PROCEDURE — 1159F MED LIST DOCD IN RCRD: CPT | Performed by: INTERNAL MEDICINE

## 2024-11-06 PROCEDURE — 1160F RVW MEDS BY RX/DR IN RCRD: CPT | Performed by: INTERNAL MEDICINE

## 2024-11-06 RX ORDER — OMEPRAZOLE 40 MG/1
40 CAPSULE, DELAYED RELEASE ORAL DAILY
Qty: 30 CAPSULE | Refills: 0 | Status: SHIPPED | OUTPATIENT
Start: 2024-11-06

## 2024-11-06 NOTE — PROGRESS NOTES
Chief Complaint   Patient presents with    Heartburn     Had a problem in the past and it got better, but now it has returned bad.  Pt has tried Tums with a little relief, feeling of needing to burp badly.  She used to take medication years ago.       Subjective     History of Present Illness  The patient is a 44-year-old female with a history of anxiety, depression, ADHD, and chronic pain who is being seen in the clinic today for concerns of heartburn symptoms.    She reports experiencing severe pain in her esophagus, likening it to the sensation of swallowing a golf ball. This discomfort, which she describes as a persistent need to burp, has been present for several days and is exacerbated by eating or drinking. The severity of the pain has even disrupted her sleep, leading her to seek medical attention.    She has a history of heartburn and acid reflux and initially thought these might be related to her current symptoms. To manage the pain, she has been drinking water and avoiding spicy foods, tomato sauce, and caffeine. She also takes a sleeping pill, which she used to take while lying down, but has since changed to taking it while standing up and remaining upright for 30 to 40 minutes afterward. Additionally, she takes a multivitamin and vitamin D in the morning. She has not experienced any difficulty swallowing pills.    The pain has lessened over the past two days, but she still experiences random episodes of discomfort.    FAMILY HISTORY  Her aunt has GERD.    The following portions of the patient's history were reviewed and updated as appropriate: allergies, current medications, past family history, past medical history, past social history, past surgical history and problem list.    Review of Systems   Constitutional:  Negative for chills, fatigue and fever.   HENT:  Negative for congestion, ear pain, rhinorrhea, sinus pressure and sore throat.    Eyes:  Negative for visual disturbance.   Respiratory:   Negative for cough, chest tightness, shortness of breath and wheezing.    Cardiovascular:  Negative for chest pain, palpitations and leg swelling.   Gastrointestinal:  Negative for abdominal pain, blood in stool, constipation, diarrhea, nausea and vomiting.   Endocrine: Negative for polydipsia and polyuria.   Genitourinary:  Negative for dysuria and hematuria.   Musculoskeletal:  Negative for arthralgias and back pain.   Skin:  Negative for rash.   Neurological:  Negative for dizziness, light-headedness, numbness and headaches.   Psychiatric/Behavioral:  Negative for dysphoric mood and sleep disturbance. The patient is not nervous/anxious.        Allergies   Allergen Reactions    Hazelnut (Filbert) Rash    Diphenhydramine Hcl Other (See Comments)    Diphenhydramine Other (See Comments)     Hyperactivity  Hyperactivity       Past Medical History:   Diagnosis Date    Anxiety     Asthma     Depression     Disease of thyroid gland     GERD (gastroesophageal reflux disease)     Headache     Hypertension     Seizures     Substance abuse     Trauma        Social History     Socioeconomic History    Marital status:    Tobacco Use    Smoking status: Former     Current packs/day: 0.00     Average packs/day: 0.3 packs/day for 20.0 years (5.0 ttl pk-yrs)     Types: Cigarettes     Start date: 2002     Quit date: 2022     Years since quittin.3     Passive exposure: Past    Smokeless tobacco: Never    Tobacco comments:     vapes also   Vaping Use    Vaping status: Every Day    Substances: Nicotine, Flavoring    Devices: Pre-filled or refillable cartridge   Substance and Sexual Activity    Alcohol use: No     Comment: stopped     Drug use: Yes     Types: IV     Comment: STATES SHE TOOK HER METHADONE AND SOME BENZOS 20    Sexual activity: Not Currently     Partners: Male     Birth control/protection: Abstinence        Past Surgical History:   Procedure Laterality Date    DILATATION AND CURETTAGE       INCISION AND DRAINAGE ABSCESS  2019    arm    JOINT REPLACEMENT Right 07/2023    hip       Family History   Problem Relation Age of Onset    Arthritis Mother     Cancer Mother         breast    Thyroid disease Mother     Breast cancer Mother     Diabetes Father     Hypertension Father     Mental illness Brother     Breast cancer Maternal Aunt          Current Outpatient Medications:     acetaminophen (TYLENOL) 500 MG tablet, Take 2 tablets by mouth Every 8 (Eight) Hours., Disp: , Rfl:     albuterol sulfate  (90 Base) MCG/ACT inhaler, Inhale 2 puffs Every 6 (Six) Hours As Needed for Wheezing., Disp: 18 g, Rfl: 1    amphetamine-dextroamphetamine (Adderall) 30 MG tablet, Take 30 mg orally every morning and afternoon, Disp: 60 tablet, Rfl: 0    buPROPion XL (WELLBUTRIN XL) 300 MG 24 hr tablet, Take 1 tablet by mouth Every Morning., Disp: 30 tablet, Rfl: 2    busPIRone (BUSPAR) 15 MG tablet, Take 1 tablet by mouth 2 (Two) Times a Day., Disp: 60 tablet, Rfl: 2    doxepin (SINEquan) 100 MG capsule, Take 1 capsule by mouth Every Night., Disp: 30 capsule, Rfl: 2    DULoxetine (CYMBALTA) 60 MG capsule, Take 1 capsule by mouth Every Morning., Disp: 30 capsule, Rfl: 2    fluticasone (FLONASE) 50 MCG/ACT nasal spray, INSERT 2 SPRAYS INTO THE NOSTRIL DAILY, Disp: 16 g, Rfl: 2    gabapentin (NEURONTIN) 800 MG tablet, Take 1 tablet by mouth 3 (Three) Times a Day., Disp: 90 tablet, Rfl: 2    methadone (DOLOPHINE) 10 MG tablet, Take 12.5 tablets by mouth Daily Elmira Elaine @ [facility] ([phone]) verified dose as 125 mg daily,, Disp: , Rfl:     ofloxacin (OCUFLOX) 0.3 % ophthalmic solution, APPLY 1 DROP TO EYE AS DIRECTED BY PROVIDER FOUR TIMES A DAY, Disp: 5 mL, Rfl: 0    prazosin (MINIPRESS) 5 MG capsule, Take 2 capsules by mouth Every Night., Disp: 60 capsule, Rfl: 2    QUEtiapine (SEROquel) 100 MG tablet, Take 1 tablet by mouth Every Night., Disp: 30 tablet, Rfl: 2    zolpidem (AMBIEN) 5 MG  "tablet, Take 1 tablet by mouth At Night As Needed for Sleep., Disp: 30 tablet, Rfl: 2    omeprazole (priLOSEC) 40 MG capsule, Take 1 capsule by mouth Daily., Disp: 30 capsule, Rfl: 0    Objective   /73   Pulse 94   Temp 98.3 °F (36.8 °C) (Temporal)   Resp 20   Ht 165.1 cm (65\")   Wt 75.3 kg (166 lb)   SpO2 99%   BMI 27.62 kg/m²     Physical Exam  Vitals and nursing note reviewed.   Constitutional:       Appearance: Normal appearance. She is well-developed. She is obese.   HENT:      Head: Normocephalic and atraumatic.   Eyes:      Extraocular Movements: Extraocular movements intact.      Conjunctiva/sclera: Conjunctivae normal.   Pulmonary:      Effort: Pulmonary effort is normal.   Musculoskeletal:      Cervical back: Normal range of motion and neck supple.   Skin:     General: Skin is warm and dry.      Findings: No rash.   Neurological:      General: No focal deficit present.      Mental Status: She is alert and oriented to person, place, and time.   Psychiatric:         Mood and Affect: Mood normal.         Behavior: Behavior normal.         Results:  Results for orders placed or performed in visit on 10/25/24   Comprehensive Metabolic Panel    Collection Time: 11/04/24  9:33 AM    Specimen: Blood   Result Value Ref Range    Glucose 87 65 - 99 mg/dL    BUN 12 6 - 20 mg/dL    Creatinine 0.93 0.57 - 1.00 mg/dL    EGFR Result 77.9 >60.0 mL/min/1.73    BUN/Creatinine Ratio 12.9 7.0 - 25.0    Sodium 140 136 - 145 mmol/L    Potassium 4.6 3.5 - 5.2 mmol/L    Chloride 103 98 - 107 mmol/L    Total CO2 28.0 22.0 - 29.0 mmol/L    Calcium 8.9 8.6 - 10.5 mg/dL    Total Protein 6.6 6.0 - 8.5 g/dL    Albumin 4.0 3.5 - 5.2 g/dL    Globulin 2.6 gm/dL    A/G Ratio 1.5 g/dL    Total Bilirubin <0.2 0.0 - 1.2 mg/dL    Alkaline Phosphatase 73 39 - 117 U/L    AST (SGOT) 17 1 - 32 U/L    ALT (SGPT) 13 1 - 33 U/L   Lipid Panel    Collection Time: 11/04/24  9:33 AM    Specimen: Blood   Result Value Ref Range    Total " Cholesterol 163 0 - 200 mg/dL    Triglycerides 136 0 - 150 mg/dL    HDL Cholesterol 37 (L) 40 - 60 mg/dL    VLDL Cholesterol [place] 24 5 - 40 mg/dL    LDL Chol Calc (NIH) 102 (H) 0 - 100 mg/dL   Vitamin D,25-Hydroxy    Collection Time: 11/04/24  9:33 AM    Specimen: Blood   Result Value Ref Range    25 Hydroxy, Vitamin D 27.9 (L) 30.0 - 100.0 ng/ml   TSH    Collection Time: 11/04/24  9:33 AM    Specimen: Blood   Result Value Ref Range    TSH 2.700 0.270 - 4.200 uIU/mL   CBC & Differential    Collection Time: 11/04/24  9:33 AM    Specimen: Blood   Result Value Ref Range    WBC 4.79 3.40 - 10.80 10*3/mm3    RBC 4.21 3.77 - 5.28 10*6/mm3    Hemoglobin 12.6 12.0 - 15.9 g/dL    Hematocrit 38.4 34.0 - 46.6 %    MCV 91.2 79.0 - 97.0 fL    MCH 29.9 26.6 - 33.0 pg    MCHC 32.8 31.5 - 35.7 g/dL    RDW 13.0 12.3 - 15.4 %    Platelets 271 140 - 450 10*3/mm3    Neutrophil Rel % 51.6 42.7 - 76.0 %    Lymphocyte Rel % 34.2 19.6 - 45.3 %    Monocyte Rel % 9.0 5.0 - 12.0 %    Eosinophil Rel % 4.2 0.3 - 6.2 %    Basophil Rel % 0.8 0.0 - 1.5 %    Neutrophils Absolute 2.47 1.70 - 7.00 10*3/mm3    Lymphocytes Absolute 1.64 0.70 - 3.10 10*3/mm3    Monocytes Absolute 0.43 0.10 - 0.90 10*3/mm3    Eosinophils Absolute 0.20 0.00 - 0.40 10*3/mm3    Basophils Absolute 0.04 0.00 - 0.20 10*3/mm3    Immature Granulocyte Rel % 0.2 0.0 - 0.5 %    Immature Grans Absolute 0.01 0.00 - 0.05 10*3/mm3    nRBC 0.0 0.0 - 0.2 /100 WBC         Assessment & Plan   Diagnoses and all orders for this visit:    1. Gastroesophageal reflux disease with esophagitis without hemorrhage (Primary)  -     omeprazole (priLOSEC) 40 MG capsule; Take 1 capsule by mouth Daily.  Dispense: 30 capsule; Refill: 0        Assessment & Plan  GERD with esophagitis.  The symptoms suggest a possible case of pill-induced esophagitis or a severe episode of GERD that may have irritated the throat or esophagus. A prescription for omeprazole has been provided for a duration of one month. She  has been advised to maintain an upright position for at least an hour after any medication intake and to ensure adequate hydration. If symptoms persist beyond a month, a referral to a specialist will be considered.      Follow up:  No follow-ups on file.     Patient or patient representative verbalized consent for the use of Ambient Listening during the visit with  David Fleming DO for chart documentation. 11/6/2024  15:07 EST

## 2024-11-18 ENCOUNTER — OFFICE VISIT (OUTPATIENT)
Dept: BEHAVIORAL HEALTH | Facility: CLINIC | Age: 44
End: 2024-11-18
Payer: MEDICARE

## 2024-11-18 VITALS
BODY MASS INDEX: 27.66 KG/M2 | WEIGHT: 166 LBS | OXYGEN SATURATION: 97 % | DIASTOLIC BLOOD PRESSURE: 80 MMHG | SYSTOLIC BLOOD PRESSURE: 148 MMHG | HEART RATE: 110 BPM | HEIGHT: 65 IN

## 2024-11-18 DIAGNOSIS — F33.1 MODERATE EPISODE OF RECURRENT MAJOR DEPRESSIVE DISORDER: ICD-10-CM

## 2024-11-18 DIAGNOSIS — F51.5 NIGHTMARES: ICD-10-CM

## 2024-11-18 DIAGNOSIS — F51.01 PRIMARY INSOMNIA: ICD-10-CM

## 2024-11-18 DIAGNOSIS — F41.1 GENERALIZED ANXIETY DISORDER: ICD-10-CM

## 2024-11-18 DIAGNOSIS — F90.2 ATTENTION DEFICIT HYPERACTIVITY DISORDER (ADHD), COMBINED TYPE: Primary | ICD-10-CM

## 2024-11-18 PROCEDURE — 1159F MED LIST DOCD IN RCRD: CPT

## 2024-11-18 PROCEDURE — 99214 OFFICE O/P EST MOD 30 MIN: CPT

## 2024-11-18 PROCEDURE — 1160F RVW MEDS BY RX/DR IN RCRD: CPT

## 2024-11-18 RX ORDER — DOXEPIN HYDROCHLORIDE 100 MG/1
100 CAPSULE ORAL NIGHTLY
Qty: 30 CAPSULE | Refills: 2 | Status: SHIPPED | OUTPATIENT
Start: 2024-11-18

## 2024-11-18 RX ORDER — DULOXETIN HYDROCHLORIDE 30 MG/1
30 CAPSULE, DELAYED RELEASE ORAL DAILY
Qty: 30 CAPSULE | Refills: 0 | Status: SHIPPED | OUTPATIENT
Start: 2024-11-18

## 2024-11-18 RX ORDER — QUETIAPINE FUMARATE 100 MG/1
100 TABLET, FILM COATED ORAL NIGHTLY
Qty: 30 TABLET | Refills: 2 | Status: SHIPPED | OUTPATIENT
Start: 2024-11-18

## 2024-11-18 RX ORDER — GABAPENTIN 800 MG/1
800 TABLET ORAL 3 TIMES DAILY
Qty: 90 TABLET | Refills: 2 | Status: SHIPPED | OUTPATIENT
Start: 2024-11-18

## 2024-11-18 RX ORDER — BUSPIRONE HYDROCHLORIDE 15 MG/1
15 TABLET ORAL 2 TIMES DAILY
Qty: 60 TABLET | Refills: 2 | Status: SHIPPED | OUTPATIENT
Start: 2024-11-18

## 2024-11-18 RX ORDER — PRAZOSIN HYDROCHLORIDE 5 MG/1
10 CAPSULE ORAL NIGHTLY
Qty: 60 CAPSULE | Refills: 2 | Status: SHIPPED | OUTPATIENT
Start: 2024-11-18

## 2024-11-18 RX ORDER — BUPROPION HYDROCHLORIDE 150 MG/1
150 TABLET ORAL EVERY MORNING
Qty: 30 TABLET | Refills: 2 | Status: SHIPPED | OUTPATIENT
Start: 2024-11-18

## 2024-11-18 RX ORDER — DEXTROAMPHETAMINE SACCHARATE, AMPHETAMINE ASPARTATE, DEXTROAMPHETAMINE SULFATE AND AMPHETAMINE SULFATE 7.5; 7.5; 7.5; 7.5 MG/1; MG/1; MG/1; MG/1
TABLET ORAL
Qty: 60 TABLET | Refills: 0 | Status: SHIPPED | OUTPATIENT
Start: 2024-11-18

## 2024-11-18 RX ORDER — ZOLPIDEM TARTRATE 5 MG/1
5 TABLET ORAL NIGHTLY PRN
Qty: 30 TABLET | Refills: 2 | Status: SHIPPED | OUTPATIENT
Start: 2024-11-18

## 2024-11-18 NOTE — PROGRESS NOTES
Follow Up Office Visit    Patient Name: Audrey Alonso  : 1980   MRN: [id number]   Care Team: Patient Care Team:  David Fleming DO as PCP - General (Internal Medicine)  Nathalia Anne APRN as Nurse Practitioner (Behavioral Health)         Chief Complaint:    Chief Complaint   Patient presents with    ADHD    Anxiety    Depression    Sleeping Problem    Med Management       History of Present Illness: Audrey Alonso is a 44 y.o. female who is here today for a medication management follow up. Patient reports that overall medication continues to be effective. She feels that her focus and concentration are doing good and her mood and anxiety have been managed well. She also reports that she is sleeping good. She feels that due to her stability she is ready to decrease some of her doses and see how she responds. She denies SI/HI today.     The following portion of the patient's history were reviewed and updated appropriately: allergies, current and past medications, family history, medical history and social history. SACHIN reviewed and appropriate.   Subjective   Review of Systems:    Review of Systems   Respiratory: Negative.     Cardiovascular: Negative.  Negative for chest pain and palpitations.   Neurological: Negative.    Psychiatric/Behavioral:  Positive for stress.    All other systems reviewed and are negative.      Current Medications:   Current Outpatient Medications   Medication Sig Dispense Refill    acetaminophen (TYLENOL) 500 MG tablet Take 2 tablets by mouth Every 8 (Eight) Hours.      albuterol sulfate  (90 Base) MCG/ACT inhaler Inhale 2 puffs Every 6 (Six) Hours As Needed for Wheezing. 18 g 1    amphetamine-dextroamphetamine (Adderall) 30 MG tablet Take 30 mg orally every morning and afternoon 60 tablet 0    busPIRone (BUSPAR) 15 MG tablet Take 1 tablet by mouth 2 (Two) Times a Day. 60 tablet 2    doxepin (SINEquan) 100 MG capsule Take 1 capsule by mouth Every Night.  "30 capsule 2    fluticasone (FLONASE) 50 MCG/ACT nasal spray INSERT 2 SPRAYS INTO THE NOSTRIL DAILY 16 g 2    gabapentin (NEURONTIN) 800 MG tablet Take 1 tablet by mouth 3 (Three) Times a Day. 90 tablet 2    methadone (DOLOPHINE) 10 MG tablet Take 12.5 tablets by mouth Daily [place] Elaine @ [facility] ([phone]) verified dose as 125 mg daily,      prazosin (MINIPRESS) 5 MG capsule Take 2 capsules by mouth Every Night. 60 capsule 2    QUEtiapine (SEROquel) 100 MG tablet Take 1 tablet by mouth Every Night. 30 tablet 2    zolpidem (AMBIEN) 5 MG tablet Take 1 tablet by mouth At Night As Needed for Sleep. 30 tablet 2    buPROPion XL (Wellbutrin XL) 150 MG 24 hr tablet Take 1 tablet by mouth Every Morning. 30 tablet 2    DULoxetine (Cymbalta) 30 MG capsule Take 1 capsule by mouth Daily. 30 capsule 0     No current facility-administered medications for this visit.       Mental Status Exam:   Hygiene:   good  Cooperation:  Cooperative  Eye Contact:  Good  Psychomotor Behavior:  Appropriate  Affect:  Appropriate  Mood: normal  Speech:  Normal  Thought Process:  Goal directed and Linear  Thought Content:  Normal and Mood congruent  Suicidal:  None  Homicidal:  None  Hallucinations:  None  Delusion:  None  Memory:  Intact  Orientation:  Person, Place, Time, and Situation  Reliability:  good  Insight:  Good  Judgement:  Good  Impulse Control:  Good  Physical/Medical Issues:  Yes see chart       Objective   Vital Signs:   /80   Pulse 110   Ht 165.1 cm (65\")   Wt 75.3 kg (166 lb)   SpO2 97%   BMI 27.62 kg/m²         Lab Results:   Office Visit on 10/25/2024   Component Date Value Ref Range Status    WBC 11/04/2024 4.79  3.40 - 10.80 10*3/mm3 Final    RBC 11/04/2024 4.21  3.77 - 5.28 10*6/mm3 Final    Hemoglobin 11/04/2024 12.6  12.0 - 15.9 g/dL Final    Hematocrit 11/04/2024 38.4  34.0 - 46.6 % Final    MCV 11/04/2024 91.2  79.0 - 97.0 fL Final    MCH 11/04/2024 29.9  26.6 - 33.0 pg Final "    MCHC 11/04/2024 32.8  31.5 - 35.7 g/dL Final    RDW 11/04/2024 13.0  12.3 - 15.4 % Final    Platelets 11/04/2024 271  140 - 450 10*3/mm3 Final    Neutrophil Rel % 11/04/2024 51.6  42.7 - 76.0 % Final    Lymphocyte Rel % 11/04/2024 34.2  19.6 - 45.3 % Final    Monocyte Rel % 11/04/2024 9.0  5.0 - 12.0 % Final    Eosinophil Rel % 11/04/2024 4.2  0.3 - 6.2 % Final    Basophil Rel % 11/04/2024 0.8  0.0 - 1.5 % Final    Neutrophils Absolute 11/04/2024 2.47  1.70 - 7.00 10*3/mm3 Final    Lymphocytes Absolute 11/04/2024 1.64  0.70 - 3.10 10*3/mm3 Final    Monocytes Absolute 11/04/2024 0.43  0.10 - 0.90 10*3/mm3 Final    Eosinophils Absolute 11/04/2024 0.20  0.00 - 0.40 10*3/mm3 Final    Basophils Absolute 11/04/2024 0.04  0.00 - 0.20 10*3/mm3 Final    Immature Granulocyte Rel % 11/04/2024 0.2  0.0 - 0.5 % Final    Immature Grans Absolute 11/04/2024 0.01  0.00 - 0.05 10*3/mm3 Final    nRBC 11/04/2024 0.0  0.0 - 0.2 /100 WBC Final    Glucose 11/04/2024 87  65 - 99 mg/dL Final    BUN 11/04/2024 12  6 - 20 mg/dL Final    Creatinine 11/04/2024 0.93  0.57 - 1.00 mg/dL Final    EGFR Result 11/04/2024 77.9  >60.0 mL/min/1.73 Final    Comment: GFR Normal >60  Chronic Kidney Disease <60  Kidney Failure <15      BUN/Creatinine Ratio 11/04/2024 12.9  7.0 - 25.0 Final    Sodium 11/04/2024 140  136 - 145 mmol/L Final    Potassium 11/04/2024 4.6  3.5 - 5.2 mmol/L Final    Chloride 11/04/2024 103  98 - 107 mmol/L Final    Total CO2 11/04/2024 28.0  22.0 - 29.0 mmol/L Final    Calcium 11/04/2024 8.9  8.6 - 10.5 mg/dL Final    Total Protein 11/04/2024 6.6  6.0 - 8.5 g/dL Final    Albumin 11/04/2024 4.0  3.5 - 5.2 g/dL Final    Globulin 11/04/2024 2.6  gm/dL Final    A/G Ratio 11/04/2024 1.5  g/dL Final    Total Bilirubin 11/04/2024 <0.2  0.0 - 1.2 mg/dL Final    Alkaline Phosphatase 11/04/2024 73  39 - 117 U/L Final    AST (SGOT) 11/04/2024 17  1 - 32 U/L Final    ALT (SGPT) 11/04/2024 13  1 - 33 U/L Final    Total Cholesterol 11/04/2024  163  0 - 200 mg/dL Final    Comment: Cholesterol Reference Ranges  (U.S. Department of Health and Human Services ATP III  Classifications)  Desirable          <200 mg/dL  Borderline High    200-239 mg/dL  High Risk          >240 mg/dL  Triglyceride Reference Ranges  (U.S. Department of Health and Human Services ATP III  Classifications)  Normal           <150 mg/dL  Borderline High  150-199 mg/dL  High             200-499 mg/dL  Very High        >500 mg/dL  HDL Reference Ranges  (U.S. Department of Health and Human Services ATP III  Classifications)  Low     <40 mg/dl (major risk factor for CHD)  High    >60 mg/dl ('negative' risk factor for CHD)  LDL Reference Ranges  (U.S. Department of Health and Human Services ATP III  Classifications)  Optimal          <100 mg/dL  Near Optimal     100-129 mg/dL  Borderline High  130-159 mg/dL  High             160-189 mg/dL  Very High        >189 mg/dL      Triglycerides 11/04/2024 136  0 - 150 mg/dL Final    HDL Cholesterol 11/04/2024 37 (L)  40 - 60 mg/dL Final    VLDL Cholesterol [place] 11/04/2024 24  5 - 40 mg/dL Final    LDL Chol Calc (NIH) 11/04/2024 102 (H)  0 - 100 mg/dL Final    25 Hydroxy, Vitamin D 11/04/2024 27.9 (L)  30.0 - 100.0 ng/ml Final    Comment: Reference Range for Total Vitamin D 25(OH)  Deficiency <20.0 ng/mL  Insufficiency 21-29 ng/mL  Sufficiency  ng/mL  Toxicity >100 ng/ml      TSH 11/04/2024 2.700  0.270 - 4.200 uIU/mL Final       Assessment / Plan    Diagnoses and all orders for this visit:    1. Attention deficit hyperactivity disorder (ADHD), combined type (Primary)  -     amphetamine-dextroamphetamine (Adderall) 30 MG tablet; Take 30 mg orally every morning and afternoon  Dispense: 60 tablet; Refill: 0    2. Primary insomnia  -     QUEtiapine (SEROquel) 100 MG tablet; Take 1 tablet by mouth Every Night.  Dispense: 30 tablet; Refill: 2  -     doxepin (SINEquan) 100 MG capsule; Take 1 capsule by mouth Every Night.  Dispense: 30 capsule; Refill:  2  -     zolpidem (AMBIEN) 5 MG tablet; Take 1 tablet by mouth At Night As Needed for Sleep.  Dispense: 30 tablet; Refill: 2    3. Nightmares  -     QUEtiapine (SEROquel) 100 MG tablet; Take 1 tablet by mouth Every Night.  Dispense: 30 tablet; Refill: 2  -     doxepin (SINEquan) 100 MG capsule; Take 1 capsule by mouth Every Night.  Dispense: 30 capsule; Refill: 2  -     prazosin (MINIPRESS) 5 MG capsule; Take 2 capsules by mouth Every Night.  Dispense: 60 capsule; Refill: 2    4. Generalized anxiety disorder  -     gabapentin (NEURONTIN) 800 MG tablet; Take 1 tablet by mouth 3 (Three) Times a Day.  Dispense: 90 tablet; Refill: 2  -     busPIRone (BUSPAR) 15 MG tablet; Take 1 tablet by mouth 2 (Two) Times a Day.  Dispense: 60 tablet; Refill: 2  -     doxepin (SINEquan) 100 MG capsule; Take 1 capsule by mouth Every Night.  Dispense: 30 capsule; Refill: 2  -     DULoxetine (Cymbalta) 30 MG capsule; Take 1 capsule by mouth Daily.  Dispense: 30 capsule; Refill: 0    5. Moderate episode of recurrent major depressive disorder  -     buPROPion XL (Wellbutrin XL) 150 MG 24 hr tablet; Take 1 tablet by mouth Every Morning.  Dispense: 30 tablet; Refill: 2  -     doxepin (SINEquan) 100 MG capsule; Take 1 capsule by mouth Every Night.  Dispense: 30 capsule; Refill: 2  -     DULoxetine (Cymbalta) 30 MG capsule; Take 1 capsule by mouth Daily.  Dispense: 30 capsule; Refill: 0     Will decrease Wellbutrin and Cymbalta, continue all other medication as ordered.     History and physical exam exhibit continued safe and appropriate use of controlled substance.    As part of patient's treatment plan I am prescribing a controlled substance.  The patient has been made aware of the appropriate use of such medications, including potential risk of somnolence, limited ability to drive and/or work safely, and potential for dependence and/or overdose.  It has also been made clear that these medications are for use by this patient only, without  concomitant use of alcohol or other substances, unless prescribed.    Patient has completed a prescribing agreement detailing terms of continued prescribing of controlled substances, including monitoring SACHIN reports, urine drug screening, and pill counts if necessary.  Patient is aware that inappropriate use will result in cessation of prescribing such medications.    AIMS  Facial and Oral Movements  Muscles of Facial Expression: None, normal  Lips and Perioral Area: None, normal  Jaw: None, normal  Tongue: None, normal  Extremity Movements  Upper (arms, wrists, hands, fingers): None, normal  Lower (legs, knees, ankles, toes): None, normal  Trunk Movements  Neck, shoulders, hips: None, normal  Overall Severity  Severity of abnormal movements (max 4): 0  Incapacitation due to abnormal movements: None, normal  Patient's awareness of abnormal movements (rate only patient's report): Aware, no distress  Dental Status  Current problems with teeth and/or dentures?: No  Does patient usually wear dentures?: No      PHQ-9 Depression Screening  Little interest or pleasure in doing things? Over half   Feeling down, depressed, or hopeless? Over half   Trouble falling or staying asleep, or sleeping too much? Over half   Feeling tired or having little energy? Several days   Poor appetite or overeating? Over half   Feeling bad about yourself - or that you are a failure or have let yourself or your family down? Several days   Trouble concentrating on things, such as reading the newspaper or watching television? Over half   Moving or speaking so slowly that other people could have noticed? Or the opposite - being so fidgety or restless that you have been moving around a lot more than usual? Not at all   Thoughts that you would be better off dead, or of hurting yourself in some way? Not at all   PHQ-9 Total Score 12   If you checked off any problems, how difficult have these problems made it for you to do your work, take care of  things at home, or get along with other people? Somewhat difficult     PHQ-9 Score:   PHQ-9 Total Score: 12    Depression Screening:  Patient screened positive for depression based on a PHQ-9 score of 12 on 11/18/2024. Follow-up recommendations include: Prescribed antidepressant medication treatment and Suicide Risk Assessment performed.        TAMANNA-7  Over the last two weeks, how often have you been bothered by the following problems?  Feeling nervous, anxious or on edge: More than half the days  Not being able to stop or control worrying: More than half the days  Worrying too much about different things: More than half the days  Trouble Relaxing: More than half the days  Being so restless that it is hard to sit still: Several days  Becoming easily annoyed or irritable: Nearly every day  Feeling afraid as if something awful might happen: Several days  TAMANNA 7 Total Score: 13  If you checked any problems, how difficult have these problems made it for you to do your work, take care of things at home, or get along with other people: Somewhat difficult      ADHD  Screening for Adults With ADHD - (1-6)  1. How often do you have trouble wrapping up the final details of a project, once the challenging parts have been done?: Often  2. How often do you have difficulty getting things in order when you have to do a task that requires organization?: Very Often  3. How often do you have problems remembering appointments or obligations : Sometimes  4. When you have a task that requires a lot of thought, how often do you avoid or delay getting started ?: Very Often  5. How often do you fidget or squirm with your hands or feet when you have to sit down for a long time?: Often  6. How often do you feel overly active and compelled to do things, like you were driven by a motor?: Rarely  7. How often do you make careless mistakes when you have to work on a boring or difficult project?: Sometimes  8. How often do have difficulty keeping  your attention when you are doing boring or repetitive work?: Often  9. How often do you have difficulty concentrating on what people say to you, even when they are speaking to you: Often  10.How often do you misplace or have difficulty finding things at home or at work?: Sometimes  11.How often are you distracted by activity or noise around you?: Often  12.How often do you leave your seat in meetings or other situations in which you are expected to remain seated?: Rarely  13.How often do you feel restless or fidgety?: Often  14.How often do you have difficulty unwinding and relaxing when you have time to yourself?: Sometimes  15.How often do you find yourself talking too much when you are in social situations?: Rarely  16.When you’re in a conversation, how often do you find yourself finishing the sentences of the people you are talking to, before they can finish them themselves?: Rarely  17.How often do you have difficulty waiting your turn in situations when turn taking is required?: Sometimes  18.How often do you interrupt others when they are busy?: Sometimes    A psychological evaluation was conducted in order to assess past and current level of functioning. Areas assessed included, but were not limited to: perception of social support, perception of ability to face and deal with challenges in life (positive functioning), anxiety symptoms, depressive symptoms, perspective on beliefs/belief system, coping skills for stress, intelligence level,  Therapeutic rapport was established. Interventions conducted today were geared towards incorporating medication management along with support for continued therapy. Education was also provided as to the med management with this provider and what to expect in subsequent sessions.      We discussed risks, benefits, goals and side effects of the above medication and the patient was agreeable with the plan. Patient was educated on the importance of compliance with treatment  and follow-up appointments. Patient is aware to contact the [facility] with any worsening of symptoms. To call for questions or concerns and return early if necessary. Patent is agreeable to go to the Emergency Department or call 911 should they begin SI/HI.    MEDS ORDERED DURING VISIT:  New Medications Ordered This Visit   Medications    QUEtiapine (SEROquel) 100 MG tablet     Sig: Take 1 tablet by mouth Every Night.     Dispense:  30 tablet     Refill:  2    gabapentin (NEURONTIN) 800 MG tablet     Sig: Take 1 tablet by mouth 3 (Three) Times a Day.     Dispense:  90 tablet     Refill:  2    amphetamine-dextroamphetamine (Adderall) 30 MG tablet     Sig: Take 30 mg orally every morning and afternoon     Dispense:  60 tablet     Refill:  0     Take 30 mg orally every morning and afternoon.    buPROPion XL (Wellbutrin XL) 150 MG 24 hr tablet     Sig: Take 1 tablet by mouth Every Morning.     Dispense:  30 tablet     Refill:  2    busPIRone (BUSPAR) 15 MG tablet     Sig: Take 1 tablet by mouth 2 (Two) Times a Day.     Dispense:  60 tablet     Refill:  2    doxepin (SINEquan) 100 MG capsule     Sig: Take 1 capsule by mouth Every Night.     Dispense:  30 capsule     Refill:  2    DULoxetine (Cymbalta) 30 MG capsule     Sig: Take 1 capsule by mouth Daily.     Dispense:  30 capsule     Refill:  0    zolpidem (AMBIEN) 5 MG tablet     Sig: Take 1 tablet by mouth At Night As Needed for Sleep.     Dispense:  30 tablet     Refill:  2    prazosin (MINIPRESS) 5 MG capsule     Sig: Take 2 capsules by mouth Every Night.     Dispense:  60 capsule     Refill:  2         Follow Up   Return in about 3 months (around 2/18/2025).  Patient was given instructions and counseling regarding her condition or for health maintenance advice. Please see specific information pulled into the AVS if appropriate.     TREATMENT PLAN/GOALS: Continue supportive psychotherapy efforts and medications as indicated. Treatment and medication options  discussed during today's visit. Patient acknowledged and verbally consented to continue with current treatment plan and was educated on the importance of compliance with treatment and follow-up appointments.    MEDICATION ISSUES:  Discussed medication options and treatment plan of prescribed medication as well as the risks, benefits, and side effects including potential falls, possible impaired driving and metabolic adversities among others. Patient is agreeable to call the office with any worsening of symptoms or onset of side effects. Patient is agreeable to call 911 or go to the nearest ER should he/she begin having SI/HI.        [facility]  [address] [place] [id number] [phone]    November 18, 2024 09:50 EST

## 2024-12-16 DIAGNOSIS — F41.1 GENERALIZED ANXIETY DISORDER: ICD-10-CM

## 2024-12-16 DIAGNOSIS — F33.1 MODERATE EPISODE OF RECURRENT MAJOR DEPRESSIVE DISORDER: ICD-10-CM

## 2024-12-17 RX ORDER — DULOXETIN HYDROCHLORIDE 30 MG/1
30 CAPSULE, DELAYED RELEASE ORAL DAILY
Qty: 30 CAPSULE | Refills: 0 | Status: SHIPPED | OUTPATIENT
Start: 2024-12-17

## 2025-01-08 DIAGNOSIS — F90.2 ATTENTION DEFICIT HYPERACTIVITY DISORDER (ADHD), COMBINED TYPE: ICD-10-CM

## 2025-01-08 RX ORDER — DEXTROAMPHETAMINE SACCHARATE, AMPHETAMINE ASPARTATE, DEXTROAMPHETAMINE SULFATE AND AMPHETAMINE SULFATE 7.5; 7.5; 7.5; 7.5 MG/1; MG/1; MG/1; MG/1
TABLET ORAL
Qty: 60 TABLET | Refills: 0 | Status: SHIPPED | OUTPATIENT
Start: 2025-01-08

## 2025-01-14 DIAGNOSIS — F33.1 MODERATE EPISODE OF RECURRENT MAJOR DEPRESSIVE DISORDER: ICD-10-CM

## 2025-01-14 DIAGNOSIS — F41.1 GENERALIZED ANXIETY DISORDER: ICD-10-CM

## 2025-01-14 RX ORDER — DULOXETIN HYDROCHLORIDE 30 MG/1
30 CAPSULE, DELAYED RELEASE ORAL DAILY
Qty: 30 CAPSULE | Refills: 0 | Status: SHIPPED | OUTPATIENT
Start: 2025-01-14

## 2025-01-14 NOTE — TELEPHONE ENCOUNTER
Rx Refill Note  Requested Prescriptions     Pending Prescriptions Disp Refills    DULoxetine (CYMBALTA) 30 MG capsule [Pharmacy Med Name: DULoxetine HCL DR 30 MG CAPSULE] 30 capsule 0     Sig: TAKE 1 CAPSULE BY MOUTH DAILY      Last office visit with prescribing clinician: 11/18/2024   Last telemedicine visit with prescribing clinician: Visit date not found   Next office visit with prescribing clinician: 2/18/2025                         Would you like a call back once the refill request has been completed: [] Yes [] No    If the office needs to give you a call back, can they leave a voicemail: [] Yes [] No    Anna Marie Berger MA  01/14/25, 14:00 EST

## 2025-02-11 DIAGNOSIS — F51.01 PRIMARY INSOMNIA: ICD-10-CM

## 2025-02-11 DIAGNOSIS — K21.00 GASTROESOPHAGEAL REFLUX DISEASE WITH ESOPHAGITIS WITHOUT HEMORRHAGE: ICD-10-CM

## 2025-02-11 DIAGNOSIS — F41.1 GENERALIZED ANXIETY DISORDER: ICD-10-CM

## 2025-02-11 RX ORDER — ZOLPIDEM TARTRATE 5 MG/1
5 TABLET ORAL NIGHTLY PRN
Qty: 30 TABLET | Refills: 0 | Status: SHIPPED | OUTPATIENT
Start: 2025-02-11

## 2025-02-12 RX ORDER — GABAPENTIN 800 MG/1
800 TABLET ORAL 3 TIMES DAILY
Qty: 90 TABLET | Refills: 2 | Status: SHIPPED | OUTPATIENT
Start: 2025-02-12

## 2025-02-12 RX ORDER — OMEPRAZOLE 40 MG/1
40 CAPSULE, DELAYED RELEASE ORAL DAILY
Qty: 30 CAPSULE | Refills: 0 | OUTPATIENT
Start: 2025-02-12

## 2025-02-18 ENCOUNTER — OFFICE VISIT (OUTPATIENT)
Dept: BEHAVIORAL HEALTH | Facility: CLINIC | Age: 45
End: 2025-02-18
Payer: MEDICARE

## 2025-02-18 VITALS
DIASTOLIC BLOOD PRESSURE: 84 MMHG | HEART RATE: 88 BPM | OXYGEN SATURATION: 95 % | WEIGHT: 169 LBS | HEIGHT: 65 IN | SYSTOLIC BLOOD PRESSURE: 128 MMHG | BODY MASS INDEX: 28.16 KG/M2

## 2025-02-18 DIAGNOSIS — F33.1 MODERATE EPISODE OF RECURRENT MAJOR DEPRESSIVE DISORDER: ICD-10-CM

## 2025-02-18 DIAGNOSIS — F51.5 NIGHTMARES: ICD-10-CM

## 2025-02-18 DIAGNOSIS — F41.1 GENERALIZED ANXIETY DISORDER: ICD-10-CM

## 2025-02-18 DIAGNOSIS — F90.2 ATTENTION DEFICIT HYPERACTIVITY DISORDER (ADHD), COMBINED TYPE: Primary | ICD-10-CM

## 2025-02-18 DIAGNOSIS — F51.01 PRIMARY INSOMNIA: ICD-10-CM

## 2025-02-18 PROCEDURE — 1159F MED LIST DOCD IN RCRD: CPT

## 2025-02-18 PROCEDURE — 1160F RVW MEDS BY RX/DR IN RCRD: CPT

## 2025-02-18 PROCEDURE — 99214 OFFICE O/P EST MOD 30 MIN: CPT

## 2025-02-18 RX ORDER — BUPROPION HYDROCHLORIDE 150 MG/1
150 TABLET ORAL EVERY MORNING
Qty: 30 TABLET | Refills: 2 | Status: SHIPPED | OUTPATIENT
Start: 2025-02-18

## 2025-02-18 RX ORDER — QUETIAPINE FUMARATE 100 MG/1
100 TABLET, FILM COATED ORAL NIGHTLY
Qty: 30 TABLET | Refills: 2 | Status: SHIPPED | OUTPATIENT
Start: 2025-02-18

## 2025-02-18 RX ORDER — BUSPIRONE HYDROCHLORIDE 7.5 MG/1
7.5 TABLET ORAL 2 TIMES DAILY
Qty: 60 TABLET | Refills: 2 | Status: SHIPPED | OUTPATIENT
Start: 2025-02-18

## 2025-02-18 RX ORDER — DOXEPIN HYDROCHLORIDE 100 MG/1
100 CAPSULE ORAL NIGHTLY
Qty: 30 CAPSULE | Refills: 2 | Status: SHIPPED | OUTPATIENT
Start: 2025-02-18

## 2025-02-18 RX ORDER — PRAZOSIN HYDROCHLORIDE 5 MG/1
10 CAPSULE ORAL NIGHTLY
Qty: 60 CAPSULE | Refills: 2 | Status: SHIPPED | OUTPATIENT
Start: 2025-02-18

## 2025-02-18 RX ORDER — DULOXETIN HYDROCHLORIDE 30 MG/1
30 CAPSULE, DELAYED RELEASE ORAL DAILY
Qty: 30 CAPSULE | Refills: 2 | Status: SHIPPED | OUTPATIENT
Start: 2025-02-18

## 2025-02-18 NOTE — PROGRESS NOTES
Follow Up Office Visit    Patient Name: Audrey Alonso  : 1980   MRN: [id number]   Care Team: Patient Care Team:  David Fleming DO as PCP - General (Internal Medicine)  Nathalia Anne APRN as Nurse Practitioner (Behavioral Health)         Chief Complaint:    Chief Complaint   Patient presents with    ADHD    Anxiety    Depression    Sleeping Problem    Med Management       History of Present Illness: Audrey Alonso is a 45 y.o. female who is here today for a medication management follow up. Patient reports that she has tolerated the decrease in her Wellbutrin and Cymbalta well. She feels that for the most part her anxiety and mood have been managed well and she is ready to decrease her Buspar. She also feels that her focus and concentration are doing well and she is sleeping good. She denies SI/HI today.     The following portion of the patient's history were reviewed and updated appropriately: allergies, current and past medications, family history, medical history and social history. SACHIN reviewed and appropriate.   Subjective   Review of Systems:    Review of Systems   Respiratory: Negative.     Cardiovascular: Negative.  Negative for chest pain and palpitations.   Neurological: Negative.    Psychiatric/Behavioral:  Positive for stress.    All other systems reviewed and are negative.      Current Medications:   Current Outpatient Medications   Medication Sig Dispense Refill    acetaminophen (TYLENOL) 500 MG tablet Take 2 tablets by mouth Every 8 (Eight) Hours.      albuterol sulfate  (90 Base) MCG/ACT inhaler Inhale 2 puffs Every 6 (Six) Hours As Needed for Wheezing. 18 g 1    amphetamine-dextroamphetamine (Adderall) 30 MG tablet Take 30 mg orally every morning and afternoon 60 tablet 0    buPROPion XL (Wellbutrin XL) 150 MG 24 hr tablet Take 1 tablet by mouth Every Morning. 30 tablet 2    doxepin (SINEquan) 100 MG capsule Take 1 capsule by mouth Every Night. 30 capsule 2     "DULoxetine (CYMBALTA) 30 MG capsule Take 1 capsule by mouth Daily. 30 capsule 2    fluticasone (FLONASE) 50 MCG/ACT nasal spray INSERT 2 SPRAYS INTO THE NOSTRIL DAILY 16 g 2    gabapentin (NEURONTIN) 800 MG tablet Take 1 tablet by mouth 3 (Three) Times a Day. 90 tablet 2    methadone (DOLOPHINE) 10 MG tablet Take 12.5 tablets by mouth Daily [facility] Elaine @ [facility] ([phone]) verified dose as 125 mg daily,      prazosin (MINIPRESS) 5 MG capsule Take 2 capsules by mouth Every Night. 60 capsule 2    QUEtiapine (SEROquel) 100 MG tablet Take 1 tablet by mouth Every Night. 30 tablet 2    zolpidem (AMBIEN) 5 MG tablet TAKE ONE TABLET BY MOUTH ONCE NIGHTLY AS NEEDED FOR SLEEP 30 tablet 0    busPIRone (BUSPAR) 7.5 MG tablet Take 1 tablet by mouth 2 (Two) Times a Day. 60 tablet 2     No current facility-administered medications for this visit.       Mental Status Exam:   Hygiene:   good  Cooperation:  Cooperative  Eye Contact:  Good  Psychomotor Behavior:  Appropriate  Affect:  Appropriate  Mood: normal  Speech:  Normal  Thought Process:  Goal directed and Linear  Thought Content:  Normal and Mood congruent  Suicidal:  None  Homicidal:  None  Hallucinations:  None  Delusion:  None  Memory:  Intact  Orientation:  Person, Place, Time, and Situation  Reliability:  good  Insight:  Good  Judgement:  Good  Impulse Control:  Good  Physical/Medical Issues:  Yes see chart       Objective   Vital Signs:   /84   Pulse 88   Ht 165.1 cm (65\")   Wt 76.7 kg (169 lb)   SpO2 95%   BMI 28.12 kg/m²         Lab Results:   Lab Results   Component Value Date    WBC 4.79 11/04/2024    HGB 12.6 11/04/2024    HCT 38.4 11/04/2024    MCV 91.2 11/04/2024     11/04/2024        Lab Results   Component Value Date    GLUCOSE 87 11/04/2024    BUN 12 11/04/2024    CREATININE 0.93 11/04/2024     11/04/2024    K 4.6 11/04/2024     11/04/2024    CALCIUM 8.9 11/04/2024    PROTEINTOT 6.6 11/04/2024    " ALBUMIN 4.0 11/04/2024    ALT 13 11/04/2024    AST 17 11/04/2024    ALKPHOS 73 11/04/2024    BILITOT <0.2 11/04/2024    GLOB 2.6 11/04/2024    AGRATIO 1.5 11/04/2024    BCR 12.9 11/04/2024    ANIONGAP 15.8 (H) 07/16/2022    EGFR 77.9 11/04/2024        Lab Results   Component Value Date    CHLPL 163 11/04/2024    TRIG 136 11/04/2024    HDL 37 (L) 11/04/2024     (H) 11/04/2024        Lab Results   Component Value Date    HGBA1C 5.40 11/16/2023        Lab Results   Component Value Date    TSH 2.700 11/04/2024            Assessment / Plan    Diagnoses and all orders for this visit:    1. Attention deficit hyperactivity disorder (ADHD), combined type (Primary)    2. Generalized anxiety disorder  -     DULoxetine (CYMBALTA) 30 MG capsule; Take 1 capsule by mouth Daily.  Dispense: 30 capsule; Refill: 2  -     busPIRone (BUSPAR) 7.5 MG tablet; Take 1 tablet by mouth 2 (Two) Times a Day.  Dispense: 60 tablet; Refill: 2  -     doxepin (SINEquan) 100 MG capsule; Take 1 capsule by mouth Every Night.  Dispense: 30 capsule; Refill: 2    3. Moderate episode of recurrent major depressive disorder  -     DULoxetine (CYMBALTA) 30 MG capsule; Take 1 capsule by mouth Daily.  Dispense: 30 capsule; Refill: 2  -     doxepin (SINEquan) 100 MG capsule; Take 1 capsule by mouth Every Night.  Dispense: 30 capsule; Refill: 2  -     buPROPion XL (Wellbutrin XL) 150 MG 24 hr tablet; Take 1 tablet by mouth Every Morning.  Dispense: 30 tablet; Refill: 2    4. Primary insomnia  -     doxepin (SINEquan) 100 MG capsule; Take 1 capsule by mouth Every Night.  Dispense: 30 capsule; Refill: 2  -     QUEtiapine (SEROquel) 100 MG tablet; Take 1 tablet by mouth Every Night.  Dispense: 30 tablet; Refill: 2    5. Nightmares  -     doxepin (SINEquan) 100 MG capsule; Take 1 capsule by mouth Every Night.  Dispense: 30 capsule; Refill: 2  -     QUEtiapine (SEROquel) 100 MG tablet; Take 1 tablet by mouth Every Night.  Dispense: 30 tablet; Refill: 2  -      prazosin (MINIPRESS) 5 MG capsule; Take 2 capsules by mouth Every Night.  Dispense: 60 capsule; Refill: 2     Will decrease Buspar and continue all other medications as ordered.    History and physical exam exhibit continued safe and appropriate use of controlled substance.    As part of patient's treatment plan I am prescribing a controlled substance.  The patient has been made aware of the appropriate use of such medications and potential for dependence and/or overdose.  It has also been made clear that these medications are for use by this patient only, without concomitant use of alcohol or other substances, unless prescribed.    At this time, patient is being prescribed an antipsychotic medication.  The risks, benefits and potential side effects of these medications have been reviewed with the patient/guardian.  I have also discussed with the patient/guardian that while on these medications the following labs should be drawn yearly.  Those labs include, a CBC, a CMP, a lipid panel, A1c, and Thyroid labs.  Encouraged patient/guardian to discuss these labs with their primary care provider.  Patient/guardian verbalized understanding.    AIMS  Facial and Oral Movements  Muscles of Facial Expression: None, normal  Lips and Perioral Area: None, normal  Jaw: None, normal  Tongue: None, normal  Extremity Movements  Upper (arms, wrists, hands, fingers): None, normal  Lower (legs, knees, ankles, toes): None, normal  Trunk Movements  Neck, shoulders, hips: None, normal  Overall Severity  Severity of abnormal movements (max 4): 0  Incapacitation due to abnormal movements: None, normal  Patient's awareness of abnormal movements (rate only patient's report): Aware, no distress  Dental Status  Current problems with teeth and/or dentures?: No  Does patient usually wear dentures?: No      PHQ-9 Depression Screening  Little interest or pleasure in doing things? Several days   Feeling down, depressed, or hopeless? Several days    Trouble falling or staying asleep, or sleeping too much? Over half   Feeling tired or having little energy? Several days   Poor appetite or overeating? Over half   Feeling bad about yourself - or that you are a failure or have let yourself or your family down? Several days   Trouble concentrating on things, such as reading the newspaper or watching television? Over half   Moving or speaking so slowly that other people could have noticed? Or the opposite - being so fidgety or restless that you have been moving around a lot more than usual? Not at all   Thoughts that you would be better off dead, or of hurting yourself in some way? Not at all   PHQ-9 Total Score 10   If you checked off any problems, how difficult have these problems made it for you to do your work, take care of things at home, or get along with other people? Somewhat difficult     PHQ-9 Score:   PHQ-9 Total Score: 10    Depression Screening:  Patient screened positive for depression based on a PHQ-9 score of 10 on 2/18/2025. Follow-up recommendations include: Prescribed antidepressant medication treatment and Suicide Risk Assessment performed.        TAMANNA-7  Over the last two weeks, how often have you been bothered by the following problems?  Feeling nervous, anxious or on edge: More than half the days  Not being able to stop or control worrying: More than half the days  Worrying too much about different things: More than half the days  Trouble Relaxing: More than half the days  Being so restless that it is hard to sit still: Several days  Becoming easily annoyed or irritable: More than half the days  Feeling afraid as if something awful might happen: Several days  TAMANNA 7 Total Score: 12  If you checked any problems, how difficult have these problems made it for you to do your work, take care of things at home, or get along with other people: Somewhat difficult      ADHD  Screening for Adults With ADHD - (1-6)  1. How often do you have trouble  wrapping up the final details of a project, once the challenging parts have been done?: Often  2. How often do you have difficulty getting things in order when you have to do a task that requires organization?: Often  3. How often do you have problems remembering appointments or obligations : Sometimes  4. When you have a task that requires a lot of thought, how often do you avoid or delay getting started ?: Often  5. How often do you fidget or squirm with your hands or feet when you have to sit down for a long time?: Very Often  6. How often do you feel overly active and compelled to do things, like you were driven by a motor?: Rarely  7. How often do you make careless mistakes when you have to work on a boring or difficult project?: Sometimes  8. How often do have difficulty keeping your attention when you are doing boring or repetitive work?: Sometimes  9. How often do you have difficulty concentrating on what people say to you, even when they are speaking to you: Rarely  10.How often do you misplace or have difficulty finding things at home or at work?: Sometimes  11.How often are you distracted by activity or noise around you?: Often  12.How often do you leave your seat in meetings or other situations in which you are expected to remain seated?: Rarely  13.How often do you feel restless or fidgety?: Often  14.How often do you have difficulty unwinding and relaxing when you have time to yourself?: Rarely  15.How often do you find yourself talking too much when you are in social situations?: Sometimes  16.When you’re in a conversation, how often do you find yourself finishing the sentences of the people you are talking to, before they can finish them themselves?: Rarely  17.How often do you have difficulty waiting your turn in situations when turn taking is required?: Rarely  18.How often do you interrupt others when they are busy?: Rarely    A psychological evaluation was conducted in order to assess past and  current level of functioning. Areas assessed included, but were not limited to: perception of social support, perception of ability to face and deal with challenges in life (positive functioning), anxiety symptoms, depressive symptoms, perspective on beliefs/belief system, coping skills for stress, intelligence level,  Therapeutic rapport was established. Interventions conducted today were geared towards incorporating medication management along with support for continued therapy. Education was also provided as to the med management with this provider and what to expect in subsequent sessions.      We discussed risks, benefits, goals and side effects of the above medication and the patient was agreeable with the plan. Patient was educated on the importance of compliance with treatment and follow-up appointments. Patient is aware to contact the [facility] with any worsening of symptoms. To call for questions or concerns and return early if necessary. Patent is agreeable to go to the Emergency Department or call 911 should they begin SI/HI.    MEDS ORDERED DURING VISIT:  New Medications Ordered This Visit   Medications    DULoxetine (CYMBALTA) 30 MG capsule     Sig: Take 1 capsule by mouth Daily.     Dispense:  30 capsule     Refill:  2    busPIRone (BUSPAR) 7.5 MG tablet     Sig: Take 1 tablet by mouth 2 (Two) Times a Day.     Dispense:  60 tablet     Refill:  2    doxepin (SINEquan) 100 MG capsule     Sig: Take 1 capsule by mouth Every Night.     Dispense:  30 capsule     Refill:  2    QUEtiapine (SEROquel) 100 MG tablet     Sig: Take 1 tablet by mouth Every Night.     Dispense:  30 tablet     Refill:  2    prazosin (MINIPRESS) 5 MG capsule     Sig: Take 2 capsules by mouth Every Night.     Dispense:  60 capsule     Refill:  2    buPROPion XL (Wellbutrin XL) 150 MG 24 hr tablet     Sig: Take 1 tablet by mouth Every Morning.     Dispense:  30 tablet     Refill:  2         Follow Up   Return in about 3 months  (around 5/18/2025).  Patient was given instructions and counseling regarding her condition or for health maintenance advice. Please see specific information pulled into the AVS if appropriate.     TREATMENT PLAN/GOALS: Continue supportive psychotherapy efforts and medications as indicated. Treatment and medication options discussed during today's visit. Patient acknowledged and verbally consented to continue with current treatment plan and was educated on the importance of compliance with treatment and follow-up appointments.    MEDICATION ISSUES:  Discussed medication options and treatment plan of prescribed medication as well as the risks, benefits, and side effects including potential falls, possible impaired driving and metabolic adversities among others. Patient is agreeable to call the office with any worsening of symptoms or onset of side effects. Patient is agreeable to call 911 or go to the nearest ER should he/she begin having SI/HI.        [facility]  [address] [place] [id number]  [phone]    February 18, 2025 08:52 EST

## 2025-03-11 DIAGNOSIS — F51.01 PRIMARY INSOMNIA: ICD-10-CM

## 2025-03-11 RX ORDER — ZOLPIDEM TARTRATE 5 MG/1
TABLET ORAL
Qty: 30 TABLET | Refills: 2 | Status: SHIPPED | OUTPATIENT
Start: 2025-03-11

## 2025-03-31 RX ORDER — ALBUTEROL SULFATE 90 UG/1
2 INHALANT RESPIRATORY (INHALATION) EVERY 6 HOURS PRN
Qty: 18 G | Refills: 6 | Status: SHIPPED | OUTPATIENT
Start: 2025-03-31

## 2025-05-07 DIAGNOSIS — F41.1 GENERALIZED ANXIETY DISORDER: ICD-10-CM

## 2025-05-07 DIAGNOSIS — F90.2 ATTENTION DEFICIT HYPERACTIVITY DISORDER (ADHD), COMBINED TYPE: ICD-10-CM

## 2025-05-07 RX ORDER — DEXTROAMPHETAMINE SACCHARATE, AMPHETAMINE ASPARTATE, DEXTROAMPHETAMINE SULFATE AND AMPHETAMINE SULFATE 7.5; 7.5; 7.5; 7.5 MG/1; MG/1; MG/1; MG/1
TABLET ORAL
Qty: 60 TABLET | Refills: 0 | Status: CANCELLED | OUTPATIENT
Start: 2025-05-07

## 2025-05-07 RX ORDER — GABAPENTIN 800 MG/1
800 TABLET ORAL 3 TIMES DAILY
Qty: 90 TABLET | OUTPATIENT
Start: 2025-05-07

## 2025-05-07 RX ORDER — GABAPENTIN 800 MG/1
800 TABLET ORAL 3 TIMES DAILY
Qty: 90 TABLET | Refills: 2 | Status: SHIPPED | OUTPATIENT
Start: 2025-05-07

## 2025-05-07 RX ORDER — DEXTROAMPHETAMINE SACCHARATE, AMPHETAMINE ASPARTATE, DEXTROAMPHETAMINE SULFATE AND AMPHETAMINE SULFATE 7.5; 7.5; 7.5; 7.5 MG/1; MG/1; MG/1; MG/1
TABLET ORAL
Qty: 60 TABLET | Refills: 0 | Status: SHIPPED | OUTPATIENT
Start: 2025-05-07

## 2025-05-07 RX ORDER — GABAPENTIN 800 MG/1
800 TABLET ORAL 3 TIMES DAILY
Qty: 90 TABLET | Refills: 2 | Status: CANCELLED | OUTPATIENT
Start: 2025-05-07

## 2025-05-07 NOTE — TELEPHONE ENCOUNTER
The gabapentin will need to go to [facility] pharmacy and she is also needing a refill on her adderral but needs that sent vinny in [place].

## 2025-05-21 ENCOUNTER — OFFICE VISIT (OUTPATIENT)
Dept: BEHAVIORAL HEALTH | Facility: CLINIC | Age: 45
End: 2025-05-21
Payer: MEDICARE

## 2025-05-21 VITALS
SYSTOLIC BLOOD PRESSURE: 132 MMHG | WEIGHT: 164 LBS | BODY MASS INDEX: 27.32 KG/M2 | OXYGEN SATURATION: 97 % | HEIGHT: 65 IN | DIASTOLIC BLOOD PRESSURE: 88 MMHG | HEART RATE: 120 BPM

## 2025-05-21 DIAGNOSIS — F33.1 MODERATE EPISODE OF RECURRENT MAJOR DEPRESSIVE DISORDER: ICD-10-CM

## 2025-05-21 DIAGNOSIS — F51.5 NIGHTMARES: ICD-10-CM

## 2025-05-21 DIAGNOSIS — F41.1 GENERALIZED ANXIETY DISORDER: Primary | ICD-10-CM

## 2025-05-21 DIAGNOSIS — F90.2 ATTENTION DEFICIT HYPERACTIVITY DISORDER (ADHD), COMBINED TYPE: ICD-10-CM

## 2025-05-21 DIAGNOSIS — F51.01 PRIMARY INSOMNIA: ICD-10-CM

## 2025-05-21 PROCEDURE — 99214 OFFICE O/P EST MOD 30 MIN: CPT

## 2025-05-21 PROCEDURE — 1160F RVW MEDS BY RX/DR IN RCRD: CPT

## 2025-05-21 PROCEDURE — 1159F MED LIST DOCD IN RCRD: CPT

## 2025-05-21 RX ORDER — QUETIAPINE FUMARATE 100 MG/1
100 TABLET, FILM COATED ORAL NIGHTLY
Qty: 30 TABLET | Refills: 2 | Status: SHIPPED | OUTPATIENT
Start: 2025-05-21

## 2025-05-21 RX ORDER — PRAZOSIN HYDROCHLORIDE 5 MG/1
10 CAPSULE ORAL NIGHTLY
Qty: 60 CAPSULE | Refills: 2 | Status: SHIPPED | OUTPATIENT
Start: 2025-05-21

## 2025-05-21 RX ORDER — DOXEPIN HYDROCHLORIDE 100 MG/1
100 CAPSULE ORAL NIGHTLY
Qty: 30 CAPSULE | Refills: 2 | Status: SHIPPED | OUTPATIENT
Start: 2025-05-21

## 2025-05-21 RX ORDER — DULOXETIN HYDROCHLORIDE 30 MG/1
30 CAPSULE, DELAYED RELEASE ORAL DAILY
Qty: 30 CAPSULE | Refills: 2 | Status: SHIPPED | OUTPATIENT
Start: 2025-05-21

## 2025-05-21 RX ORDER — BUPROPION HYDROCHLORIDE 150 MG/1
150 TABLET ORAL EVERY MORNING
Qty: 30 TABLET | Refills: 2 | Status: SHIPPED | OUTPATIENT
Start: 2025-05-21

## 2025-05-21 RX ORDER — ZOLPIDEM TARTRATE 5 MG/1
5 TABLET ORAL NIGHTLY PRN
Qty: 30 TABLET | Refills: 2 | Status: SHIPPED | OUTPATIENT
Start: 2025-05-21

## 2025-05-21 RX ORDER — BUSPIRONE HYDROCHLORIDE 7.5 MG/1
7.5 TABLET ORAL 2 TIMES DAILY
Qty: 60 TABLET | Refills: 2 | Status: SHIPPED | OUTPATIENT
Start: 2025-05-21

## 2025-05-21 NOTE — PROGRESS NOTES
Follow Up Office Visit    Patient Name: Audrey Alonso  : 1980   MRN: [id number]   Care Team: Patient Care Team:  David Fleming DO as PCP - General (Internal Medicine)  Nathalia Anne APRN as Nurse Practitioner (Behavioral Health)         Chief Complaint:    Chief Complaint   Patient presents with    ADHD    Depression    Sleeping Problem    Anxiety    Med Management       History of Present Illness: Audrey Alonso is a 45 y.o. female who is here today for a medication management follow up.  Patient reports that overall medication continues to be effective.  She feels that her focus and concentration are doing good and her mood and anxiety have been managed well.  She also reports that she is sleeping well. She did not see the need to make any changes and did not have any medication concerns at today's visit. She denies SI/HI today.     The following portion of the patient's history were reviewed and updated appropriately: allergies, current and past medications, family history, medical history and social history. SACHIN reviewed and appropriate.   Subjective   Review of Systems:    Review of Systems   Respiratory: Negative.     Cardiovascular: Negative.  Negative for chest pain and palpitations.   Neurological: Negative.    Psychiatric/Behavioral:  Positive for sleep disturbance and stress.    All other systems reviewed and are negative.      Current Medications:   Current Outpatient Medications   Medication Sig Dispense Refill    acetaminophen (TYLENOL) 500 MG tablet Take 2 tablets by mouth Every 8 (Eight) Hours.      albuterol sulfate  (90 Base) MCG/ACT inhaler Inhale 2 puffs Every 6 (Six) Hours As Needed for Wheezing. 18 g 6    amphetamine-dextroamphetamine (Adderall) 30 MG tablet Take 30 mg orally every morning and afternoon 60 tablet 0    buPROPion XL (Wellbutrin XL) 150 MG 24 hr tablet Take 1 tablet by mouth Every Morning. 30 tablet 2    busPIRone (BUSPAR) 7.5 MG tablet Take  "1 tablet by mouth 2 (Two) Times a Day. 60 tablet 2    doxepin (SINEquan) 100 MG capsule Take 1 capsule by mouth Every Night. 30 capsule 2    DULoxetine (CYMBALTA) 30 MG capsule Take 1 capsule by mouth Daily. 30 capsule 2    fluticasone (FLONASE) 50 MCG/ACT nasal spray INSERT 2 SPRAYS INTO THE NOSTRIL DAILY 16 g 2    gabapentin (NEURONTIN) 800 MG tablet Take 1 tablet by mouth 3 (Three) Times a Day. 90 tablet 2    methadone (DOLOPHINE) 10 MG tablet Take 12.5 tablets by mouth Daily [facility]ira Elaine @ [facility] ([phone]) verified dose as 125 mg daily,      prazosin (MINIPRESS) 5 MG capsule Take 2 capsules by mouth Every Night. 60 capsule 2    QUEtiapine (SEROquel) 100 MG tablet Take 1 tablet by mouth Every Night. 30 tablet 2    zolpidem (AMBIEN) 5 MG tablet Take 1 tablet by mouth At Night As Needed for Sleep. 30 tablet 2     No current facility-administered medications for this visit.       Mental Status Exam:   Hygiene:   good  Cooperation:  Cooperative  Eye Contact:  Good  Psychomotor Behavior:  Appropriate  Affect:  Appropriate  Mood: normal  Speech:  Normal  Thought Process:  Goal directed and Linear  Thought Content:  Normal and Mood congruent  Suicidal:  None  Homicidal:  None  Hallucinations:  None  Delusion:  None  Memory:  Intact  Orientation:  Person, Place, Time, and Situation  Reliability:  good  Insight:  Good  Judgement:  Good  Impulse Control:  Good  Physical/Medical Issues:  Yes see chart       Objective   Vital Signs:   /88   Pulse 120   Ht 165.1 cm (65\")   Wt 74.4 kg (164 lb)   SpO2 97%   BMI 27.29 kg/m²         Lab Results:   Lab Results   Component Value Date    WBC 4.79 11/04/2024    HGB 12.6 11/04/2024    HCT 38.4 11/04/2024    MCV 91.2 11/04/2024     11/04/2024        Lab Results   Component Value Date    GLUCOSE 87 11/04/2024    BUN 12 11/04/2024    CREATININE 0.93 11/04/2024     11/04/2024    K 4.6 11/04/2024     11/04/2024    CALCIUM 8.9 " 11/04/2024    PROTEINTOT 6.6 11/04/2024    ALBUMIN 4.0 11/04/2024    ALT 13 11/04/2024    AST 17 11/04/2024    ALKPHOS 73 11/04/2024    BILITOT <0.2 11/04/2024    GLOB 2.6 11/04/2024    AGRATIO 1.5 11/04/2024    BCR 12.9 11/04/2024    ANIONGAP 15.8 (H) 07/16/2022    EGFR 77.9 11/04/2024        Lab Results   Component Value Date    CHLPL 163 11/04/2024    TRIG 136 11/04/2024    HDL 37 (L) 11/04/2024     (H) 11/04/2024        Lab Results   Component Value Date    HGBA1C 5.40 11/16/2023        Lab Results   Component Value Date    TSH 2.700 11/04/2024            Assessment / Plan    Diagnoses and all orders for this visit:    1. Generalized anxiety disorder (Primary)  -     busPIRone (BUSPAR) 7.5 MG tablet; Take 1 tablet by mouth 2 (Two) Times a Day.  Dispense: 60 tablet; Refill: 2  -     doxepin (SINEquan) 100 MG capsule; Take 1 capsule by mouth Every Night.  Dispense: 30 capsule; Refill: 2  -     DULoxetine (CYMBALTA) 30 MG capsule; Take 1 capsule by mouth Daily.  Dispense: 30 capsule; Refill: 2    2. Moderate episode of recurrent major depressive disorder  -     buPROPion XL (Wellbutrin XL) 150 MG 24 hr tablet; Take 1 tablet by mouth Every Morning.  Dispense: 30 tablet; Refill: 2  -     doxepin (SINEquan) 100 MG capsule; Take 1 capsule by mouth Every Night.  Dispense: 30 capsule; Refill: 2  -     DULoxetine (CYMBALTA) 30 MG capsule; Take 1 capsule by mouth Daily.  Dispense: 30 capsule; Refill: 2    3. Primary insomnia  -     doxepin (SINEquan) 100 MG capsule; Take 1 capsule by mouth Every Night.  Dispense: 30 capsule; Refill: 2  -     QUEtiapine (SEROquel) 100 MG tablet; Take 1 tablet by mouth Every Night.  Dispense: 30 tablet; Refill: 2  -     zolpidem (AMBIEN) 5 MG tablet; Take 1 tablet by mouth At Night As Needed for Sleep.  Dispense: 30 tablet; Refill: 2    4. Nightmares  -     doxepin (SINEquan) 100 MG capsule; Take 1 capsule by mouth Every Night.  Dispense: 30 capsule; Refill: 2  -     prazosin  (MINIPRESS) 5 MG capsule; Take 2 capsules by mouth Every Night.  Dispense: 60 capsule; Refill: 2  -     QUEtiapine (SEROquel) 100 MG tablet; Take 1 tablet by mouth Every Night.  Dispense: 30 tablet; Refill: 2    5. Attention deficit hyperactivity disorder (ADHD), combined type     Patient appears to be doing well on current medication. No issues reported and no indication for change. Will continue medication as ordered.     History and physical exam exhibit continued safe and appropriate use of controlled substance.    As part of patient's treatment plan I am prescribing a controlled substance.  The patient has been made aware of the appropriate use of such medications, including potential risk of somnolence, limited ability to drive and/or work safely, and potential for dependence and/or overdose.  It has also been made clear that these medications are for use by this patient only, without concomitant use of alcohol or other substances, unless prescribed.    Patient/guardian has completed a prescribing agreement detailing terms of continued prescribing of controlled substances, including monitoring SACHIN reports, urine drug screening, and pill counts if necessary.  Patient is aware that inappropriate use will result in cessation of prescribing such medications.    At this time, patient is being prescribed an antipsychotic medication.  The risks, benefits and potential side effects of these medications have been reviewed with the patient/guardian.  I have also discussed with the patient/guardian that while on these medications the following labs should be drawn yearly.  Those labs include, a CBC, a CMP, a lipid panel, A1c, and Thyroid labs.  Encouraged patient/guardian to discuss these labs with their primary care provider.      AIMS  Facial and Oral Movements  Muscles of Facial Expression: None, normal  Lips and Perioral Area: None, normal  Jaw: None, normal  Tongue: None, normal  Extremity Movements  Upper (arms,  wrists, hands, fingers): None, normal  Lower (legs, knees, ankles, toes): None, normal  Trunk Movements  Neck, shoulders, hips: None, normal  Overall Severity  Severity of abnormal movements (max 4): 0  Incapacitation due to abnormal movements: None, normal  Patient's awareness of abnormal movements (rate only patient's report): No Awareness  Dental Status  Current problems with teeth and/or dentures?: No  Does patient usually wear dentures?: No      PHQ-9 Depression Screening  Little interest or pleasure in doing things? Several days   Feeling down, depressed, or hopeless? Several days   Trouble falling or staying asleep, or sleeping too much? Almost all   Feeling tired or having little energy? Over half   Poor appetite or overeating? Over half   Feeling bad about yourself - or that you are a failure or have let yourself or your family down? Several days   Trouble concentrating on things, such as reading the newspaper or watching television? Several days   Moving or speaking so slowly that other people could have noticed? Or the opposite - being so fidgety or restless that you have been moving around a lot more than usual? Not at all   Thoughts that you would be better off dead, or of hurting yourself in some way? Not at all   PHQ-9 Total Score 11   If you checked off any problems, how difficult have these problems made it for you to do your work, take care of things at home, or get along with other people? Somewhat difficult     PHQ-9 Score:   PHQ-9 Total Score: 11    Depression Screening:  Patient screened positive for depression based on a PHQ-9 score of 11 on 5/21/2025. Follow-up recommendations include: Prescribed antidepressant medication treatment, Suicide Risk Assessment performed, and Continue with medication management.        TAMANNA-7  Over the last two weeks, how often have you been bothered by the following problems?  Feeling nervous, anxious or on edge: More than half the days  Not being able to stop or  control worrying: More than half the days  Worrying too much about different things: More than half the days  Trouble Relaxing: Nearly every day  Being so restless that it is hard to sit still: Several days  Becoming easily annoyed or irritable: More than half the days  Feeling afraid as if something awful might happen: Several days  TAMANNA 7 Total Score: 13  If you checked any problems, how difficult have these problems made it for you to do your work, take care of things at home, or get along with other people: Somewhat difficult      ADHD  Screening for Adults With ADHD - (1-6)  1. How often do you have trouble wrapping up the final details of a project, once the challenging parts have been done?: Often  2. How often do you have difficulty getting things in order when you have to do a task that requires organization?: Often  3. How often do you have problems remembering appointments or obligations : Sometimes  4. When you have a task that requires a lot of thought, how often do you avoid or delay getting started ?: Sometimes  5. How often do you fidget or squirm with your hands or feet when you have to sit down for a long time?: Often  6. How often do you feel overly active and compelled to do things, like you were driven by a motor?: Rarely  7. How often do you make careless mistakes when you have to work on a boring or difficult project?: Sometimes  8. How often do have difficulty keeping your attention when you are doing boring or repetitive work?: Sometimes  9. How often do you have difficulty concentrating on what people say to you, even when they are speaking to you: Rarely  10.How often do you misplace or have difficulty finding things at home or at work?: Often  11.How often are you distracted by activity or noise around you?: Sometimes  12.How often do you leave your seat in meetings or other situations in which you are expected to remain seated?: Rarely  13.How often do you feel restless or fidgety?:  Sometimes  14.How often do you have difficulty unwinding and relaxing when you have time to yourself?: Sometimes  15.How often do you find yourself talking too much when you are in social situations?: Rarely  16.When you’re in a conversation, how often do you find yourself finishing the sentences of the people you are talking to, before they can finish them themselves?: Rarely  17.How often do you have difficulty waiting your turn in situations when turn taking is required?: Rarely  18.How often do you interrupt others when they are busy?: Often    A psychological evaluation was conducted in order to assess past and current level of functioning. Areas assessed included, but were not limited to: perception of social support, perception of ability to face and deal with challenges in life (positive functioning), anxiety symptoms, depressive symptoms, perspective on beliefs/belief system, coping skills for stress, intelligence level,  Therapeutic rapport was established. Interventions conducted today were geared towards incorporating medication management along with support for continued therapy. Education was also provided as to the med management with this provider and what to expect in subsequent sessions.      We discussed risks, benefits, goals and side effects of the above medication and the patient was agreeable with the plan. Patient was educated on the importance of compliance with treatment and follow-up appointments. Patient is aware to contact the [facility] with any worsening of symptoms. To call for questions or concerns and return early if necessary. Patent is agreeable to go to the Emergency Department or call 911 should they begin SI/HI.    MEDS ORDERED DURING VISIT:  New Medications Ordered This Visit   Medications    buPROPion XL (Wellbutrin XL) 150 MG 24 hr tablet     Sig: Take 1 tablet by mouth Every Morning.     Dispense:  30 tablet     Refill:  2    busPIRone (BUSPAR) 7.5 MG tablet     Sig:  Take 1 tablet by mouth 2 (Two) Times a Day.     Dispense:  60 tablet     Refill:  2    doxepin (SINEquan) 100 MG capsule     Sig: Take 1 capsule by mouth Every Night.     Dispense:  30 capsule     Refill:  2    DULoxetine (CYMBALTA) 30 MG capsule     Sig: Take 1 capsule by mouth Daily.     Dispense:  30 capsule     Refill:  2    prazosin (MINIPRESS) 5 MG capsule     Sig: Take 2 capsules by mouth Every Night.     Dispense:  60 capsule     Refill:  2    QUEtiapine (SEROquel) 100 MG tablet     Sig: Take 1 tablet by mouth Every Night.     Dispense:  30 tablet     Refill:  2    zolpidem (AMBIEN) 5 MG tablet     Sig: Take 1 tablet by mouth At Night As Needed for Sleep.     Dispense:  30 tablet     Refill:  2         Follow Up   Return in about 3 months (around 8/21/2025).  Patient was given instructions and counseling regarding her condition or for health maintenance advice. Please see specific information pulled into the AVS if appropriate.     TREATMENT PLAN/GOALS: Continue supportive psychotherapy efforts and medications as indicated. Treatment and medication options discussed during today's visit. Patient acknowledged and verbally consented to continue with current treatment plan and was educated on the importance of compliance with treatment and follow-up appointments.    MEDICATION ISSUES:  Discussed medication options and treatment plan of prescribed medication as well as the risks, benefits, and side effects including potential falls, possible impaired driving and metabolic adversities among others. Patient is agreeable to call the office with any worsening of symptoms or onset of side effects. Patient is agreeable to call 911 or go to the nearest ER should he/she begin having SI/HI.        [facility]  [address] [place] [address] [address] BRETT YARBROUGH [id number]  [phone]    May 21, 2025 09:40 EDT

## 2025-07-29 DIAGNOSIS — F41.1 GENERALIZED ANXIETY DISORDER: ICD-10-CM

## 2025-07-29 RX ORDER — GABAPENTIN 800 MG/1
800 TABLET ORAL 3 TIMES DAILY
Qty: 90 TABLET | Refills: 2 | Status: SHIPPED | OUTPATIENT
Start: 2025-07-29

## 2025-08-01 DIAGNOSIS — F90.2 ATTENTION DEFICIT HYPERACTIVITY DISORDER (ADHD), COMBINED TYPE: ICD-10-CM

## 2025-08-01 DIAGNOSIS — J30.2 SEASONAL ALLERGIES: ICD-10-CM

## 2025-08-04 DIAGNOSIS — J30.2 SEASONAL ALLERGIES: ICD-10-CM

## 2025-08-04 RX ORDER — FLUTICASONE PROPIONATE 50 MCG
2 SPRAY, SUSPENSION (ML) NASAL DAILY
Qty: 16 G | Refills: 1 | Status: SHIPPED | OUTPATIENT
Start: 2025-08-04 | End: 2025-08-04

## 2025-08-04 RX ORDER — FLUTICASONE PROPIONATE 50 MCG
2 SPRAY, SUSPENSION (ML) NASAL DAILY
Qty: 16 G | Refills: 1 | Status: SHIPPED | OUTPATIENT
Start: 2025-08-04

## 2025-08-04 RX ORDER — DEXTROAMPHETAMINE SACCHARATE, AMPHETAMINE ASPARTATE, DEXTROAMPHETAMINE SULFATE AND AMPHETAMINE SULFATE 7.5; 7.5; 7.5; 7.5 MG/1; MG/1; MG/1; MG/1
TABLET ORAL
Qty: 60 TABLET | Refills: 0 | Status: SHIPPED | OUTPATIENT
Start: 2025-08-04

## 2025-08-19 DIAGNOSIS — F51.01 PRIMARY INSOMNIA: ICD-10-CM

## 2025-08-19 DIAGNOSIS — F51.5 NIGHTMARES: ICD-10-CM

## 2025-08-19 DIAGNOSIS — F41.1 GENERALIZED ANXIETY DISORDER: ICD-10-CM

## 2025-08-19 DIAGNOSIS — F33.1 MODERATE EPISODE OF RECURRENT MAJOR DEPRESSIVE DISORDER: ICD-10-CM

## 2025-08-19 RX ORDER — QUETIAPINE FUMARATE 100 MG/1
100 TABLET, FILM COATED ORAL NIGHTLY
Qty: 90 TABLET | Refills: 0 | Status: SHIPPED | OUTPATIENT
Start: 2025-08-19

## 2025-08-19 RX ORDER — DOXEPIN HYDROCHLORIDE 100 MG/1
CAPSULE ORAL
Qty: 90 CAPSULE | Refills: 0 | Status: SHIPPED | OUTPATIENT
Start: 2025-08-19

## 2025-08-19 RX ORDER — DULOXETIN HYDROCHLORIDE 30 MG/1
30 CAPSULE, DELAYED RELEASE ORAL DAILY
Qty: 90 CAPSULE | Refills: 0 | Status: SHIPPED | OUTPATIENT
Start: 2025-08-19 | End: 2025-08-25

## 2025-08-19 RX ORDER — PRAZOSIN HYDROCHLORIDE 5 MG/1
10 CAPSULE ORAL NIGHTLY
Qty: 180 CAPSULE | Refills: 0 | Status: SHIPPED | OUTPATIENT
Start: 2025-08-19

## 2025-08-19 RX ORDER — BUPROPION HYDROCHLORIDE 150 MG/1
150 TABLET ORAL EVERY MORNING
Qty: 90 TABLET | Refills: 0 | Status: SHIPPED | OUTPATIENT
Start: 2025-08-19 | End: 2025-08-25

## 2025-08-19 RX ORDER — BUSPIRONE HYDROCHLORIDE 7.5 MG/1
7.5 TABLET ORAL 2 TIMES DAILY
Qty: 180 TABLET | Refills: 0 | Status: SHIPPED | OUTPATIENT
Start: 2025-08-19

## 2025-08-25 ENCOUNTER — OFFICE VISIT (OUTPATIENT)
Dept: BEHAVIORAL HEALTH | Facility: CLINIC | Age: 45
End: 2025-08-25
Payer: MEDICARE

## 2025-08-25 VITALS
WEIGHT: 165 LBS | BODY MASS INDEX: 27.49 KG/M2 | SYSTOLIC BLOOD PRESSURE: 120 MMHG | OXYGEN SATURATION: 98 % | HEIGHT: 65 IN | HEART RATE: 97 BPM | DIASTOLIC BLOOD PRESSURE: 70 MMHG

## 2025-08-25 DIAGNOSIS — F90.2 ATTENTION DEFICIT HYPERACTIVITY DISORDER (ADHD), COMBINED TYPE: ICD-10-CM

## 2025-08-25 DIAGNOSIS — F51.01 PRIMARY INSOMNIA: Primary | ICD-10-CM

## 2025-08-25 DIAGNOSIS — F33.1 MODERATE EPISODE OF RECURRENT MAJOR DEPRESSIVE DISORDER: ICD-10-CM

## 2025-08-25 DIAGNOSIS — F41.1 GENERALIZED ANXIETY DISORDER: ICD-10-CM

## 2025-08-25 PROCEDURE — 1159F MED LIST DOCD IN RCRD: CPT

## 2025-08-25 PROCEDURE — 99214 OFFICE O/P EST MOD 30 MIN: CPT

## 2025-08-25 PROCEDURE — 1160F RVW MEDS BY RX/DR IN RCRD: CPT

## 2025-08-25 RX ORDER — DEXTROAMPHETAMINE SACCHARATE, AMPHETAMINE ASPARTATE, DEXTROAMPHETAMINE SULFATE AND AMPHETAMINE SULFATE 7.5; 7.5; 7.5; 7.5 MG/1; MG/1; MG/1; MG/1
TABLET ORAL
Qty: 60 TABLET | Refills: 0 | Status: CANCELLED | OUTPATIENT
Start: 2025-08-25

## 2025-08-25 RX ORDER — ZOLPIDEM TARTRATE 5 MG/1
5 TABLET ORAL NIGHTLY PRN
Qty: 30 TABLET | Refills: 2 | Status: SHIPPED | OUTPATIENT
Start: 2025-08-25

## 2025-08-25 RX ORDER — DEXTROAMPHETAMINE SACCHARATE, AMPHETAMINE ASPARTATE, DEXTROAMPHETAMINE SULFATE AND AMPHETAMINE SULFATE 7.5; 7.5; 7.5; 7.5 MG/1; MG/1; MG/1; MG/1
TABLET ORAL
Qty: 60 TABLET | Refills: 0 | Status: SHIPPED | OUTPATIENT
Start: 2025-08-25

## 2025-08-25 RX ORDER — GABAPENTIN 800 MG/1
800 TABLET ORAL 3 TIMES DAILY
Qty: 90 TABLET | Refills: 2 | Status: SHIPPED | OUTPATIENT
Start: 2025-08-25